# Patient Record
Sex: FEMALE | Race: WHITE | NOT HISPANIC OR LATINO | Employment: UNEMPLOYED | URBAN - METROPOLITAN AREA
[De-identification: names, ages, dates, MRNs, and addresses within clinical notes are randomized per-mention and may not be internally consistent; named-entity substitution may affect disease eponyms.]

---

## 2017-01-04 ENCOUNTER — OFFICE VISIT (OUTPATIENT)
Dept: URGENT CARE | Facility: URGENT CARE | Age: 37
End: 2017-01-04
Payer: COMMERCIAL

## 2017-01-04 VITALS
BODY MASS INDEX: 33.39 KG/M2 | HEART RATE: 72 BPM | TEMPERATURE: 98.3 F | WEIGHT: 193.2 LBS | SYSTOLIC BLOOD PRESSURE: 117 MMHG | DIASTOLIC BLOOD PRESSURE: 77 MMHG | OXYGEN SATURATION: 99 %

## 2017-01-04 DIAGNOSIS — H66.002 ACUTE SUPPURATIVE OTITIS MEDIA OF LEFT EAR WITHOUT SPONTANEOUS RUPTURE OF TYMPANIC MEMBRANE, RECURRENCE NOT SPECIFIED: Primary | ICD-10-CM

## 2017-01-04 PROCEDURE — 99213 OFFICE O/P EST LOW 20 MIN: CPT | Performed by: NURSE PRACTITIONER

## 2017-01-04 NOTE — MR AVS SNAPSHOT
After Visit Summary   1/4/2017    Brit Romano    MRN: [id number]           Patient Information     Date Of Birth          1980        Visit Information        Provider Department      1/4/2017 3:35 PM Em Sherman NP [facility]        Today's Diagnoses     Acute suppurative otitis media of left ear without spontaneous rupture of tympanic membrane, recurrence not specified    -  1       Care Instructions      Otitis Media (Middle-Ear Infection) in Adults  Otitis media is another name for a middle-ear infection. It means an infection behind your eardrum. This kind of ear infection can happen after any condition that keeps fluid from draining from the middle ear. These conditions include allergies, a cold, a sore throat, or a respiratory infection.  Middle-ear infections are common in children, but they can also happen in adults. An ear infection in an adult may mean a more serious problem than in a child. So you may need additional tests. If you have an ear infection, you should see your health care provider for treatment.  What are the types of middle-ear infections?  Infections can affect the middle ear in several ways. They are:    Acute otitis media. This middle-ear infection occurs suddenly. It causes swelling and redness. Fluid and mucus become trapped inside the ear. You can have a fever and ear pain.    Otitis media with effusion. Fluid (effusion) and mucus build up in the middle ear after the infection goes away. You may feel like your middle ear is full. This can continue for months and may affect your hearing.    Chronic otitis media with effusion. Fluid (effusion) remains in the middle ear for a long time. Or it builds up again and again, even though there is no infection. This type of middle-ear infection may be hard to treat. It may also affect your hearing.  Who is more likely to get a middle-ear infection?  You are more likely to get an ear infection if  you:    Smoke or are around someone who smokes    Have seasonal or year-round allergy symptoms    Have a cold or other upper respiratory infection  What causes a middle-ear infection?  The middle ear connects to the throat by a canal called the eustachian tube. This tube helps even out the pressure between the outer ear and the inner ear. A cold or allergy can irritate the tube or cause the area around it to swell. This can keep fluid from draining from the middle ear. The fluid builds up behind the eardrum. Bacteria and viruses can grow in this fluid. The bacteria and viruses cause the middle-ear infection.  What are the symptoms of a middle-ear infection?  Common symptoms of a middle-ear infection in adults are:    Pain in 1 or both ears    Drainage from the ear    Muffled hearing    Sore throat   You may also have a fever. Rarely, your balance can be affected.  These symptoms may be the same as for other conditions. It s important to talk with your health care provider if you think you have a middle-ear infection. If you have a high fever, severe pain behind your ear, or paralysis in your face, see your provider as soon as you can.  How is a middle-ear infection diagnosed?  Your health care provider will take a medical history and do a physical exam. He or she will look at the outer ear and eardrum with an otoscope. The otoscope is a lighted tool that lets your provider see inside the ear. A pneumatic otoscope blows a puff of air into the ear to check how well your eardrum moves. If you eardrum doesn t move well, it may mean you have fluid behind it.  Your provider may also do a test called tympanometry. This test tells how well the middle ear is working. It can find any changes in pressure in the middle ear. Your provider may test your hearing with a tuning fork.  How is a middle-ear infection treated?  A middle-ear infection may be treated with:    Antibiotics, taken by mouth or as ear drops    Medication for  pain    Decongestants, antihistamines, or nasal steroids  Your health care provider may also have you try autoinsufflation. This helps adjust the air pressure in your ear. For this, you pinch your nose and gently exhale. This forces air back through the eustachian tube.  The exact treatment for your ear infection will depend on the type of infection you have. In general, if your symptoms don t get better in 48 to 72 hours, contact your health care provider.  Middle-ear infections can cause long-term problems if not treated. They can lead to:    Infection in other parts of the head    Permanent hearing loss    Paralysis of a nerve in your face  If you have a middle-ear infection that doesn t get better, you may need to see an ear, nose, and throat specialist (otolaryngologist). You may need a CT scan or MRI to check for head and neck cancer.  Ear tubes  Sometimes fluid stays in the middle ear even after you take antibiotics and the infection goes away. In this case, your health care provider may suggest that a small tube be placed in your ear. The tube is put at the opening of the eardrum. The tube keeps fluid from building up and relieves pressure in the middle ear. It can also help you hear better. This surgery is called myringotomy. It is not often done in adults.  The tubes usually fall out on their own after 6 months to a year.    2495-5397 [employer]. [address] [place], [place] [address]. All rights reserved. This information is not intended as a substitute for professional medical care. Always follow your healthcare professional's instructions.              Follow-ups after your visit        Who to contact     If you have questions or need follow up information about today's clinic visit or your schedule please contact [facility] directly at [phone].  Normal or non-critical lab and imaging results will be communicated to you by MyChart, letter or phone within 4  "business days after the clinic has received the results. If you do not hear from us within 7 days, please contact the clinic through [employer] or phone. If you have a critical or abnormal lab result, we will notify you by phone as soon as possible.  Submit refill requests through [employer] or call your pharmacy and they will forward the refill request to us. Please allow 3 business days for your refill to be completed.          Additional Information About Your Visit        [employer] Information     [employer] lets you send messages to your doctor, view your test results, renew your prescriptions, schedule appointments and more. To sign up, go to [email or web address] . Click on \"Log in\" on the left side of the screen, which will take you to the Welcome page. Then click on \"Sign up Now\" on the right side of the page.     You will be asked to enter the access code listed below, as well as some personal information. Please follow the directions to create your username and password.     Your access code is: [email or web address]  Expires: 2017  3:50 PM     Your access code will  in 90 days. If you need help or a new code, please call your [facility] clinic or [phone].        Care EveryWhere ID     This is your Care EveryWhere ID. This could be used by other organizations to access your [facility] medical records  [id number]        Your Vitals Were     Pulse Temperature Pulse Oximetry             72 98.3  F (36.8  C) (Oral) 99%          Blood Pressure from Last 3 Encounters:   17 117/77   16 136/85   10/31/16 128/88    Weight from Last 3 Encounters:   17 193 lb 3.2 oz (87.635 kg)   16 191 lb 9.6 oz (86.909 kg)   10/31/16 189 lb 6.4 oz (85.911 kg)              Today, you had the following     No orders found for display         Today's Medication Changes          These changes are accurate as of: 17  3:50 PM.  If you have any questions, ask your nurse or doctor.               Start taking these " medicines.        Dose/Directions    amoxicillin-clavulanate 875-125 MG per tablet   Commonly known as:  AUGMENTIN   Used for:  Acute suppurative otitis media of left ear without spontaneous rupture of tympanic membrane, recurrence not specified   Started by:  Em Sherman NP        Dose:  1 tablet   Take 1 tablet by mouth 2 times daily for 7 days   Quantity:  14 tablet   Refills:  0            Where to get your medicines      These medications were sent to [facility]/pharmacy #[id number] - [facility] [address] [facility] [address]     Phone:  [phone]    - amoxicillin-clavulanate 875-125 MG per tablet             Primary Care Provider Office Phone # Fax #    [facility] [phone]       [facility] [address] [place] [id number]        Thank you!     Thank you for choosing [facility]  for your care. Our goal is always to provide you with excellent care. Hearing back from our patients is one way we can continue to improve our services. Please take a few minutes to complete the written survey that you may receive in the mail after your visit with us. Thank you!             Your Updated Medication List - Protect others around you: Learn how to safely use, store and throw away your medicines at www.disposemymeds.org.          This list is accurate as of: 1/4/17  3:50 PM.  Always use your most recent med list.                   Brand Name Dispense Instructions for use    amoxicillin-clavulanate 875-125 MG per tablet    AUGMENTIN    14 tablet    Take 1 tablet by mouth 2 times daily for 7 days       * cetirizine 10 MG tablet    zyrTEC     Take 1 tablet by mouth daily       * cetirizine 10 MG tablet    zyrTEC    60 tablet    Take 1 tablet (10 mg) by mouth 2 times daily       fluticasone 50 MCG/ACT spray    FLONASE    16 mL    SPRAY 2 SPRAYS INTO BOTH NOSTRILS DAILY       montelukast 10 MG tablet     SINGULAIR    30 tablet    Take 1 tablet (10 mg) by mouth At Bedtime       omeprazole 40 MG capsule    priLOSEC    30 capsule    Take 1 capsule (40 mg) by mouth daily       * Notice:  This list has 2 medication(s) that are the same as other medications prescribed for you. Read the directions carefully, and ask your doctor or other care provider to review them with you.

## 2017-01-04 NOTE — PROGRESS NOTES
SUBJECTIVE:                                                    Brit Romano is a 37 year old female who presents to clinic today for the following health issues:      Swollen glands on left side  6 days ago  Left side of throat  Pain on inside and outside of throat  Ibuprofen    No Known Allergies    Past Medical History   Diagnosis Date     NO ACTIVE PROBLEMS      Seasonal allergies          Current Outpatient Prescriptions on File Prior to Visit:  montelukast (SINGULAIR) 10 MG tablet Take 1 tablet (10 mg) by mouth At Bedtime   cetirizine (ZYRTEC) 10 MG tablet Take 1 tablet (10 mg) by mouth 2 times daily   omeprazole (PRILOSEC) 40 MG capsule Take 1 capsule (40 mg) by mouth daily   fluticasone (FLONASE) 50 MCG/ACT nasal spray SPRAY 2 SPRAYS INTO BOTH NOSTRILS DAILY   cetirizine (ZYRTEC) 10 MG tablet Take 1 tablet by mouth daily     No current facility-administered medications on file prior to visit.    Social History   Substance Use Topics     Smoking status: Never Smoker      Smokeless tobacco: Never Used     Alcohol Use: No       ROS:   Constitutional: no fevers  ENT: as above    OBJECTIVE:  /77 mmHg  Pulse 72  Temp(Src) 98.3  F (36.8  C) (Oral)  Wt 193 lb 3.2 oz (87.635 kg)  SpO2 99%   General:   awake, alert, and cooperative.  NAD.   Head: Normocephalic, atraumatic.  Eyes: Conjunctiva clear,   ENT: . Left TM is erythematous and bulging, unable to see landmarks. Right TM is grey and translucent. Ear canals are intact  Neuro: Alert and oriented - normal speech.    ASSESSMENT:    ICD-10-CM    1. Acute suppurative otitis media of left ear without spontaneous rupture of tympanic membrane, recurrence not specified H66.002 amoxicillin-clavulanate (AUGMENTIN) 875-125 MG per tablet       PLAN:     I discussed clinical findings with the patient.  Patient educational/instructional material provided including reasons for follow-up    The patient indicates understanding of these issues and agrees with the  plan.   Advised about symptoms which might herald more serious problems.    Em Sherman  [facility]  Family Nurse Practitoner

## 2017-01-04 NOTE — PATIENT INSTRUCTIONS
Otitis Media (Middle-Ear Infection) in Adults  Otitis media is another name for a middle-ear infection. It means an infection behind your eardrum. This kind of ear infection can happen after any condition that keeps fluid from draining from the middle ear. These conditions include allergies, a cold, a sore throat, or a respiratory infection.  Middle-ear infections are common in children, but they can also happen in adults. An ear infection in an adult may mean a more serious problem than in a child. So you may need additional tests. If you have an ear infection, you should see your health care provider for treatment.  What are the types of middle-ear infections?  Infections can affect the middle ear in several ways. They are:    Acute otitis media. This middle-ear infection occurs suddenly. It causes swelling and redness. Fluid and mucus become trapped inside the ear. You can have a fever and ear pain.    Otitis media with effusion. Fluid (effusion) and mucus build up in the middle ear after the infection goes away. You may feel like your middle ear is full. This can continue for months and may affect your hearing.    Chronic otitis media with effusion. Fluid (effusion) remains in the middle ear for a long time. Or it builds up again and again, even though there is no infection. This type of middle-ear infection may be hard to treat. It may also affect your hearing.  Who is more likely to get a middle-ear infection?  You are more likely to get an ear infection if you:    Smoke or are around someone who smokes    Have seasonal or year-round allergy symptoms    Have a cold or other upper respiratory infection  What causes a middle-ear infection?  The middle ear connects to the throat by a canal called the eustachian tube. This tube helps even out the pressure between the outer ear and the inner ear. A cold or allergy can irritate the tube or cause the area around it to swell. This can keep fluid from draining from  the middle ear. The fluid builds up behind the eardrum. Bacteria and viruses can grow in this fluid. The bacteria and viruses cause the middle-ear infection.  What are the symptoms of a middle-ear infection?  Common symptoms of a middle-ear infection in adults are:    Pain in 1 or both ears    Drainage from the ear    Muffled hearing    Sore throat   You may also have a fever. Rarely, your balance can be affected.  These symptoms may be the same as for other conditions. It s important to talk with your health care provider if you think you have a middle-ear infection. If you have a high fever, severe pain behind your ear, or paralysis in your face, see your provider as soon as you can.  How is a middle-ear infection diagnosed?  Your health care provider will take a medical history and do a physical exam. He or she will look at the outer ear and eardrum with an otoscope. The otoscope is a lighted tool that lets your provider see inside the ear. A pneumatic otoscope blows a puff of air into the ear to check how well your eardrum moves. If you eardrum doesn t move well, it may mean you have fluid behind it.  Your provider may also do a test called tympanometry. This test tells how well the middle ear is working. It can find any changes in pressure in the middle ear. Your provider may test your hearing with a tuning fork.  How is a middle-ear infection treated?  A middle-ear infection may be treated with:    Antibiotics, taken by mouth or as ear drops    Medication for pain    Decongestants, antihistamines, or nasal steroids  Your health care provider may also have you try autoinsufflation. This helps adjust the air pressure in your ear. For this, you pinch your nose and gently exhale. This forces air back through the eustachian tube.  The exact treatment for your ear infection will depend on the type of infection you have. In general, if your symptoms don t get better in 48 to 72 hours, contact your health care  provider.  Middle-ear infections can cause long-term problems if not treated. They can lead to:    Infection in other parts of the head    Permanent hearing loss    Paralysis of a nerve in your face  If you have a middle-ear infection that doesn t get better, you may need to see an ear, nose, and throat specialist (otolaryngologist). You may need a CT scan or MRI to check for head and neck cancer.  Ear tubes  Sometimes fluid stays in the middle ear even after you take antibiotics and the infection goes away. In this case, your health care provider may suggest that a small tube be placed in your ear. The tube is put at the opening of the eardrum. The tube keeps fluid from building up and relieves pressure in the middle ear. It can also help you hear better. This surgery is called myringotomy. It is not often done in adults.  The tubes usually fall out on their own after 6 months to a year.    2413-6028 [employer]. [address] [place] [address], [place] [address]. All rights reserved. This information is not intended as a substitute for professional medical care. Always follow your healthcare professional's instructions.

## 2017-01-04 NOTE — NURSING NOTE
"Chief Complaint   Patient presents with     Mass     left side swelling       Initial /77 mmHg  Pulse 72  Temp(Src) 98.3  F (36.8  C) (Oral)  Wt 193 lb 3.2 oz (87.635 kg)  SpO2 99% Estimated body mass index is 33.39 kg/(m^2) as calculated from the following:    Height as of 10/31/16: 5' 3.78\" (1.62 m).    Weight as of this encounter: 193 lb 3.2 oz (87.635 kg).  BP completed using cuff size: jeromy Wu MA    "

## 2017-01-19 ENCOUNTER — OFFICE VISIT (OUTPATIENT)
Dept: OTOLARYNGOLOGY | Facility: CLINIC | Age: 37
End: 2017-01-19
Payer: COMMERCIAL

## 2017-01-19 VITALS — HEIGHT: 64 IN | WEIGHT: 197.6 LBS | RESPIRATION RATE: 16 BRPM | BODY MASS INDEX: 33.73 KG/M2

## 2017-01-19 DIAGNOSIS — R07.0 THROAT PAIN: ICD-10-CM

## 2017-01-19 DIAGNOSIS — H92.02 OTALGIA OF LEFT EAR: ICD-10-CM

## 2017-01-19 DIAGNOSIS — J32.0 CHRONIC MAXILLARY SINUSITIS: Primary | ICD-10-CM

## 2017-01-19 PROCEDURE — 31575 DIAGNOSTIC LARYNGOSCOPY: CPT | Performed by: OTOLARYNGOLOGY

## 2017-01-19 PROCEDURE — 99244 OFF/OP CNSLTJ NEW/EST MOD 40: CPT | Mod: 25 | Performed by: OTOLARYNGOLOGY

## 2017-01-19 RX ORDER — FLUTICASONE PROPIONATE 50 MCG
1-2 SPRAY, SUSPENSION (ML) NASAL DAILY
Qty: 1 BOTTLE | Refills: 11 | Status: CANCELLED | OUTPATIENT
Start: 2017-01-19

## 2017-01-19 ASSESSMENT — PAIN SCALES - GENERAL: PAINLEVEL: MILD PAIN (2)

## 2017-01-19 NOTE — PROGRESS NOTES
History of Present Illness - Brit Romano is a 37 year old female who was seen in consultation at the request of Dr. Natarajan for possible chronic sinusitis. However, today she presents with the chief complaint of left ear pain as well as left upper neck pain. She was recently diagnosed with a left ear infection. She denies any hearing changes. She is able to swallow without difficulty. Sometimes she hears a popping sound in the left ear. She does have itchy and runny nose, and is being managed by [facility] for allergic rhinitis. She denies prior sinus surgery. She is able to breath through her nose easily.    Past Medical History -   Patient Active Problem List   Diagnosis     Mixed incontinence urge and stress (male)(female)     Lactose intolerance     Hypovitaminosis D     Vitamin D deficiency     Anemia      (normal spontaneous vaginal delivery)     Fatty liver disease, nonalcoholic     History of gestational diabetes     Fibromyalgia       Current Medications -   Current outpatient prescriptions:      montelukast (SINGULAIR) 10 MG tablet, Take 1 tablet (10 mg) by mouth At Bedtime, Disp: 30 tablet, Rfl: 3     cetirizine (ZYRTEC) 10 MG tablet, Take 1 tablet (10 mg) by mouth 2 times daily, Disp: 60 tablet, Rfl: 11     omeprazole (PRILOSEC) 40 MG capsule, Take 1 capsule (40 mg) by mouth daily, Disp: 30 capsule, Rfl: 5     fluticasone (FLONASE) 50 MCG/ACT nasal spray, SPRAY 2 SPRAYS INTO BOTH NOSTRILS DAILY, Disp: 16 mL, Rfl: 3     cetirizine (ZYRTEC) 10 MG tablet, Take 1 tablet by mouth daily, Disp: , Rfl: 8    Allergies - No Known Allergies    Social History -   Social History     Social History     Marital Status: Legally      Spouse Name: N/A     Number of Children: N/A     Years of Education: N/A     Occupational History     Housewife      Social History Main Topics     Smoking status: Never Smoker      Smokeless tobacco: Never Used     Alcohol Use: No     Drug Use: No     Sexual Activity:      "Partners: Male     Other Topics Concern     Not on file     Social History Narrative    How much exercise per week? none    How much calcium per day? diet       How much caffeine per day? none    How much vitamin D per day? Diet/supplement    Do you/your family wear seatbelts?  Yes    Do you/your family use safety helmets? No    Do you/your family use sunscreen? No    Do you/your family keep firearms in the home? No    Do you/your family have a smoke detector(s)? Yes        Do you feel safe in your home? Yes    Has anyone ever touched you in an unwanted manner? No     Explain     Cande Persaud, ANGELIKA    08/21/2015        Review SMA Fe    10/07/2016       Family History -   Family History   Problem Relation Age of Onset     Hypertension Mother      DIABETES Mother      KIDNEY DISEASE Mother      dialysis       Review of Systems - As per HPI and PMHx, otherwise 7 system review of the head and neck negative. 10+ system review negative.    Physical Exam  Resp 16  Ht 1.62 m (5' 3.78\")  Wt 89.631 kg (197 lb 9.6 oz)  BMI 34.15 kg/m2  General - The patient is well nourished and well developed, and appears to have good nutritional status.  Alert and oriented to person and place, answers questions and cooperates with examination appropriately.   Head and Face - Normocephalic and atraumatic, with no gross asymmetry noted of the contour of the facial features.  The facial nerve is intact, with strong symmetric movements.  Voice and Breathing - The patient was breathing comfortably without the use of accessory muscles. There was no wheezing, stridor, or stertor.  The patients voice was clear and strong, and had appropriate pitch and quality.  Ears - Bilateral pinna and EACs with normal appearing overlying skin. Tympanic membrane intact with good mobility on pneumatic otoscopy bilaterally. Bony landmarks of the ossicular chain are normal. The tympanic membranes are normal in appearance. No retraction, perforation, or " masses.  No fluid or purulence was seen in the external canal or the middle ear.   Eyes - Extraocular movements intact.  Sclera were not icteric or injected, conjunctiva were pink and moist.  Mouth - Examination of the oral cavity showed pink, healthy oral mucosa. No lesions or ulcerations noted.  The tongue was mobile and midline, and the dentition were in good condition.  Palpation of the left TMJ demonstrates consistent crepitus with jaw movement.  Throat - The walls of the oropharynx were smooth, pink, moist, symmetric, and had no lesions or ulcerations.  The tonsillar pillars and soft palate were symmetric.  The uvula was midline on elevation.  Neck - Normal midline excursion of the laryngotracheal complex during swallowing.  Full range of motion on passive movement.  Palpation of the occipital, submental, submandibular, internal jugular chain, and supraclavicular nodes did not demonstrate any abnormal lymph nodes or masses.  The carotid pulse was palpable bilaterally.  Palpation of the thyroid was soft and smooth, with no nodules or goiter appreciated.  The trachea was mobile and midline.  Nose - External contour is symmetric, no gross deflection or scars.  Nasal mucosa is pink and moist with no abnormal mucus.  The septum was midline and non-obstructive, turbinates of normal size and position.  No polyps, masses, or purulence noted on examination.  Heart:  Regular rate and rhythm, no murmurs.  Lungs:  Chest clear to auscultation bilaterally    Procedure: Flexible Endoscopy  Indication: otalgia, throat pain    Attempts at mirror laryngoscopy were not possible due to gag reflex.  Therefore I proceeded with a fiberoptic examination.  First I sprayed both sides of the nose with a mixture of lidocaine and neosynephrine.  I then passed the scope through the nasal cavity.  The nasal cavity was unremarkable. There was no thick mucus emanating from the nasal cavity or middle meatus.  The nasopharynx was mucosally  covered and symmetric.  The Eustachian tube openings were unobstructed.  Going further down I had a clear view of the base of tongue which had normal appearing lingual tonsillar tissue.  The base of tongue was free of lesions, and the vallecula was open.  The epiglottis was smooth and mucosally covered.  The supraglottic larynx was then clearly visualized.  The vocal cords moved smoothly and symmetrically, they were pearly white and no lesions were seen.  The pyriform sinuses were open, and the limited view of the postcricoid region did not show any lesions.        Assessment - Brit Romano is a 37 year old female with left otalgia and left upper throat pain as well as findings of left TMJ crepitus. Based on today's history and physical exam, I can find no evidence of middle ear pathology or eustachian tube dysfunction.  At this point my primary diagnosis is of temporomandibular syndrome.  I have discussed the etiology of TMJ, and the reasons why referred pain can mimic symptoms of ear disease, headaches, and even sinusitis.  Finally, I counseled the patient that should the therapy not help, or should the symptoms change, that they should return to me.    For the next 2 weeks:  1. Soft diet  2. No chewing gum  3. Use ibuprofen 600mg every 6 hours for 2 weeks  4. Use a bite guard at night, consider Lavinia's Grind-No-More which is available OTC.       Examination today did not find any suspisous findings for chronic sinusitis. However, if she develops nasal obstruction and thick rhinorrhea, she was advised to return for a CT Sinus to evaluate for possible chronic sinusitis.    Dr. Sudarshan Walters MD  Otolaryngology  [facility]

## 2017-01-19 NOTE — MR AVS SNAPSHOT
After Visit Summary   1/19/2017    Brit Romano    MRN: [id number]           Patient Information     Date Of Birth          1980        Visit Information        Provider Department      1/19/2017 1:45 PM Sudarshan Walters MD [facility]        Today's Diagnoses     Chronic maxillary sinusitis    -  1     Throat pain         Otalgia of left ear           Care Instructions    Scheduling Information  To schedule your CT/MRI scan, please contact [facility] at [phone] OR [facility] at [phone]    To schedule your Surgery, please contact our Specialty Schedulers at [phone]    ** If a CT scan or biopsy were ordered/done, Dr. Walters will need to see you back in clinic to go over your biopsy results or CT/MRI scan. He will go over the images from your scan with you and discuss treatment based on your results.     ENT Clinic Locations Clinic Hours Telephone Number     [facility] [place]  [address]  [place] [address]   E/O Thursday:      7:30am -- 4:00pm   To schedule/reschedule an appointment with   Dr. Walters,   please contact our   Specialty Scheduling Department at:     [phone]       [facility]  [address] [place] [address]     Monday:              12:00pm -- 4:00pm    Tuesday:               8:30am -- 4:30pm    Wednesday:        12:00pm -- 4:00pm    E/O Thursday:        8:00am - 4:30pm           Urgent Care Locations Clinic Hours Telephone Numbers     [facility]  [address]  [place] [address]     Monday-Friday:     11:00am - 9:00pm    Saturday-Sunday:  9:00am - 5:00pm   [phone]     [facility]  [address] [place] [address]     Monday-Friday:      5:00pm - 9:00pm     Saturday-Sunday:  9:00am - 5:00pm   [phone]     Based on today's history and physical exam, I can find no evidence of middle ear pathology or eustachian tube dysfunction.  At this point my primary diagnosis is of  temporomandibular syndrome.  I have discussed the etiology of TMJ, and the reasons why referred pain can mimic symptoms of ear disease, headaches, and even sinusitis.  Finally, I counseled the patient that should the therapy not help, or should the symptoms change, that they should return to me.    For the next 2 weeks:  1. Soft diet  2. No chewing gum  3. Use ibuprofen 600mg every 6 hours for 2 weeks  4. Use a bite guard at night, consider Lavinia's Grind-No-More which is available OTC.    TMJ exercises:  1. Close your mouth to touch your upper and lower teeth without clenching them. Rest the tip of your tongue on your palate behind your upper teeth.  2. Slide the tip of your tongue backwards toward the throat as far as you can, keeping your teeth together.  3. Try to touch the soft palate with the tip of your tongue, and keep it there. Then slowly open your mouth until you feel your tongue is being pulled away from the soft palate. Keep your mouth open in this position for five seconds before closing your mouth to relax.  4. Repeat the above steps slowly for 5 minutes. You may want to check in a mirror to be sure your teeth are closed aligned straight up and down, without your jaw being off to one side.    Do the exercises twice daily for the first 2 weeks, but then you can do it more often if it seems to help. You shouldn't hear any clicks or noise from your joints, and if you do then you should restart the exercise rather than opening the mouth further. With practice, you will be able to open further without having clicking.          Follow-ups after your visit        Who to contact     If you have questions or need follow up information about today's clinic visit or your schedule please contact [facility] directly at [phone].  Normal or non-critical lab and imaging results will be communicated to you by MyChart, letter or phone within 4 business days after the clinic has received the  "results. If you do not hear from us within 7 days, please contact the clinic through [employer] or phone. If you have a critical or abnormal lab result, we will notify you by phone as soon as possible.  Submit refill requests through [employer] or call your pharmacy and they will forward the refill request to us. Please allow 3 business days for your refill to be completed.          Additional Information About Your Visit        [employer] Information     [employer] lets you send messages to your doctor, view your test results, renew your prescriptions, schedule appointments and more. To sign up, go to [email or web address] . Click on \"Log in\" on the left side of the screen, which will take you to the Welcome page. Then click on \"Sign up Now\" on the right side of the page.     You will be asked to enter the access code listed below, as well as some personal information. Please follow the directions to create your username and password.     Your access code is: [email or web address]  Expires: 2017  3:50 PM     Your access code will  in 90 days. If you need help or a new code, please call your [facility] clinic or [phone].        Care EveryWhere ID     This is your Care EveryWhere ID. This could be used by other organizations to access your [facility] medical records  [id number]        Your Vitals Were     Respirations Height BMI (Body Mass Index)             16 1.62 m (5' 3.78\") 34.15 kg/m2          Blood Pressure from Last 3 Encounters:   17 117/77   16 136/85   10/31/16 128/88    Weight from Last 3 Encounters:   17 89.631 kg (197 lb 9.6 oz)   17 87.635 kg (193 lb 3.2 oz)   16 86.909 kg (191 lb 9.6 oz)              We Performed the Following     Laryngoscopy, Fiber        Primary Care Provider Office Phone # Fax #    Princess MALACHI Mcnamara [phone]       [address] [address]        Thank you!     Thank you for choosing " [facility]  for your care. Our goal is always to provide you with excellent care. Hearing back from our patients is one way we can continue to improve our services. Please take a few minutes to complete the written survey that you may receive in the mail after your visit with us. Thank you!             Your Updated Medication List - Protect others around you: Learn how to safely use, store and throw away your medicines at www.disposemymeds.org.          This list is accurate as of: 1/19/17  3:35 PM.  Always use your most recent med list.                   Brand Name Dispense Instructions for use    * cetirizine 10 MG tablet    zyrTEC     Take 1 tablet by mouth daily       * cetirizine 10 MG tablet    zyrTEC    60 tablet    Take 1 tablet (10 mg) by mouth 2 times daily       fluticasone 50 MCG/ACT spray    FLONASE    16 mL    SPRAY 2 SPRAYS INTO BOTH NOSTRILS DAILY       montelukast 10 MG tablet    SINGULAIR    30 tablet    Take 1 tablet (10 mg) by mouth At Bedtime       omeprazole 40 MG capsule    priLOSEC    30 capsule    Take 1 capsule (40 mg) by mouth daily       * Notice:  This list has 2 medication(s) that are the same as other medications prescribed for you. Read the directions carefully, and ask your doctor or other care provider to review them with you.

## 2017-01-19 NOTE — NURSING NOTE
"Chief Complaint   Patient presents with     Ear Problem     Recent infection in left ear     Throat Problem     Pain, left side       Initial Resp 16  Ht 1.62 m (5' 3.78\")  Wt 89.631 kg (197 lb 9.6 oz)  BMI 34.15 kg/m2 Estimated body mass index is 34.15 kg/(m^2) as calculated from the following:    Height as of this encounter: 1.62 m (5' 3.78\").    Weight as of this encounter: 89.631 kg (197 lb 9.6 oz).  BP completed using cuff size: NA (Not Taken)    Lizzie Dodson MA    "

## 2017-01-19 NOTE — PATIENT INSTRUCTIONS
Scheduling Information  To schedule your CT/MRI scan, please contact [facility] at [phone] OR [facility] at [phone]    To schedule your Surgery, please contact our Specialty Schedulers at [phone]    ** If a CT scan or biopsy were ordered/done, Dr. Walters will need to see you back in clinic to go over your biopsy results or CT/MRI scan. He will go over the images from your scan with you and discuss treatment based on your results.     ENT Clinic Locations Clinic Hours Telephone Number     [facility]  [address]  [place] [address]   E/O Thursday:      7:30am -- 4:00pm   To schedule/reschedule an appointment with   Dr. Walters,   please contact our   Specialty Scheduling Department at:     [phone]       [facility]  [address] [place] [address]     Monday:              12:00pm -- 4:00pm    Tuesday:               8:30am -- 4:30pm    Wednesday:        12:00pm -- 4:00pm    E/O Thursday:        8:00am - 4:30pm           Urgent Care Locations Clinic Hours Telephone Numbers     [facility]  [address] [place] [address]     Monday-Friday:     11:00am - 9:00pm    Saturday-Sunday:  9:00am - 5:00pm   [phone]     [facility]  [address] [place] [address]     Monday-Friday:      5:00pm - 9:00pm     Saturday-Sunday:  9:00am - 5:00pm   [phone]     Based on today's history and physical exam, I can find no evidence of middle ear pathology or eustachian tube dysfunction.  At this point my primary diagnosis is of temporomandibular syndrome.  I have discussed the etiology of TMJ, and the reasons why referred pain can mimic symptoms of ear disease, headaches, and even sinusitis.  Finally, I counseled the patient that should the therapy not help, or should the symptoms change, that they should return to me.    For the next 2 weeks:  1. Soft diet  2. No chewing gum  3. Use ibuprofen 600mg every 6 hours for 2 weeks  4. Use  a bite guard at night, consider Lavinia's Grind-No-More which is available OTC.    TMJ exercises:  1. Close your mouth to touch your upper and lower teeth without clenching them. Rest the tip of your tongue on your palate behind your upper teeth.  2. Slide the tip of your tongue backwards toward the throat as far as you can, keeping your teeth together.  3. Try to touch the soft palate with the tip of your tongue, and keep it there. Then slowly open your mouth until you feel your tongue is being pulled away from the soft palate. Keep your mouth open in this position for five seconds before closing your mouth to relax.  4. Repeat the above steps slowly for 5 minutes. You may want to check in a mirror to be sure your teeth are closed aligned straight up and down, without your jaw being off to one side.    Do the exercises twice daily for the first 2 weeks, but then you can do it more often if it seems to help. You shouldn't hear any clicks or noise from your joints, and if you do then you should restart the exercise rather than opening the mouth further. With practice, you will be able to open further without having clicking.

## 2017-03-15 ENCOUNTER — RADIANT APPOINTMENT (OUTPATIENT)
Dept: GENERAL RADIOLOGY | Facility: CLINIC | Age: 37
End: 2017-03-15
Attending: FAMILY MEDICINE
Payer: COMMERCIAL

## 2017-03-15 ENCOUNTER — OFFICE VISIT (OUTPATIENT)
Dept: URGENT CARE | Facility: URGENT CARE | Age: 37
End: 2017-03-15
Payer: COMMERCIAL

## 2017-03-15 VITALS
WEIGHT: 195 LBS | SYSTOLIC BLOOD PRESSURE: 121 MMHG | BODY MASS INDEX: 33.7 KG/M2 | DIASTOLIC BLOOD PRESSURE: 77 MMHG | HEART RATE: 83 BPM | TEMPERATURE: 97.5 F | OXYGEN SATURATION: 99 %

## 2017-03-15 DIAGNOSIS — M79.641 PAIN OF RIGHT HAND: Primary | ICD-10-CM
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
## 2017-03-15 DIAGNOSIS — M79.641 PAIN OF RIGHT HAND: ICD-10-CM

## 2017-03-15 PROCEDURE — 73130 X-RAY EXAM OF HAND: CPT | Mod: RT

## 2017-03-15 PROCEDURE — 99213 OFFICE O/P EST LOW 20 MIN: CPT | Performed by: FAMILY MEDICINE

## 2017-03-15 RX ORDER — CEPHALEXIN 500 MG/1
500 CAPSULE ORAL 3 TIMES DAILY
Refills: 0 | Status: CANCELLED | OUTPATIENT
Start: 2017-03-15 | End: 2017-03-22

## 2017-03-15 ASSESSMENT — PAIN SCALES - GENERAL: PAINLEVEL: EXTREME PAIN (8)

## 2017-03-15 NOTE — MR AVS SNAPSHOT
After Visit Summary   3/15/2017    Brit Romano    MRN: [id number]           Patient Information     Date Of Birth          1980        Visit Information        Provider Department      3/15/2017 6:35 PM Theron Oseguera MD [facility]        Today's Diagnoses     Pain of right hand    -  1    Fall, initial encounter          Care Instructions      Finger Sprain  A sprain is a stretching or tearing of the ligaments that hold a joint together. There are no broken bones. Sprains take 3 to 6 weeks to heal.    A sprained finger may be treated with a splint or buddy tape. This is when you tape the injured finger to the one next to it for support. Minor sprains may require no additional support.  Home care    Keep your hand elevated to reduce pain and swelling. This is very important during the first 48 hours.    Apply an ice pack over the injured area for 15 to 20 minutes every 3 to 6 hours. You should do this for the first 24 to 48 hours. You can make an ice pack by filling a plastic bag that seals at the top with ice cubes and then wrapping it with a thin towel. Continue the use of ice packs for relief of pain and swelling as needed. As the ice melts, be careful to avoid getting any wrap or splint wet. After 48 hours, apply heat (warm shower or warm bath) for 15 to 20 minutes several times a day, or alternate ice and heat.    If buddy tape was applied and it becomes wet or dirty, change it. You may replace it with paper, plastic or cloth tape. Cloth tape and paper tapes must be kept dry. Apply gauze or cotton padding between the fingers, especially at the webbed space. This will help prevent the skin from getting moist and breaking down. Keep the buddy tape in place for at least 4 weeks, or as instructed by your healthcare provider.    If a splint was applied, wear it for the time advised.    You may use over-the-counter pain medicine to control pain, unless another pain  medicine was prescribed. If you have chronic liver or kidney disease or ever had a stomach ulcer or GI bleeding, talk with your healthcare provider before using these medicines.  Follow-up care  Follow up with your healthcare provider as directed. Finger joints will become stiff if immobile for too long. If a splint was applied, ask your healthcare provider when it is safe to begin range-of-motion exercises.  Sometimes fractures don t show up on the first X-ray. Bruises and sprains can sometimes hurt as much as a fracture. These injuries can take time to heal completely. If your symptoms don t improve or they get worse, talk with your healthcare provider. You may need a repeat X-ray. If X-rays were taken, you will be told of any new findings that may affect your care.  When to seek medical advice  Call your healthcare provider right away if any of these occur:    Pain or swelling increases    Fingers or hand becomes cold, blue, numb, or tingly    1864-3309 [employer]. [address] [place] [address]. All rights reserved. This information is not intended as a substitute for professional medical care. Always follow your healthcare professional's instructions.              Follow-ups after your visit        Who to contact     If you have questions or need follow up information about today's clinic visit or your schedule please contact [facility] directly at [phone].  Normal or non-critical lab and imaging results will be communicated to you by MyChart, letter or phone within 4 business days after the clinic has received the results. If you do not hear from us within 7 days, please contact the clinic through MyChart or phone. If you have a critical or abnormal lab result, we will notify you by phone as soon as possible.  Submit refill requests through [employer] or call your pharmacy and they will forward the refill request to us. Please allow 3 business days for your  "refill to be completed.          Additional Information About Your Visit        "[employer] Information     [employer] lets you send messages to your doctor, view your test results, renew your prescriptions, schedule appointments and more. To sign up, go to [email or web address] . Click on \"Log in\" on the left side of the screen, which will take you to the Welcome page. Then click on \"Sign up Now\" on the right side of the page.     You will be asked to enter the access code listed below, as well as some personal information. Please follow the directions to create your username and password.     Your access code is: [email or web address]  Expires: 2017  4:50 PM     Your access code will  in 90 days. If you need help or a new code, please call your [place] clinic or [phone].        Care EveryWhere ID     This is your Care EveryWhere ID. This could be used by other organizations to access your [place] medical records  [id number]        Your Vitals Were     Pulse Temperature Pulse Oximetry BMI (Body Mass Index)          83 97.5  F (36.4  C) (Oral) 99% 33.7 kg/m2         Blood Pressure from Last 3 Encounters:   03/15/17 121/77   17 117/77   16 136/85    Weight from Last 3 Encounters:   03/15/17 195 lb (88.5 kg)   17 197 lb 9.6 oz (89.6 kg)   17 193 lb 3.2 oz (87.6 kg)               Primary Care Provider Office Phone # Fax #    Princess MALACHI Mcnamara [employer] [phone]       [facility] [address] [facility] [address]        Thank you!     Thank you for choosing [facility]  for your care. Our goal is always to provide you with excellent care. Hearing back from our patients is one way we can continue to improve our services. Please take a few minutes to complete the written survey that you may receive in the mail after your visit with us. Thank you!             Your Updated Medication List - Protect others around you: Learn how to " safely use, store and throw away your medicines at www.disposemymeds.org.          This list is accurate as of: 3/15/17  8:43 PM.  Always use your most recent med list.                   Brand Name Dispense Instructions for use    cetirizine 10 MG tablet    zyrTEC    60 tablet    Take 1 tablet (10 mg) by mouth 2 times daily       fluticasone 50 MCG/ACT spray    FLONASE    16 mL    SPRAY 2 SPRAYS INTO BOTH NOSTRILS DAILY       montelukast 10 MG tablet    SINGULAIR    30 tablet    Take 1 tablet (10 mg) by mouth At Bedtime       omeprazole 40 MG capsule    priLOSEC    30 capsule    Take 1 capsule (40 mg) by mouth daily

## 2017-03-16 NOTE — PROGRESS NOTES
"No chief complaint on file.      Initial There were no vitals taken for this visit. Estimated body mass index is 34.15 kg/(m^2) as calculated from the following:    Height as of 17: 5' 3.78\" (1.62 m).    Weight as of 17: 197 lb 9.6 oz (89.6 kg).  Medication Reconciliation: jin Andersen CMA      Some of this note was populated by a medical assistant.      SUBJECTIVE:                                                    Brit Romano is a 37 year old female who presents to clinic today for the following health issues:      Musculoskeletal problem/pain      Duration: 1 day    Description  Location: right hand. Fall onto right hand.     Intensity:  8/10    Accompanying signs and symptoms: warmth and swelling    History  Previous similar problem: no   Previous evaluation:  none    Precipitating or alleviating factors:  Trauma or overuse: YES  Aggravating factors include: overuse    Therapies tried and outcome: rest/inactivity, heat, ice and acetaminophen       Problem list and histories reviewed & adjusted, as indicated.  Additional history: as documented    Patient Active Problem List   Diagnosis     Mixed incontinence urge and stress (male)(female)     Lactose intolerance     Hypovitaminosis D     Vitamin D deficiency     Anemia      (normal spontaneous vaginal delivery)     Fatty liver disease, nonalcoholic     History of gestational diabetes     Fibromyalgia     Past Surgical History   Procedure Laterality Date     No history of surgery         Social History   Substance Use Topics     Smoking status: Never Smoker     Smokeless tobacco: Never Used     Alcohol use No     Family History   Problem Relation Age of Onset     Hypertension Mother      DIABETES Mother      KIDNEY DISEASE Mother      dialysis         Current Outpatient Prescriptions   Medication Sig Dispense Refill     montelukast (SINGULAIR) 10 MG tablet Take 1 tablet (10 mg) by mouth At Bedtime 30 tablet 3     cetirizine (ZYRTEC) 10 " MG tablet Take 1 tablet (10 mg) by mouth 2 times daily 60 tablet 11     omeprazole (PRILOSEC) 40 MG capsule Take 1 capsule (40 mg) by mouth daily 30 capsule 5     fluticasone (FLONASE) 50 MCG/ACT nasal spray SPRAY 2 SPRAYS INTO BOTH NOSTRILS DAILY 16 mL 3     No Known Allergies    Reviewed and updated as needed this visit by clinical staff       Reviewed and updated as needed this visit by Provider         ROS:  Constitutional, HEENT, cardiovascular, pulmonary, gi and gu systems are negative, except as otherwise noted.    OBJECTIVE:                                                    /77 (BP Location: Left arm, Patient Position: Chair, Cuff Size: Adult Large)  Pulse 83  Temp 97.5  F (36.4  C) (Oral)  Wt 195 lb (88.5 kg)  SpO2 99%  BMI 33.7 kg/m2  Body mass index is 33.7 kg/(m^2).  GENERAL: healthy, alert and no distress  NECK: no adenopathy, no asymmetry, masses, or scars and thyroid normal to palpation  RESP: lungs clear to auscultation - no rales, rhonchi or wheezes  CV: regular rate and rhythm, normal S1 S2, no S3 or S4, no murmur, click or rub, no peripheral edema and peripheral pulses strong  ABDOMEN: soft, nontender, no hepatosplenomegaly, no masses and bowel sounds normal  MS: no gross musculoskeletal defects noted, without edema. Noted proximal ulnar side wrist pain without scaphoid area tenderness. Noted increased pain with  and movement. Noted slight reduced ROM in all planes. Normal circ 2+ and sensation    XR HAND RT G/E 3 VW 3/15/2017 8:33 PM     HISTORY: Right hand pain. Fall.     COMPARISON: None.     FINDINGS: No fracture or malalignment. Osseous structures are within  normal limits.      ASSESSMENT/PLAN:                                                        ICD-10-CM    1. Pain of right hand M79.641 XR Hand Right G/E 3 Views   2. Fall, initial encounter W19.XXXA XR Hand Right G/E 3 Views        PLAN  Patient educational/instructional material provided including reasons for follow-up    The  patient indicates understanding of these issues and agrees with the plan.  Theron Oseguera MD        [facility]

## 2017-03-16 NOTE — PATIENT INSTRUCTIONS
Finger Sprain  A sprain is a stretching or tearing of the ligaments that hold a joint together. There are no broken bones. Sprains take 3 to 6 weeks to heal.    A sprained finger may be treated with a splint or buddy tape. This is when you tape the injured finger to the one next to it for support. Minor sprains may require no additional support.  Home care    Keep your hand elevated to reduce pain and swelling. This is very important during the first 48 hours.    Apply an ice pack over the injured area for 15 to 20 minutes every 3 to 6 hours. You should do this for the first 24 to 48 hours. You can make an ice pack by filling a plastic bag that seals at the top with ice cubes and then wrapping it with a thin towel. Continue the use of ice packs for relief of pain and swelling as needed. As the ice melts, be careful to avoid getting any wrap or splint wet. After 48 hours, apply heat (warm shower or warm bath) for 15 to 20 minutes several times a day, or alternate ice and heat.    If buddy tape was applied and it becomes wet or dirty, change it. You may replace it with paper, plastic or cloth tape. Cloth tape and paper tapes must be kept dry. Apply gauze or cotton padding between the fingers, especially at the webbed space. This will help prevent the skin from getting moist and breaking down. Keep the buddy tape in place for at least 4 weeks, or as instructed by your healthcare provider.    If a splint was applied, wear it for the time advised.    You may use over-the-counter pain medicine to control pain, unless another pain medicine was prescribed. If you have chronic liver or kidney disease or ever had a stomach ulcer or GI bleeding, talk with your healthcare provider before using these medicines.  Follow-up care  Follow up with your healthcare provider as directed. Finger joints will become stiff if immobile for too long. If a splint was applied, ask your healthcare provider when it is safe to begin  range-of-motion exercises.  Sometimes fractures don t show up on the first X-ray. Bruises and sprains can sometimes hurt as much as a fracture. These injuries can take time to heal completely. If your symptoms don t improve or they get worse, talk with your healthcare provider. You may need a repeat X-ray. If X-rays were taken, you will be told of any new findings that may affect your care.  When to seek medical advice  Call your healthcare provider right away if any of these occur:    Pain or swelling increases    Fingers or hand becomes cold, blue, numb, or tingly    7452-7116 [employer]. [address] [place] [address], [place] [address]. All rights reserved. This information is not intended as a substitute for professional medical care. Always follow your healthcare professional's instructions.

## 2017-05-02 ENCOUNTER — TELEPHONE (OUTPATIENT)
Dept: ALLERGY | Facility: CLINIC | Age: 37
End: 2017-05-02

## 2017-05-02 DIAGNOSIS — J31.0 CHRONIC RHINITIS: ICD-10-CM

## 2017-05-02 RX ORDER — CETIRIZINE HYDROCHLORIDE 10 MG/1
10 TABLET ORAL 2 TIMES DAILY
Qty: 60 TABLET | Refills: 4 | Status: SHIPPED | OUTPATIENT
Start: 2017-05-02 | End: 2018-05-07

## 2017-05-02 NOTE — TELEPHONE ENCOUNTER
Reason for Call:  Medication or medication refill:    Do you use a [facility]?  Name of the pharmacy and phone number for the current request:  [facility] listed above    Name of the medication requested: Cetirizine    Other request: NA      Can we leave a detailed message on this number? YES    Phone number patient can be reached at: Home number on file [phone] (home)    Best Time: any time    Call taken on 5/2/2017 at 1:08 PM by Christiana Lucero

## 2017-05-02 NOTE — TELEPHONE ENCOUNTER
Left message for patient to call clinic back.  Patient still has refills left on prescription, will let her know when she calls clinic back.     Diandra Vicente RN............. 5/2/2017....1:35 PM

## 2017-05-02 NOTE — TELEPHONE ENCOUNTER
Prescription sent to preferred pharmacy.  No further questions. Closing encounter.     Diandra Vicente RN............. 5/2/2017....1:52 PM

## 2017-08-29 NOTE — PROGRESS NOTES
SUBJECTIVE:   Brit Romano is a 37 year old female who presents to clinic today for the following health issues:      ABDOMINAL   PAIN     Onset: x1 months     Description:   Character: Burning and Cramping  Location: all over stomach area  Radiation: None    Intensity: moderate    Progression of Symptoms:  same    Accompanying Signs & Symptoms:  Fever/Chills?: YES  Gas/Bloating: YES  Nausea: YES  Vomitting: no   Diarrhea?: YES  Constipation:no   Dysuria or Hematuria: YES   History:   Trauma: no   Previous similar pain: YES   Previous tests done: none    Precipitating factors:   Does the pain change with:     Food: YES     BM: no     Urination: YES    Alleviating factors:  Eating, bathroom    Therapies Tried and outcome: tylenol, home remedies    LMP:  2017       Patient is concerned that she may have H. Pylori again.  She was recently visiting in [place] and developed the symptoms while there.  She saw GI and was diagnosed with duodenitis previously on EGD.  Patient continues on omeprazole 40 mg daily.  Patient denies melena, hematochezia.  Patient complains of dysuria.      Problem list and histories reviewed & adjusted, as indicated.  Additional history: as documented    Patient Active Problem List   Diagnosis     Mixed incontinence urge and stress (male)(female)     Lactose intolerance     Hypovitaminosis D     Vitamin D deficiency     Anemia      (normal spontaneous vaginal delivery)     Fatty liver disease, nonalcoholic     History of gestational diabetes     Fibromyalgia     Past Surgical History:   Procedure Laterality Date     NO HISTORY OF SURGERY         Social History   Substance Use Topics     Smoking status: Never Smoker     Smokeless tobacco: Never Used     Alcohol use No     Family History   Problem Relation Age of Onset     Hypertension Mother      DIABETES Mother      KIDNEY DISEASE Mother      dialysis         Current Outpatient Prescriptions   Medication Sig Dispense Refill      ranitidine (ZANTAC) 150 MG tablet Take 1 tablet (150 mg) by mouth 2 times daily 60 tablet 1     metroNIDAZOLE (FLAGYL) 500 MG tablet Take 1 tablet (500 mg) by mouth 2 times daily 14 tablet 0     fluconazole (DIFLUCAN) 150 MG tablet Take 1 tablet (150 mg) by mouth once for 1 dose 1 tablet 0     cetirizine (ZYRTEC) 10 MG tablet Take 1 tablet (10 mg) by mouth 2 times daily 60 tablet 4     montelukast (SINGULAIR) 10 MG tablet Take 1 tablet (10 mg) by mouth At Bedtime 30 tablet 3     omeprazole (PRILOSEC) 40 MG capsule Take 1 capsule (40 mg) by mouth daily 30 capsule 5     fluticasone (FLONASE) 50 MCG/ACT nasal spray SPRAY 2 SPRAYS INTO BOTH NOSTRILS DAILY 16 mL 3     No Known Allergies  BP Readings from Last 3 Encounters:   08/30/17 132/84   03/15/17 121/77   01/04/17 117/77    Wt Readings from Last 3 Encounters:   08/30/17 193 lb (87.5 kg)   03/15/17 195 lb (88.5 kg)   01/19/17 197 lb 9.6 oz (89.6 kg)                  Labs reviewed in EPIC        Reviewed and updated as needed this visit by clinical staff     Reviewed and updated as needed this visit by Provider         ROS:  Constitutional, HEENT, cardiovascular, pulmonary, gi and gu systems are negative, except as otherwise noted.      OBJECTIVE:   /84  Pulse 89  Temp 98.1  F (36.7  C) (Oral)  Wt 193 lb (87.5 kg)  LMP 08/11/2017  SpO2 100%  BMI 33.36 kg/m2  Body mass index is 33.36 kg/(m^2).  GENERAL: healthy, alert and no distress  RESP: lungs clear to auscultation - no rales, rhonchi or wheezes  CV: regular rate and rhythm, normal S1 S2, no S3 or S4, no murmur, click or rub, no peripheral edema and peripheral pulses strong  ABDOMEN: tenderness generalized, no organomegaly or masses, bowel sounds normal and no palpable or pulsatile masses  MS: no gross musculoskeletal defects noted, no edema    Diagnostic Test Results:  Results for orders placed or performed in visit on 08/30/17 (from the past 24 hour(s))   *UA reflex to Microscopic and Culture ([facility]  and [facility] (except [place] and [place])   Result Value Ref [facility]    Color Urine Yellow     Appearance Urine Clear     Glucose Urine Negative NEG^Negative mg/dL    Bilirubin Urine Negative NEG^Negative    Ketones Urine Negative NEG^Negative mg/dL    Specific Gravity Urine 1.025 1.003 - 1.035    Blood Urine Negative NEG^Negative    pH Urine 6.0 5.0 - 7.0 pH    Protein Albumin Urine Negative NEG^Negative mg/dL    Urobilinogen Urine 0.2 0.2 - 1.0 EU/dL    Nitrite Urine Negative NEG^Negative    Leukocyte Esterase Urine Trace (A) NEG^Negative    Source Midstream Urine    Wet prep   Result Value Ref [facility]    Specimen Description Vagina     Wet Prep No Trichomonas seen     Wet Prep Clue cells seen (A)     Wet Prep Yeast seen (A)    Urine Microscopic   Result Value Ref [facility]    WBC Urine O - 2 OTO2^O - 2 /HPF    RBC Urine O - 2 OTO2^O - 2 /HPF    Squamous Epithelial /LPF Urine Few FEW^Few /LPF    Bacteria Urine Few (A) NEG^Negative /HPF       ASSESSMENT/PLAN:     1. Abdominal pain, generalized  Normal urine.  Patient to switch from omeprazole to ranitidine for 2 weeks prior to testing for H. Pylori.  - *UA reflex to Microscopic and Culture ([place] and [facility] (except [place] and [place])  - Wet prep  - Urine Microscopic  - H Pylori antigen, stool; Future  - CBC with platelets differential  - Comprehensive metabolic panel  - Lipase  - ranitidine (ZANTAC) 150 MG tablet; Take 1 tablet (150 mg) by mouth 2 times daily  Dispense: 60 tablet; Refill: 1    2. Diarrhea, unspecified type    - Clostridium difficile Toxin B PCR; Future  - Enteric Bacteria and Virus Panel by [facility] Stool; Future    3. Special screening for malignant neoplasms, colon    - Fecal colorectal cancer screen FIT; Future    4. Bacterial vaginal infection    - metroNIDAZOLE (FLAGYL) 500 MG tablet; Take 1 tablet (500 mg) by mouth 2 times daily  Dispense: 14 tablet; Refill: 0    5. Yeast infection of the skin    - fluconazole (DIFLUCAN) 150 MG  tablet; Take 1 tablet (150 mg) by mouth once for 1 dose  Dispense: 1 tablet; Refill: 0    FUTURE APPOINTMENTS:       - Follow-up visit in 2 weeks.    MALACHI Reynolds CNP  [facility]  met

## 2017-08-30 ENCOUNTER — OFFICE VISIT (OUTPATIENT)
Dept: INTERNAL MEDICINE | Facility: CLINIC | Age: 37
End: 2017-08-30
Payer: COMMERCIAL

## 2017-08-30 VITALS
BODY MASS INDEX: 33.36 KG/M2 | HEART RATE: 89 BPM | DIASTOLIC BLOOD PRESSURE: 84 MMHG | TEMPERATURE: 98.1 F | SYSTOLIC BLOOD PRESSURE: 132 MMHG | OXYGEN SATURATION: 100 % | WEIGHT: 193 LBS

## 2017-08-30 DIAGNOSIS — E87.6 HYPOKALEMIA: ICD-10-CM

## 2017-08-30 DIAGNOSIS — R19.7 DIARRHEA, UNSPECIFIED TYPE: ICD-10-CM

## 2017-08-30 DIAGNOSIS — D50.9 MICROCYTIC ANEMIA: ICD-10-CM

## 2017-08-30 DIAGNOSIS — B37.2 YEAST INFECTION OF THE SKIN: ICD-10-CM

## 2017-08-30 DIAGNOSIS — B96.89 BACTERIAL VAGINAL INFECTION: ICD-10-CM

## 2017-08-30 DIAGNOSIS — Z12.11 SPECIAL SCREENING FOR MALIGNANT NEOPLASMS, COLON: ICD-10-CM

## 2017-08-30 DIAGNOSIS — R10.84 ABDOMINAL PAIN, GENERALIZED: Primary | ICD-10-CM

## 2017-08-30 DIAGNOSIS — N76.0 BACTERIAL VAGINAL INFECTION: ICD-10-CM

## 2017-08-30 LAB
ALBUMIN SERPL-MCNC: 3.2 G/DL (ref 3.4–5)
ALBUMIN UR-MCNC: NEGATIVE MG/DL
ALP SERPL-CCNC: 62 U/L (ref 40–150)
ALT SERPL W P-5'-P-CCNC: 17 U/L (ref 0–50)
ANION GAP SERPL CALCULATED.3IONS-SCNC: 6 MMOL/L (ref 3–14)
APPEARANCE UR: CLEAR
AST SERPL W P-5'-P-CCNC: 11 U/L (ref 0–45)
BACTERIA #/AREA URNS HPF: ABNORMAL /HPF
BASOPHILS # BLD AUTO: 0 10E9/L (ref 0–0.2)
BASOPHILS NFR BLD AUTO: 0.5 %
BILIRUB SERPL-MCNC: 0.3 MG/DL (ref 0.2–1.3)
BILIRUB UR QL STRIP: NEGATIVE
BUN SERPL-MCNC: 4 MG/DL (ref 7–30)
CALCIUM SERPL-MCNC: 8.5 MG/DL (ref 8.5–10.1)
CHLORIDE SERPL-SCNC: 109 MMOL/L (ref 94–109)
CO2 SERPL-SCNC: 26 MMOL/L (ref 20–32)
COLOR UR AUTO: YELLOW
CREAT SERPL-MCNC: 0.55 MG/DL (ref 0.52–1.04)
DIFFERENTIAL METHOD BLD: ABNORMAL
EOSINOPHIL # BLD AUTO: 0.2 10E9/L (ref 0–0.7)
EOSINOPHIL NFR BLD AUTO: 3.2 %
ERYTHROCYTE [DISTWIDTH] IN BLOOD BY AUTOMATED COUNT: 18.8 % (ref 10–15)
GFR SERPL CREATININE-BSD FRML MDRD: >90 ML/MIN/1.7M2
GLUCOSE SERPL-MCNC: 111 MG/DL (ref 70–99)
GLUCOSE UR STRIP-MCNC: NEGATIVE MG/DL
HCT VFR BLD AUTO: 27 % (ref 35–47)
HGB BLD-MCNC: 8.3 G/DL (ref 11.7–15.7)
HGB UR QL STRIP: NEGATIVE
KETONES UR STRIP-MCNC: NEGATIVE MG/DL
LEUKOCYTE ESTERASE UR QL STRIP: ABNORMAL
LIPASE SERPL-CCNC: 108 U/L (ref 73–393)
LYMPHOCYTES # BLD AUTO: 1.9 10E9/L (ref 0.8–5.3)
LYMPHOCYTES NFR BLD AUTO: 32.8 %
MCH RBC QN AUTO: 21.4 PG (ref 26.5–33)
MCHC RBC AUTO-ENTMCNC: 30.7 G/DL (ref 31.5–36.5)
MCV RBC AUTO: 70 FL (ref 78–100)
MONOCYTES # BLD AUTO: 0.4 10E9/L (ref 0–1.3)
MONOCYTES NFR BLD AUTO: 6.9 %
NEUTROPHILS # BLD AUTO: 3.3 10E9/L (ref 1.6–8.3)
NEUTROPHILS NFR BLD AUTO: 56.6 %
NITRATE UR QL: NEGATIVE
NON-SQ EPI CELLS #/AREA URNS LPF: ABNORMAL /LPF
PH UR STRIP: 6 PH (ref 5–7)
PLATELET # BLD AUTO: 286 10E9/L (ref 150–450)
POTASSIUM SERPL-SCNC: 3.1 MMOL/L (ref 3.4–5.3)
PROT SERPL-MCNC: 7.1 G/DL (ref 6.8–8.8)
RBC # BLD AUTO: 3.88 10E12/L (ref 3.8–5.2)
RBC #/AREA URNS AUTO: ABNORMAL /HPF
SODIUM SERPL-SCNC: 141 MMOL/L (ref 133–144)
SOURCE: ABNORMAL
SP GR UR STRIP: 1.02 (ref 1–1.03)
SPECIMEN SOURCE: ABNORMAL
UROBILINOGEN UR STRIP-ACNC: 0.2 EU/DL (ref 0.2–1)
WBC # BLD AUTO: 5.9 10E9/L (ref 4–11)
WBC #/AREA URNS AUTO: ABNORMAL /HPF
WET PREP SPEC: ABNORMAL

## 2017-08-30 PROCEDURE — 36415 COLL VENOUS BLD VENIPUNCTURE: CPT | Performed by: NURSE PRACTITIONER

## 2017-08-30 PROCEDURE — 85025 COMPLETE CBC W/AUTO DIFF WBC: CPT | Performed by: NURSE PRACTITIONER

## 2017-08-30 PROCEDURE — 83690 ASSAY OF LIPASE: CPT | Performed by: NURSE PRACTITIONER

## 2017-08-30 PROCEDURE — 80053 COMPREHEN METABOLIC PANEL: CPT | Performed by: NURSE PRACTITIONER

## 2017-08-30 PROCEDURE — 81001 URINALYSIS AUTO W/SCOPE: CPT | Performed by: NURSE PRACTITIONER

## 2017-08-30 PROCEDURE — 87210 SMEAR WET MOUNT SALINE/INK: CPT | Performed by: NURSE PRACTITIONER

## 2017-08-30 PROCEDURE — 99214 OFFICE O/P EST MOD 30 MIN: CPT | Performed by: NURSE PRACTITIONER

## 2017-08-30 RX ORDER — FLUCONAZOLE 150 MG/1
150 TABLET ORAL ONCE
Qty: 1 TABLET | Refills: 0 | Status: SHIPPED | OUTPATIENT
Start: 2017-08-30 | End: 2017-08-30

## 2017-08-30 RX ORDER — METRONIDAZOLE 500 MG/1
500 TABLET ORAL 2 TIMES DAILY
Qty: 14 TABLET | Refills: 0 | Status: SHIPPED | OUTPATIENT
Start: 2017-08-30 | End: 2018-02-09

## 2017-08-30 NOTE — PATIENT INSTRUCTIONS
Hold omeprazole for 2 weeks.  Take ranitidine instead.  If this is not possible, please call clinic.      [facility]    If you have any questions regarding to your visit please contact your care team:     Team Pink:   Clinic Hours Telephone Number   Internal Medicine:  Dr. Chen Hicks NP       7am-7pm  Monday - Thursday   7am-5pm  Fridays  [phone]  (Appointment scheduling available 24/7)    Questions about your visit?  Team Line  [phone]   Urgent Care - [place] - 11am-9pm Monday-Friday Saturday-Sunday- 9am-5pm   [place] - 5pm-9pm Monday-Friday Saturday-Sunday- 9am-5pm  [phone] - [facility]  [phone] - [place]       What options do I have for visits at the clinic other than the traditional office visit?  To expand how we care for you, many of our providers are utilizing electronic visits (e-visits) and telephone visits, when medically appropriate, for interactions with their patients rather than a visit in the clinic.   We also offer nurse visits for many medical concerns. Just like any other service, we will bill your insurance company for this type of visit based on time spent on the phone with your provider. Not all insurance companies cover these visits. Please check with your medical insurance if this type of visit is covered. You will be responsible for any charges that are not paid by your insurance.      E-visits via [employer]:  generally incur a $35.00 fee.  Telephone visits:  Time spent on the phone: *charged based on time that is spent on the phone in increments of 10 minutes. Estimated cost:   5-10 mins $30.00   11-20 mins. $59.00   21-30 mins. $85.00   Use [employer] (secure email communication and access to your chart) to send your primary care provider a message or make an appointment. Ask someone on your Team how to sign up for [employer].    For a Price Quote for your services, please call our  Consumer [place] Line at [phone].    As always, Thank you for trusting us with your health care needs!    Discharged by Sammi BUNCH CMA ([facility])

## 2017-08-30 NOTE — MR AVS SNAPSHOT
After Visit Summary   8/30/2017    Brit Romano    MRN: [id number]           Patient Information     Date Of Birth          1980        Visit Information        Provider Department      8/30/2017 2:20 PM Princess Hicks APRN [facility]        Today's Diagnoses     Abdominal pain, generalized    -  1    Diarrhea, unspecified type        Special screening for malignant neoplasms, colon        Bacterial vaginal infection        Yeast infection of the skin          Care Instructions    Hold omeprazole for 2 weeks.  Take ranitidine instead.  If this is not possible, please call clinic.      [facility]    If you have any questions regarding to your visit please contact your care team:     Team Pink:   Clinic Hours Telephone Number   Internal Medicine:  Dr. Chen Hicks, NP       7am-7pm  Monday - Thursday   7am-5pm  Fridays  [phone]  (Appointment scheduling available 24/7)    Questions about your visit?  Team Line  [phone]   Urgent Care - [place] - 11am-9pm Monday-Friday Saturday-Sunday- 9am-5pm   [place] - 5pm-9pm Monday-Friday Saturday-Sunday- 9am-5pm  [phone] - [place]   [phone] - [place]       What options do I have for visits at the clinic other than the traditional office visit?  To expand how we care for you, many of our providers are utilizing electronic visits (e-visits) and telephone visits, when medically appropriate, for interactions with their patients rather than a visit in the clinic.   We also offer nurse visits for many medical concerns. Just like any other service, we will bill your insurance company for this type of visit based on time spent on the phone with your provider. Not all insurance companies cover these visits. Please check with your medical insurance if this type of visit is covered. You will be responsible for any charges that are not  paid by your insurance.      E-visits via Neema:  generally incur a $35.00 fee.  Telephone visits:  Time spent on the phone: *charged based on time that is spent on the phone in increments of 10 minutes. Estimated cost:   5-10 mins $30.00   11-20 mins. $59.00   21-30 mins. $85.00   Use [employer] (secure email communication and access to your chart) to send your primary care provider a message or make an appointment. Ask someone on your Team how to sign up for Neema.    For a Price Quote for your services, please call our ADOR Line at [phone].    As always, Thank you for trusting us with your health care needs!    Discharged by Sammi BUNCH CMA ([facility])            Follow-ups after your visit        Future tests that were ordered for you today     Open Future Orders        Priority Expected Expires Ordered    Clostridium difficile Toxin B PCR Routine  9/29/2017 8/30/2017    Enteric Bacteria and Virus Panel by ROSEANNA Stool Routine  8/30/2018 8/30/2017    Fecal colorectal cancer screen FIT Routine 9/20/2017 11/22/2017 8/30/2017    H Pylori antigen, stool Routine  9/29/2017 8/30/2017            Who to contact     If you have questions or need follow up information about today's clinic visit or your schedule please contact [facility] directly at [phone].  Normal or non-critical lab and imaging results will be communicated to you by [employer], letter or phone within 4 business days after the clinic has received the results. If you do not hear from us within 7 days, please contact the clinic through [employer] or phone. If you have a critical or abnormal lab result, we will notify you by phone as soon as possible.  Submit refill requests through Neema or call your pharmacy and they will forward the refill request to us. Please allow 3 business days for your refill to be completed.          Additional Information About Your Visit        [employer] Information     Neema lets you send messages to your  "doctor, view your test results, renew your prescriptions, schedule appointments and more. To sign up, go to [email or web address] . Click on \"Log in\" on the left side of the screen, which will take you to the Welcome page. Then click on \"Sign up Now\" on the right side of the page.     You will be asked to enter the access code listed below, as well as some personal information. Please follow the directions to create your username and password.     Your access code is: [email or web address]  Expires: 2017  3:40 PM     Your access code will  in 90 days. If you need help or a new code, please call your [place] clinic or [phone].        Care EveryWhere ID     This is your Care EveryWhere ID. This could be used by other organizations to access your [place] medical records  [id number]        Your Vitals Were     Pulse Temperature Last Period Pulse Oximetry BMI (Body Mass Index)       89 98.1  F (36.7  C) (Oral) 2017 100% 33.36 kg/m2        Blood Pressure from Last 3 Encounters:   17 132/84   03/15/17 121/77   17 117/77    Weight from Last 3 Encounters:   17 193 lb (87.5 kg)   03/15/17 195 lb (88.5 kg)   17 197 lb 9.6 oz (89.6 kg)              We Performed the Following     *UA reflex to Microscopic and Culture ([place] [place] [facility] (except [place] and [place])     CBC with platelets differential     Comprehensive metabolic panel     Lipase     Urine Microscopic     Wet prep          Today's Medication Changes          These changes are accurate as of: 17  3:17 PM.  If you have any questions, ask your nurse or doctor.               Start taking these medicines.        Dose/Directions    fluconazole 150 MG tablet   Commonly known as:  DIFLUCAN   Used for:  Yeast infection of the skin   Started by:  Princess Hicks APRN CNP        Dose:  150 mg   Take 1 tablet (150 mg) by mouth once for 1 dose   Quantity:  1 tablet   Refills:  0       metroNIDAZOLE 500 MG " tablet   Commonly known as:  FLAGYL   Used for:  Bacterial vaginal infection   Started by:  Princess Hicks APRN CNP        Dose:  500 mg   Take 1 tablet (500 mg) by mouth 2 times daily   Quantity:  14 tablet   Refills:  0       ranitidine 150 MG tablet   Commonly known as:  ZANTAC   Used for:  Abdominal pain, generalized   Started by:  Princess Hicks APRN CNP        Dose:  150 mg   Take 1 tablet (150 mg) by mouth 2 times daily   Quantity:  60 tablet   Refills:  1            Where to get your medicines      These medications were sent to [facility] - [place] [address] AT [facility] [address]  [address], [facility] [address]     Phone:  [phone]     fluconazole 150 MG tablet    metroNIDAZOLE 500 MG tablet    ranitidine 150 MG tablet                Primary Care Provider Office Phone # Fax #    MALACHI Mazariegos [phone] [address] [facility] [address]        Equal Access to Services     [facility]: Hadii aad ku hadasho Soomaali, waaxda luqadaha, qaybta kaalmada adeegyada, waxay idiin hayestefanin jax khdena snyder . So [facility] [phone].    ATENCIÓN: Si habla español, tiene a smith disposición servicios gratuitos de asistencia lingüística. [facility] [phone].    We comply with applicable federal civil rights laws and [place] laws. We do not discriminate on the basis of race, color, national origin, age, disability sex, sexual orientation or gender identity.            Thank you!     Thank you for choosing [facility]  for your care. Our goal is always to provide you with excellent care. Hearing back from our patients is one way we can continue to improve our services. Please take a few minutes to complete the written survey that you may receive in the mail after your visit with us. Thank you!             Your Updated Medication List - Protect others around you: Learn how to safely use,  store and throw away your medicines at www.disposemymeds.org.          This list is accurate as of: 8/30/17  3:17 PM.  Always use your most recent med list.                   Brand Name Dispense Instructions for use Diagnosis    cetirizine 10 MG tablet    zyrTEC    60 tablet    Take 1 tablet (10 mg) by mouth 2 times daily    Chronic rhinitis       fluconazole 150 MG tablet    DIFLUCAN    1 tablet    Take 1 tablet (150 mg) by mouth once for 1 dose    Yeast infection of the skin       fluticasone 50 MCG/ACT spray    FLONASE    16 mL    SPRAY 2 SPRAYS INTO BOTH NOSTRILS DAILY    Seasonal allergic rhinitis, unspecified allergic rhinitis trigger       metroNIDAZOLE 500 MG tablet    FLAGYL    14 tablet    Take 1 tablet (500 mg) by mouth 2 times daily    Bacterial vaginal infection       montelukast 10 MG tablet    SINGULAIR    30 tablet    Take 1 tablet (10 mg) by mouth At Bedtime    Chronic rhinitis       omeprazole 40 MG capsule    priLOSEC    30 capsule    Take 1 capsule (40 mg) by mouth daily    Abdominal pain, generalized       ranitidine 150 MG tablet    ZANTAC    60 tablet    Take 1 tablet (150 mg) by mouth 2 times daily    Abdominal pain, generalized

## 2017-08-30 NOTE — NURSING NOTE
"Chief Complaint   Patient presents with     Abdominal Pain     x1 month, cramping when eating and pain when bladder is full     Nausea     Dizziness       Initial /84  Pulse 89  Temp 98.1  F (36.7  C) (Oral)  Wt 193 lb (87.5 kg)  LMP 08/11/2017  SpO2 100%  BMI 33.36 kg/m2 Estimated body mass index is 33.36 kg/(m^2) as calculated from the following:    Height as of 1/19/17: 5' 3.78\" (1.62 m).    Weight as of this encounter: 193 lb (87.5 kg).  Medication Reconciliation: complete   Sammi BUNCH CMA ([facility])      "

## 2017-08-31 RX ORDER — POTASSIUM CHLORIDE 750 MG/1
10 TABLET, EXTENDED RELEASE ORAL DAILY
Qty: 4 TABLET | Refills: 0 | Status: SHIPPED | OUTPATIENT
Start: 2017-08-31 | End: 2017-08-31

## 2017-08-31 RX ORDER — POTASSIUM CHLORIDE 1500 MG/1
20 TABLET, EXTENDED RELEASE ORAL DAILY
Qty: 4 TABLET | Refills: 0 | Status: SHIPPED | OUTPATIENT
Start: 2017-08-31 | End: 2017-09-04

## 2017-08-31 RX ORDER — FERROUS SULFATE 325(65) MG
325 TABLET ORAL 2 TIMES DAILY
Qty: 60 TABLET | Refills: 2 | Status: SHIPPED | OUTPATIENT
Start: 2017-08-31 | End: 2018-02-09

## 2017-08-31 NOTE — PROGRESS NOTES
Please call patient-    Her labs show she is anemic and has low potassium.  I would like her to continue with plan to return stool samples.  I would like her start taking iron 325 mg po BID and potassium chloride 20 mEQ daily x 4 days.  Please have her obtain and abdominal CT scan w contrast for her abdominal pain and return for repeat bmp again on Tuesday (indicaiton hypokalemia).  I would also like additional lab work to be started on Tuesday- iron and iron binding capacity, ferritin, folate, b12, peripheral smear, CBC with diff (indication microcytic anemia).    Thanks,  Princess Hicks, CNP

## 2017-09-06 DIAGNOSIS — D64.9 ANEMIA: ICD-10-CM

## 2017-09-06 DIAGNOSIS — R10.9 ABDOMINAL PAIN: ICD-10-CM

## 2017-09-06 DIAGNOSIS — D50.9 MICROCYTIC ANEMIA: ICD-10-CM

## 2017-09-06 DIAGNOSIS — E87.6 HYPOKALEMIA: ICD-10-CM

## 2017-09-06 DIAGNOSIS — E53.8 VITAMIN B12 DEFICIENCY: Primary | ICD-10-CM

## 2017-09-06 LAB
ANION GAP SERPL CALCULATED.3IONS-SCNC: 11 MMOL/L (ref 3–14)
BUN SERPL-MCNC: 3 MG/DL (ref 7–30)
CALCIUM SERPL-MCNC: 9.1 MG/DL (ref 8.5–10.1)
CHLORIDE SERPL-SCNC: 106 MMOL/L (ref 94–109)
CO2 SERPL-SCNC: 21 MMOL/L (ref 20–32)
CREAT SERPL-MCNC: 0.52 MG/DL (ref 0.52–1.04)
FERRITIN SERPL-MCNC: 33 NG/ML (ref 12–150)
FOLATE SERPL-MCNC: 14.3 NG/ML
GFR SERPL CREATININE-BSD FRML MDRD: >90 ML/MIN/1.7M2
GLUCOSE SERPL-MCNC: 86 MG/DL (ref 70–99)
IRON SATN MFR SERPL: 35 % (ref 15–46)
IRON SERPL-MCNC: 156 UG/DL (ref 35–180)
POTASSIUM SERPL-SCNC: 3.7 MMOL/L (ref 3.4–5.3)
RETICS # AUTO: 119.5 10E9/L (ref 25–95)
RETICS/RBC NFR AUTO: 2.8 % (ref 0.5–2)
SODIUM SERPL-SCNC: 138 MMOL/L (ref 133–144)
TIBC SERPL-MCNC: 449 UG/DL (ref 240–430)
VIT B12 SERPL-MCNC: 120 PG/ML (ref 193–986)

## 2017-09-06 PROCEDURE — 85045 AUTOMATED RETICULOCYTE COUNT: CPT | Performed by: NURSE PRACTITIONER

## 2017-09-06 PROCEDURE — 83550 IRON BINDING TEST: CPT | Performed by: NURSE PRACTITIONER

## 2017-09-06 PROCEDURE — 85060 BLOOD SMEAR INTERPRETATION: CPT | Performed by: NURSE PRACTITIONER

## 2017-09-06 PROCEDURE — 40000847 ZZHCL STATISTIC MORPHOLOGY W/INTERP HISTOLOGY TC 85060: Performed by: NURSE PRACTITIONER

## 2017-09-06 PROCEDURE — 80048 BASIC METABOLIC PNL TOTAL CA: CPT | Performed by: NURSE PRACTITIONER

## 2017-09-06 PROCEDURE — 82728 ASSAY OF FERRITIN: CPT | Performed by: NURSE PRACTITIONER

## 2017-09-06 PROCEDURE — 83540 ASSAY OF IRON: CPT | Performed by: NURSE PRACTITIONER

## 2017-09-06 PROCEDURE — 85025 COMPLETE CBC W/AUTO DIFF WBC: CPT | Performed by: NURSE PRACTITIONER

## 2017-09-06 PROCEDURE — 82746 ASSAY OF FOLIC ACID SERUM: CPT | Performed by: NURSE PRACTITIONER

## 2017-09-06 PROCEDURE — 82607 VITAMIN B-12: CPT | Performed by: NURSE PRACTITIONER

## 2017-09-06 PROCEDURE — 36415 COLL VENOUS BLD VENIPUNCTURE: CPT | Performed by: NURSE PRACTITIONER

## 2017-09-07 LAB — COPATH REPORT: NORMAL

## 2017-09-08 DIAGNOSIS — D64.9 ANEMIA: ICD-10-CM

## 2017-09-08 DIAGNOSIS — R10.84 ABDOMINAL PAIN, GENERALIZED: ICD-10-CM

## 2017-09-08 DIAGNOSIS — E53.8 VITAMIN B12 DEFICIENCY (NON ANEMIC): Primary | ICD-10-CM

## 2017-09-08 RX ORDER — LANOLIN ALCOHOL/MO/W.PET/CERES
1000 CREAM (GRAM) TOPICAL DAILY
Qty: 90 TABLET | Refills: 1 | Status: SHIPPED | OUTPATIENT
Start: 2017-09-08 | End: 2018-02-09

## 2017-09-08 NOTE — PROGRESS NOTES
Please call patient-    Her labs show a B12 deficiency and likely mild iron deficiency.  Her anemia has improved slightly.  Please have her continue her iron supplement and start B12 1000 mcg po daily (indication b12 deficiency).  I would also like her to get a colonoscopy in addition to her abdominal CT scan (indication anemia, abdominal pain).  I would like to plan a recheck of her hgb again in 1 week (indication anemia).    Thanks,  Princess Hicks, CNP

## 2017-09-11 LAB
ANISOCYTOSIS BLD QL SMEAR: ABNORMAL
BASOPHILS # BLD AUTO: 0.1 10E9/L (ref 0–0.2)
BASOPHILS NFR BLD AUTO: 1 %
DIFFERENTIAL METHOD BLD: ABNORMAL
EOSINOPHIL # BLD AUTO: 0.5 10E9/L (ref 0–0.7)
EOSINOPHIL NFR BLD AUTO: 7 %
ERYTHROCYTE [DISTWIDTH] IN BLOOD BY AUTOMATED COUNT: 20.3 % (ref 10–15)
HCT VFR BLD AUTO: 29 % (ref 35–47)
HGB BLD-MCNC: 9 G/DL (ref 11.7–15.7)
HYPOCHROMIA BLD QL: PRESENT
LYMPHOCYTES # BLD AUTO: 2.2 10E9/L (ref 0.8–5.3)
LYMPHOCYTES NFR BLD AUTO: 32 %
MCH RBC QN AUTO: 21.6 PG (ref 26.5–33)
MCHC RBC AUTO-ENTMCNC: 31 G/DL (ref 31.5–36.5)
MCV RBC AUTO: 70 FL (ref 78–100)
MICROCYTES BLD QL SMEAR: PRESENT
MONOCYTES # BLD AUTO: 0.3 10E9/L (ref 0–1.3)
MONOCYTES NFR BLD AUTO: 4 %
NEUTROPHILS # BLD AUTO: 3.7 10E9/L (ref 1.6–8.3)
NEUTROPHILS NFR BLD AUTO: 56 %
PLATELET # BLD AUTO: 327 10E9/L (ref 150–450)
PLATELET # BLD EST: NORMAL 10*3/UL
POIKILOCYTOSIS BLD QL SMEAR: ABNORMAL
RBC # BLD AUTO: 4.16 10E12/L (ref 3.8–5.2)
WBC # BLD AUTO: 6.8 10E9/L (ref 4–11)

## 2017-09-14 DIAGNOSIS — R19.7 DIARRHEA, UNSPECIFIED TYPE: ICD-10-CM

## 2017-09-14 DIAGNOSIS — R10.84 ABDOMINAL PAIN, GENERALIZED: ICD-10-CM

## 2017-09-14 LAB
C DIFF TOX B STL QL: NEGATIVE
SPECIMEN SOURCE: NORMAL

## 2017-09-14 PROCEDURE — 87338 HPYLORI STOOL AG IA: CPT | Performed by: NURSE PRACTITIONER

## 2017-09-14 PROCEDURE — 87493 C DIFF AMPLIFIED PROBE: CPT | Performed by: NURSE PRACTITIONER

## 2017-09-14 PROCEDURE — 82274 ASSAY TEST FOR BLOOD FECAL: CPT | Performed by: NURSE PRACTITIONER

## 2017-09-14 PROCEDURE — 87506 IADNA-DNA/RNA PROBE TQ 6-11: CPT | Performed by: NURSE PRACTITIONER

## 2017-09-14 NOTE — TELEPHONE ENCOUNTER
Reason for Call:  Medication or medication refill:    Do you use a [facility]?  Name of the pharmacy and phone number for the current request:  [facility] listed above    Name of the medication requested: omeprazole     Other request: NA    Can we leave a detailed message on this number? YES    Phone number patient can be reached at: Home number on file [phone] (home)    Best Time: any time      Call taken on 9/14/2017 at 5:26 PM by Christiana Lucero

## 2017-09-14 NOTE — LETTER
[address] [place] [address]    September 18, 2017    Brit Romano  [address] [facility] [address]          Dear Brit,    Your stool tests are normal.     If you have any questions please feel free to contact [phone] or myself via [employer]    Enclosed is a copy of your results.     Results for orders placed or performed in visit on 09/14/17   H Pylori antigen, stool   Result Value Ref Range    Specimen Description Feces     H Pylori Antigen       Negative for Helicobacter pylori antigen by enzyme immunoassay. A negative result   indicates the absence of H. pylori antigen or that the level of antigen is below the level   of detection.     Enteric Bacteria and Virus Panel by ROSEANNA Stool   Result Value Ref Range    Campylobacter group by ROSEANNA Not Detected NDET^Not Detected    Salmonella species by ROSEANNA Not Detected NDET^Not Detected    Shigella species by ROSEANNA Not Detected NDET^Not Detected    Vibrio group by ROSEANNA Not Detected NDET^Not Detected    Rotavirus A by ROSEANNA Not Detected NDET^Not Detected    Shiga toxin 1 gene by ROSEANNA Not Detected NDET^Not Detected    Shiga toxin 2 gene by ROSEANNA Not Detected NDET^Not Detected    Norovirus I and II by ROSEANNA Not Detected NDET^Not Detected    Yersinia enterocolitica by ROSEANNA Not Detected NDET^Not Detected    Enteric pathogen comment       Testing performed by multiplexed, qualitative PCR using the [employer] Isabella Oliverigene Enteric   Pathogens Nucleic Acid Test. Results should not be used as the sole basis for diagnosis,   treatment, or other patient management decisions.     Clostridium difficile Toxin B PCR   Result Value Ref Range    Specimen Description Feces     C Diff Toxin B PCR Negative NEG^Negative       If you have any questions or concerns, please call myself or my nurse at [phone].      Sincerely,        Princess Hicks, CNP/MORENO

## 2017-09-14 NOTE — TELEPHONE ENCOUNTER
Omeprazole 40 mg capsule      Last Written Prescription Date: 10/26/2016  Last Fill Quantity: 30,  # refills: 5   Last Office Visit with [facility], [facility] or [facility] prescribing provider: 8/30/2017

## 2017-09-15 ENCOUNTER — RADIANT APPOINTMENT (OUTPATIENT)
Dept: CT IMAGING | Facility: CLINIC | Age: 37
End: 2017-09-15
Attending: NURSE PRACTITIONER
Payer: COMMERCIAL

## 2017-09-15 DIAGNOSIS — R10.84 ABDOMINAL PAIN, GENERALIZED: ICD-10-CM

## 2017-09-15 DIAGNOSIS — K38.8 OTHER SPECIFIED DISEASES OF APPENDIX: Primary | ICD-10-CM

## 2017-09-15 LAB
C COLI+JEJUNI+LARI FUSA STL QL NAA+PROBE: NOT DETECTED
EC STX1 GENE STL QL NAA+PROBE: NOT DETECTED
EC STX2 GENE STL QL NAA+PROBE: NOT DETECTED
ENTERIC PATHOGEN COMMENT: NORMAL
H PYLORI AG STL QL IA: NORMAL
NOROV GI+II ORF1-ORF2 JNC STL QL NAA+PR: NOT DETECTED
RVA NSP5 STL QL NAA+PROBE: NOT DETECTED
SALMONELLA SP RPOD STL QL NAA+PROBE: NOT DETECTED
SHIGELLA SP+EIEC IPAH STL QL NAA+PROBE: NOT DETECTED
SPECIMEN SOURCE: NORMAL
V CHOL+PARA RFBL+TRKH+TNAA STL QL NAA+PR: NOT DETECTED
Y ENTERO RECN STL QL NAA+PROBE: NOT DETECTED

## 2017-09-15 PROCEDURE — 74177 CT ABD & PELVIS W/CONTRAST: CPT | Mod: TC

## 2017-09-15 RX ORDER — IOPAMIDOL 755 MG/ML
95 INJECTION, SOLUTION INTRAVASCULAR ONCE
Status: COMPLETED | OUTPATIENT
Start: 2017-09-15 | End: 2017-09-15

## 2017-09-15 RX ORDER — OMEPRAZOLE 40 MG/1
40 CAPSULE, DELAYED RELEASE ORAL DAILY
Qty: 30 CAPSULE | Refills: 5 | Status: SHIPPED | OUTPATIENT
Start: 2017-09-15 | End: 2019-03-31

## 2017-09-15 RX ADMIN — IOPAMIDOL 95 ML: 755 INJECTION, SOLUTION INTRAVASCULAR at 14:19

## 2017-09-15 NOTE — TELEPHONE ENCOUNTER
Pending Prescriptions:                       Disp   Refills    omeprazole (PRILOSEC) 40 MG capsule        30 cap*5        Sig: Take 1 capsule (40 mg) by mouth daily

## 2017-09-15 NOTE — PROGRESS NOTES
I spoke with patient on the phone.  She mentioned that a provider in [facility] several months ago noted her appendix was enlarged as well.  I do not feel that acute appendicitis is likely at this time, but would appreciate surgical consult.  Patient is agreeable to follow-up and referral was placed.  Patient advised to continue with plan for colonoscopy as well due to anemia.  Patient advised to seek care for fever, worsening pain.  Patient verbalized understanding.     Princess Hicks, CNP

## 2017-09-15 NOTE — PROGRESS NOTES
Dear Brit,    Your recent test results are attached.      Your stool tests are normal.    If you have any questions please feel free to contact [phone] or myself via Medicine in [employer].    Sincerely,  Princess Hicks, CNP

## 2017-09-15 NOTE — TELEPHONE ENCOUNTER
Prescription approved per [facility] Refill Protocol.  Monisha Hollsi, RN - BC

## 2017-09-17 LAB — HEMOCCULT STL QL IA: NEGATIVE

## 2017-09-18 DIAGNOSIS — Z12.11 SPECIAL SCREENING FOR MALIGNANT NEOPLASMS, COLON: ICD-10-CM

## 2017-09-18 NOTE — LETTER
[facility]  [address] [place] [address]    September 18, 2017    Brit Romano  [address] [facility] [address]          Dear Brit,    No blood noted in stool on screening.       If you have any questions please feel free to contact [phone] or myself via [employer]    Enclosed is a copy of your results.     Results for orders placed or performed in visit on 09/18/17   Fecal colorectal cancer screen FIT   Result Value Ref Range    Occult Blood Scn FIT Negative NEG^Negative       If you have any questions or concerns, please call myself or my nurse at [phone].      Sincerely,      Princess Hicks CNP/MORENO

## 2017-09-18 NOTE — LETTER
[facility]  [address] [place] [address]    September 18, 2017    Brit Romano  [address] [facility] [address]          Dear Brit,    No blood noted in stool on screening.       If you have any questions please feel free to contact [phone] or myself via Streem[employer]    Enclosed is a copy of your results.     Results for orders placed or performed in visit on 09/18/17   Fecal colorectal cancer screen FIT   Result Value Ref Range    Occult Blood Scn FIT Negative NEG^Negative       If you have any questions or concerns, please call myself or my nurse at [phone].      Sincerely,        Princess Hicks CNP/MORENO

## 2017-09-18 NOTE — PROGRESS NOTES
Dear Brit,    Your recent test results are attached.      No blood noted in stool on screening.      If you have any questions please feel free to contact [phone] or myself via [employer].    Sincerely,  Princess Hicks, CNP

## 2017-09-20 ENCOUNTER — TELEPHONE (OUTPATIENT)
Dept: INTERNAL MEDICINE | Facility: CLINIC | Age: 37
End: 2017-09-20

## 2017-09-20 NOTE — TELEPHONE ENCOUNTER
Received fax from pharmacy stating patient requires Prior Authorization for Omeprazole 40 mg capsules.     **Per [facility]: Over the quantity limits, 120 per year, needs PA**    Insurance information:   Name: Jerel Pedroza/[employer] [place]  Phone number: [phone]  ID number: [id number]    Initiate prior authorization or change medication?    If a prior authorization is to be initiated, please list the following:    -any medications the patient has tried and failed or any contraindications.  -is the patient currently on this medication, or has tried before?  -What is the diagnosis?  -Justification or other information that may be helpful.

## 2017-09-21 NOTE — TELEPHONE ENCOUNTER
PA for quantity limit done over the phone with [employer].  Was told expect decision within 4-5 business days.  Dixie Tate,

## 2017-09-21 NOTE — TELEPHONE ENCOUNTER
Please initiate prior auth:    Dx: GERD, epigastric pain  Patient has severe symptoms and is also on ranitidine at the same time.    Thanks,  Princess Hicks, CNP

## 2017-09-22 ENCOUNTER — OFFICE VISIT (OUTPATIENT)
Dept: SURGERY | Facility: CLINIC | Age: 37
End: 2017-09-22
Payer: COMMERCIAL

## 2017-09-22 VITALS
WEIGHT: 193 LBS | SYSTOLIC BLOOD PRESSURE: 129 MMHG | DIASTOLIC BLOOD PRESSURE: 85 MMHG | BODY MASS INDEX: 32.15 KG/M2 | HEART RATE: 66 BPM | HEIGHT: 65 IN

## 2017-09-22 DIAGNOSIS — R10.84 ABDOMINAL PAIN, GENERALIZED: Primary | ICD-10-CM

## 2017-09-22 DIAGNOSIS — N94.0 MITTELSCHMERZ: ICD-10-CM

## 2017-09-22 PROCEDURE — 99243 OFF/OP CNSLTJ NEW/EST LOW 30: CPT | Performed by: SURGERY

## 2017-09-22 NOTE — MR AVS SNAPSHOT
"              After Visit Summary   2017    Brit Romano    MRN: [id number]           Patient Information     Date Of Birth          1980        Visit Information        Provider Department      2017 8:30 AM [facility]        Today's Diagnoses     Abdominal pain, generalized    -  1    [place]           Follow-ups after your visit        Who to contact     If you have questions or need follow up information about today's clinic visit or your schedule please contact [facility] directly at [phone].  Normal or non-critical lab and imaging results will be communicated to you by [employer], letter or phone within 4 business days after the clinic has received the results. If you do not hear from us within 7 days, please contact the clinic through [employer] or phone. If you have a critical or abnormal lab result, we will notify you by phone as soon as possible.  Submit refill requests through [employer] or call your pharmacy and they will forward the refill request to us. Please allow 3 business days for your refill to be completed.          Additional Information About Your Visit        MyChart Information     [employer] lets you send messages to your doctor, view your test results, renew your prescriptions, schedule appointments and more. To sign up, go to [email or web address] . Click on \"Log in\" on the left side of the screen, which will take you to the Welcome page. Then click on \"Sign up Now\" on the right side of the page.     You will be asked to enter the access code listed below, as well as some personal information. Please follow the directions to create your username and password.     Your access code is: [email or web address]  Expires: 2017  3:40 PM     Your access code will  in 90 days. If you need help or a new code, please call your [facility] or [phone].        Care EveryWhere ID     This is your Care EveryWhere ID. This " "could be used by other organizations to access your [place] medical records  [id number]        Your Vitals Were     Pulse Height Last Period BMI (Body Mass Index)          66 1.651 m (5' 5\") 08/11/2017 32.12 kg/m2         Blood Pressure from Last 3 Encounters:   09/22/17 129/85   08/30/17 132/84   03/15/17 121/77    Weight from Last 3 Encounters:   09/22/17 87.5 kg (193 lb)   08/30/17 87.5 kg (193 lb)   03/15/17 88.5 kg (195 lb)              Today, you had the following     No orders found for display       Primary Care Provider Office Phone # Fax #    Princess Knutson MALACHI Hicks [facility] [phone] [address] [facility] [address]        Equal Access to Services     ZORAIDA MOLINA : Hadii aad ku hadasho Soomaali, waaxda luqadaha, qaybta kaalmada adeegyada, pablo zimmerman hayrosangela snyder . So [facility] [phone].    ATENCIÓN: Si habla español, tiene a smith disposición servicios gratuitos de asistencia lingüística. Jasmin al [phone].    We comply with applicable federal civil rights laws and [place] laws. We do not discriminate on the basis of race, color, national origin, age, disability sex, sexual orientation or gender identity.            Thank you!     Thank you for choosing [facility]  for your care. Our goal is always to provide you with excellent care. Hearing back from our patients is one way we can continue to improve our services. Please take a few minutes to complete the written survey that you may receive in the mail after your visit with us. Thank you!             Your Updated Medication List - Protect others around you: Learn how to safely use, store and throw away your medicines at www.disposemymeds.org.          This list is accurate as of: 9/22/17  9:20 AM.  Always use your most recent med list.                   Brand Name Dispense Instructions for use Diagnosis    cetirizine 10 MG tablet    zyrTEC    60 tablet    Take 1 tablet (10 mg) by mouth 2 " times daily    Chronic rhinitis       cyanocobalamin 1000 MCG tablet    vitamin  B-12    90 tablet    Take 1 tablet (1,000 mcg) by mouth daily    Vitamin B12 deficiency       ferrous sulfate 325 (65 FE) MG tablet    IRON    60 tablet    Take 1 tablet (325 mg) by mouth 2 times daily    Microcytic anemia       fluticasone 50 MCG/ACT spray    FLONASE    16 mL    SPRAY 2 SPRAYS INTO BOTH NOSTRILS DAILY    Seasonal allergic rhinitis, unspecified allergic rhinitis trigger       metroNIDAZOLE 500 MG tablet    FLAGYL    14 tablet    Take 1 tablet (500 mg) by mouth 2 times daily    Bacterial vaginal infection       montelukast 10 MG tablet    SINGULAIR    30 tablet    Take 1 tablet (10 mg) by mouth At Bedtime    Chronic rhinitis       omeprazole 40 MG capsule    priLOSEC    30 capsule    Take 1 capsule (40 mg) by mouth daily    Abdominal pain, generalized       ranitidine 150 MG tablet    ZANTAC    60 tablet    Take 1 tablet (150 mg) by mouth 2 times daily    Abdominal pain, generalized

## 2017-09-22 NOTE — NURSING NOTE
"Chief Complaint   Patient presents with     Consult     appendix       Initial /85  Pulse 66  Ht 5' 5\" (1.651 m)  Wt 193 lb (87.5 kg)  LMP 08/11/2017  BMI 32.12 kg/m2 Estimated body mass index is 32.12 kg/(m^2) as calculated from the following:    Height as of this encounter: 5' 5\" (1.651 m).    Weight as of this encounter: 193 lb (87.5 kg).  Medication Reconciliation: complete   Jacki Mclaughlin Cma      "

## 2017-09-22 NOTE — PROGRESS NOTES
Patient seen in consultation for appendix by Princess Hicks    HPI:  Patient is a 37 year old female  with complaints of some abdominal pain she had back in July when she was in [place]  Had US which didn't show anything but she says the images were bad  Now has had CT to further investigate since being [place] the country  Currently gets some abdominal pain in the central abdomen. Not as bad as it was in July and is different spot. Back then was more towards right side or RLQ and into right leg even. Felt like there was a lump in there and was painful to ride in car or if kids bumped the bed  Had a pain episode back in March as well, then in July.  The current more general pain she thinks has been there for a year or two  Feels chills every night. Sometimes will get nauseous  nothing makes the episode better. Pain will subside with time but does not go completely away  Pain does seem to correlate with menstrual cycles in that it is in between periods maybe 10 days after the pain will come  Patient has not family history of similar problems    Review Of Systems    Skin: negative  Ears/Nose/Throat: negative  Respiratory: No shortness of breath, dyspnea on exertion, cough, or hemoptysis  Cardiovascular: negative  Gastrointestinal: negative  Genitourinary: negative, regular menstrual cycles  Musculoskeletal: negative  Neurologic: negative  Hematologic/Lymphatic/Immunologic: negative  Endocrine: negative      Past Medical History:   Diagnosis Date     NO ACTIVE PROBLEMS      Seasonal allergies        Past Surgical History:   Procedure Laterality Date     NO HISTORY OF SURGERY         Social History     Social History     Marital status: Legally      Spouse name: N/A     Number of children: N/A     Years of education: N/A     Occupational History     Housewife      Social History Main Topics     Smoking status: Never Smoker     Smokeless tobacco: Never Used     Alcohol use No     Drug use: No     Sexual  "activity: Yes     Partners: Male     Other Topics Concern     Not on file     Social History Narrative    How much exercise per week? none    How much calcium per day? diet       How much caffeine per day? none    How much vitamin D per day? Diet/supplement    Do you/your family wear seatbelts?  Yes    Do you/your family use safety helmets? No    Do you/your family use sunscreen? No    Do you/your family keep firearms in the home? No    Do you/your family have a smoke detector(s)? Yes        Do you feel safe in your home? Yes    Has anyone ever touched you in an unwanted manner? No     Explain     Cande Persaud, [facility]    08/21/2015        Review Maya Zamorano, [facility]    10/07/2016       Current Outpatient Prescriptions   Medication Sig Dispense Refill     cyanocobalamin (VITAMIN  B-12) 1000 MCG tablet Take 1 tablet (1,000 mcg) by mouth daily 90 tablet 1     ferrous sulfate (IRON) 325 (65 FE) MG tablet Take 1 tablet (325 mg) by mouth 2 times daily 60 tablet 2     ranitidine (ZANTAC) 150 MG tablet Take 1 tablet (150 mg) by mouth 2 times daily 60 tablet 1     cetirizine (ZYRTEC) 10 MG tablet Take 1 tablet (10 mg) by mouth 2 times daily 60 tablet 4     omeprazole (PRILOSEC) 40 MG capsule Take 1 capsule (40 mg) by mouth daily (Patient not taking: Reported on 9/22/2017) 30 capsule 5     metroNIDAZOLE (FLAGYL) 500 MG tablet Take 1 tablet (500 mg) by mouth 2 times daily (Patient not taking: Reported on 9/22/2017) 14 tablet 0     montelukast (SINGULAIR) 10 MG tablet Take 1 tablet (10 mg) by mouth At Bedtime (Patient not taking: Reported on 9/22/2017) 30 tablet 3     fluticasone (FLONASE) 50 MCG/ACT nasal spray SPRAY 2 SPRAYS INTO BOTH NOSTRILS DAILY (Patient not taking: Reported on 9/22/2017) 16 mL 3       Medications and history reviewed    Physical exam:  Vitals: /85  Pulse 66  Ht 1.651 m (5' 5\")  Wt 87.5 kg (193 lb)  LMP 08/11/2017  BMI 32.12 kg/m2  BMI= Body mass index is 32.12 kg/(m^2).    Constitutional: healthy, " alert and no distress  Head: Normocephalic. No masses, lesions, tenderness or abnormalities  Cardiovascular: negative, PMI normal. No lifts, heaves, or thrills. RRR. No murmurs, clicks gallops or rub  Respiratory: negative, Percussion normal. Good diaphragmatic excursion. Lungs clear  Gastrointestinal: Abdomen soft. BS normal. No masses, organomegaly. Some mild tenderness in lower abdomen   : Deferred  Musculoskeletal: extremities normal- no gross deformities noted, gait normal and normal muscle tone  Skin: no suspicious lesions or rashes  Psychiatric: mentation appears normal and affect normal/bright  Patient able to get up on table without difficulty.    Labs show:  Results for LEAH TORIBIO (MRN [id number]) as of 9/22/2017 08:59   Ref. Range 9/6/2017 13:01   Sodium Latest Ref Range: 133 - 144 mmol/L 138   Potassium Latest Ref Range: 3.4 - 5.3 mmol/L 3.7   Chloride Latest Ref Range: 94 - 109 mmol/L 106   Carbon Dioxide Latest Ref Range: 20 - 32 mmol/L 21   Urea Nitrogen Latest Ref Range: 7 - 30 mg/dL 3 (L)   Creatinine Latest Ref Range: 0.52 - 1.04 mg/dL 0.52   GFR Estimate Latest Ref Range: >60 mL/min/1.7m2 >90   GFR Estimate If Black Latest Ref Range: >60 mL/min/1.7m2 >90   Calcium Latest Ref Range: 8.5 - 10.1 mg/dL 9.1   Anion Gap Latest Ref Range: 3 - 14 mmol/L 11   Ferritin Latest Ref Range: 12 - 150 ng/mL 33   Iron Latest Ref Range: 35 - 180 ug/dL 156   Iron Binding Cap Latest Ref Range: 240 - 430 ug/dL 449 (H)   Iron Saturation Index Latest Ref Range: 15 - 46 % 35   Vitamin B12 Latest Ref Range: 193 - 986 pg/mL 120 (L)   Glucose Latest Ref Range: 70 - 99 mg/dL 86   WBC Latest Ref Range: 4.0 - 11.0 10e9/L 6.8   Hemoglobin Latest Ref Range: 11.7 - 15.7 g/dL 9.0 (L)   Hematocrit Latest Ref Range: 35.0 - 47.0 % 29.0 (L)   Platelet Count Latest Ref Range: 150 - 450 10e9/L 327   RBC Count Latest Ref Range: 3.8 - 5.2 10e12/L 4.16   MCV Latest Ref Range: 78 - 100 fl 70 (L)   MCH Latest Ref Range: 26.5 - 33.0 pg  21.6 (L)   MCHC Latest Ref Range: 31.5 - 36.5 g/dL 31.0 (L)   RDW Latest Ref Range: 10.0 - 15.0 % 20.3 (H)   Diff Method Unknown Manual Differential   % Neutrophils Latest Units: % 56.0   % Lymphocytes Latest Units: % 32.0   % Monocytes Latest Units: % 4.0   % Eosinophils Latest Units: % 7.0   % Basophils Latest Units: % 1.0   Absolute Neutrophil Latest Ref Range: 1.6 - 8.3 10e9/L 3.7   Absolute Lymphocytes Latest Ref Range: 0.8 - 5.3 10e9/L 2.2   Absolute Monocytes Latest Ref Range: 0.0 - 1.3 10e9/L 0.3   Absolute Eosinophils Latest Ref Range: 0.0 - 0.7 10e9/L 0.5   Absolute Basophils Latest Ref Range: 0.0 - 0.2 10e9/L 0.1   Anisocytosis Unknown Moderate   Poikilocytosis Unknown Moderate   Microcytes Unknown Present   Hypochromasia Unknown Present   Platelet Estimate Unknown Normal   % Retic Latest Ref Range: 0.5 - 2.0 % 2.8 (H)   Absolute Retic Latest Ref Range: 25 - 95 10e9/L 119.5 (H)   BLOOD MORPHOLOGY PATHOLOGIST REVIEW Unknown Rpt       Imaging shows: Images personally reviewed with patient    CT ABDOMEN PELVIS WITH CONTRAST 9/15/2017 2:24 PM     TECHNIQUE: Images from diaphragm to pubic symphysis 95 mL Isovue-370  IV contrast and oral contrast.  Radiation dose for this scan was reduced using automated exposure  control, adjustment of the mA and/or kV according to patient size, or  iterative reconstruction technique.     HISTORY: Generalized abdominal pain     COMPARISON: None.     FINDINGS:   Abdomen and Pelvis: Lung bases clear. Normal-appearing liver,  gallbladder, spleen, pancreas, adrenal glands and kidneys.     No periaortic or pelvic adenopathy. No free fluid or dominant ovarian  cyst. The appendix is prominent, 0.7 cm in size in the midabdomen. No  evidence for periappendiceal stranding but very early appendicitis  cannot be excluded. There is gas identified in the proximal half of  the appendix and tiny amount of gas in the more distal appendix.         IMPRESSION:   1. Mildly prominent 0.7 cm  appendix without periappendiceal stranding  may be normal, very early appendix inflammation cannot be excluded.  Clinical correlation. Results telephoned by myself to [facility]  Flor Lyons 3:00pm 9/15/17.    Assessment:     ICD-10-CM    1. Abdominal pain, generalized R10.84    2. Mittelschmerz N94.0      Plan: I think there could be two different abdominal pains that were going on. What she described to me with this pain back in July does sound like it may have been an appendicitis. No sequelae seen on recent CT however. Appendix is fairly long and was measured just slightly above normal at 7mm.  Her current pain issue is different and currently am suspicious of this being mittleschmerz or painful ovulation given its regularity and timing with her menstrual cycle. With how CT looks and with current pains being different I do not think appendectomy is warranted. I did discuss with patient that if pain becomes more like it was in July then to get to ER so it can be evaluated and if is appendix surgery can be arranged. No other questions or concerns from patient at this time.  Follow up with me as needed.    Jeet Lacy MD

## 2017-10-02 NOTE — TELEPHONE ENCOUNTER
Please notify patient that her prior auth was denied.  She'll need to purchase it out of pocket.    Thanks,  Princess Hicks, CNP

## 2017-10-02 NOTE — TELEPHONE ENCOUNTER
Received Denial because the requested quantity larger or greater than allowed in patient's benefit set.Put papers in providers bin to review.  Lissa Coultre,

## 2017-10-06 ENCOUNTER — TRANSFERRED RECORDS (OUTPATIENT)
Dept: HEALTH INFORMATION MANAGEMENT | Facility: CLINIC | Age: 37
End: 2017-10-06

## 2017-10-26 ENCOUNTER — TELEPHONE (OUTPATIENT)
Dept: INTERNAL MEDICINE | Facility: CLINIC | Age: 37
End: 2017-10-26

## 2017-10-26 NOTE — TELEPHONE ENCOUNTER
Received fax from pharmacy stating patient requires Prior Authorization for Omeprazole 40 mg capsules.     Insurance information:   Name: Jerel Pedroza/[employer] [place]  Phone number: [phone]  ID number: [id number]    Initiate prior authorization or change medication?    If a prior authorization is to be initiated, please list the following:    -any medications the patient has tried and failed or any contraindications.  -is the patient currently on this medication, or has tried before?  -What is the diagnosis?  -Justification or other information that may be helpful.

## 2017-10-27 ENCOUNTER — TELEPHONE (OUTPATIENT)
Dept: INTERNAL MEDICINE | Facility: CLINIC | Age: 37
End: 2017-10-27

## 2017-10-27 NOTE — TELEPHONE ENCOUNTER
Patient states she spoke to the pharmacy and they said her insurance might cover a 20 mg dose but we would need to submit a prior authorization first for the 40 mg for this month and then if/when it denied see if they will cover the 20 mg. I advised the patient to call her insurance now and see if they will cover a different dose and she states she already did and they can't do anything with it until a PA is sent.    Ashley Posada, CMA

## 2017-10-27 NOTE — TELEPHONE ENCOUNTER
Pharmacy/Pateint informed  informed as written .  Lab only appointment scheduled for 11/1/2017    Ethel Mclaughlin MA

## 2017-10-30 NOTE — TELEPHONE ENCOUNTER
Received fax from pharmacy stating patient requires Prior Authorization for Omeprozole.     Insurance information:   Name: Kyle Pedroza  Phone number: [phone]   ID number: [id number]    Initiate prior authorization or change medication?    If a prior authorization is to be initiated, please list the following:    -any medications the patient has tried and failed or any contraindications.  -is the patient currently on this medication, or has tried before?  -What is the diagnosis?  -Justification or other information that may be helpful.

## 2017-10-31 NOTE — TELEPHONE ENCOUNTER
I called [employer] ([phone]) to do PA over the phone, and was told that the omeprazole 40mg., was covered with no PA needed.  I called [facility] ([phone]) to inform them and they stated that her insurance only covers 120 capsules in a year's time.      I called [employer] back and PA was done over the phone for 365 capsules for 365 days.  Was told to expect decision within 10 calendar days.  Will await their decision. Dixie Tate,

## 2017-11-07 NOTE — TELEPHONE ENCOUNTER
Spoke to patient in regards to message below. Patient states understanding.    Ashley Posada, ANGELIKA

## 2017-11-07 NOTE — TELEPHONE ENCOUNTER
Received Denial because you must take this medication according to the FDA label. This includes how often you take the drug and intended condition. It also includes the length of treatment.You are able to get 1 capsule per day for a maximum of 120 days within 365 days.  Lissa Coulter,

## 2017-11-15 ENCOUNTER — APPOINTMENT (OUTPATIENT)
Dept: OPTOMETRY | Facility: CLINIC | Age: 37
End: 2017-11-15
Payer: COMMERCIAL

## 2017-11-15 ENCOUNTER — OFFICE VISIT (OUTPATIENT)
Dept: OPTOMETRY | Facility: CLINIC | Age: 37
End: 2017-11-15
Payer: COMMERCIAL

## 2017-11-15 DIAGNOSIS — H52.13 MYOPIA OF BOTH EYES: Primary | ICD-10-CM

## 2017-11-15 DIAGNOSIS — H04.123 DRY EYES: ICD-10-CM

## 2017-11-15 PROCEDURE — V2020 VISION SVCS FRAMES PURCHASES: HCPCS | Performed by: OPTOMETRIST

## 2017-11-15 PROCEDURE — V2100 LENS SPHER SINGLE PLANO 4.00: HCPCS | Mod: LT | Performed by: OPTOMETRIST

## 2017-11-15 PROCEDURE — 92015 DETERMINE REFRACTIVE STATE: CPT | Performed by: OPTOMETRIST

## 2017-11-15 PROCEDURE — 92004 COMPRE OPH EXAM NEW PT 1/>: CPT | Performed by: OPTOMETRIST

## 2017-11-15 ASSESSMENT — VISUAL ACUITY
OS_SC: 20/25
OD_SC: 20/30
OD_SC: 20/25
METHOD: SNELLEN - LINEAR
OS_SC+: -1
OD_SC+: -2
OS_SC: 20/25-1

## 2017-11-15 ASSESSMENT — CONF VISUAL FIELD
OS_NORMAL: 1
OD_NORMAL: 1
METHOD: COUNTING FINGERS

## 2017-11-15 ASSESSMENT — CUP TO DISC RATIO
OS_RATIO: 0.3
OD_RATIO: 0.3

## 2017-11-15 ASSESSMENT — EXTERNAL EXAM - LEFT EYE: OS_EXAM: NORMAL

## 2017-11-15 ASSESSMENT — SLIT LAMP EXAM - LIDS
COMMENTS: NORMAL
COMMENTS: NORMAL

## 2017-11-15 ASSESSMENT — TONOMETRY
OD_IOP_MMHG: 15
IOP_METHOD: APPLANATION
OS_IOP_MMHG: 17

## 2017-11-15 ASSESSMENT — REFRACTION_MANIFEST
OS_SPHERE: -1.00
OS_CYLINDER: SPHERE
OD_SPHERE: -1.00
OD_CYLINDER: SPHERE

## 2017-11-15 ASSESSMENT — EXTERNAL EXAM - RIGHT EYE: OD_EXAM: NORMAL

## 2017-11-15 NOTE — PATIENT INSTRUCTIONS
Patient was advised of today's exam findings.  Fill glasses prescription  Use over the counter artificial tears 2 times a day (Blink Tears, Systane Ultra or Refresh Optive)  Use Zaditor or Alaway allergy drop twice a day as needed for itchy eyes  Return in 1 year for eye exam    Simin Grover, LUCIANO  [facility]    [address] [place] [address]. [place] [id number] [phone]

## 2017-11-15 NOTE — MR AVS SNAPSHOT
"              After Visit Summary   11/15/2017    Brit Romano    MRN: [id number]           Patient Information     Date Of Birth          1980        Visit Information        Provider Department      11/15/2017 1:20 PM Simin Grover, OD [facility]        Today's Diagnoses     Myopia of both eyes    -  1      Care Instructions    Patient was advised of today's exam findings.  Fill glasses prescription  Use over the counter artificial tears 2 times a day (Blink Tears, Systane Ultra or Refresh Optive)  Use Zaditor or Alaway allergy drop twice a day as needed for itchy eyes  Return in 1 year for eye exam    Simin Grover, OD  [facility]    [address] [place] [address]. [place] [address]   [phone]                 Follow-ups after your visit        Who to contact     If you have questions or need follow up information about today's clinic visit or your schedule please contact [facility] directly at [phone].  Normal or non-critical lab and imaging results will be communicated to you by RUSBASEhart, letter or phone within 4 business days after the clinic has received the results. If you do not hear from us within 7 days, please contact the clinic through [employer] or phone. If you have a critical or abnormal lab result, we will notify you by phone as soon as possible.  Submit refill requests through Bubok or call your pharmacy and they will forward the refill request to us. Please allow 3 business days for your refill to be completed.          Additional Information About Your Visit        RUSBASEharYashi Information     Bubok lets you send messages to your doctor, view your test results, renew your prescriptions, schedule appointments and more. To sign up, go to [email or web address] . Click on \"Log in\" on the left side of the screen, which will take you to the Welcome page. Then click on \"Sign up Now\" on the right side of the page.     You will be asked to enter the access " code listed below, as well as some personal information. Please follow the directions to create your username and password.     Your access code is: [email or web address]  Expires: 2017  2:40 PM     Your access code will  in 90 days. If you need help or a new code, please call your [facility] clinic or [phone].        Care EveryWhere ID     This is your Care EveryWhere ID. This could be used by other organizations to access your [facility] medical records  [id number]         Blood Pressure from Last 3 Encounters:   17 129/85   17 132/84   03/15/17 121/77    Weight from Last 3 Encounters:   17 87.5 kg (193 lb)   17 87.5 kg (193 lb)   03/15/17 88.5 kg (195 lb)              Today, you had the following     No orders found for display       Primary Care Provider Office Phone # Fax #    Princess Knutson Lawrence Wang, MALACHI [facility] [phone]       [address] [facility] [address]        Equal Access to Services     [facility]: Hadii aad ku hadasho Soomaali, waaxda luqadaha, qaybta kaalmada ademaxyada, pablo snyder . So [facility] [phone].    ATENCIÓN: Si habla español, tiene a smith disposición servicios gratuitos de asistencia lingüística. [facility] [phone].    We comply with applicable federal civil rights laws and [place] laws. We do not discriminate on the basis of race, color, national origin, age, disability, sex, sexual orientation, or gender identity.            Thank you!     Thank you for choosing [facility]  for your care. Our goal is always to provide you with excellent care. Hearing back from our patients is one way we can continue to improve our services. Please take a few minutes to complete the written survey that you may receive in the mail after your visit with us. Thank you!             Your Updated Medication List - Protect others around you: Learn how to safely use, store and throw away your medicines at  www.disposemymeds.org.          This list is accurate as of: 11/15/17  2:46 PM.  Always use your most recent med list.                   Brand Name Dispense Instructions for use Diagnosis    cetirizine 10 MG tablet    zyrTEC    60 tablet    Take 1 tablet (10 mg) by mouth 2 times daily    Chronic rhinitis       cyanocobalamin 1000 MCG tablet    vitamin  B-12    90 tablet    Take 1 tablet (1,000 mcg) by mouth daily    Vitamin B12 deficiency       ferrous sulfate 325 (65 FE) MG tablet    IRON    60 tablet    Take 1 tablet (325 mg) by mouth 2 times daily    Microcytic anemia       fluticasone 50 MCG/ACT spray    FLONASE    16 mL    SPRAY 2 SPRAYS INTO BOTH NOSTRILS DAILY    Seasonal allergic rhinitis, unspecified allergic rhinitis trigger       metroNIDAZOLE 500 MG tablet    FLAGYL    14 tablet    Take 1 tablet (500 mg) by mouth 2 times daily    Bacterial vaginal infection       montelukast 10 MG tablet    SINGULAIR    30 tablet    Take 1 tablet (10 mg) by mouth At Bedtime    Chronic rhinitis       omeprazole 40 MG capsule    priLOSEC    30 capsule    Take 1 capsule (40 mg) by mouth daily    Abdominal pain, generalized       ranitidine 150 MG tablet    ZANTAC    60 tablet    Take 1 tablet (150 mg) by mouth 2 times daily    Abdominal pain, generalized

## 2017-11-15 NOTE — PROGRESS NOTES
Chief Complaint   Patient presents with     COMPREHENSIVE EYE EXAM     New to Eye Dept         Last Eye Exam: 2+ years ago  Dilated Previously: Yes    What are you currently using to see?  Patient had glasses but lost them. She has been been without them for about 8 months       Distance Vision Acuity: Noticed gradual change in both eyes, and is missing her glasses for the distance tasks    Near Vision Acuity: Satisfied with vision while reading and using computer unaided    Eye Comfort: itchy and again mentioned that she has allergies  Do you use eye drops? 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       OBJECTIVE: See Ophthalmology exam    ASSESSMENT:    ICD-10-CM    1. Myopia of both eyes H52.13 EYE EXAM (SIMPLE-NONBILLABLE)     REFRACTION   2. Dry eyes H04.123 EYE EXAM (SIMPLE-NONBILLABLE)     REFRACTION     polyethylene glycol 400 (BLINK TEARS) 0.25 % SOLN ophthalmic solution      PLAN:     Patient Instructions   Patient was advised of today's exam findings.  Fill glasses prescription  Use over the counter artificial tears 2 times a day (Blink Tears, Systane Ultra or Refresh Optive)  Use Zaditor or Alaway allergy drop twice a day as needed for itchy eyes  Return in 1 year for eye exam    Simin Grover OD  [facility]    [address] [facility]. [place] [address]   [phone]

## 2017-12-24 ENCOUNTER — HEALTH MAINTENANCE LETTER (OUTPATIENT)
Age: 37
End: 2017-12-24

## 2018-01-11 NOTE — PROGRESS NOTES
SUBJECTIVE:   Brit Romano is a 38 year old female who presents to clinic today for the following health issues:      ABDOMINAL   PAIN     Onset: About 1 month ago    Description:   Character: Sharp, Dull ache, Fullness and Cramping  Location: middle lower  Radiation: Up to right side    Intensity: mild    Progression of Symptoms:  worsening    Accompanying Signs & Symptoms:  Fever/Chills?: YES- hot flashes and chills, no fever  Gas/Bloating: YES  Nausea: YES  Vomitting: no   Diarrhea?: YES- Occasional  Constipation:YES- denies melena, hematochezia.  Dysuria or Hematuria: Yes.   History:   Trauma: no   Previous similar pain: YES   Previous tests done: Seen in past for similar pain    Precipitating factors:   Does the pain change with:     Food: YES- occasionally improves.    BM: YES- feels better    Urination: no     Alleviating factors:  food    Therapies Tried and outcome: Tylenol    LMP:  11/3/2017       Patient had colonoscopy in 10/17, which was normal, and was diagnosed with IBS at that time.  Patient has noticed worsening pain over the past month.  LMP was over 2 months ago.  Patient complains of pain over her kidney's, worse on left side.  Patient is taking omeprazole daily, but has stopped her iron.  She has not had a recheck of her iron deficiency anemia lately.  Patient has been taking overnight laxative relief once weekly.  She feels better when she uses it.    Problem list and histories reviewed & adjusted, as indicated.  Additional history: as documented    Patient Active Problem List   Diagnosis     Mixed incontinence urge and stress (male)(female)     Lactose intolerance     Hypovitaminosis D     Vitamin D deficiency     Anemia      (normal spontaneous vaginal delivery)     Fatty liver disease, nonalcoholic     History of gestational diabetes     Fibromyalgia     Vitamin B12 deficiency (non anemic)     Past Surgical History:   Procedure Laterality Date     NO HISTORY OF SURGERY         Social  "History   Substance Use Topics     Smoking status: Never Smoker     Smokeless tobacco: Never Used     Alcohol use No     Family History   Problem Relation Age of Onset     Hypertension Mother      DIABETES Mother      KIDNEY DISEASE Mother      dialysis     Glaucoma Mother      Macular Degeneration No family hx of          Current Outpatient Prescriptions   Medication Sig Dispense Refill     psyllium (METAMUCIL SMOOTH TEXTURE) 28 % PACK Take 1 Package by mouth daily 30 each 11     Prenatal Vit-Fe Fumarate-FA (PRENATAL MULTIVITAMIN PLUS IRON) 27-0.8 MG TABS per tablet Take 1 tablet by mouth daily 100 tablet 3     omeprazole (PRILOSEC) 40 MG capsule Take 1 capsule (40 mg) by mouth daily 30 capsule 5     cetirizine (ZYRTEC) 10 MG tablet Take 1 tablet (10 mg) by mouth 2 times daily 60 tablet 4     polyethylene glycol 400 (BLINK TEARS) 0.25 % SOLN ophthalmic solution Place 1 drop into both eyes 2 times daily \"Blink Tears\" (Patient not taking: Reported on 1/12/2018)       cyanocobalamin (VITAMIN  B-12) 1000 MCG tablet Take 1 tablet (1,000 mcg) by mouth daily (Patient not taking: Reported on 1/12/2018) 90 tablet 1     ferrous sulfate (IRON) 325 (65 FE) MG tablet Take 1 tablet (325 mg) by mouth 2 times daily (Patient not taking: Reported on 1/12/2018) 60 tablet 2     metroNIDAZOLE (FLAGYL) 500 MG tablet Take 1 tablet (500 mg) by mouth 2 times daily (Patient not taking: Reported on 1/12/2018) 14 tablet 0     montelukast (SINGULAIR) 10 MG tablet Take 1 tablet (10 mg) by mouth At Bedtime (Patient not taking: Reported on 9/22/2017) 30 tablet 3     fluticasone (FLONASE) 50 MCG/ACT nasal spray SPRAY 2 SPRAYS INTO BOTH NOSTRILS DAILY (Patient not taking: Reported on 9/22/2017) 16 mL 3     No Known Allergies  BP Readings from Last 3 Encounters:   01/12/18 128/70   09/22/17 129/85   08/30/17 132/84    Wt Readings from Last 3 Encounters:   01/12/18 205 lb (93 kg)   09/22/17 193 lb (87.5 kg)   08/30/17 193 lb (87.5 kg)                "   Labs reviewed in EPIC        Reviewed and updated as needed this visit by clinical staffTobacco  Allergies  Meds  Problems  Med Hx  Surg Hx  Fam Hx  Soc Hx        Reviewed and updated as needed this visit by Provider  Problems         ROS:  Constitutional, HEENT, cardiovascular, pulmonary, gi and gu systems are negative, except as otherwise noted.      OBJECTIVE:   /70  Pulse 82  Temp 97.2  F (36.2  C) (Oral)  Resp 18  Wt 205 lb (93 kg)  LMP 11/03/2017  BMI 34.11 kg/m2  Body mass index is 34.11 kg/(m^2).  GENERAL: healthy, alert and no distress  RESP: lungs clear to auscultation - no rales, rhonchi or wheezes  CV: regular rate and rhythm, normal S1 S2, no S3 or S4, no murmur, click or rub, no peripheral edema and peripheral pulses strong  ABDOMEN: soft, nontender, no hepatosplenomegaly, no masses and bowel sounds normal  MS: no gross musculoskeletal defects noted, no edema    Diagnostic Test Results:  Results for orders placed or performed in visit on 01/12/18 (from the past 24 hour(s))   UA reflex to Microscopic and Culture   Result Value Ref Range    Color Urine Yellow     Appearance Urine Clear     Glucose Urine Negative NEG^Negative mg/dL    Bilirubin Urine Negative NEG^Negative    Ketones Urine Negative NEG^Negative mg/dL    Specific Gravity Urine <=1.005 1.003 - 1.035    Blood Urine Negative NEG^Negative    pH Urine 6.0 5.0 - 7.0 pH    Protein Albumin Urine Negative NEG^Negative mg/dL    Urobilinogen Urine 0.2 0.2 - 1.0 EU/dL    Nitrite Urine Negative NEG^Negative    Leukocyte Esterase Urine Negative NEG^Negative    Source Midstream Urine    Beta HCG qual IFA urine - [facility] and [place]   Result Value Ref Range    Beta HCG Qual IFA Urine Positive (A) NEG^Negative      CBC with platelets differential   Result Value Ref Range    WBC 8.0 4.0 - 11.0 10e9/L    RBC Count 4.24 3.8 - 5.2 10e12/L    Hemoglobin 10.9 (L) 11.7 - 15.7 g/dL    Hematocrit 33.4 (L) 35.0 - 47.0 %    MCV 79 78 - 100 fl     MCH 25.7 (L) 26.5 - 33.0 pg    MCHC 32.6 31.5 - 36.5 g/dL    RDW 15.7 (H) 10.0 - 15.0 %    Platelet Count 262 150 - 450 10e9/L    Diff Method Automated Method     % Neutrophils 64.2 %    % Lymphocytes 23.6 %    % Monocytes 8.9 %    % Eosinophils 3.1 %    % Basophils 0.2 %    Absolute Neutrophil 5.2 1.6 - 8.3 10e9/L    Absolute Lymphocytes 1.9 0.8 - 5.3 10e9/L    Absolute Monocytes 0.7 0.0 - 1.3 10e9/L    Absolute Eosinophils 0.3 0.0 - 0.7 10e9/L    Absolute Basophils 0.0 0.0 - 0.2 10e9/L       ASSESSMENT/PLAN:     1. Abdominal pain, generalized  Normal urine.  - UA reflex to Microscopic and Culture    2. Absence of menstruation    - Beta HCG qual IFA urine - [facility] and [place]    3. Iron deficiency anemia, unspecified iron deficiency anemia type  Patient due for recheck and is not currently taking iron.  - Ferritin  - Iron and iron binding capacity  - CBC with platelets differential    4. Vitamin B12 deficiency (non anemic)  Due for recheck.  - Vitamin B12    5. Irritable bowel syndrome with both constipation and diarrhea  Likely the cause of patient's symptoms.  Will have her start fiber supplements to help with constipation.  - psyllium (METAMUCIL SMOOTH TEXTURE) 28 % PACK; Take 1 Package by mouth daily  Dispense: 30 each; Refill: 11    6. Pregnancy examination or test, positive result  FRANCISCO JAVIER 8/10/18, 10w0d  G 9, P 7.    Patient educated on signs and symptoms of pregnancy- nausea, breast tenderness, fatigue.  Patient educated on signs and symptoms of possible ectopic pregnancy and miscarriage- bleeding, cramping, unilateral abdominal pain.  Patient educated on eating a healthy diet and the improved of light exercise.  Patient advised to avoid seafood that may contain mercury, unpasteurized dairy products, and uncooked meats.  Patient to start prenatal vitamin as below.    - Prenatal Vit-Fe Fumarate-FA (PRENATAL MULTIVITAMIN PLUS IRON) 27-0.8 MG TABS per tablet; Take 1 tablet by mouth daily  Dispense: 100 tablet;  Refill: 3  - OB/GYN REFERRAL    FUTURE APPOINTMENTS:       - Follow-up for annual visit or as needed    MALACHI Reynolds [facility]

## 2018-01-12 ENCOUNTER — OFFICE VISIT (OUTPATIENT)
Dept: FAMILY MEDICINE | Facility: CLINIC | Age: 38
End: 2018-01-12
Payer: COMMERCIAL

## 2018-01-12 VITALS
RESPIRATION RATE: 18 BRPM | BODY MASS INDEX: 34.11 KG/M2 | HEART RATE: 82 BPM | DIASTOLIC BLOOD PRESSURE: 70 MMHG | WEIGHT: 205 LBS | SYSTOLIC BLOOD PRESSURE: 128 MMHG | TEMPERATURE: 97.2 F

## 2018-01-12 DIAGNOSIS — N91.2 ABSENCE OF MENSTRUATION: ICD-10-CM

## 2018-01-12 DIAGNOSIS — Z32.01 PREGNANCY EXAMINATION OR TEST, POSITIVE RESULT: ICD-10-CM

## 2018-01-12 DIAGNOSIS — R10.84 ABDOMINAL PAIN, GENERALIZED: Primary | ICD-10-CM

## 2018-01-12 DIAGNOSIS — K58.2 IRRITABLE BOWEL SYNDROME WITH BOTH CONSTIPATION AND DIARRHEA: ICD-10-CM

## 2018-01-12 DIAGNOSIS — D50.9 IRON DEFICIENCY ANEMIA, UNSPECIFIED IRON DEFICIENCY ANEMIA TYPE: ICD-10-CM

## 2018-01-12 DIAGNOSIS — E53.8 VITAMIN B12 DEFICIENCY (NON ANEMIC): ICD-10-CM

## 2018-01-12 LAB
ALBUMIN UR-MCNC: NEGATIVE MG/DL
APPEARANCE UR: CLEAR
BASOPHILS # BLD AUTO: 0 10E9/L (ref 0–0.2)
BASOPHILS NFR BLD AUTO: 0.2 %
BETA HCG QUAL IFA URINE: POSITIVE
BILIRUB UR QL STRIP: NEGATIVE
COLOR UR AUTO: YELLOW
DIFFERENTIAL METHOD BLD: ABNORMAL
EOSINOPHIL # BLD AUTO: 0.3 10E9/L (ref 0–0.7)
EOSINOPHIL NFR BLD AUTO: 3.1 %
ERYTHROCYTE [DISTWIDTH] IN BLOOD BY AUTOMATED COUNT: 15.7 % (ref 10–15)
FERRITIN SERPL-MCNC: 6 NG/ML (ref 12–150)
GLUCOSE UR STRIP-MCNC: NEGATIVE MG/DL
HCT VFR BLD AUTO: 33.4 % (ref 35–47)
HGB BLD-MCNC: 10.9 G/DL (ref 11.7–15.7)
HGB UR QL STRIP: NEGATIVE
IRON SATN MFR SERPL: 6 % (ref 15–46)
IRON SERPL-MCNC: 28 UG/DL (ref 35–180)
KETONES UR STRIP-MCNC: NEGATIVE MG/DL
LEUKOCYTE ESTERASE UR QL STRIP: NEGATIVE
LYMPHOCYTES # BLD AUTO: 1.9 10E9/L (ref 0.8–5.3)
LYMPHOCYTES NFR BLD AUTO: 23.6 %
MCH RBC QN AUTO: 25.7 PG (ref 26.5–33)
MCHC RBC AUTO-ENTMCNC: 32.6 G/DL (ref 31.5–36.5)
MCV RBC AUTO: 79 FL (ref 78–100)
MONOCYTES # BLD AUTO: 0.7 10E9/L (ref 0–1.3)
MONOCYTES NFR BLD AUTO: 8.9 %
NEUTROPHILS # BLD AUTO: 5.2 10E9/L (ref 1.6–8.3)
NEUTROPHILS NFR BLD AUTO: 64.2 %
NITRATE UR QL: NEGATIVE
PH UR STRIP: 6 PH (ref 5–7)
PLATELET # BLD AUTO: 262 10E9/L (ref 150–450)
RBC # BLD AUTO: 4.24 10E12/L (ref 3.8–5.2)
SOURCE: NORMAL
SP GR UR STRIP: <=1.005 (ref 1–1.03)
TIBC SERPL-MCNC: 505 UG/DL (ref 240–430)
UROBILINOGEN UR STRIP-ACNC: 0.2 EU/DL (ref 0.2–1)
WBC # BLD AUTO: 8 10E9/L (ref 4–11)

## 2018-01-12 PROCEDURE — 36415 COLL VENOUS BLD VENIPUNCTURE: CPT | Performed by: NURSE PRACTITIONER

## 2018-01-12 PROCEDURE — 99214 OFFICE O/P EST MOD 30 MIN: CPT | Performed by: NURSE PRACTITIONER

## 2018-01-12 PROCEDURE — 84703 CHORIONIC GONADOTROPIN ASSAY: CPT | Performed by: NURSE PRACTITIONER

## 2018-01-12 PROCEDURE — 85025 COMPLETE CBC W/AUTO DIFF WBC: CPT | Performed by: NURSE PRACTITIONER

## 2018-01-12 PROCEDURE — 82728 ASSAY OF FERRITIN: CPT | Performed by: NURSE PRACTITIONER

## 2018-01-12 PROCEDURE — 83550 IRON BINDING TEST: CPT | Performed by: NURSE PRACTITIONER

## 2018-01-12 PROCEDURE — 83540 ASSAY OF IRON: CPT | Performed by: NURSE PRACTITIONER

## 2018-01-12 PROCEDURE — 82607 VITAMIN B-12: CPT | Performed by: NURSE PRACTITIONER

## 2018-01-12 PROCEDURE — 81003 URINALYSIS AUTO W/O SCOPE: CPT | Performed by: NURSE PRACTITIONER

## 2018-01-12 RX ORDER — PRENATAL VIT/IRON FUM/FOLIC AC 27MG-0.8MG
1 TABLET ORAL DAILY
Qty: 100 TABLET | Refills: 3 | Status: SHIPPED | OUTPATIENT
Start: 2018-01-12 | End: 2019-04-09

## 2018-01-12 ASSESSMENT — PAIN SCALES - GENERAL: PAINLEVEL: SEVERE PAIN (6)

## 2018-01-12 NOTE — MR AVS SNAPSHOT
After Visit Summary   1/12/2018    Brit Romano    MRN: [id number]           Patient Information     Date Of Birth          1980        Visit Information        Provider Department      1/12/2018 2:00 PM Princess Hicks APRN [facility]        Today's Diagnoses     Abdominal pain, generalized    -  1    Absence of menstruation        Iron deficiency anemia, unspecified iron deficiency anemia type        Vitamin B12 deficiency (non anemic)        Irritable bowel syndrome with both constipation and diarrhea        Pregnancy examination or test, positive result          Care Instructions    Schedule Ob/Gyn appointment.    [facility]    If you have any questions regarding to your visit please contact your care team:     Team Pink:   Clinic Hours Telephone Number   Internal Medicine:  Dr. Chen Hicks, NP       7am-7pm  Monday - Thursday   7am-5pm  Fridays  [phone]  (Appointment scheduling available 24/7)    Questions about your visit?  Team Line  [phone]   Urgent Care - [place] - 11am-9pm Monday-Friday Saturday-Sunday- 9am-5pm   [place] - 5pm-9pm Monday-Friday Saturday-Sunday- 9am-5pm  [phone] - [place]   [phone] - [place]       What options do I have for visits at the clinic other than the traditional office visit?  To expand how we care for you, many of our providers are utilizing electronic visits (e-visits) and telephone visits, when medically appropriate, for interactions with their patients rather than a visit in the clinic.   We also offer nurse visits for many medical concerns. Just like any other service, we will bill your insurance company for this type of visit based on time spent on the phone with your provider. Not all insurance companies cover these visits. Please check with your medical insurance if this type of visit is covered. You will be  responsible for any charges that are not paid by your insurance.      E-visits via [employer]hart:  generally incur a $35.00 fee.  Telephone visits:  Time spent on the phone: *charged based on time that is spent on the phone in increments of 10 minutes. Estimated cost:   5-10 mins $30.00   11-20 mins. $59.00   21-30 mins. $85.00   Use [employer]hart (secure email communication and access to your chart) to send your primary care provider a message or make an appointment. Ask someone on your Team how to sign up for [employer].    For a Price Quote for your services, please call our [employer] Line at [phone].    As always, Thank you for trusting us with your health care needs!    Discharged By:  EDSON ARAYA            Follow-ups after your visit        Additional Services     OB/GYN REFERRAL       Your provider has referred you to:  [facility]: [facility] [phone]   [email or web address]    Please be aware that coverage of these services is subject to the terms and limitations of your health insurance plan.  Call member services at your health plan with any benefit or coverage questions.      Please bring the following with you to your appointment:    (1) Any X-Rays, CTs or MRIs which have been performed.  Contact the facility where they were done to arrange for  prior to your scheduled appointment.   (2) List of current medications   (3) This referral request   (4) Any documents/labs given to you for this referral                  Who to contact     If you have questions or need follow up information about today's clinic visit or your schedule please contact [facility] directly at [phone].  Normal or non-critical lab and imaging results will be communicated to you by MyChart, letter or phone within 4 business days after the clinic has received the results. If you do not hear from us within 7 days, please contact the clinic through MyChart or phone. If you  "have a critical or abnormal lab result, we will notify you by phone as soon as possible.  Submit refill requests through [employer] or call your pharmacy and they will forward the refill request to us. Please allow 3 business days for your refill to be completed.          Additional Information About Your Visit        [email or web address] Information     [employer] lets you send messages to your doctor, view your test results, renew your prescriptions, schedule appointments and more. To sign up, go to [email or web address] . Click on \"Log in\" on the left side of the screen, which will take you to the Welcome page. Then click on \"Sign up Now\" on the right side of the page.     You will be asked to enter the access code listed below, as well as some personal information. Please follow the directions to create your username and password.     Your access code is: [email or web address]  Expires: 2018  3:17 PM     Your access code will  in 90 days. If you need help or a new code, please call your [place] clinic or [phone].        Care EveryWhere ID     This is your Care EveryWhere ID. This could be used by other organizations to access your [place] medical records  [id number]        Your Vitals Were     Pulse Temperature Respirations Last Period BMI (Body Mass Index)       82 97.2  F (36.2  C) (Oral) 18 2017 34.11 kg/m2        Blood Pressure from Last 3 Encounters:   18 128/70   17 129/85   17 132/84    Weight from Last 3 Encounters:   18 205 lb (93 kg)   17 193 lb (87.5 kg)   17 193 lb (87.5 kg)              We Performed the Following     Beta HCG qual IFA urine - [facility] and [place]     CBC with platelets differential     Ferritin     Iron and iron binding capacity     OB/GYN REFERRAL     UA reflex to Microscopic and Culture     Vitamin B12          Today's Medication Changes          These changes are accurate as of: 18  3:17 PM.  If you have any questions, ask your nurse or " doctor.               Start taking these medicines.        Dose/Directions    prenatal multivitamin plus iron 27-0.8 MG Tabs per tablet   Used for:  Pregnancy examination or test, positive result   Started by:  Princess Hicks APRN CNP        Dose:  1 tablet   Take 1 tablet by mouth daily   Quantity:  100 tablet   Refills:  3       psyllium 28 % Pack   Commonly known as:  METAMUCIL SMOOTH TEXTURE   Used for:  Irritable bowel syndrome with both constipation and diarrhea   Started by:  Princess Hicks APRN CNP        Dose:  1 Package   Take 1 Package by mouth daily   Quantity:  30 each   Refills:  11            Where to get your medicines      These medications were sent to [id number] - [place], [place] - [address] AT [facility] [address]  [address], [place] [address]     Phone:  [phone]     prenatal multivitamin plus iron 27-0.8 MG Tabs per tablet    psyllium 28 % Pack                Primary Care Provider Office Phone # Fax #    MALACHI Mazariegos -[phone] [address] [facility] [address]        Equal Access to Services     [facility]: Hadii aad ku hadasho Soomaali, waaxda luqadaha, qaybta kaalmada adeegyada, waxay idiin hayaan jax snyder . So [facility] [phone].    ATENCIÓN: Si habla español, tiene a smith disposición servicios gratuitos de asistencia lingüística. [facility] [phone].    We comply with applicable federal civil rights laws and [place] laws. We do not discriminate on the basis of race, color, national origin, age, disability, sex, sexual orientation, or gender identity.            Thank you!     Thank you for choosing [facility]  for your care. Our goal is always to provide you with excellent care. Hearing back from our patients is one way we can continue to improve our services. Please take a few minutes to complete the written survey that you  "may receive in the mail after your visit with us. Thank you!             Your Updated Medication List - Protect others around you: Learn how to safely use, store and throw away your medicines at www.disposemymeds.org.          This list is accurate as of: 1/12/18  3:17 PM.  Always use your most recent med list.                   Brand Name Dispense Instructions for use Diagnosis    cetirizine 10 MG tablet    zyrTEC    60 tablet    Take 1 tablet (10 mg) by mouth 2 times daily    Chronic rhinitis       cyanocobalamin 1000 MCG tablet    vitamin  B-12    90 tablet    Take 1 tablet (1,000 mcg) by mouth daily    Vitamin B12 deficiency       ferrous sulfate 325 (65 FE) MG tablet    IRON    60 tablet    Take 1 tablet (325 mg) by mouth 2 times daily    Microcytic anemia       fluticasone 50 MCG/ACT spray    FLONASE    16 mL    SPRAY 2 SPRAYS INTO BOTH NOSTRILS DAILY    Seasonal allergic rhinitis, unspecified allergic rhinitis trigger       metroNIDAZOLE 500 MG tablet    FLAGYL    14 tablet    Take 1 tablet (500 mg) by mouth 2 times daily    Bacterial vaginal infection       montelukast 10 MG tablet    SINGULAIR    30 tablet    Take 1 tablet (10 mg) by mouth At Bedtime    Chronic rhinitis       omeprazole 40 MG capsule    priLOSEC    30 capsule    Take 1 capsule (40 mg) by mouth daily    Abdominal pain, generalized       polyethylene glycol 400 0.25 % Soln ophthalmic solution    BLINK TEARS     Place 1 drop into both eyes 2 times daily \"Blink Tears\"    Dry eyes       prenatal multivitamin plus iron 27-0.8 MG Tabs per tablet     100 tablet    Take 1 tablet by mouth daily    Pregnancy examination or test, positive result       psyllium 28 % Pack    METAMUCIL SMOOTH TEXTURE    30 each    Take 1 Package by mouth daily    Irritable bowel syndrome with both constipation and diarrhea         "

## 2018-01-12 NOTE — PATIENT INSTRUCTIONS
Schedule Ob/Gyn appointment.    [facility]    If you have any questions regarding to your visit please contact your care team:     Team Pink:   Clinic Hours Telephone Number   Internal Medicine:  Dr. Chen Hicks, NP       7am-7pm  Monday - Thursday   7am-5pm  Fridays  [phone]  (Appointment scheduling available 24/7)    Questions about your visit?  Team Line  [phone]   Urgent Care - [place] and [facility] - 11am-9pm Monday-Friday Saturday-Sunday- 9am-5pm   [place] - 5pm-9pm Monday-Friday Saturday-Sunday- 9am-5pm  [phone] - [facility]  [phone] - [place]       What options do I have for visits at the clinic other than the traditional office visit?  To expand how we care for you, many of our providers are utilizing electronic visits (e-visits) and telephone visits, when medically appropriate, for interactions with their patients rather than a visit in the clinic.   We also offer nurse visits for many medical concerns. Just like any other service, we will bill your insurance company for this type of visit based on time spent on the phone with your provider. Not all insurance companies cover these visits. Please check with your medical insurance if this type of visit is covered. You will be responsible for any charges that are not paid by your insurance.      E-visits via [employer]:  generally incur a $35.00 fee.  Telephone visits:  Time spent on the phone: *charged based on time that is spent on the phone in increments of 10 minutes. Estimated cost:   5-10 mins $30.00   11-20 mins. $59.00   21-30 mins. $85.00   Use [employer] (secure email communication and access to your chart) to send your primary care provider a message or make an appointment. Ask someone on your Team how to sign up for [employer].    For a Price Quote for your services, please call our Consumer Price Line at [phone].    As always, Thank you for trusting us with  your health care needs!    Discharged By:  EDSON ARAYA

## 2018-01-13 LAB — VIT B12 SERPL-MCNC: 128 PG/ML (ref 193–986)

## 2018-02-07 ENCOUNTER — TELEPHONE (OUTPATIENT)
Dept: OBGYN | Facility: CLINIC | Age: 38
End: 2018-02-07

## 2018-02-07 DIAGNOSIS — O09.521 ELDERLY MULTIGRAVIDA IN FIRST TRIMESTER: Primary | ICD-10-CM

## 2018-02-07 NOTE — TELEPHONE ENCOUNTER
Pt is calling her LMP was 11/3/17 this is her 9th pregnancy and she is currently experiencing nausea, vomiting, and breast tenderness. Pt is currently taking prenatal vitamins and is scheduled on 2/8/18 @ 1:30pm.

## 2018-02-08 ENCOUNTER — OFFICE VISIT (OUTPATIENT)
Dept: OBGYN | Facility: CLINIC | Age: 38
End: 2018-02-08
Attending: ADVANCED PRACTICE MIDWIFE
Payer: COMMERCIAL

## 2018-02-08 VITALS
WEIGHT: 206.4 LBS | DIASTOLIC BLOOD PRESSURE: 77 MMHG | BODY MASS INDEX: 34.39 KG/M2 | HEIGHT: 65 IN | SYSTOLIC BLOOD PRESSURE: 123 MMHG | HEART RATE: 87 BPM

## 2018-02-08 DIAGNOSIS — O44.42 LOW-LYING PLACENTA IN SECOND TRIMESTER: ICD-10-CM

## 2018-02-08 DIAGNOSIS — O09.40 SUPERVISION OF HIGH-RISK PREGNANCY WITH GRAND MULTIPARITY, UNSPECIFIED TRIMESTER: Primary | ICD-10-CM

## 2018-02-08 DIAGNOSIS — O09.529 SUPERVISION OF HIGH-RISK PREGNANCY OF ELDERLY MULTIGRAVIDA: Primary | ICD-10-CM

## 2018-02-08 DIAGNOSIS — Z86.32 HISTORY OF GESTATIONAL DIABETES: ICD-10-CM

## 2018-02-08 DIAGNOSIS — D50.8 OTHER IRON DEFICIENCY ANEMIA: ICD-10-CM

## 2018-02-08 DIAGNOSIS — Z36.89 ENCOUNTER FOR FETAL ANATOMIC SURVEY: ICD-10-CM

## 2018-02-08 LAB
ABO + RH BLD: NORMAL
ABO + RH BLD: NORMAL
BASOPHILS # BLD AUTO: 0 10E9/L (ref 0–0.2)
BASOPHILS NFR BLD AUTO: 0.1 %
BLD GP AB SCN SERPL QL: NORMAL
BLOOD BANK CMNT PATIENT-IMP: NORMAL
DIFFERENTIAL METHOD BLD: ABNORMAL
EOSINOPHIL # BLD AUTO: 0.1 10E9/L (ref 0–0.7)
EOSINOPHIL NFR BLD AUTO: 2 %
ERYTHROCYTE [DISTWIDTH] IN BLOOD BY AUTOMATED COUNT: 15.9 % (ref 10–15)
HBA1C MFR BLD: 5.4 % (ref 4.3–6)
HCT VFR BLD AUTO: 33.1 % (ref 35–47)
HGB BLD-MCNC: 10.6 G/DL (ref 11.7–15.7)
IMM GRANULOCYTES # BLD: 0 10E9/L (ref 0–0.4)
IMM GRANULOCYTES NFR BLD: 0.1 %
LYMPHOCYTES # BLD AUTO: 1.6 10E9/L (ref 0.8–5.3)
LYMPHOCYTES NFR BLD AUTO: 22.7 %
MCH RBC QN AUTO: 25.4 PG (ref 26.5–33)
MCHC RBC AUTO-ENTMCNC: 32 G/DL (ref 31.5–36.5)
MCV RBC AUTO: 79 FL (ref 78–100)
MONOCYTES # BLD AUTO: 0.4 10E9/L (ref 0–1.3)
MONOCYTES NFR BLD AUTO: 5.5 %
NEUTROPHILS # BLD AUTO: 5 10E9/L (ref 1.6–8.3)
NEUTROPHILS NFR BLD AUTO: 69.6 %
NRBC # BLD AUTO: 0 10*3/UL
NRBC BLD AUTO-RTO: 0 /100
PLATELET # BLD AUTO: 244 10E9/L (ref 150–450)
RBC # BLD AUTO: 4.17 10E12/L (ref 3.8–5.2)
SPECIMEN EXP DATE BLD: NORMAL
WBC # BLD AUTO: 7.1 10E9/L (ref 4–11)

## 2018-02-08 PROCEDURE — 87086 URINE CULTURE/COLONY COUNT: CPT | Performed by: ADVANCED PRACTICE MIDWIFE

## 2018-02-08 PROCEDURE — 82306 VITAMIN D 25 HYDROXY: CPT | Performed by: ADVANCED PRACTICE MIDWIFE

## 2018-02-08 PROCEDURE — G0463 HOSPITAL OUTPT CLINIC VISIT: HCPCS | Mod: 25,ZF

## 2018-02-08 PROCEDURE — 85025 COMPLETE CBC W/AUTO DIFF WBC: CPT | Performed by: ADVANCED PRACTICE MIDWIFE

## 2018-02-08 PROCEDURE — 36415 COLL VENOUS BLD VENIPUNCTURE: CPT | Performed by: ADVANCED PRACTICE MIDWIFE

## 2018-02-08 PROCEDURE — 86850 RBC ANTIBODY SCREEN: CPT | Performed by: ADVANCED PRACTICE MIDWIFE

## 2018-02-08 PROCEDURE — 83021 HEMOGLOBIN CHROMOTOGRAPHY: CPT | Performed by: ADVANCED PRACTICE MIDWIFE

## 2018-02-08 PROCEDURE — 76801 OB US < 14 WKS SINGLE FETUS: CPT | Mod: ZF

## 2018-02-08 PROCEDURE — 87591 N.GONORRHOEAE DNA AMP PROB: CPT | Performed by: ADVANCED PRACTICE MIDWIFE

## 2018-02-08 PROCEDURE — 86762 RUBELLA ANTIBODY: CPT | Performed by: ADVANCED PRACTICE MIDWIFE

## 2018-02-08 PROCEDURE — G0499 HEPB SCREEN HIGH RISK INDIV: HCPCS | Performed by: ADVANCED PRACTICE MIDWIFE

## 2018-02-08 PROCEDURE — 87491 CHLMYD TRACH DNA AMP PROBE: CPT | Performed by: ADVANCED PRACTICE MIDWIFE

## 2018-02-08 PROCEDURE — 86780 TREPONEMA PALLIDUM: CPT | Performed by: ADVANCED PRACTICE MIDWIFE

## 2018-02-08 PROCEDURE — 83036 HEMOGLOBIN GLYCOSYLATED A1C: CPT | Performed by: ADVANCED PRACTICE MIDWIFE

## 2018-02-08 PROCEDURE — 86901 BLOOD TYPING SEROLOGIC RH(D): CPT | Performed by: ADVANCED PRACTICE MIDWIFE

## 2018-02-08 PROCEDURE — 86900 BLOOD TYPING SEROLOGIC ABO: CPT | Performed by: ADVANCED PRACTICE MIDWIFE

## 2018-02-08 PROCEDURE — 87389 HIV-1 AG W/HIV-1&-2 AB AG IA: CPT | Performed by: ADVANCED PRACTICE MIDWIFE

## 2018-02-08 ASSESSMENT — ANXIETY QUESTIONNAIRES
1. FEELING NERVOUS, ANXIOUS, OR ON EDGE: NOT AT ALL
5. BEING SO RESTLESS THAT IT IS HARD TO SIT STILL: NOT AT ALL
6. BECOMING EASILY ANNOYED OR IRRITABLE: NOT AT ALL
GAD7 TOTAL SCORE: 0
3. WORRYING TOO MUCH ABOUT DIFFERENT THINGS: NOT AT ALL
7. FEELING AFRAID AS IF SOMETHING AWFUL MIGHT HAPPEN: NOT AT ALL
2. NOT BEING ABLE TO STOP OR CONTROL WORRYING: NOT AT ALL

## 2018-02-08 ASSESSMENT — PATIENT HEALTH QUESTIONNAIRE - PHQ9: 5. POOR APPETITE OR OVEREATING: NOT AT ALL

## 2018-02-08 NOTE — PROGRESS NOTES
38 year old female, , with LMP 11/3/17, 13 6/7 weeks,  Estimated Date of Delivery: 8/10/18 presents for confirmation of dates and assessment of viability. This study was done transabdominally.    Measurements     CRL = 73.7 mm = 13 3/7 weeks  EGA.   FRANCISCO JAVIER = 18.     Fetal anatomy appears normal for gestational age.     Fetal/Fetal Cardiac Activity: Present.  FHR = 153bpm.     Implantation: Intrauterine.     Cervix = 4.3 cm      Maternal structures appear normal.    Low lying placenta- 0.74cm    Impression: Viable IUP at 13+6 weeks by LMP c/w today's study.  Low lying placenta is noted.    Recommend comprehensive scan at 18 to 20 weeks, follow up placental location then.    ANGELA Nair MD, FACOG

## 2018-02-08 NOTE — PATIENT INSTRUCTIONS
"  Thank you for choosing [facility] for your obstetrical care.  We are an integrated health clinic with obstetricians, midwives, a psychologist, an acupuncturist, a nutritionist, a pharmacist, internal medicine and family practice all in one.  If you have questions about services offered please ask.      o Please keep all appointments with your provider.  You will help catch, prevent and treat problems early.  o Review your Expectant Family booklet and folder given to you at this intake visit.  It can answer many basic questions including:    Treatment for nausea and vomiting    Medications that are safe in pregnancy    Healthy diet and weight gain    Exercise and activity  o ASK questions!!  Please use \"Questions for my New OB visit\" form to write down any questions or concerns for your next visit.  o Eat a healthy diet.  Visit www.choosemyplate.gov and click on  Pregnancy and Breastfeeding  for information and tips  o Do not smoke.  Avoid other people's smoke, too.  We are happy to help with referrals to stop smoking programs.  o Do not drink alcohol.  o Try to avoid people who have colds or other infections.  Practice good hand washing.  o Consider registering for our Healthy Pregnancy Class here at [facility].  This class is offered every 3rd Wednesday from 2:30-4:30 p.m.  [place] at [phone] or online at [email or web address]  o Consider registering for prenatal education classes through [place] at [facility].  You can view class schedules and register online at [email or web address] or call [phone] for questions     For urgent concerns, call [facility] at [phone] to speak with a triage nurse during regular clinic hours (8:00 am - 4:30 pm).  This line is answered by our service 24 hours a day, after hours a provider will return your call.    Thank you for choosing [facility] for your " "obstetrical care.  We are an integrated health clinic with obstetricians, midwives, a psychologist, an acupuncturist, a nutritionist, a pharmacist, internal medicine and family practice all in one.  If you have questions about services offered please ask.      o Please keep all appointments with your provider.  You will help catch, prevent and treat problems early.  o Review your Expectant Family booklet and folder given to you at this intake visit.  It can answer many basic questions including:    Treatment for nausea and vomiting    Medications that are safe in pregnancy    Healthy diet and weight gain    Exercise and activity  o ASK questions!!  Please use \"Questions for my New OB visit\" form to write down any questions or concerns for your next visit.  o Eat a healthy diet.  Visit www.choosemyplate.gov and click on  Pregnancy and Breastfeeding  for information and tips  o Do not smoke.  Avoid other people's smoke, too.  We are happy to help with referrals to stop smoking programs.  o Do not drink alcohol.  o Try to avoid people who have colds or other infections.  Practice good hand washing.  o Consider registering for our Healthy Pregnancy Class here at [facility].  This class is offered every 3rd Wednesday from 2:30-4:30 p.m.  [place] at [phone] or online at [email or web address] or [email or web address]  o Consider registering for prenatal education classes through [place] at [facility].  You can view class schedules and register online at [email or web address] or call [phone] for questions     For urgent concerns, call [facility] at [phone] to speak with a triage nurse during regular clinic hours (8:00 am - 4:30 pm).  This line is answered by our service 24 hours a day, after hours a provider will return your call.  "

## 2018-02-08 NOTE — PROGRESS NOTES
"  SUBJECTIVE:    38 year old, female, , 13w6d,  who presents to the clinic today for a new ob visit.    Feels well. Has started PNV.  Estimated Date of Delivery: Aug 10, 2018   Aug 10, 2018 is calculated from Patient's last menstrual period was 2017., c/w dating u/s.  She has not had bleeding since her LMP.   She has had mild nausea. Weight loss has not occurred.   Patient states this pregnancy was \"not a surprise.\" Not using contraception. Happy.   FOB is involved.    OTHER CONCERNS:    - History of IUFD-    - History of GDM x 2       ===========================================  ROS  PSYCHIATRIC:  Denies mood changes, difficulty concentrating, depression, anxiety, panic attacks or post partum depression  PHQ9: Last PHQ-9 score on record= 0  Social History   Substance Use Topics     Smoking status: Never Smoker     Smokeless tobacco: Never Used     Alcohol use No     History   Drug Use No     History   Smoking Status     Never Smoker   Smokeless Tobacco     Never Used       Alcohol use No     Family History   Problem Relation Age of Onset     Hypertension Mother      DIABETES Mother      KIDNEY DISEASE Mother      dialysis     Glaucoma Mother      Macular Degeneration No family hx of      ============================================  MEDICAL HISTORY   No Known Allergies        Current Outpatient Prescriptions:      Prenatal Vit-Fe Fumarate-FA (PRENATAL MULTIVITAMIN PLUS IRON) 27-0.8 MG TABS per tablet, Take 1 tablet by mouth daily, Disp: 100 tablet, Rfl: 3     omeprazole (PRILOSEC) 40 MG capsule, Take 1 capsule (40 mg) by mouth daily, Disp: 30 capsule, Rfl: 5     cyanocobalamin (VITAMIN  B-12) 1000 MCG tablet, Take 1 tablet (1,000 mcg) by mouth daily, Disp: 90 tablet, Rfl: 1     ferrous sulfate (IRON) 325 (65 FE) MG tablet, Take 1 tablet (325 mg) by mouth 2 times daily, Disp: 60 tablet, Rfl: 2     cetirizine (ZYRTEC) 10 MG tablet, Take 1 tablet (10 mg) by mouth 2 times daily, Disp: 60 tablet, Rfl: " "4     psyllium (METAMUCIL SMOOTH TEXTURE) 28 % PACK, Take 1 Package by mouth daily (Patient not taking: Reported on 2018), Disp: 30 each, Rfl: 11     polyethylene glycol 400 (BLINK TEARS) 0.25 % SOLN ophthalmic solution, Place 1 drop into both eyes 2 times daily \"Blink Tears\" (Patient not taking: Reported on 2018), Disp: , Rfl:      metroNIDAZOLE (FLAGYL) 500 MG tablet, Take 1 tablet (500 mg) by mouth 2 times daily (Patient not taking: Reported on 2018), Disp: 14 tablet, Rfl: 0     montelukast (SINGULAIR) 10 MG tablet, Take 1 tablet (10 mg) by mouth At Bedtime (Patient not taking: Reported on 2017), Disp: 30 tablet, Rfl: 3     fluticasone (FLONASE) 50 MCG/ACT nasal spray, SPRAY 2 SPRAYS INTO BOTH NOSTRILS DAILY (Patient not taking: Reported on 2017), Disp: 16 mL, Rfl: 3    Past Medical History:   Diagnosis Date     NO ACTIVE PROBLEMS      Seasonal allergies        Past Surgical History:   Procedure Laterality Date     NO HISTORY OF SURGERY              Obstetric History       T8      L7     SAB0   TAB0   Ectopic0   Multiple0   Live Births7       # Outcome Date GA Lbr Jamal/2nd Weight Sex Delivery Anes PTL Lv   9 Current            8 Term 16 40w2d 04:29 / 00:17 3.52 kg (7 lb 12.2 oz) F Vag-Spont EPI N ROXANNA      Apgar1:  9                Apgar5: 9   7 Term 12    F    ROXANNA   6 Term 11    F   N ROXANNA   5 Term 09    M   N ROXANNA      Complications: GDM (gestational diabetes mellitus)   4 Term 07    M I.U. FETAL D  N FD   3 Term 05    M   N ROXANNA   2 Term 03    M   N ROXANNA   1 Term 12/10/00    M   N ROXANNA      Obstetric Comments   No PTL, of HTN. GDM x 1only, diet controlled       GYN History- - Pap NIL 10/24/14, declines today, desires PP    I personally reviewed the past social/family/medical and surgical history on the date of service.   I reviewed lab work done at Intake visit with patient.    OBJECTIVE:   PHYSICAL EXAM:  BP " "123/77 (BP Location: Right arm, Patient Position: Chair)  Pulse 87  Ht 1.651 m (5' 5\")  Wt 93.6 kg (206 lb 6.4 oz)  LMP 2017  BMI 34.35 kg/m2  BMI- Body mass index is 34.35 kg/(m^2)., GENERAL:  Pleasant pregnant female, alert, cooperative  and well groomed.  SKIN:  Warm and dry, without lesions or rashes  HEAD: Symmetrical features.  MOUTH:  Buccal mucosa pink, moist without lesions.  Teeth in good repair.    NECK:  Thyroid without enlargement and nodules.  Lymph nodes not palpable.   LUNGS:  Clear to auscultation.  BREAST:  declined  HEART:  RRR without murmur.  ABDOMEN: Soft without masses , tenderness or organomegaly.  No CVA tenderness.  Uterus palpable at size equal to dates.  No scars noted.. Fetal heart tones present.  MUSCULOSKELETAL:  Full range of motion  EXTREMITIES:  No edema. No significant varicosities.   PELVIC EXAM: declined  WET PREP:Not done  GC/CHLAMYDIA CULTURE OBTAINED:YES  - Pap NIL 10/24/14, declines today, desires PP    ASSESSMENT:  Intrauterine pregnancy 13w6d size  consistent with dates  Genetic Screening: level 2 only  Encounter Diagnosis   Name Primary?     Supervision of high-risk pregnancy of elderly multigravida Yes        PLAN:  Orders Placed This Encounter   Procedures     25- OH-Vitamin D     CBC with Platelets Differential [BPG892]     Anti Treponema [KDD3436]     Rubella Antibody IgG Quantitative [OGQ9077]     Hepatitis B Surface Antigen [VOU312]     HIV Antigen Antibody Combo [OUO3330]     Hemoglobin A1c     HGB Eval Reflex to ELP or RBC Solubility     Iron and Iron Binding Capacity     Ferritin     Maternal Fetal Medicine Center Referral [9046.022]     ABO/Rh Type and Screen [OKE817]     - Reviewed use of triage nurse line and contacting the on-call provider after hours for an urgent need such as fever, vagina bleeding, bladder or vaginal infection, rupture of membranes,  or term labor.    - Reviewed best evidence for: weight gain for her weight and height for " pregnancy:    healthy diet and foods to avoid; exercise and activity during pregnancy;avoiding exposure to toxoplasmosis; and maintenance of a generally healthy lifestyle.   - Discussed the harms, benefits, side effects and alternative therapies for current prescribed and OTC medications.    - All pt's questions discussed and answered.  Pt verbalized understanding of and agreement to plan of care.     - Reviewed OBI/NOB education.  - Reviewed dating ultrasound today and finding of low lying placenta  Recommended pelvic rest. Will reevaluate placenta location at level 2 u/s.   - M referral placed for level 2 ultrasound.  - Labs ordered.  - Hbga1c added d/t histoy of gdm x 2. Declines early 1 hour glucose.   - GC/CT today  - Pap NIL 10/24/14, declines today, desires PP  - Needs urine culture at next visit.  - Desires MD care.  - Continue scheduled prenatal care, RTC in approx 4 weeks and prn if questions or concerns    MALACHI Espino, GEMA

## 2018-02-08 NOTE — LETTER
2018       RE: Brit Romano  [address] [place] [facility] [place] [facility] [address]     Dear Colleague,    Thank you for referring your patient, Brit Romano, to the [facility] at [facility]. Please see a copy of my visit note below.    38 year old female, , with LMP 11/3/17, 13 6/7 weeks,  Estimated Date of Delivery: 8/10/18 presents for confirmation of dates and assessment of viability. This study was done transabdominally.    Measurements     CRL = 73.7 mm = 13 3/7 weeks  EGA.   FRANCISCO JAVIER = 18.     Fetal anatomy appears normal for gestational age.     Fetal/Fetal Cardiac Activity: Present.  FHR = 153bpm.     Implantation: Intrauterine.     Cervix = 4.3 cm      Maternal structures appear normal.    Low lying placenta- 0.74cm    Impression: Viable IUP at 13+6 weeks by LMP c/w today's study.  Low lying placenta is noted.    Recommend comprehensive scan at 18 to 20 weeks, follow up placental location then.    ANGELA Nair MD, FACOG      Again, thank you for allowing me to participate in the care of your patient.

## 2018-02-08 NOTE — MR AVS SNAPSHOT
"              After Visit Summary   2/8/2018    Brit Romano    MRN: [id number]           Patient Information     Date Of Birth          1980        Visit Information        Provider Department      2/8/2018 2:00 PM Zully Goldman CNM [facility]        Today's Diagnoses     Supervision of high-risk pregnancy of elderly multigravida    -  1    Encounter for fetal anatomic survey        Other iron deficiency anemia        Low-lying placenta in second trimester        History of gestational diabetes          Care Instructions      Thank you for choosing [facility] for your obstetrical care.  We are an integrated health clinic with obstetricians, midwives, a psychologist, an acupuncturist, a nutritionist, a pharmacist, internal medicine and family practice all in one.  If you have questions about services offered please ask.      o Please keep all appointments with your provider.  You will help catch, prevent and treat problems early.  o Review your Expectant Family booklet and folder given to you at this intake visit.  It can answer many basic questions including:    Treatment for nausea and vomiting    Medications that are safe in pregnancy    Healthy diet and weight gain    Exercise and activity  o ASK questions!!  Please use \"Questions for my New OB visit\" form to write down any questions or concerns for your next visit.  o Eat a healthy diet.  Visit www.choosemyplate.gov and click on  Pregnancy and Breastfeeding  for information and tips  o Do not smoke.  Avoid other people's smoke, too.  We are happy to help with referrals to stop smoking programs.  o Do not drink alcohol.  o Try to avoid people who have colds or other infections.  Practice good hand washing.  o Consider registering for our Healthy Pregnancy Class here at [facility].  This class is offered every 3rd Wednesday from 2:30-4:30 p.m.  [place] at [phone] or online at " [email or web address] or [email or web address]  o Consider registering for prenatal education classes through [place] at [facility].  You can view class schedules and register online at [email or web address] or call [phone] for questions     For urgent concerns, call [facility] at [phone] to speak with a triage nurse during regular clinic hours (8:00 am - 4:30 pm).  This line is answered by our service 24 hours a day, after hours a provider will return your call.    Thank you for choosing [facility] for your obstetrical care.  We are an integrated health clinic with obstetricians, midwives, a psychologist, an acupuncturist, a nutritionist, a pharmacist, internal medicine and family practice all in one.  If you have questions about services offered please ask.      o Please keep all appointments with your provider.  You will help catch, prevent and treat problems early.  o Review your Expectant Family booklet and folder given to you at this intake visit.  It can answer many basic questions including:    Treatment for nausea and vomiting    Medications that are safe in pregnancy    Healthy diet and weight gain    Exercise and activity  o ASK questions!!  Please use \"Questions for my New OB visit\" form to write down any questions or concerns for your next visit.  o Eat a healthy diet.  Visit www.choosemyplate.gov and click on  Pregnancy and Breastfeeding  for information and tips  o Do not smoke.  Avoid other people's smoke, too.  We are happy to help with referrals to stop smoking programs.  o Do not drink alcohol.  o Try to avoid people who have colds or other infections.  Practice good hand washing.  o Consider registering for our Healthy Pregnancy Class here at [facility].  This class is offered every 3rd Wednesday from 2:30-4:30 p.m.  [place] at [phone] or online at [email or web address] or " [email or web address]  o Consider registering for prenatal education classes through [place] at [facility].  You can view class schedules and register online at [email or web address] or call [phone] for questions     For urgent concerns, call [facility] at [phone] to speak with a triage nurse during regular clinic hours (8:00 am - 4:30 pm).  This line is answered by our service 24 hours a day, after hours a provider will return your call.          Follow-ups after your visit        Additional Services     Maternal Fetal Medicine Center Referral [9046.022]       >> Patient may proceed with recommendations for further testing as directed by the Maternal Fetal Medicine Specialist >>    >> If requesting Fetal Echo: MFM will determine appropriate location for exam due to indication.    >> If requesting Lung Maturity Amnio:  If results indicate fetal lung maturity, induction or C/S is recommended within 36 hours.  Please schedule accordingly.     Dear Patient:   Please be aware that coverage of these services is subject to the terms and limitations of your health insurance plan.  Call member services at your health plan with any benefit or coverage questions.      Please bring the following to your appointment:    >>  Any x-rays, CTs or MRIs which have been performed.  Contact the facility where they were done to arrange for  prior to your scheduled appointment.  Any new CT, MRI or other procedures ordered by your specialist must be performed at a [place] facility or coordinated by your clinic's referral office.  >>  List of current medications   >>  This referral request   >>  Any documents/labs given to you for this referral                  Follow-up notes from your care team     Return in about 4 weeks (around 3/8/2018) for CABRERA ross.      Your next 10 appointments already scheduled     Mar 12, 2018 12:45 PM CDT   Genetic Counseling with FIFI GEN COUNSELOR 2   [facility]  M[facility] Fetal Medicine - [place] ([facility])    [address]  [facility] [address]   [phone]            Mar 12, 2018  1:30 PM CDT   (Arrive by 1:15 PM)   MFM US COMP with [id number]   [facility]etal [facility] Ultrasound - [place] ([facility])    [address]  [facility] [address] [phone]           Wear comfortable clothes and leave your valuables at home.            Mar 12, 2018  2:00 PM CDT   Radiology MD with UR KAELA ROUSSEAU   [facility] M[facility]etal [facility] - [facility])    [address]  [facility] [address]   [phone]           Please arrive at the time given for your first appointment. This visit is used internally to schedule the physician's time during your ultrasound.              Who to contact     Please call your clinic at [phone] to:    Ask questions about your health    Make or cancel appointments    Discuss your medicines    Learn about your test results    Speak to your doctor            Additional Information About Your Visit        Basyshart Information     GuestMetrics is an electronic gateway that provides easy, online access to your medical records. With GuestMetrics, you can request a clinic appointment, read your test results, renew a prescription or communicate with your care team.     To sign up for Guest[employer] visit the website at [email or web address]   You will be asked to enter the access code listed below, as well as some personal information. Please follow the directions to create your username and password.     Your access code is: [email or web address]  Expires: 2018  3:17 PM     Your access code will  in 90 days. If you need help or a new code, please contact your [facility] or call [phone] for assistance.        Care EveryWhere ID     This is your Care EveryWhere ID. This could be used  "by other organizations to access your [place] medical records  [id number]        Your Vitals Were     Pulse Height Last Period BMI (Body Mass Index)          87 1.651 m (5' 5\") 11/03/2017 34.35 kg/m2         Blood Pressure from Last 3 Encounters:   02/08/18 123/77   01/12/18 128/70   09/22/17 129/85    Weight from Last 3 Encounters:   02/08/18 93.6 kg (206 lb 6.4 oz)   01/12/18 93 kg (205 lb)   09/22/17 87.5 kg (193 lb)              We Performed the Following     25- OH-Vitamin D     ABO/Rh Type and Screen [CMP936]     Anti Treponema [NSE0593]     CBC with Platelets Differential [LUB005]     Chlamydia PCR (Clinic Collect)     Gonorrhea PCR     Hemoglobin A1c     Hepatitis B Surface Antigen [JFF579]     HGB Eval Reflex to ELP or RBC Solubility     HIV Antigen Antibody Combo [OBP6908]     Maternal Fetal Medicine Center Referral [9046.022]     Rubella Antibody IgG Quantitative [[id number]]        Primary Care Provider Office Phone # Fax #    Princess Zenia Hicks, APRN [phone]       [address] [facility] [address]        Equal Access to Services     KERRI MOLINA : Hadii aad ku hadasho Soomaali, waaxda luqadaha, qaybta kaalmada adeegyada, pbalo snyder . So [facility] [phone].    ATENCIÓN: Si habla español, tiene a smith disposición servicios gratuitos de asistencia lingüística. Llame al [phone].    We comply with applicable federal civil rights laws and [place] laws. We do not discriminate on the basis of race, color, national origin, age, disability, sex, sexual orientation, or gender identity.            Thank you!     Thank you for choosing [facility]  for your care. Our goal is always to provide you with excellent care. Hearing back from our patients is one way we can continue to improve our services. Please take a few minutes to complete the written survey that you may receive in the mail after your visit with us. Thank you!      "        Your Updated Medication List - Protect others around you: Learn how to safely use, store and throw away your medicines at www.disposemymeds.org.          This list is accurate as of 2/8/18 11:59 PM.  Always use your most recent med list.                   Brand Name Dispense Instructions for use Diagnosis    cetirizine 10 MG tablet    zyrTEC    60 tablet    Take 1 tablet (10 mg) by mouth 2 times daily    Chronic rhinitis       fluticasone 50 MCG/ACT spray    FLONASE    16 mL    SPRAY 2 SPRAYS INTO BOTH NOSTRILS DAILY    Seasonal allergic rhinitis, unspecified allergic rhinitis trigger       montelukast 10 MG tablet    SINGULAIR    30 tablet    Take 1 tablet (10 mg) by mouth At Bedtime    Chronic rhinitis       omeprazole 40 MG capsule    priLOSEC    30 capsule    Take 1 capsule (40 mg) by mouth daily    Abdominal pain, generalized       prenatal multivitamin plus iron 27-0.8 MG Tabs per tablet     100 tablet    Take 1 tablet by mouth daily    Pregnancy examination or test, positive result       psyllium 28 % Pack    METAMUCIL SMOOTH TEXTURE    30 each    Take 1 Package by mouth daily    Irritable bowel syndrome with both constipation and diarrhea

## 2018-02-08 NOTE — MR AVS SNAPSHOT
After Visit Summary   2018    Brit Romano    MRN: [id number]           Patient Information     Date Of Birth          1980        Visit Information        Provider Department      2018 1:30 PM [facility] ULTRASOUND II [facility]        Today's Diagnoses     Supervision of high-risk pregnancy with grand multiparity, unspecified trimester    -  1       Follow-ups after your visit        Who to contact     Please call your clinic at [phone] to:    Ask questions about your health    Make or cancel appointments    Discuss your medicines    Learn about your test results    Speak to your doctor            Additional Information About Your Visit        MyChart Information     Alsbridge is an electronic gateway that provides easy, online access to your medical records. With Alsbridge, you can request a clinic appointment, read your test results, renew a prescription or communicate with your care team.     To sign up for Alsbridge visit the website at [email or web address]   You will be asked to enter the access code listed below, as well as some personal information. Please follow the directions to create your username and password.     Your access code is: [email or web address]  Expires: 2018  3:17 PM     Your access code will  in 90 days. If you need help or a new code, please contact your [facility] or call [phone] for assistance.        Care EveryWhere ID     This is your Care EveryWhere ID. This could be used by other organizations to access your [place] medical records  [id number]        Your Vitals Were     Last Period                   2017            Blood Pressure from Last 3 Encounters:   18 128/70   17 129/85   17 132/84    Weight from Last 3 Encounters:   18 93 kg (205 lb)   17 87.5 kg (193 lb)   17 87.5 kg (193 lb)               Primary Care Provider Office Phone # Fax #     Princess Hicks, APRN [phone]       [address] [facility]  [place] [address]        Equal Access to Services     KERRI MOLINA : Hadpebbles juan diaz kofi Sodary, wasirenada luqadaha, qaybta kaalmada marian, pablo hebert lashannaleida ruiz. So [facility] [phone].    ATENCIÓN: Si habla español, tiene a smith disposición servicios gratuitos de asistencia lingüística. Llame al [phone].    We comply with applicable federal civil rights laws and [place] laws. We do not discriminate on the basis of race, color, national origin, age, disability, sex, sexual orientation, or gender identity.            Thank you!     Thank you for choosing [facility]  for your care. Our goal is always to provide you with excellent care. Hearing back from our patients is one way we can continue to improve our services. Please take a few minutes to complete the written survey that you may receive in the mail after your visit with us. Thank you!             Your Updated Medication List - Protect others around you: Learn how to safely use, store and throw away your medicines at www.disposemymeds.org.          This list is accurate as of 2/8/18  2:03 PM.  Always use your most recent med list.                   Brand Name Dispense Instructions for use Diagnosis    cetirizine 10 MG tablet    zyrTEC    60 tablet    Take 1 tablet (10 mg) by mouth 2 times daily    Chronic rhinitis       cyanocobalamin 1000 MCG tablet    vitamin  B-12    90 tablet    Take 1 tablet (1,000 mcg) by mouth daily    Vitamin B12 deficiency       ferrous sulfate 325 (65 FE) MG tablet    IRON    60 tablet    Take 1 tablet (325 mg) by mouth 2 times daily    Microcytic anemia       fluticasone 50 MCG/ACT spray    FLONASE    16 mL    SPRAY 2 SPRAYS INTO BOTH NOSTRILS DAILY    Seasonal allergic rhinitis, unspecified allergic rhinitis trigger       metroNIDAZOLE 500 MG tablet    FLAGYL    14 tablet    Take 1 tablet (500 mg)  "by mouth 2 times daily    Bacterial vaginal infection       montelukast 10 MG tablet    SINGULAIR    30 tablet    Take 1 tablet (10 mg) by mouth At Bedtime    Chronic rhinitis       omeprazole 40 MG capsule    priLOSEC    30 capsule    Take 1 capsule (40 mg) by mouth daily    Abdominal pain, generalized       polyethylene glycol 400 0.25 % Soln ophthalmic solution    BLINK TEARS     Place 1 drop into both eyes 2 times daily \"Blink Tears\"    Dry eyes       prenatal multivitamin plus iron 27-0.8 MG Tabs per tablet     100 tablet    Take 1 tablet by mouth daily    Pregnancy examination or test, positive result       psyllium 28 % Pack    METAMUCIL SMOOTH TEXTURE    30 each    Take 1 Package by mouth daily    Irritable bowel syndrome with both constipation and diarrhea         "

## 2018-02-08 NOTE — LETTER
"2018       RE: Brit Romano  [address] [facility] [address]     Dear Colleague,    Thank you for referring your patient, Brit Romano, to the [facility] at [facility]. Please see a copy of my visit note below.      SUBJECTIVE:    38 year old, female, , 13w6d,  who presents to the clinic today for a new ob visit.    Feels well. Has started PNV.  Estimated Date of Delivery: Aug 10, 2018   Aug 10, 2018 is calculated from Patient's last menstrual period was 2017., c/w dating u/s.  She has not had bleeding since her LMP.   She has had mild nausea. Weight loss has not occurred.   Patient states this pregnancy was \"not a surprise.\" Not using contraception. Happy.   FOB is involved.    OTHER CONCERNS:    - History of IUFD-    - History of GDM x 2       ===========================================  ROS  PSYCHIATRIC:  Denies mood changes, difficulty concentrating, depression, anxiety, panic attacks or post partum depression  PHQ9: Last PHQ-9 score on record= 0  Social History   Substance Use Topics     Smoking status: Never Smoker     Smokeless tobacco: Never Used     Alcohol use No     History   Drug Use No     History   Smoking Status     Never Smoker   Smokeless Tobacco     Never Used       Alcohol use No     Family History   Problem Relation Age of Onset     Hypertension Mother      DIABETES Mother      KIDNEY DISEASE Mother      dialysis     Glaucoma Mother      Macular Degeneration No family hx of      ============================================  MEDICAL HISTORY   No Known Allergies        Current Outpatient Prescriptions:      Prenatal Vit-Fe Fumarate-FA (PRENATAL MULTIVITAMIN PLUS IRON) 27-0.8 MG TABS per tablet, Take 1 tablet by mouth daily, Disp: 100 tablet, Rfl: 3     omeprazole (PRILOSEC) 40 MG capsule, Take 1 capsule (40 mg) by mouth daily, Disp: 30 capsule, Rfl: 5     cyanocobalamin (VITAMIN  B-12) 1000 MCG " "tablet, Take 1 tablet (1,000 mcg) by mouth daily, Disp: 90 tablet, Rfl: 1     ferrous sulfate (IRON) 325 (65 FE) MG tablet, Take 1 tablet (325 mg) by mouth 2 times daily, Disp: 60 tablet, Rfl: 2     cetirizine (ZYRTEC) 10 MG tablet, Take 1 tablet (10 mg) by mouth 2 times daily, Disp: 60 tablet, Rfl: 4     psyllium (METAMUCIL SMOOTH TEXTURE) 28 % PACK, Take 1 Package by mouth daily (Patient not taking: Reported on 2018), Disp: 30 each, Rfl: 11     polyethylene glycol 400 (BLINK TEARS) 0.25 % SOLN ophthalmic solution, Place 1 drop into both eyes 2 times daily \"Blink Tears\" (Patient not taking: Reported on 2018), Disp: , Rfl:      metroNIDAZOLE (FLAGYL) 500 MG tablet, Take 1 tablet (500 mg) by mouth 2 times daily (Patient not taking: Reported on 2018), Disp: 14 tablet, Rfl: 0     montelukast (SINGULAIR) 10 MG tablet, Take 1 tablet (10 mg) by mouth At Bedtime (Patient not taking: Reported on 2017), Disp: 30 tablet, Rfl: 3     fluticasone (FLONASE) 50 MCG/ACT nasal spray, SPRAY 2 SPRAYS INTO BOTH NOSTRILS DAILY (Patient not taking: Reported on 2017), Disp: 16 mL, Rfl: 3    Past Medical History:   Diagnosis Date     NO ACTIVE PROBLEMS      Seasonal allergies        Past Surgical History:   Procedure Laterality Date     NO HISTORY OF SURGERY              Obstetric History       T8      L7     SAB0   TAB0   Ectopic0   Multiple0   Live Births7       # Outcome Date GA Lbr Jamal/2nd Weight Sex Delivery Anes PTL Lv   9 Current            8 Term 16 40w2d 04:29 / 00:17 3.52 kg (7 lb 12.2 oz) F Vag-Spont EPI N ROXANNA      Apgar1:  9                Apgar5: 9   7 Term 12    F    ROXANNA   6 Term 11    F   N ROXANNA   5 Term 09    M   N ROXANNA      Complications: GDM (gestational diabetes mellitus)   4 Term 07    M I.U. FETAL D  N FD   3 Term 05    M   N ROXANNA   2 Term 03    M   N ROXANNA   1 Term 12/10/00    M   N ROXANNA      Obstetric Comments   No PTL, " "of HTN. GDM x 1only, diet controlled       GYN History- - Pap NIL 10/24/14, declines today, desires PP    I personally reviewed the past social/family/medical and surgical history on the date of service.   I reviewed lab work done at Intake visit with patient.    OBJECTIVE:   PHYSICAL EXAM:  /77 (BP Location: Right arm, Patient Position: Chair)  Pulse 87  Ht 1.651 m (5' 5\")  Wt 93.6 kg (206 lb 6.4 oz)  LMP 11/03/2017  BMI 34.35 kg/m2  BMI- Body mass index is 34.35 kg/(m^2)., GENERAL:  Pleasant pregnant female, alert, cooperative  and well groomed.  SKIN:  Warm and dry, without lesions or rashes  HEAD: Symmetrical features.  MOUTH:  Buccal mucosa pink, moist without lesions.  Teeth in good repair.    NECK:  Thyroid without enlargement and nodules.  Lymph nodes not palpable.   LUNGS:  Clear to auscultation.  BREAST:  declined  HEART:  RRR without murmur.  ABDOMEN: Soft without masses , tenderness or organomegaly.  No CVA tenderness.  Uterus palpable at size equal to dates.  No scars noted.. Fetal heart tones present.  MUSCULOSKELETAL:  Full range of motion  EXTREMITIES:  No edema. No significant varicosities.   PELVIC EXAM: declined  WET PREP:Not done  GC/CHLAMYDIA CULTURE OBTAINED:YES  - Pap NIL 10/24/14, declines today, desires PP    ASSESSMENT:  Intrauterine pregnancy 13w6d size  consistent with dates  Genetic Screening: level 2 only  Encounter Diagnosis   Name Primary?     Supervision of high-risk pregnancy of elderly multigravida Yes        PLAN:  Orders Placed This Encounter   Procedures     25- OH-Vitamin D     CBC with Platelets Differential [FQY122]     Anti Treponema [FNP4549]     Rubella Antibody IgG Quantitative [DPG1082]     Hepatitis B Surface Antigen [OOA864]     HIV Antigen Antibody Combo [VGL8050]     Hemoglobin A1c     HGB Eval Reflex to ELP or RBC Solubility     Iron and Iron Binding Capacity     Ferritin     [facility] Referral [9046.022]     ABO/Rh Type and Screen " [GCB276]     - Reviewed use of triage nurse line and contacting the on-call provider after hours for an urgent need such as fever, vagina bleeding, bladder or vaginal infection, rupture of membranes,  or term labor.    - Reviewed best evidence for: weight gain for her weight and height for pregnancy:    healthy diet and foods to avoid; exercise and activity during pregnancy;avoiding exposure to toxoplasmosis; and maintenance of a generally healthy lifestyle.   - Discussed the harms, benefits, side effects and alternative therapies for current prescribed and OTC medications.    - All pt's questions discussed and answered.  Pt verbalized understanding of and agreement to plan of care.     - Reviewed OBI/NOB education.  - Reviewed dating ultrasound today and finding of low lying placenta  Recommended pelvic rest. Will reevaluate placenta location at level 2 u/s.   - M referral placed for level 2 ultrasound.  - Labs ordered.  - Hbga1c added d/t histoy of gdm x 2. Declines early 1 hour glucose.   - GC/CT today  - Pap NIL 10/24/14, declines today, desires PP  - Needs urine culture at next visit.  - Desires MD care.  - Continue scheduled prenatal care, RTC in approx 4 weeks and prn if questions or concerns    MALACHI Espino, GEMA                Discussed lab results with Brit. She will go to get labs done soon and will  prescriptions for supplements at pharmacy.

## 2018-02-09 DIAGNOSIS — E55.9 VITAMIN D DEFICIENCY: Primary | ICD-10-CM

## 2018-02-09 DIAGNOSIS — D50.8 OTHER IRON DEFICIENCY ANEMIA: Primary | ICD-10-CM

## 2018-02-09 LAB
C TRACH DNA SPEC QL NAA+PROBE: NEGATIVE
DEPRECATED CALCIDIOL+CALCIFEROL SERPL-MC: 10 UG/L (ref 20–75)
HBV SURFACE AG SERPL QL IA: NONREACTIVE
HIV 1+2 AB+HIV1 P24 AG SERPL QL IA: NONREACTIVE
N GONORRHOEA DNA SPEC QL NAA+PROBE: NEGATIVE
RUBV IGG SERPL IA-ACNC: 21 IU/ML
SPECIMEN SOURCE: NORMAL
SPECIMEN SOURCE: NORMAL
T PALLIDUM IGG+IGM SER QL: NEGATIVE

## 2018-02-09 RX ORDER — FERROUS SULFATE 325(65) MG
325 TABLET ORAL
Qty: 30 TABLET | Refills: 3 | Status: ON HOLD | OUTPATIENT
Start: 2018-02-09 | End: 2018-08-01

## 2018-02-09 RX ORDER — LANOLIN ALCOHOL/MO/W.PET/CERES
1000 CREAM (GRAM) TOPICAL DAILY
Qty: 30 TABLET | Refills: 3 | Status: SHIPPED | OUTPATIENT
Start: 2018-02-09 | End: 2019-10-02

## 2018-02-09 RX ORDER — MULTIVIT WITH MINERALS/LUTEIN
250 TABLET ORAL DAILY
Qty: 60 TABLET | Refills: 3 | Status: SHIPPED | OUTPATIENT
Start: 2018-02-09 | End: 2018-07-25

## 2018-02-09 ASSESSMENT — PATIENT HEALTH QUESTIONNAIRE - PHQ9: SUM OF ALL RESPONSES TO PHQ QUESTIONS 1-9: 2

## 2018-02-09 ASSESSMENT — ANXIETY QUESTIONNAIRES: GAD7 TOTAL SCORE: 0

## 2018-02-10 LAB
HGB A1 MFR BLD: 96.6 % (ref 95–97.9)
HGB A2 MFR BLD: 3 % (ref 2–3.5)
HGB C MFR BLD: 0 % (ref 0–0)
HGB E MFR BLD: 0 % (ref 0–0)
HGB F MFR BLD: 0.4 % (ref 0–2.1)
HGB FRACT BLD ELPH-IMP: NORMAL
HGB OTHER MFR BLD: 0 % (ref 0–0)
HGB S BLD QL SOLY: NORMAL
HGB S MFR BLD: 0 % (ref 0–0)
PATH INTERP BLD-IMP: NORMAL

## 2018-02-14 NOTE — PROGRESS NOTES
Discussed lab results with Brit. She will go to get labs done soon and will  prescriptions for supplements at pharmacy.

## 2018-02-16 PROBLEM — O44.42 LOW-LYING PLACENTA IN SECOND TRIMESTER: Status: ACTIVE | Noted: 2018-02-16

## 2018-03-08 ENCOUNTER — PRE VISIT (OUTPATIENT)
Dept: MATERNAL FETAL MEDICINE | Facility: CLINIC | Age: 38
End: 2018-03-08

## 2018-03-12 ENCOUNTER — OFFICE VISIT (OUTPATIENT)
Dept: MATERNAL FETAL MEDICINE | Facility: CLINIC | Age: 38
End: 2018-03-12
Attending: ADVANCED PRACTICE MIDWIFE
Payer: COMMERCIAL

## 2018-03-12 ENCOUNTER — HOSPITAL ENCOUNTER (OUTPATIENT)
Dept: ULTRASOUND IMAGING | Facility: CLINIC | Age: 38
Discharge: HOME | End: 2018-03-12
Attending: ADVANCED PRACTICE MIDWIFE | Admitting: OBSTETRICS & GYNECOLOGY
Payer: COMMERCIAL

## 2018-03-12 DIAGNOSIS — O26.90 PREGNANCY RELATED CONDITION, ANTEPARTUM: ICD-10-CM

## 2018-03-12 DIAGNOSIS — O09.522 ELDERLY MULTIGRAVIDA IN SECOND TRIMESTER: ICD-10-CM

## 2018-03-12 DIAGNOSIS — O35.10X0 SUSPECTED CHROMOSOME ANOMALY OF FETUS AFFECTING MANAGEMENT OF MOTHER, ANTEPARTUM, SINGLE OR UNSPECIFIED FETUS: Primary | ICD-10-CM

## 2018-03-12 DIAGNOSIS — O09.522 AMA (ADVANCED MATERNAL AGE) MULTIGRAVIDA 35+, SECOND TRIMESTER: Primary | ICD-10-CM

## 2018-03-12 PROCEDURE — 76811 OB US DETAILED SNGL FETUS: CPT

## 2018-03-12 PROCEDURE — 96040 ZZH GENETIC COUNSELING, EACH 30 MINUTES: CPT | Mod: ZF | Performed by: GENETIC COUNSELOR, MS

## 2018-03-12 NOTE — MR AVS SNAPSHOT
After Visit Summary   3/12/2018    Brit Romano    MRN: [id number]           Patient Information     Date Of Birth          1980        Visit Information        Provider Department      3/12/2018 2:00 PM Mario Houston MD [facility] Maternal Fetal Medicine [facility]        Today's Diagnoses     Suspected chromosome anomaly of fetus affecting management of mother, antepartum, single or unspecified fetus    -  1    Elderly multigravida in second trimester           Follow-ups after your visit        Your next 10 appointments already scheduled     Apr 04, 2018 11:45 AM CDT   (Arrive by 11:30 AM)   MFM US COMPRE SINGLE F/U with [id number]   [facility] Maternal Fetal Medicine Ultrasound - [place] ([facility])    [address]  [facility] [id number] [phone]           Wear comfortable clothes and leave your valuables at home.            Apr 04, 2018 12:15 PM CDT   Radiology MD with UR KAELA ROUSSEAU   [facility] Maternal Fetal Medicine - [facility])    [address]  [facility] [address]   [phone]           Please arrive at the time given for your first appointment. This visit is used internally to schedule the physician's time during your ultrasound.              Future tests that were ordered for you today     Open Future Orders        Priority Expected Expires Ordered    MFM US Comprehensive Single F/U Routine  3/12/2019 3/12/2018            Who to contact     If you have questions or need follow up information about today's clinic visit or your schedule please contact [facility] directly at [phone].  Normal or non-critical lab and imaging results will be communicated to you by MyChart, letter or phone within 4 business days after the clinic has received the results. If you do not hear from us within 7 days, please contact the clinic through MyChart or phone.  "If you have a critical or abnormal lab result, we will notify you by phone as soon as possible.  Submit refill requests through [employer] or call your pharmacy and they will forward the refill request to us. Please allow 3 business days for your refill to be completed.          Additional Information About Your Visit        Ceph[employer] Information     [employer] lets you send messages to your doctor, view your test results, renew your prescriptions, schedule appointments and more. To sign up, go to [email or web address] . Click on \"Log in\" on the left side of the screen, which will take you to the Welcome page. Then click on \"Sign up Now\" on the right side of the page.     You will be asked to enter the access code listed below, as well as some personal information. Please follow the directions to create your username and password.     Your access code is: [email or web address]  Expires: 2018  4:17 PM     Your access code will  in 90 days. If you need help or a new code, please call your [place] clinic or [phone].        Care EveryWhere ID     This is your Care EveryWhere ID. This could be used by other organizations to access your [place] medical records  [id number]        Your Vitals Were     Last Period                   2017            Blood Pressure from Last 3 Encounters:   18 123/77   18 128/70   17 129/85    Weight from Last 3 Encounters:   18 93.6 kg (206 lb 6.4 oz)   18 93 kg (205 lb)   17 87.5 kg (193 lb)               Primary Care Provider Office Phone # Fax #    Princess MALACHI Mcnamara [phone]       [address] [facility] [place] [address]        Equal Access to Services     ZORAIDA MOLINA : Hadii juan Razo, waaxda luqadaha, qaybta kaalmada marian, pablo ruiz. So [facility] [phone].    ATENCIÓN: Si habla español, tiene a smith disposición servicios gratuitos de asistencia lingüística. " Jasmin martinez [phone].    We comply with applicable federal civil rights laws and [place] laws. We do not discriminate on the basis of race, color, national origin, age, disability, sex, sexual orientation, or gender identity.            Thank you!     Thank you for choosing [facility]  for your care. Our goal is always to provide you with excellent care. Hearing back from our patients is one way we can continue to improve our services. Please take a few minutes to complete the written survey that you may receive in the mail after your visit with us. Thank you!             Your Updated Medication List - Protect others around you: Learn how to safely use, store and throw away your medicines at www.disposemymeds.org.          This list is accurate as of 3/12/18  2:55 PM.  Always use your most recent med list.                   Brand Name Dispense Instructions for use Diagnosis    ascorbic acid 250 MG tablet    VITAMIN C    60 tablet    Take 1 tablet (250 mg) by mouth daily    Other iron deficiency anemia       cetirizine 10 MG tablet    zyrTEC    60 tablet    Take 1 tablet (10 mg) by mouth 2 times daily    Chronic rhinitis       cholecalciferol 5000 UNITS Caps capsule    vitamin D3    90 capsule    Take 1 capsule (5,000 Units) by mouth daily Take one capsule daily.    Vitamin D deficiency       cyanocobalamin 1000 MCG tablet    vitamin  B-12    30 tablet    Take 1 tablet (1,000 mcg) by mouth daily    Other iron deficiency anemia       ferrous sulfate 325 (65 FE) MG tablet    IRON    30 tablet    Take 1 tablet (325 mg) by mouth daily (with breakfast)    Other iron deficiency anemia       fluticasone 50 MCG/ACT spray    FLONASE    16 mL    SPRAY 2 SPRAYS INTO BOTH NOSTRILS DAILY    Seasonal allergic rhinitis, unspecified allergic rhinitis trigger       montelukast 10 MG tablet    SINGULAIR    30 tablet    Take 1 tablet (10 mg) by mouth At Bedtime    Chronic rhinitis       omeprazole 40  MG capsule    priLOSEC    30 capsule    Take 1 capsule (40 mg) by mouth daily    Abdominal pain, generalized       prenatal multivitamin plus iron 27-0.8 MG Tabs per tablet     100 tablet    Take 1 tablet by mouth daily    Pregnancy examination or test, positive result       psyllium 28 % Pack    METAMUCIL SMOOTH TEXTURE    30 each    Take 1 Package by mouth daily    Irritable bowel syndrome with both constipation and diarrhea

## 2018-03-12 NOTE — PROGRESS NOTES
"Please see \"Imaging\" tab under \"Chart Review\" for details of today's US at the [facility].    Mario Houston MD  Maternal-Fetal Medicine      "

## 2018-03-12 NOTE — MR AVS SNAPSHOT
After Visit Summary   3/12/2018    Brit Romano    MRN: [id number]           Patient Information     Date Of Birth          1980        Visit Information        Provider Department      3/12/2018 12:45 PM Zoe Bonilla [facility] Maternal Fetal Medicine [facility]        Today's Diagnoses     AMA (advanced maternal age) multigravida 35+, second trimester    -  1    Pregnancy related condition, antepartum           Follow-ups after your visit        Your next 10 appointments already scheduled     Apr 04, 2018 11:45 AM CDT   (Arrive by 11:30 AM)   MFM US COMPRE SINGLE F/U with [id number]   [facility] Maternal Fetal Medicine Ultrasound - [place] ([facility])    [address]  [facility] [id number] [phone]           Wear comfortable clothes and leave your valuables at home.            Apr 04, 2018 12:15 PM CDT   Radiology MD with UR JUNIOR ROUSSEAU   [facility] Maternal Fetal Medicine - [facility])    [address]  [facility] [id number] [phone]           Please arrive at the time given for your first appointment. This visit is used internally to schedule the physician's time during your ultrasound.              Future tests that were ordered for you today     Open Future Orders        Priority Expected Expires Ordered    MFM US Comprehensive Single F/U Routine  3/12/2019 3/12/2018            Who to contact     If you have questions or need follow up information about today's clinic visit or your schedule please contact [facility] MATERNAL FETAL MEDICINE [facility] directly at [phone].  Normal or non-critical lab and imaging results will be communicated to you by MyChart, letter or phone within 4 business days after the clinic has received the results. If you do not hear from us within 7 days, please contact the clinic through MyChart or phone. If you have a critical or abnormal lab  "result, we will notify you by phone as soon as possible.  Submit refill requests through BarEye or call your pharmacy and they will forward the refill request to us. Please allow 3 business days for your refill to be completed.          Additional Information About Your Visit        Vitehart Information     BarEye lets you send messages to your doctor, view your test results, renew your prescriptions, schedule appointments and more. To sign up, go to [email or web address] . Click on \"Log in\" on the left side of the screen, which will take you to the Welcome page. Then click on \"Sign up Now\" on the right side of the page.     You will be asked to enter the access code listed below, as well as some personal information. Please follow the directions to create your username and password.     Your access code is: [email or web address]  Expires: 2018  4:17 PM     Your access code will  in 90 days. If you need help or a new code, please call your [place] clinic or [phone].        Care EveryWhere ID     This is your Care EveryWhere ID. This could be used by other organizations to access your [place] medical records  [id number]        Your Vitals Were     Last Period                   2017            Blood Pressure from Last 3 Encounters:   18 123/77   18 128/70   17 129/85    Weight from Last 3 Encounters:   18 93.6 kg (206 lb 6.4 oz)   18 93 kg (205 lb)   17 87.5 kg (193 lb)              We Performed the Following     [facility] Genetic Counseling        Primary Care Provider Office Phone # Fax #    PrincessMALACHI Carbajal [phone]       [address] [facility] [place] [address]        Equal Access to Services     ZORAIDA MOLINA : Hadii juan Razo, wasirenada leesaadaha, qaybta kaalmada marian, pablo ruiz. So [facility] [phone].    ATENCIÓN: Si habla español, tiene a smith disposición servicios gratuitos de " asistencia lingüística. Jasmin al [phone].    We comply with applicable federal civil rights laws and [place] laws. We do not discriminate on the basis of race, color, national origin, age, disability, sex, sexual orientation, or gender identity.            Thank you!     Thank you for choosing [facility]  for your care. Our goal is always to provide you with excellent care. Hearing back from our patients is one way we can continue to improve our services. Please take a few minutes to complete the written survey that you may receive in the mail after your visit with us. Thank you!             Your Updated Medication List - Protect others around you: Learn how to safely use, store and throw away your medicines at www.disposemymeds.org.          This list is accurate as of 3/12/18  3:39 PM.  Always use your most recent med list.                   Brand Name Dispense Instructions for use Diagnosis    ascorbic acid 250 MG tablet    VITAMIN C    60 tablet    Take 1 tablet (250 mg) by mouth daily    Other iron deficiency anemia       cetirizine 10 MG tablet    zyrTEC    60 tablet    Take 1 tablet (10 mg) by mouth 2 times daily    Chronic rhinitis       cholecalciferol 5000 UNITS Caps capsule    vitamin D3    90 capsule    Take 1 capsule (5,000 Units) by mouth daily Take one capsule daily.    Vitamin D deficiency       cyanocobalamin 1000 MCG tablet    vitamin  B-12    30 tablet    Take 1 tablet (1,000 mcg) by mouth daily    Other iron deficiency anemia       ferrous sulfate 325 (65 FE) MG tablet    IRON    30 tablet    Take 1 tablet (325 mg) by mouth daily (with breakfast)    Other iron deficiency anemia       fluticasone 50 MCG/ACT spray    FLONASE    16 mL    SPRAY 2 SPRAYS INTO BOTH NOSTRILS DAILY    Seasonal allergic rhinitis, unspecified allergic rhinitis trigger       montelukast 10 MG tablet    SINGULAIR    30 tablet    Take 1 tablet (10 mg) by mouth At Bedtime    Chronic  rhinitis       omeprazole 40 MG capsule    priLOSEC    30 capsule    Take 1 capsule (40 mg) by mouth daily    Abdominal pain, generalized       prenatal multivitamin plus iron 27-0.8 MG Tabs per tablet     100 tablet    Take 1 tablet by mouth daily    Pregnancy examination or test, positive result       psyllium 28 % Pack    METAMUCIL SMOOTH TEXTURE    30 each    Take 1 Package by mouth daily    Irritable bowel syndrome with both constipation and diarrhea

## 2018-03-12 NOTE — PROGRESS NOTES
"[facility]  Genetic Counseling Consult    Patient:  Brit Romano YOB: 1980   Date of Service:  3/12/18      Brit \"[id number]\" Juan Antonio was seen at the [facility] for genetic consultation as part of her appointment for comprehensive ultrasound.  The indication for genetic counseling is advanced maternal age.       Impression/Plan:   1. Brit has declined serum screening in this pregnancy.    2. Brit had a comprehensive (level II) ultrasound today.  Please see the ultrasound report for further details.    3. The patient declines genetic amniocentesis and maternal serum screening today.    Pregnancy History:   /Parity:    Age at Delivery: 38 year old    2000: Son,     2003: Son,     2005: Son,     2007: Son (Bravo) - stillborn at 42 weeks gestation.  Prenatal care and delivery were in [facility].  Brit does not believe that any testing was done.  She reports that her son had a normal appearance and looked like her other children.      2009: Son, , GDM    2011: Daughter,     2012: Daughter,     2016:  Daughter,   FRANCISCO JAVIER: 8/10/2018, by Last Menstrual Period  Gestational Age: 18w3d    No significant complications or exposures were reported in the current pregnancy.    Medical History:   Brit s reported medical history is not expected to impact pregnancy management or risks to fetal development.    Brit was born in [facility].  Her biological parents requested that a distant family member take her during the [place] War because they did not have the resources to provide for her; however, Brit was originally told that her parents passed away in the War.  Her adoptive family took her to a refugee camp in [facility] and then eventually to [place] .  She has lived in [place] since .  After living here for some time she was contacted by her biological mother " who was living in [place].  She visited her mother and siblings and then ultimately brought her mother here to pursue treatment for kidney failure.     Brit and the father of her children (Batsheva) were  when she was pregnant with her youngest daughter; however, they got back together after she was born and they are currently together.  He is a  and is often gone for 2-3 weeks out of the month.  Brit arranged for her sons to live in [place] with family for 2 years so that they could experience that culture.  She missed them and is happy that they are all living back in the [place] with her.  She is hoping that one of her brothers will be moving to the [place] soon so that he can help to care for her mother, who ultimately would rather be in [place] where the family cannot afford the cost of dialysis.          Family History:   A three-generation pedigree was obtained, and is scanned under the  Media  tab.   The following significant findings were reported by Brit:      Brit has 3 full siblings and 2 maternal half brothers.  One of her half-brother's wife had 4 healthy children and 4 intrauterine demises in [place].  Maternal blood pressure was reportedly very high and both the mother and the baby did pass away with the last pregnancy.      Brit's full brother has a daughter who suffered some degree of birth trauma when born in a refugee [place].  She is currently 2 years old and is not able to sit unassissted, is not able to swallow food, and is not able to speak.  She does reportedly understand things that are being asked of her.      One of Brit's sisters had a fetal demise at term in [place], possibly due to elevated blood sugars that were not managed.  Brit reported that this sister was in a comma for two days.      We discussed that it is difficult to interpret and provide a risk assessment for the aforementioned concerns reported in the family.  Given that Brit and Batsheva have 6 healthy children  together, it is unlikely that they are at increased risk for the noted family history concerns.      Brit and the father of this pregnancy are first cousins.  We discussed the increased possibility of two individuals both being carriers of the same autosomal recessive genetic condition when they share a common ancestor and the empiric data that suggests an increased chance of birth defects/cognitive disabilities. The option of expanded carrier screening was reviewed and declined.      Otherwise, the reported family history is negative for multiple miscarriages, stillbirths, birth defects, mental retardation, known genetic conditions, and consanguinity.       Carrier Screening:   The patient reports that she and the father of the pregnancy have  ancestry:      The hemoglobinopathies are a group of genetic blood diseases that occur with increased frequency in individuals of  ancestry and carrier screening for these conditions is available.  Carrier screening for the hemoglobinopathies includes a CBC with red blood cell indices, a ferritin level, and a quantitative hemoglobin electrophoresis or HPLC.  In addition,  screening in the [facility] includes many of the hemoglobinopathies.      Expanded carrier screening for mutations in a large panel of genes associated with autosomal recessive conditions including cystic fibrosis, spinal muscular atrophy, and others, is now available.      The patient has had previous carrier screening for hemoglobinopathies, the results of which were normal.  A copy of the report was available for our review today.  The option of expanded carrier screening was discussed and declined.         Risk Assessment for Chromosome Conditions:   We explained that the risk for fetal chromosome abnormalities increases with maternal age. We discussed specific features of common chromosome abnormalities, including Down syndrome, trisomy 13, trisomy 18, and sex chromosome  trisomies.      - At age 37 at midtrimester, the risk to have a baby with Down syndrome is 1 in 129.     - At age 37 at midtrimester, the risk to have a baby with any chromosome abnormality is 1 in 65.     Note, Brit reports that her actual date of birth occurred in January of 1981.      Brit did not have maternal serum screening earlier in pregnancy.       Testing Options:   We discussed the following options:   Non-invasive Prenatal Testing (NIPT)    Maternal plasma cell-free DNA testing; first trimester ultrasound with nuchal translucency and nasal bone assessment is recommended, when appropriate    Screens for fetal trisomy 21, trisomy 13, trisomy 18, and sex chromosome aneuploidy    Cannot screen for open neural tube defects; maternal serum AFP after 15 weeks is recommended       Genetic Amniocentesis    Invasive procedure typically performed in the second trimester by which amniotic fluid is obtained for the purpose of chromosome analysis and/or other prenatal genetic analysis    Diagnostic results; >99% sensitivity for fetal chromosome abnormalities    AFAFP measurement tests for open neural tube defects       Comprehensive (Level II) ultrasound: Detailed ultrasound performed between 18-22 weeks gestation to screen for major birth defects and markers for aneuploidy.        We reviewed the benefits and limitations of this testing.  Screening tests provide a risk assessment specific to the pregnancy for certain fetal chromosome abnormalities, but cannot definitively diagnose or exclude a fetal chromosome abnormality.  Follow-up genetic counseling and consideration of diagnostic testing is recommended with any abnormal screening result.     Diagnostic tests carry inherent risks- including risk of miscarriage- that require careful consideration.  These tests can detect fetal chromosome abnormalities with greater than 99% certainty.  Results can be compromised by maternal cell contamination or mosaicism, and are  limited by the resolution of cytogenetic G-banding technology.  There is no screening nor diagnostic test that can detect all forms of birth defects or mental disability.    It was a pleasure to be involved with Brit s care. Face-to-face time of the meeting was 30 minutes.      Zoe Bonilla, [facility]  Certified Genetic Counselor  Pager: [phone]

## 2018-04-04 ENCOUNTER — HOSPITAL ENCOUNTER (OUTPATIENT)
Dept: ULTRASOUND IMAGING | Facility: CLINIC | Age: 38
Discharge: HOME | End: 2018-04-04
Attending: OBSTETRICS & GYNECOLOGY | Admitting: OBSTETRICS & GYNECOLOGY
Payer: COMMERCIAL

## 2018-04-04 ENCOUNTER — OFFICE VISIT (OUTPATIENT)
Dept: MATERNAL FETAL MEDICINE | Facility: CLINIC | Age: 38
End: 2018-04-04
Attending: OBSTETRICS & GYNECOLOGY
Payer: COMMERCIAL

## 2018-04-04 DIAGNOSIS — Z03.73 SUSPECTED FETAL ANOMALY NOT FOUND: Primary | ICD-10-CM

## 2018-04-04 DIAGNOSIS — O35.10X0 SUSPECTED CHROMOSOME ANOMALY OF FETUS AFFECTING MANAGEMENT OF MOTHER, ANTEPARTUM, SINGLE OR UNSPECIFIED FETUS: ICD-10-CM

## 2018-04-04 PROCEDURE — 76816 OB US FOLLOW-UP PER FETUS: CPT

## 2018-04-04 NOTE — MR AVS SNAPSHOT
"              After Visit Summary   2018    Brit Romano    MRN: [id number]           Patient Information     Date Of Birth          1980        Visit Information        Provider Department      2018 12:15 PM Carolina Aranda, DO [facility] Maternal Fetal [facility]        Today's Diagnoses     Suspected fetal anomaly not found    -  1       Follow-ups after your visit        Who to contact     If you have questions or need follow up information about today's clinic visit or your schedule please contact [facility] MATERNAL FETAL MEDICINE [facility] directly at [phone].  Normal or non-critical lab and imaging results will be communicated to you by TareasPlushart, letter or phone within 4 business days after the clinic has received the results. If you do not hear from us within 7 days, please contact the clinic through [employer] or phone. If you have a critical or abnormal lab result, we will notify you by phone as soon as possible.  Submit refill requests through Clickyreserva or call your pharmacy and they will forward the refill request to us. Please allow 3 business days for your refill to be completed.          Additional Information About Your Visit        MyChart Information     Clickyreserva lets you send messages to your doctor, view your test results, renew your prescriptions, schedule appointments and more. To sign up, go to [email or web address] . Click on \"Log in\" on the left side of the screen, which will take you to the Welcome page. Then click on \"Sign up Now\" on the right side of the page.     You will be asked to enter the access code listed below, as well as some personal information. Please follow the directions to create your username and password.     Your access code is: [email or web address]  Expires: 2018  4:17 PM     Your access code will  in 90 days. If you need help or a new code, please call your [place] clinic or [phone].        Care EveryWhere ID     This is your Care " EveryWhere ID. This could be used by other organizations to access your [place] medical records  [id number]        Your Vitals Were     Last Period                   11/03/2017            Blood Pressure from Last 3 Encounters:   02/08/18 123/77   01/12/18 128/70   09/22/17 129/85    Weight from Last 3 Encounters:   02/08/18 93.6 kg (206 lb 6.4 oz)   01/12/18 93 kg (205 lb)   09/22/17 87.5 kg (193 lb)              Today, you had the following     No orders found for display       Primary Care Provider Office Phone # Fax #    Princess Hicks, MAL[facility] [phone]       [address] [facility] [address]        Equal Access to Services     KERRI MOLINA : Hadii aad ku hadasho Soomaali, waaxda luqadaha, qaybta kaalmada adeegyada, pablo snyder . So [facility] [phone].    ATENCIÓN: Si habla español, tiene a smith disposición servicios gratuitos de asistencia lingüística. Llame al [phone].    We comply with applicable federal civil rights laws and [place] laws. We do not discriminate on the basis of race, color, national origin, age, disability, sex, sexual orientation, or gender identity.            Thank you!     Thank you for choosing [facility]  for your care. Our goal is always to provide you with excellent care. Hearing back from our patients is one way we can continue to improve our services. Please take a few minutes to complete the written survey that you may receive in the mail after your visit with us. Thank you!             Your Updated Medication List - Protect others around you: Learn how to safely use, store and throw away your medicines at www.disposemymeds.org.          This list is accurate as of 4/4/18 12:48 PM.  Always use your most recent med list.                   Brand Name Dispense Instructions for use Diagnosis    ascorbic acid 250 MG tablet    VITAMIN C    60 tablet    Take 1 tablet (250 mg) by mouth  daily    Other iron deficiency anemia       cetirizine 10 MG tablet    zyrTEC    60 tablet    Take 1 tablet (10 mg) by mouth 2 times daily    Chronic rhinitis       cholecalciferol 5000 UNITS Caps capsule    vitamin D3    90 capsule    Take 1 capsule (5,000 Units) by mouth daily Take one capsule daily.    Vitamin D deficiency       cyanocobalamin 1000 MCG tablet    vitamin  B-12    30 tablet    Take 1 tablet (1,000 mcg) by mouth daily    Other iron deficiency anemia       ferrous sulfate 325 (65 FE) MG tablet    IRON    30 tablet    Take 1 tablet (325 mg) by mouth daily (with breakfast)    Other iron deficiency anemia       fluticasone 50 MCG/ACT spray    FLONASE    16 mL    SPRAY 2 SPRAYS INTO BOTH NOSTRILS DAILY    Seasonal allergic rhinitis, unspecified allergic rhinitis trigger       montelukast 10 MG tablet    SINGULAIR    30 tablet    Take 1 tablet (10 mg) by mouth At Bedtime    Chronic rhinitis       omeprazole 40 MG capsule    priLOSEC    30 capsule    Take 1 capsule (40 mg) by mouth daily    Abdominal pain, generalized       prenatal multivitamin plus iron 27-0.8 MG Tabs per tablet     100 tablet    Take 1 tablet by mouth daily    Pregnancy examination or test, positive result       psyllium 28 % Pack    METAMUCIL SMOOTH TEXTURE    30 each    Take 1 Package by mouth daily    Irritable bowel syndrome with both constipation and diarrhea

## 2018-04-04 NOTE — PROGRESS NOTES
"Please see \"Imaging\" tab under \"Chart Review\" for details of today's US.    Carolina Aranda, DO    "

## 2018-05-03 ENCOUNTER — TELEPHONE (OUTPATIENT)
Dept: ALLERGY | Facility: CLINIC | Age: 38
End: 2018-05-03

## 2018-05-03 NOTE — TELEPHONE ENCOUNTER
Spoke with patient and scheduled appointment with Dr. Natarajan for refills and to discuss allergy symptoms.    Jewell Crockett RN

## 2018-05-03 NOTE — TELEPHONE ENCOUNTER
Reason for Call:  Other call back    Detailed comments: Patient requesting refills of her cetirizine (ZYRTEC) 10 MG tablet and her fluticasone (FLONASE) 50 MCG/ACT nasal spray. Patient states the cetirizine is not working as well. Is there a way to increase the strength? Please advise    Phone Number Patient can be reached at: Home number on file [phone] (home)    Best Time: ASAP    Can we leave a detailed message on this number? YES    Call taken on 5/3/2018 at 12:11 PM by Madiha Merrill

## 2018-05-07 ENCOUNTER — OFFICE VISIT (OUTPATIENT)
Dept: ALLERGY | Facility: CLINIC | Age: 38
End: 2018-05-07
Payer: COMMERCIAL

## 2018-05-07 VITALS
WEIGHT: 216.2 LBS | HEART RATE: 109 BPM | DIASTOLIC BLOOD PRESSURE: 75 MMHG | OXYGEN SATURATION: 100 % | BODY MASS INDEX: 36.91 KG/M2 | SYSTOLIC BLOOD PRESSURE: 136 MMHG | HEIGHT: 64 IN | TEMPERATURE: 97.1 F

## 2018-05-07 DIAGNOSIS — J30.1 CHRONIC SEASONAL ALLERGIC RHINITIS DUE TO POLLEN: ICD-10-CM

## 2018-05-07 DIAGNOSIS — H10.13 ALLERGIC CONJUNCTIVITIS OF BOTH EYES: ICD-10-CM

## 2018-05-07 DIAGNOSIS — J30.89 ALLERGIC RHINITIS DUE TO DUST MITE: Primary | ICD-10-CM

## 2018-05-07 PROCEDURE — 99213 OFFICE O/P EST LOW 20 MIN: CPT | Performed by: ALLERGY & IMMUNOLOGY

## 2018-05-07 RX ORDER — CETIRIZINE HYDROCHLORIDE 10 MG/1
10 TABLET ORAL AT BEDTIME
Qty: 30 TABLET | Refills: 11 | Status: ON HOLD | OUTPATIENT
Start: 2018-05-07 | End: 2018-08-01

## 2018-05-07 RX ORDER — LORATADINE 10 MG/1
10 TABLET ORAL DAILY
Qty: 30 TABLET | Refills: 11 | Status: ON HOLD | OUTPATIENT
Start: 2018-05-07 | End: 2018-08-01

## 2018-05-07 RX ORDER — MONTELUKAST SODIUM 10 MG/1
10 TABLET ORAL AT BEDTIME
Qty: 30 TABLET | Refills: 11 | Status: SHIPPED | OUTPATIENT
Start: 2018-05-07 | End: 2019-04-09

## 2018-05-07 RX ORDER — FLUTICASONE PROPIONATE 50 MCG
2 SPRAY, SUSPENSION (ML) NASAL DAILY
Qty: 1 BOTTLE | Refills: 11 | Status: SHIPPED | OUTPATIENT
Start: 2018-05-07 | End: 2019-08-22

## 2018-05-07 NOTE — PROGRESS NOTES
Brit Romano was seen in the Allergy Clinic at [facility]. The following are my recommendations regarding her Allergic Rhinitis Due to Pollen, Allergic Rhinitis Due to Dust Mites and Allergic Conjunctivitis    1. Continue cetirizine 10mg at bedtime  2. Begin loratadine 10mg every morning  3. Begin montelukast 10mg daily  4. Begin fluticasone nasal spray, 2 sprays in each nostril daily - appropriate nasal spray technique reviewed  5. Begin ketotifen eye drops, 1 drop in each eye twice daily as needed  6. Consider allergen immunotherapy treatment post-pregnancy if symptoms persist  7. Consider evaluation with ENT post-pregnancy and after trial of fluticasone nasal spray  8. Follow-up in 3-6 months, sooner if needed      Brit Romano is a 38 year old [place]ian female who is seen today for follow-up of allergies. She states that in the last 2 months her symptoms have significantly worsened. Brit moved into a new home that has central heat and air conditioning and wonders if this could be causing her symptoms to flare-up. The family also got a pet cat 1 month ago and she is currently 7 months pregnant. Brit reports having itchy and watery eyes, sneezing, and constant nasal congestion. She has also lost her voice on occasion. She is currently taking cetirizine at bedtime but does not feel that it is helpful. She is not using any nasal sprays or eye drops. Brit also expressed concerns that she may have enlarged adenoids as she snores and this has been getting worse.      Component      Latest Ref Rng & Units 10/31/2016   Allergen Cat Dander      <0.10 KU(A)/L <0.10 . . .   Allergen Dog Dander      <0.10 KU(A)/L <0.10 . . .   Allergen [place] Grass      <0.10 KU(A)/L <0.10 . . .   Allergen Cocksfoot      <0.10 KU(A)/L <0.10 . . .   Allergen Gerardo      <0.10 KU(A)/L <0.10 . . .   Allergen, D Pteronyssinus      <0.10 KU(A)/L 2.06 (H)   Allergen D farinae      <0.10 KU(A)/L 0.51 (H)   Allergen A alternata       <0.10 KU(A)/L <0.10 . . .   Allergen A fumigatus      <0.10 KU(A)/L <0.10 . . .   Allergen C herbarum      <0.10 KU(A)/L <0.10 . . .   Allergen Epicoccum purpurascens IgE      <0.10 KU(A)/L <0.10 . . .   Allergen P notatum      <0.10 KU(A)/L <0.10 . . .   Allergen Oak(white)      <0.10 KU(A)/L <0.10 . . .   Allergen Red [place] IgE      <0.10 KU(A)/L <0.10 . . .   Allergen, Silver Birch      <0.10 KU(A)/L <0.10 . . .   Allergen [place] Tree       0.11   Allergen White Pine      <0.10 KU(A)/L <0.10 . . .   Allergen [place]      <0.10 KU(A)/L <0.10 . . .   Allergen Cedar IgE      <0.10 KU(A)/L <0.10 . . .   Allergen [place]      <0.10 KU(A)/L <0.10 . . .   Allergen Elm      <0.10 KU(A)/L <0.10 . . .   Allergen Maple      <0.10 KU(A)/L 0.92 (H)   Allergen [place] Tree       <0.10 . . .   Allergen, Plantain English      <0.10 KU(A)/L <0.10 . . .   Allergen, Giant Ragweed      <0.10 KU(A)/L <0.10 . . .   Allergen Lambs Qtrs      <0.10 KU(A)/L <0.10 . . .   Allergen Mugwort IgE      <0.10 KU(A)/L <0.10 . . .   Allergen, Ragweed Short      <0.10 KU(A)/L <0.10 . . .   Allergen Sagebrush [place]ormwood IgE      <0.10 KU(A)/L <0.10 . . .   Allergen Russian Thistle      <0.10 KU(A)/L <0.10 . . .   Allergen Sheep Sorrel IgE      <0.10 KU(A)/L 0.10 (H)   Allergen Weed Nettle IgE      KU(A)/L <0.10 . . .   Allergen, Kochia/Firebush      <0.10 KU(A)/L <0.10 . . .       REVIEW OF SYSTEMS:  General: negative for weight gain. negative for weight loss. positive  for changes in sleep.   Eyes: positive  for itching. positive  for redness. positive  for tearing/watering.  Ears: positive  for fullness. negative for hearing loss. negative for dizziness.   Nose: positive  for snoring.positive  for changes in smell. negative for drainage.   Throat: positive  for hoarseness. negative for sore throat. negative for trouble swallowing.   Lungs: positive  for shortness of breath.negative for wheezing. negative for sputum production.    Cardiovascular: negative for chest pain. positive for swelling of ankles. negative for fast or irregular heartbeat.   Gastrointestinal: negative for nausea. positive  for heartburn. negative for acid reflux.   Musculoskeletal: positive  for joint pain. positive  for joint stiffness. positive  for joint swelling.   Neurologic: negative for seizures. positive  for fainting. negative for weakness.   Psychiatric: negative for changes in mood. negative for anxiety.   Endocrine: negative for cold intolerance. positive  for heat intolerance. negative for tremors.   Hematologic: negative for easy bruising. negative for easy bleeding.  Integumentary: negative for rash. negative for scaling. negative for nail changes.       Current Outpatient Prescriptions:      ascorbic acid (VITAMIN C) 250 MG tablet, Take 1 tablet (250 mg) by mouth daily, Disp: 60 tablet, Rfl: 3     cetirizine (ZYRTEC) 10 MG tablet, Take 1 tablet (10 mg) by mouth 2 times daily, Disp: 60 tablet, Rfl: 4     cholecalciferol (VITAMIN D3) 5000 UNITS CAPS capsule, Take 1 capsule (5,000 Units) by mouth daily Take one capsule daily., Disp: 90 capsule, Rfl: 3     cyanocobalamin (VITAMIN  B-12) 1000 MCG tablet, Take 1 tablet (1,000 mcg) by mouth daily, Disp: 30 tablet, Rfl: 3     ferrous sulfate (IRON) 325 (65 FE) MG tablet, Take 1 tablet (325 mg) by mouth daily (with breakfast), Disp: 30 tablet, Rfl: 3     fluticasone (FLONASE) 50 MCG/ACT nasal spray, SPRAY 2 SPRAYS INTO BOTH NOSTRILS DAILY, Disp: 16 mL, Rfl: 3     omeprazole (PRILOSEC) 40 MG capsule, Take 1 capsule (40 mg) by mouth daily, Disp: 30 capsule, Rfl: 5     Prenatal Vit-Fe Fumarate-FA (PRENATAL MULTIVITAMIN PLUS IRON) 27-0.8 MG TABS per tablet, Take 1 tablet by mouth daily, Disp: 100 tablet, Rfl: 3     montelukast (SINGULAIR) 10 MG tablet, Take 1 tablet (10 mg) by mouth At Bedtime (Patient not taking: Reported on 9/22/2017), Disp: 30 tablet, Rfl: 3     psyllium (METAMUCIL SMOOTH TEXTURE) 28 % PACK, Take  "1 Package by mouth daily (Patient not taking: Reported on 2/8/2018), Disp: 30 each, Rfl: 11    EXAM:   /75  Pulse 109  Temp 97.1  F (36.2  C) (Oral)  Ht 1.626 m (5' 4\")  Wt 98.1 kg (216 lb 3.2 oz)  LMP 11/03/2017  SpO2 100%  BMI 37.11 kg/m2  GENERAL APPEARANCE: alert, cooperative and not in distress  SKIN: no rashes, no lesions  HEAD: atraumatic, normocephalic  ENT: no scars or lesions, nasal exam showed mucosal edema, tongue midline and normal, soft palate, uvula, and tonsils normal  NECK: no asymmetry, masses, or scars, supple without significant adenopathy  LUNGS: unlabored respirations, no intercostal retractions or accessory muscle use, clear to auscultation without rales or wheezes  HEART: regular rate and rhythm without murmurs and normal S1 and S2  MUSCULOSKELETAL: no musculoskeletal defects are noted  NEURO: no focal deficits noted  PSYCH: does not appear depressed or anxious      WORKUP:  None    ASSESSMENT/PLAN:  Brit Romano is a 38 year old female here for follow-up of allergic rhinoconjunctivitis. She does not feel her current medications have been effective in managing her symptoms and is not sure why her symptoms have flared in the last 2 months. Brit does not recall her symptoms being this severe last spring however she has since moved to a new home, gotten a pet cat, and is currently pregnant. We discussed that any of these could be contributing to her current symptoms. She is also concerned about possible structural abnormalities contributing to her symptoms.    1. Continue cetirizine 10mg at bedtime  2. Begin loratadine 10mg every morning  3. Begin montelukast 10mg daily  4. Begin fluticasone nasal spray, 2 sprays in each nostril daily - appropriate nasal spray technique reviewed  5. Begin ketotifen eye drops, 1 drop in each eye twice daily as needed  6. Consider allergen immunotherapy treatment post-pregnancy if symptoms persist  7. Consider evaluation with ENT post-pregnancy and " after trial of fluticasone nasal spray  8. Follow-up in 3-6 months, sooner if needed      Mo Natarajan MD  Allergy/Immunology  [facility] and [place], [place]      Chart documentation done in part with Dragon Voice Recognition Software. Although reviewed after completion, some word and grammatical errors may remain.

## 2018-05-07 NOTE — PATIENT INSTRUCTIONS
If you have any questions regarding your allergies, asthma, or what we discussed during your visit today please call the allergy clinic or contact us via [employer].    [facility]: [phone]      Your allergy symptoms may be worse this year because of your pregnancy. If the medicines do not help your allergy symptoms we can talk about allergy shots as a treatment after you have delivered the baby.    Take these allergy medications - all are safe while you are pregnant:    1. Cetirizine (zyrtec) 10mg - 1 tablet at bedtime  2. Loratadine (claritin) 10mg - 1 tablet in the morning  3. Montelukast (singulair) 10mg - 1 tablet at bedtime  4. Fluticasone (flonase) nasal spray - 2 sprays in each nostril daily  5. Ketotifen (zaditor) eye drops - 1 drop in each eye twice daily

## 2018-05-07 NOTE — LETTER
5/7/2018         RE: Brit Romano  [address] [id number]        Dear Colleague,    Thank you for referring your patient, Brit Romano, to the [facility]. Please see a copy of my visit note below.    Brit Romano was seen in the Allergy Clinic at [facility]. The following are my recommendations regarding her Allergic Rhinitis Due to Pollen, Allergic Rhinitis Due to Dust Mites and Allergic Conjunctivitis    1. Continue cetirizine 10mg at bedtime  2. Begin loratadine 10mg every morning  3. Begin montelukast 10mg daily  4. Begin fluticasone nasal spray, 2 sprays in each nostril daily - appropriate nasal spray technique reviewed  5. Begin ketotifen eye drops, 1 drop in each eye twice daily as needed  6. Consider allergen immunotherapy treatment post-pregnancy if symptoms persist  7. Consider evaluation with ENT post-pregnancy and after trial of fluticasone nasal spray  8. Follow-up in 3-6 months, sooner if needed      Brit Romano is a 38 year old [place] female who is seen today for follow-up of allergies. She states that in the last 2 months her symptoms have significantly worsened. Brit moved into a new home that has central heat and air conditioning and wonders if this could be causing her symptoms to flare-up. The family also got a pet cat 1 month ago and she is currently 7 months pregnant. Brit reports having itchy and watery eyes, sneezing, and constant nasal congestion. She has also lost her voice on occasion. She is currently taking cetirizine at bedtime but does not feel that it is helpful. She is not using any nasal sprays or eye drops. Brit also expressed concerns that she may have enlarged adenoids as she snores and this has been getting worse.      Component      Latest Ref Rng & Units 10/31/2016   Allergen Cat Dander      <0.10 KU(A)/L <0.10 . . .   Allergen Dog Dander      <0.10 KU(A)/L <0.10 . . .   Allergen Arnoldo Grass      <0.10  KU(A)/L <0.10 . . .   Allergen Cocksfoot      <0.10 KU(A)/L <0.10 . . .   Allergen Gerardo      <0.10 KU(A)/L <0.10 . . .   Allergen, D Pteronyssinus      <0.10 KU(A)/L 2.06 (H)   Allergen D farinae      <0.10 KU(A)/L 0.51 (H)   Allergen A alternata      <0.10 KU(A)/L <0.10 . . .   Allergen A fumigatus      <0.10 KU(A)/L <0.10 . . .   Allergen C herbarum      <0.10 KU(A)/L <0.10 . . .   Allergen Epicoccum purpurascens IgE      <0.10 KU(A)/L <0.10 . . .   Allergen P notatum      <0.10 KU(A)/L <0.10 . . .   Allergen Oak(white)      <0.10 KU(A)/L <0.10 . . .   Allergen Red [place] IgE      <0.10 KU(A)/L <0.10 . . .   Allergen, Silver Birch      <0.10 KU(A)/L <0.10 . . .   Allergen [place] Tree       0.11   Allergen White Pine      <0.10 KU(A)/L <0.10 . . .   Allergen White David      <0.10 KU(A)/L <0.10 . . .   Allergen Cedar IgE      <0.10 KU(A)/L <0.10 . . .   Allergen [place]      <0.10 KU(A)/L <0.10 . . .   Allergen Elm      <0.10 KU(A)/L <0.10 . . .   Allergen Maple      <0.10 KU(A)/L 0.92 (H)   Allergen [place] Tree       <0.10 . . .   Allergen, Plantain English      <0.10 KU(A)/L <0.10 . . .   Allergen, Giant Ragweed      <0.10 KU(A)/L <0.10 . . .   Allergen Lambs Qtrs      <0.10 KU(A)/L <0.10 . . .   Allergen Mugwort IgE      <0.10 KU(A)/L <0.10 . . .   Allergen, Ragweed Short      <0.10 KU(A)/L <0.10 . . .   Allergen Sagebrush [place] IgE      <0.10 KU(A)/L <0.10 . . .   Allergen Russian Thistle      <0.10 KU(A)/L <0.10 . . .   Allergen Sheep Sorrel IgE      <0.10 KU(A)/L 0.10 (H)   Allergen Weed Nettle IgE      KU(A)/L <0.10 . . .   Allergen, Kochia/Firebush      <0.10 KU(A)/L <0.10 . . .       REVIEW OF SYSTEMS:  General: negative for weight gain. negative for weight loss. positive  for changes in sleep.   Eyes: positive  for itching. positive  for redness. positive  for tearing/watering.  Ears: positive  for fullness. negative for hearing loss. negative for dizziness.   Nose: positive  for  snoring.positive  for changes in smell. negative for drainage.   Throat: positive  for hoarseness. negative for sore throat. negative for trouble swallowing.   Lungs: positive  for shortness of breath.negative for wheezing. negative for sputum production.   Cardiovascular: negative for chest pain. positive for swelling of ankles. negative for fast or irregular heartbeat.   Gastrointestinal: negative for nausea. positive  for heartburn. negative for acid reflux.   Musculoskeletal: positive  for joint pain. positive  for joint stiffness. positive  for joint swelling.   Neurologic: negative for seizures. positive  for fainting. negative for weakness.   Psychiatric: negative for changes in mood. negative for anxiety.   Endocrine: negative for cold intolerance. positive  for heat intolerance. negative for tremors.   Hematologic: negative for easy bruising. negative for easy bleeding.  Integumentary: negative for rash. negative for scaling. negative for nail changes.       Current Outpatient Prescriptions:      ascorbic acid (VITAMIN C) 250 MG tablet, Take 1 tablet (250 mg) by mouth daily, Disp: 60 tablet, Rfl: 3     cetirizine (ZYRTEC) 10 MG tablet, Take 1 tablet (10 mg) by mouth 2 times daily, Disp: 60 tablet, Rfl: 4     cholecalciferol (VITAMIN D3) 5000 UNITS CAPS capsule, Take 1 capsule (5,000 Units) by mouth daily Take one capsule daily., Disp: 90 capsule, Rfl: 3     cyanocobalamin (VITAMIN  B-12) 1000 MCG tablet, Take 1 tablet (1,000 mcg) by mouth daily, Disp: 30 tablet, Rfl: 3     ferrous sulfate (IRON) 325 (65 FE) MG tablet, Take 1 tablet (325 mg) by mouth daily (with breakfast), Disp: 30 tablet, Rfl: 3     fluticasone (FLONASE) 50 MCG/ACT nasal spray, SPRAY 2 SPRAYS INTO BOTH NOSTRILS DAILY, Disp: 16 mL, Rfl: 3     omeprazole (PRILOSEC) 40 MG capsule, Take 1 capsule (40 mg) by mouth daily, Disp: 30 capsule, Rfl: 5     Prenatal Vit-Fe Fumarate-FA (PRENATAL MULTIVITAMIN PLUS IRON) 27-0.8 MG TABS per tablet, Take 1  "tablet by mouth daily, Disp: 100 tablet, Rfl: 3     montelukast (SINGULAIR) 10 MG tablet, Take 1 tablet (10 mg) by mouth At Bedtime (Patient not taking: Reported on 9/22/2017), Disp: 30 tablet, Rfl: 3     psyllium (METAMUCIL SMOOTH TEXTURE) 28 % PACK, Take 1 Package by mouth daily (Patient not taking: Reported on 2/8/2018), Disp: 30 each, Rfl: 11    EXAM:   /75  Pulse 109  Temp 97.1  F (36.2  C) (Oral)  Ht 1.626 m (5' 4\")  Wt 98.1 kg (216 lb 3.2 oz)  LMP 11/03/2017  SpO2 100%  BMI 37.11 kg/m2  GENERAL APPEARANCE: alert, cooperative and not in distress  SKIN: no rashes, no lesions  HEAD: atraumatic, normocephalic  ENT: no scars or lesions, nasal exam showed mucosal edema, tongue midline and normal, soft palate, uvula, and tonsils normal  NECK: no asymmetry, masses, or scars, supple without significant adenopathy  LUNGS: unlabored respirations, no intercostal retractions or accessory muscle use, clear to auscultation without rales or wheezes  HEART: regular rate and rhythm without murmurs and normal S1 and S2  MUSCULOSKELETAL: no musculoskeletal defects are noted  NEURO: no focal deficits noted  PSYCH: does not appear depressed or anxious      WORKUP:  None    ASSESSMENT/PLAN:  Brit Romano is a 38 year old female here for follow-up of allergic rhinoconjunctivitis. She does not feel her current medications have been effective in managing her symptoms and is not sure why her symptoms have flared in the last 2 months. Brit does not recall her symptoms being this severe last spring however she has since moved to a new home, gotten a pet cat, and is currently pregnant. We discussed that any of these could be contributing to her current symptoms. She is also concerned about possible structural abnormalities contributing to her symptoms.    1. Continue cetirizine 10mg at bedtime  2. Begin loratadine 10mg every morning  3. Begin montelukast 10mg daily  4. Begin fluticasone nasal spray, 2 sprays in each nostril " daily - appropriate nasal spray technique reviewed  5. Begin ketotifen eye drops, 1 drop in each eye twice daily as needed  6. Consider allergen immunotherapy treatment post-pregnancy if symptoms persist  7. Consider evaluation with ENT post-pregnancy and after trial of fluticasone nasal spray  8. Follow-up in 3-6 months, sooner if needed      Mo Natarajan MD  Allergy/Immunology  [place], [place]      Chart documentation done in part with Dragon Voice Recognition Software. Although reviewed after completion, some word and grammatical errors may remain.      Again, thank you for allowing me to participate in the care of your patient.        Sincerely,        Mo Natarajan MD

## 2018-05-07 NOTE — MR AVS SNAPSHOT
After Visit Summary   5/7/2018    Brit Romano    MRN: [id number]           Patient Information     Date Of Birth          1980        Visit Information        Provider Department      5/7/2018 2:40 PM Mo Natarajan MD [facility]        Today's Diagnoses     Allergic rhinitis due to dust mite    -  1    Chronic seasonal allergic rhinitis due to pollen        Allergic conjunctivitis of both eyes          Care Instructions    If you have any questions regarding your allergies, asthma, or what we discussed during your visit today please call the allergy clinic or contact us via Nor1.    [facility]: [phone]      Your allergy symptoms may be worse this year because of your pregnancy. If the medicines do not help your allergy symptoms we can talk about allergy shots as a treatment after you have delivered the baby.    Take these allergy medications - all are safe while you are pregnant:    1. Cetirizine (zyrtec) 10mg - 1 tablet at bedtime  2. Loratadine (claritin) 10mg - 1 tablet in the morning  3. Montelukast (singulair) 10mg - 1 tablet at bedtime  4. Fluticasone (flonase) nasal spray - 2 sprays in each nostril daily  5. Ketotifen (zaditor) eye drops - 1 drop in each eye twice daily          Follow-ups after your visit        Who to contact     If you have questions or need follow up information about today's clinic visit or your schedule please contact [facility] directly at [phone].  Normal or non-critical lab and imaging results will be communicated to you by [employer] - Smartphone RFIDhart, letter or phone within 4 business days after the clinic has received the results. If you do not hear from us within 7 days, please contact the clinic through Element IDt or phone. If you have a critical or abnormal lab result, we will notify you by phone as soon as possible.  Submit refill requests through Nor1 or call your pharmacy and they will forward the refill  "request to us. Please allow 3 business days for your refill to be completed.          Additional Information About Your Visit        MyChart Information     [employer] lets you send messages to your doctor, view your test results, renew your prescriptions, schedule appointments and more. To sign up, go to [email or web address] . Click on \"Log in\" on the left side of the screen, which will take you to the Welcome page. Then click on \"Sign up Now\" on the right side of the page.     You will be asked to enter the access code listed below, as well as some personal information. Please follow the directions to create your username and password.     Your access code is: [email or web address]  Expires: 2018  3:02 PM     Your access code will  in 90 days. If you need help or a new code, please call your [facility] clinic or [phone].        Care EveryWhere ID     This is your Care EveryWhere ID. This could be used by other organizations to access your [facility] medical records  [id number]        Your Vitals Were     Pulse Temperature Height Last Period Pulse Oximetry BMI (Body Mass Index)    109 97.1  F (36.2  C) (Oral) 1.626 m (5' 4\") 2017 100% 37.11 kg/m2       Blood Pressure from Last 3 Encounters:   18 136/75   18 123/77   18 128/70    Weight from Last 3 Encounters:   18 98.1 kg (216 lb 3.2 oz)   18 93.6 kg (206 lb 6.4 oz)   18 93 kg (205 lb)              Today, you had the following     No orders found for display         Today's Medication Changes          These changes are accurate as of 18  3:02 PM.  If you have any questions, ask your nurse or doctor.               Start taking these medicines.        Dose/Directions    ketotifen 0.025 % Soln ophthalmic solution   Commonly known as:  ZADITOR/REFRESH ANTI-ITCH   Used for:  Allergic conjunctivitis of both eyes   Started by:  Mo Natarajan MD        Dose:  1 drop   Place 1 drop into both eyes 2 times daily   Quantity:  1 " Bottle   Refills:  11       loratadine 10 MG tablet   Commonly known as:  CLARITIN   Used for:  Allergic rhinitis due to dust mite, Chronic seasonal allergic rhinitis due to pollen, Allergic conjunctivitis of both eyes   Started by:  Mo Natarajan MD        Dose:  10 mg   Take 1 tablet (10 mg) by mouth daily   Quantity:  30 tablet   Refills:  11         These medicines have changed or have updated prescriptions.        Dose/Directions    cetirizine 10 MG tablet   Commonly known as:  zyrTEC   This may have changed:  when to take this   Used for:  Allergic rhinitis due to dust mite, Chronic seasonal allergic rhinitis due to pollen, Allergic conjunctivitis of both eyes   Changed by:  Mo Natarajan MD        Dose:  10 mg   Take 1 tablet (10 mg) by mouth At Bedtime   Quantity:  30 tablet   Refills:  11       fluticasone 50 MCG/ACT spray   Commonly known as:  FLONASE   This may have changed:  See the new instructions.   Used for:  Allergic rhinitis due to dust mite, Chronic seasonal allergic rhinitis due to pollen, Allergic conjunctivitis of both eyes   Changed by:  Mo Natarajan MD        Dose:  2 spray   Spray 2 sprays into both nostrils daily   Quantity:  1 Bottle   Refills:  11            Where to get your medicines      [id number] - [place], [place] - [address] AT [facility] [address]  [address], [place] [address]     Phone:  [phone]     cetirizine 10 MG tablet    fluticasone 50 MCG/ACT spray    ketotifen 0.025 % Soln ophthalmic solution    loratadine 10 MG tablet    montelukast 10 MG tablet                Primary Care Provider Office Phone # Fax #    Princess MALACHI Mcnamara [phone] [address] [facility] [address]        Equal Access to Services     KERRI MOLINA AH: Nasrin kirby Sodary, waaxda luqadaha, qaybta kaalmada ademaxyaluz marina, pablo ruiz.  So [facility] [phone].    ATENCIÓN: Si joan walker, tiene a smith disposición servicios gratuitos de asistencia lingüística. Jasmin martinez [phone].    We comply with applicable federal civil rights laws and [place] laws. We do not discriminate on the basis of race, color, national origin, age, disability, sex, sexual orientation, or gender identity.            Thank you!     Thank you for choosing [facility]  for your care. Our goal is always to provide you with excellent care. Hearing back from our patients is one way we can continue to improve our services. Please take a few minutes to complete the written survey that you may receive in the mail after your visit with us. Thank you!             Your Updated Medication List - Protect others around you: Learn how to safely use, store and throw away your medicines at www.disposemymeds.org.          This list is accurate as of 5/7/18  3:02 PM.  Always use your most recent med list.                   Brand Name Dispense Instructions for use Diagnosis    ascorbic acid 250 MG tablet    VITAMIN C    60 tablet    Take 1 tablet (250 mg) by mouth daily    Other iron deficiency anemia       cetirizine 10 MG tablet    zyrTEC    30 tablet    Take 1 tablet (10 mg) by mouth At Bedtime    Allergic rhinitis due to dust mite, Chronic seasonal allergic rhinitis due to pollen, Allergic conjunctivitis of both eyes       cholecalciferol 5000 units Caps capsule    vitamin D3    90 capsule    Take 1 capsule (5,000 Units) by mouth daily Take one capsule daily.    Vitamin D deficiency       cyanocobalamin 1000 MCG tablet    vitamin  B-12    30 tablet    Take 1 tablet (1,000 mcg) by mouth daily    Other iron deficiency anemia       ferrous sulfate 325 (65 Fe) MG tablet    IRON    30 tablet    Take 1 tablet (325 mg) by mouth daily (with breakfast)    Other iron deficiency anemia       fluticasone 50 MCG/ACT spray    FLONASE    1 Bottle    Spray 2 sprays into both nostrils daily     Allergic rhinitis due to dust mite, Chronic seasonal allergic rhinitis due to pollen, Allergic conjunctivitis of both eyes       ketotifen 0.025 % Soln ophthalmic solution    ZADITOR/REFRESH ANTI-ITCH    1 Bottle    Place 1 drop into both eyes 2 times daily    Allergic conjunctivitis of both eyes       loratadine 10 MG tablet    CLARITIN    30 tablet    Take 1 tablet (10 mg) by mouth daily    Allergic rhinitis due to dust mite, Chronic seasonal allergic rhinitis due to pollen, Allergic conjunctivitis of both eyes       montelukast 10 MG tablet    SINGULAIR    30 tablet    Take 1 tablet (10 mg) by mouth At Bedtime    Allergic rhinitis due to dust mite, Chronic seasonal allergic rhinitis due to pollen       omeprazole 40 MG capsule    priLOSEC    30 capsule    Take 1 capsule (40 mg) by mouth daily    Abdominal pain, generalized       prenatal multivitamin plus iron 27-0.8 MG Tabs per tablet     100 tablet    Take 1 tablet by mouth daily    Pregnancy examination or test, positive result       psyllium 28 % Pack    METAMUCIL SMOOTH TEXTURE    30 each    Take 1 Package by mouth daily    Irritable bowel syndrome with both constipation and diarrhea

## 2018-05-17 NOTE — PROGRESS NOTES
"OB Follow up    Patient name: Brit Romano  MRN: [id number]  : 1980    S:Patient is overall feeling well. We discussed need for likely iron supplementation. She denies shortness of breath, chest pain, nausea and vomiting. She denies cramping, LOF or VB and notes good fetal movement.    O: /81 (BP Location: Left arm, Patient Position: Chair)  Pulse 106  Ht 1.626 m (5' 4\")  Wt 98.7 kg (217 lb 9.6 oz)  LMP 2017  Breastfeeding? No  BMI 37.35 kg/m2  Gen: comfortable, no distress  Abdomen: gravid, nontender    FH: 38  FHR: 150    Ferritin: 6  (H)      A/P: 38 year old  at 27w6d by LMP c/w 13w3d US here for Routine OB visit.    1. Limited PNC:   - NOB: Neg Gonorrhea, chlamydia, Rh Pos, Ab neg, HIV NR, Hep B s Agn NR,  Rubella NR, Anti treponema Neg,    - Genetics: declined screening  2. Hx of GDM x2   - No Early GCT, Hgb A1c 18 - 5.4%   -  today, patient will need phone call and plan for GTT on Monday  3. Vit D deficiency   - on Supplementation intermittently, encouraged regular use  4. Anemia:   - Starting Hgb 10.6   - Ferritin and IBC consistent with [place]   - Hgb ELP nl on 18  5. Low laying placenta on Dating US, now resolved   - 3/12/18: 18w3d, posterior plac, no previa, normal fluid, anatomy , but cardiac, spine not seen   - 18: 21w5d, posterior plac, normal anatomy  6. Needs repeat Pap PP.  7. Allergies: Continue cetirizine 10mg at bedtime, Loratadine 10mg every morning, montelukast 10mg daily,  fluticasone nasal spray, 2 sprays in each nostril daily, ketotifen eye drops, 1 drop in each eye twice daily as needed   -  Consider allergen immunotherapy treatment post-pregnancy if symptoms persist   -  Consider evaluation with ENT post-pregnancy and after trial of fluticasone nasal spray   - F/u with allergist   RTC in 4 weeks  Rita Garcia MD 2018 7:51 PM     The Patient was seen in [facility] by RITA GARCIA " SAMY.  I reviewed the history & exam. Assessment and plan were jointly made.    Desiree Love MD

## 2018-05-18 ENCOUNTER — TELEPHONE (OUTPATIENT)
Dept: OBGYN | Facility: CLINIC | Age: 38
End: 2018-05-18

## 2018-05-18 ENCOUNTER — OFFICE VISIT (OUTPATIENT)
Dept: OBGYN | Facility: CLINIC | Age: 38
End: 2018-05-18
Payer: COMMERCIAL

## 2018-05-18 VITALS
WEIGHT: 217.6 LBS | HEIGHT: 64 IN | SYSTOLIC BLOOD PRESSURE: 117 MMHG | BODY MASS INDEX: 37.15 KG/M2 | HEART RATE: 106 BPM | DIASTOLIC BLOOD PRESSURE: 81 MMHG

## 2018-05-18 DIAGNOSIS — D64.9 ANEMIA, UNSPECIFIED TYPE: ICD-10-CM

## 2018-05-18 DIAGNOSIS — O09.529 SUPERVISION OF HIGH-RISK PREGNANCY OF ELDERLY MULTIGRAVIDA: Primary | ICD-10-CM

## 2018-05-18 DIAGNOSIS — Z36.89 ENCOUNTER FOR FETAL ANATOMIC SURVEY: ICD-10-CM

## 2018-05-18 DIAGNOSIS — D50.8 IRON DEFICIENCY ANEMIA SECONDARY TO INADEQUATE DIETARY IRON INTAKE: ICD-10-CM

## 2018-05-18 DIAGNOSIS — N64.4 SORE NIPPLE: ICD-10-CM

## 2018-05-18 DIAGNOSIS — D50.8 OTHER IRON DEFICIENCY ANEMIA: ICD-10-CM

## 2018-05-18 LAB
FERRITIN SERPL-MCNC: 6 NG/ML (ref 12–150)
GLUCOSE 1H P 50 G GLC PO SERPL-MCNC: 165 MG/DL (ref 60–129)
HGB BLD-MCNC: 10.5 G/DL (ref 11.7–15.7)
IRON SATN MFR SERPL: 8 % (ref 15–46)
IRON SERPL-MCNC: 43 UG/DL (ref 35–180)
TIBC SERPL-MCNC: 534 UG/DL (ref 240–430)

## 2018-05-18 PROCEDURE — 82728 ASSAY OF FERRITIN: CPT | Performed by: OBSTETRICS & GYNECOLOGY

## 2018-05-18 PROCEDURE — G0463 HOSPITAL OUTPT CLINIC VISIT: HCPCS | Mod: ZF

## 2018-05-18 PROCEDURE — 83540 ASSAY OF IRON: CPT | Performed by: OBSTETRICS & GYNECOLOGY

## 2018-05-18 PROCEDURE — 82950 GLUCOSE TEST: CPT | Performed by: OBSTETRICS & GYNECOLOGY

## 2018-05-18 PROCEDURE — 36415 COLL VENOUS BLD VENIPUNCTURE: CPT | Performed by: OBSTETRICS & GYNECOLOGY

## 2018-05-18 PROCEDURE — 83550 IRON BINDING TEST: CPT | Performed by: OBSTETRICS & GYNECOLOGY

## 2018-05-18 PROCEDURE — 85018 HEMOGLOBIN: CPT | Performed by: OBSTETRICS & GYNECOLOGY

## 2018-05-18 RX ORDER — LANOLIN 100 %
OINTMENT (GRAM) TOPICAL DAILY PRN
Qty: 28 G | Refills: 0 | Status: SHIPPED | OUTPATIENT
Start: 2018-05-18 | End: 2019-10-02

## 2018-05-18 NOTE — MR AVS SNAPSHOT
After Visit Summary   2018    Brit Romano    MRN: [id number]           Patient Information     Date Of Birth          1980        Visit Information        Provider Department      2018 3:00 PM Rita Ruth MD [facility]        Today's Diagnoses     Anemia, unspecified type    -  1    Sore nipple          Care Instructions    Call with questions or concerns          Follow-ups after your visit        Follow-up notes from your care team     Return in about 4 weeks (around 6/15/2018).      Future tests that were ordered for you today     Open Future Orders        Priority Expected Expires Ordered    Ferritin Routine  2018            Who to contact     Please call your clinic at [phone] to:    Ask questions about your health    Make or cancel appointments    Discuss your medicines    Learn about your test results    Speak to your doctor            Additional Information About Your Visit        MyChart Information     [employer] is an electronic gateway that provides easy, online access to your medical records. With [email or web address], you can request a clinic appointment, read your test results, renew a prescription or communicate with your care team.     To sign up for [employer] visit the website at [email or web address]   You will be asked to enter the access code listed below, as well as some personal information. Please follow the directions to create your username and password.     Your access code is: [email or web address]  Expires: 2018  3:02 PM     Your access code will  in 90 days. If you need help or a new code, please contact your [facility] or call [phone] for assistance.        Care EveryWhere ID     This is your Care EveryWhere ID. This could be used by other organizations to access your [place] medical records  [id number]        Your Vitals Were     Pulse Height Last Period Breastfeeding? BMI  "(Body Mass Index)       106 1.626 m (5' 4\") 11/03/2017 No 37.35 kg/m2        Blood Pressure from Last 3 Encounters:   05/18/18 117/81   05/07/18 136/75   02/08/18 123/77    Weight from Last 3 Encounters:   05/18/18 98.7 kg (217 lb 9.6 oz)   05/07/18 98.1 kg (216 lb 3.2 oz)   02/08/18 93.6 kg (206 lb 6.4 oz)              We Performed the Following     Iron and Iron Binding Capacity          Today's Medication Changes          These changes are accurate as of 5/18/18  3:48 PM.  If you have any questions, ask your nurse or doctor.               Start taking these medicines.        Dose/Directions    lanolin ointment   Used for:  Sore nipple   Started by:  Rita Ruth MD        Apply topically daily as needed for dry skin   Quantity:  28 g   Refills:  0            Where to get your medicines      These medications were sent to [id number] - [place], [place] - [address] AT [facility] [address]  [address], [place] [address]     Phone:  [phone]     lanolin ointment                Primary Care Provider Office Phone # Fax #    Princess MALACHI Mcnamara [facility] [phone] [address] [facility] [address]        Equal Access to Services     [place] AH: Hadii juan ku hadasho Soraynaali, waaxda luqadaha, qaybta kaalmada adeegyada, pablo ruiz. So [facility] [phone].    ATENCIÓN: Si habla español, tiene a smith disposición servicios gratuitos de asistencia lingüística. Llame al [phone].    We comply with applicable federal civil rights laws and [place] laws. We do not discriminate on the basis of race, color, national origin, age, disability, sex, sexual orientation, or gender identity.            Thank you!     Thank you for choosing [facility]  for your care. Our goal is always to provide you with excellent care. Hearing back from our patients is one way we can continue to " improve our services. Please take a few minutes to complete the written survey that you may receive in the mail after your visit with us. Thank you!             Your Updated Medication List - Protect others around you: Learn how to safely use, store and throw away your medicines at www.disposemymeds.org.          This list is accurate as of 5/18/18  3:48 PM.  Always use your most recent med list.                   Brand Name Dispense Instructions for use Diagnosis    ascorbic acid 250 MG tablet    VITAMIN C    60 tablet    Take 1 tablet (250 mg) by mouth daily    Other iron deficiency anemia       cetirizine 10 MG tablet    zyrTEC    30 tablet    Take 1 tablet (10 mg) by mouth At Bedtime    Allergic rhinitis due to dust mite, Chronic seasonal allergic rhinitis due to pollen, Allergic conjunctivitis of both eyes       cholecalciferol 5000 units Caps capsule    vitamin D3    90 capsule    Take 1 capsule (5,000 Units) by mouth daily Take one capsule daily.    Vitamin D deficiency       cyanocobalamin 1000 MCG tablet    vitamin  B-12    30 tablet    Take 1 tablet (1,000 mcg) by mouth daily    Other iron deficiency anemia       ferrous sulfate 325 (65 Fe) MG tablet    IRON    30 tablet    Take 1 tablet (325 mg) by mouth daily (with breakfast)    Other iron deficiency anemia       fluticasone 50 MCG/ACT spray    FLONASE    1 Bottle    Spray 2 sprays into both nostrils daily    Allergic rhinitis due to dust mite, Chronic seasonal allergic rhinitis due to pollen, Allergic conjunctivitis of both eyes       ketotifen 0.025 % Soln ophthalmic solution    ZADITOR/REFRESH ANTI-ITCH    1 Bottle    Place 1 drop into both eyes 2 times daily    Allergic conjunctivitis of both eyes       lanolin ointment     28 g    Apply topically daily as needed for dry skin    Sore nipple       loratadine 10 MG tablet    CLARITIN    30 tablet    Take 1 tablet (10 mg) by mouth daily    Allergic rhinitis due to dust mite, Chronic seasonal allergic  rhinitis due to pollen, Allergic conjunctivitis of both eyes       montelukast 10 MG tablet    SINGULAIR    30 tablet    Take 1 tablet (10 mg) by mouth At Bedtime    Allergic rhinitis due to dust mite, Chronic seasonal allergic rhinitis due to pollen       omeprazole 40 MG capsule    priLOSEC    30 capsule    Take 1 capsule (40 mg) by mouth daily    Abdominal pain, generalized       prenatal multivitamin plus iron 27-0.8 MG Tabs per tablet     100 tablet    Take 1 tablet by mouth daily    Pregnancy examination or test, positive result       psyllium 28 % Pack    METAMUCIL SMOOTH TEXTURE    30 each    Take 1 Package by mouth daily    Irritable bowel syndrome with both constipation and diarrhea

## 2018-05-18 NOTE — NURSING NOTE
Chief Complaint   Patient presents with     Prenatal Care     28w0d       See Roxann, CMA 5/18/2018

## 2018-05-21 ENCOUNTER — TELEPHONE (OUTPATIENT)
Dept: INTERNAL MEDICINE | Facility: CLINIC | Age: 38
End: 2018-05-21

## 2018-05-21 NOTE — TELEPHONE ENCOUNTER
Notes Recorded by Monisha Hollis RN on 5/21/2018 at 6:43 PM  Message left for patient to call the RN hotline # at [phone]    Telephone encounter started    Monisha Hollis RN - BC    ------    Notes Recorded by Princess Hicks, APRN CNP on 5/21/2018 at 4:44 PM  Please call patient-    I received results from her anemia, thought it also looks like Ob/Gyn is monitoring as well.  Her anemia is stable.  Is she taking iron?     Thanks,  Princess Hicks, CNP

## 2018-05-21 NOTE — TELEPHONE ENCOUNTER
Patient notified of providers message as written.   Patient does take Iron but not every day. She states she takes it 2-3 times per week.    Patient verbalized understanding and has no further questions or concerns.    Monisha Hollis RN - BC

## 2018-05-22 ENCOUNTER — TELEPHONE (OUTPATIENT)
Dept: OBGYN | Facility: CLINIC | Age: 38
End: 2018-05-22

## 2018-05-22 DIAGNOSIS — O09.529 SUPERVISION OF HIGH-RISK PREGNANCY OF ELDERLY MULTIGRAVIDA: Primary | ICD-10-CM

## 2018-05-22 NOTE — TELEPHONE ENCOUNTER
Spoke with patient and providers message read as below. Patient verbalized understanding and will start taking iron daily.     Giulia Beltran, RN, BSN, PHN

## 2018-05-22 NOTE — TELEPHONE ENCOUNTER
Discussed glucose results with patient. She will go to lab this Friday 3pm for 3 hr glucose test.     Reminded her to continue taking iron supplements, She understood plan and had no further questions.

## 2018-05-29 RX ORDER — CETIRIZINE HYDROCHLORIDE 10 MG/1
10 TABLET ORAL 2 TIMES DAILY
Qty: 30 TABLET | Refills: 1 | OUTPATIENT
Start: 2018-05-29

## 2018-05-29 NOTE — TELEPHONE ENCOUNTER
New prescriptions written and sent to the pharmacy on 5/7/18. Patient is to take 10mg of loratadine every morning and 10mg of cetirizine every evening. Please confirm pharmacy has received these prescriptions.

## 2018-05-29 NOTE — TELEPHONE ENCOUNTER
Spoke with pharmacy.  They do have the rx sent over for cetirizine on 5/7/18.  Rx not needed.    Refused Prescriptions:                       Disp   Refills    cetirizine (ZYRTEC) 10 MG tablet           30 tab*1        Sig: Take 1 tablet (10 mg) by mouth 2 times daily  Refused By: SOLIS MONTELONGO  Reason for Refusal: Duplicate  Reason for Refusal Comment: see rx sent 5/7/18    Solis Montelongo RN

## 2018-05-29 NOTE — TELEPHONE ENCOUNTER
Pending Prescriptions:                       Disp   Refills    cetirizine (ZYRTEC) 10 MG tablet          30 tab*1            Sig: Take 1 tablet (10 mg) by mouth 2 times daily      Routing refill request to provider for review/approval because:  Medication dose requested by pharmacy is more than what is ordered in patient's medication list. Patient is also currently pregnant.    Last office visit: 05/07/18    Jewell Crockett RN

## 2018-05-30 NOTE — PROGRESS NOTES
Spoke with patient who states she has trouble swallowing the tablets so has only been taking occasionally. Informed we can send liquid iron or discuss infusion therapy but patient states she will begin to take the oral tablet daily. Pt had no further questions or concerns at this time.

## 2018-06-14 ENCOUNTER — TELEPHONE (OUTPATIENT)
Dept: OBGYN | Facility: CLINIC | Age: 38
End: 2018-06-14

## 2018-06-14 ENCOUNTER — OFFICE VISIT (OUTPATIENT)
Dept: OBGYN | Facility: CLINIC | Age: 38
End: 2018-06-14
Attending: OBSTETRICS & GYNECOLOGY
Payer: COMMERCIAL

## 2018-06-14 VITALS
HEART RATE: 105 BPM | HEIGHT: 64 IN | WEIGHT: 216.9 LBS | BODY MASS INDEX: 37.03 KG/M2 | DIASTOLIC BLOOD PRESSURE: 83 MMHG | SYSTOLIC BLOOD PRESSURE: 121 MMHG

## 2018-06-14 DIAGNOSIS — O24.410 DIET CONTROLLED GESTATIONAL DIABETES MELLITUS (GDM) IN THIRD TRIMESTER: Primary | ICD-10-CM

## 2018-06-14 DIAGNOSIS — O09.529 SUPERVISION OF HIGH-RISK PREGNANCY OF ELDERLY MULTIGRAVIDA: Primary | ICD-10-CM

## 2018-06-14 DIAGNOSIS — D50.8 IRON DEFICIENCY ANEMIA SECONDARY TO INADEQUATE DIETARY IRON INTAKE: ICD-10-CM

## 2018-06-14 DIAGNOSIS — Z86.32 HISTORY OF GESTATIONAL DIABETES: ICD-10-CM

## 2018-06-14 LAB
DEPRECATED CALCIDIOL+CALCIFEROL SERPL-MC: 11 UG/L (ref 20–75)
ERYTHROCYTE [DISTWIDTH] IN BLOOD BY AUTOMATED COUNT: 14.1 % (ref 10–15)
GLUCOSE 1H P 100 G GLC PO SERPL-MCNC: 223 MG/DL (ref 60–179)
GLUCOSE 2H P 100 G GLC PO SERPL-MCNC: 144 MG/DL (ref 60–154)
GLUCOSE 3H P 100 G GLC PO SERPL-MCNC: 112 MG/DL (ref 60–139)
GLUCOSE BLDC GLUCOMTR-MCNC: 110 MG/DL (ref 70–99)
GLUCOSE P FAST SERPL-MCNC: 101 MG/DL (ref 60–94)
HCT VFR BLD AUTO: 33 % (ref 35–47)
HGB BLD-MCNC: 10.7 G/DL (ref 11.7–15.7)
MCH RBC QN AUTO: 27.2 PG (ref 26.5–33)
MCHC RBC AUTO-ENTMCNC: 32.4 G/DL (ref 31.5–36.5)
MCV RBC AUTO: 84 FL (ref 78–100)
PLATELET # BLD AUTO: 174 10E9/L (ref 150–450)
RBC # BLD AUTO: 3.94 10E12/L (ref 3.8–5.2)
WBC # BLD AUTO: 8.9 10E9/L (ref 4–11)

## 2018-06-14 PROCEDURE — 82952 GTT-ADDED SAMPLES: CPT | Performed by: OBSTETRICS & GYNECOLOGY

## 2018-06-14 PROCEDURE — 86780 TREPONEMA PALLIDUM: CPT | Performed by: OBSTETRICS & GYNECOLOGY

## 2018-06-14 PROCEDURE — 85027 COMPLETE CBC AUTOMATED: CPT | Performed by: OBSTETRICS & GYNECOLOGY

## 2018-06-14 PROCEDURE — 82951 GLUCOSE TOLERANCE TEST (GTT): CPT | Performed by: OBSTETRICS & GYNECOLOGY

## 2018-06-14 PROCEDURE — 36415 COLL VENOUS BLD VENIPUNCTURE: CPT | Performed by: OBSTETRICS & GYNECOLOGY

## 2018-06-14 PROCEDURE — 82962 GLUCOSE BLOOD TEST: CPT

## 2018-06-14 PROCEDURE — 82306 VITAMIN D 25 HYDROXY: CPT | Performed by: OBSTETRICS & GYNECOLOGY

## 2018-06-14 NOTE — MR AVS SNAPSHOT
After Visit Summary   2018    Brit Romano    MRN: [id number]           Patient Information     Date Of Birth          1980        Visit Information        Provider Department      2018 10:30 AM Diann Lyon MD [facility]        Today's Diagnoses     Supervision of high-risk pregnancy of elderly multigravida, [facility] MD    -  1    Iron deficiency anemia         History of gestational diabetes           Follow-ups after your visit        Follow-up notes from your care team     Return in about 2 weeks (around 2018) for CABRERA Visit.      Your next 10 appointments already scheduled     2018  4:00 PM CDT   RETURN OB with Jacqueline Hobson MD   [facility] ([facility])    [facility]  [address]  [address]  [facility] [address] [phone]              Who to contact     Please call your clinic at [phone] to:    Ask questions about your health    Make or cancel appointments    Discuss your medicines    Learn about your test results    Speak to your doctor            Additional Information About Your Visit        [employer] Information     Playtox is an electronic gateway that provides easy, online access to your medical records. With Playtox, you can request a clinic appointment, read your test results, renew a prescription or communicate with your care team.     To sign up for Playtox visit the website at [email or web address]   You will be asked to enter the access code listed below, as well as some personal information. Please follow the directions to create your username and password.     Your access code is: [email or web address]  Expires: 2018  3:02 PM     Your access code will  in 90 days. If you need help or a new code, please contact your [facility] or call [phone] for assistance.        Care EveryWhere ID     This is your Care  "EveryWhere ID. This could be used by other organizations to access your [place] medical records  [id number]        Your Vitals Were     Pulse Height Last Period BMI (Body Mass Index)          105 1.626 m (5' 4\") 11/03/2017 37.23 kg/m2         Blood Pressure from Last 3 Encounters:   06/14/18 121/83   05/18/18 117/81   05/07/18 136/75    Weight from Last 3 Encounters:   06/14/18 98.4 kg (216 lb 14.4 oz)   05/18/18 98.7 kg (217 lb 9.6 oz)   05/07/18 98.1 kg (216 lb 3.2 oz)              We Performed the Following     25- OH-Vitamin D     CBC with Platelets     Glucose tolerance gest std 100 gm 3 hr     Treponema Abs w Reflex to RPR and Titer        Primary Care Provider Office Phone # Fax #    Princess Zenia Hicks, MALACHI [phone] [address] [facility] [address]        Equal Access to Services     [facility] AH: Hadii aad ku hadasho Soomaali, waaxda luqadaha, qaybta kaalmada adeegyada, waxay eliezerin hayrosangela snyder . So [facility] [phone].    ATENCIÓN: Si habla español, tiene a smith disposición servicios gratuitos de asistencia lingüística. [facility] [phone].    We comply with applicable federal civil rights laws and [place] laws. We do not discriminate on the basis of race, color, national origin, age, disability, sex, sexual orientation, or gender identity.            Thank you!     Thank you for choosing [facility]  for your care. Our goal is always to provide you with excellent care. Hearing back from our patients is one way we can continue to improve our services. Please take a few minutes to complete the written survey that you may receive in the mail after your visit with us. Thank you!             Your Updated Medication List - Protect others around you: Learn how to safely use, store and throw away your medicines at www.disposemymeds.org.          This list is accurate as of 6/14/18 10:51 AM.  Always use your most recent med list.    "                Brand Name Dispense Instructions for use Diagnosis    ascorbic acid 250 MG tablet    VITAMIN C    60 tablet    Take 1 tablet (250 mg) by mouth daily    Other iron deficiency anemia       cetirizine 10 MG tablet    zyrTEC    30 tablet    Take 1 tablet (10 mg) by mouth At Bedtime    Allergic rhinitis due to dust mite, Chronic seasonal allergic rhinitis due to pollen, Allergic conjunctivitis of both eyes       cholecalciferol 5000 units Caps capsule    vitamin D3    90 capsule    Take 1 capsule (5,000 Units) by mouth daily Take one capsule daily.    Vitamin D deficiency       cyanocobalamin 1000 MCG tablet    vitamin  B-12    30 tablet    Take 1 tablet (1,000 mcg) by mouth daily    Other iron deficiency anemia       ferrous sulfate 325 (65 Fe) MG tablet    IRON    30 tablet    Take 1 tablet (325 mg) by mouth daily (with breakfast)    Other iron deficiency anemia       fluticasone 50 MCG/ACT spray    FLONASE    1 Bottle    Spray 2 sprays into both nostrils daily    Allergic rhinitis due to dust mite, Chronic seasonal allergic rhinitis due to pollen, Allergic conjunctivitis of both eyes       ketotifen 0.025 % Soln ophthalmic solution    ZADITOR/REFRESH ANTI-ITCH    1 Bottle    Place 1 drop into both eyes 2 times daily    Allergic conjunctivitis of both eyes       lanolin ointment     28 g    Apply topically daily as needed for dry skin    Sore nipple       loratadine 10 MG tablet    CLARITIN    30 tablet    Take 1 tablet (10 mg) by mouth daily    Allergic rhinitis due to dust mite, Chronic seasonal allergic rhinitis due to pollen, Allergic conjunctivitis of both eyes       montelukast 10 MG tablet    SINGULAIR    30 tablet    Take 1 tablet (10 mg) by mouth At Bedtime    Allergic rhinitis due to dust mite, Chronic seasonal allergic rhinitis due to pollen       omeprazole 40 MG capsule    priLOSEC    30 capsule    Take 1 capsule (40 mg) by mouth daily    Abdominal pain, generalized       prenatal  multivitamin plus iron 27-0.8 MG Tabs per tablet     100 tablet    Take 1 tablet by mouth daily    Pregnancy examination or test, positive result       psyllium 28 % Pack    METAMUCIL SMOOTH TEXTURE    30 each    Take 1 Package by mouth daily    Irritable bowel syndrome with both constipation and diarrhea

## 2018-06-14 NOTE — LETTER
2018       RE: Brit Romano  [address] [address]     Dear Colleague,    Thank you for referring your patient, Brit Romano, to the [facility] at [facility]. Please see a copy of my visit note below.    CABRERA Visit 18    S: Doing ok.  Occasional BH btx.  No VB or LOF.  + FM.  Denies HA, scotoma, SOB, RUQ pain or increased swelling in her extremities.  Plans to do her GTT this afternoon, is fasting currently.    O:  See OB Flowsheet    A/P: 38 year old  at 31w6d presents for CABRERA visit  1. Prenatal care:  Has had minimal care.  Rh positive, Lianne negative, Rubella NONIMMUNE, Remainder NOB labs wnl.  FAILED GCT.  2. Genetic screening: Declined  3. Hx of GDM x2: No Early GCT, Hgb A1c 18 - 5.4%.   18 and was notified with plan for patient to complete but patient has not done yet, explained importance of test today or if patient was to be treated as diabetic will get set up with diabetes education, patient plans for GTT this afternoon so will await results and plan accordingly.  4. Vit D deficiency: on Supplementation, level was not checked at EOB  5. Anemia: Starting Hgb 10.6.  Ferritin and IBC consistent with CAMDEN.  Hgb ELP nl on 18.  Patient last Hgb 10.5 at EOB visit, was given Rx or ferrous sulfate.  Patient states she is taking every day and tolerating well.  6. Low laying placenta on Dating US, now resolved  7. Needs repeat Pap PP.  8. Allergies: Continue cetirizine 10mg at bedtime, Loratadine 10mg every morning, montelukast 10mg daily,  fluticasone nasal spray, 2 sprays in each nostril daily, ketotifen eye drops, 1 drop in each eye twice daily as needed.  Consider allergen immunotherapy treatment post-pregnancy if symptoms persist.  Consider evaluation with ENT post-pregnancy and after trial of fluticasone nasal spray.  F/u with allergist 2018  9. Tdap offered today and declined, check  again at next visit  10. RTC in 2 week for CABRERA visit    Diann Lyon MD

## 2018-06-14 NOTE — TELEPHONE ENCOUNTER
Spoke with patient about elevated glucose. She will make appt. With diabetic Ed. Patient added to high risk GDM list.

## 2018-06-14 NOTE — TELEPHONE ENCOUNTER
----- Message from Diann Lyon MD sent at 6/14/2018  3:25 PM CDT -----  Regarding: Failed 3 hour GTT  This patient failed her 3 hour GTT, had 2 abnormal values fasting and 1 hour.  Please call her to let her know and get her set up for diabetes education, she has had gestational diabetes in 2 prior pregnancies.

## 2018-06-14 NOTE — PROGRESS NOTES
CABRERA Visit 18    S: Doing ok.  Occasional BH btx.  No VB or LOF.  + FM.  Denies HA, scotoma, SOB, RUQ pain or increased swelling in her extremities.  Plans to do her GTT this afternoon, is fasting currently.    O:  See OB Flowsheet    A/P: 38 year old  at 31w6d presents for CABRERA visit  1. Prenatal care:  Has had minimal care.  Rh positive, Lianne negative, Rubella NONIMMUNE, Remainder NOB labs wnl.  FAILED GCT.  2. Genetic screening: Declined  3. Hx of GDM x2: No Early GCT, Hgb A1c 18 - 5.4%.   18 and was notified with plan for patient to complete but patient has not done yet, explained importance of test today or if patient was to be treated as diabetic will get set up with diabetes education, patient plans for GTT this afternoon so will await results and plan accordingly.  4. Vit D deficiency: on Supplementation, level was not checked at EOB  5. Anemia: Starting Hgb 10.6.  Ferritin and IBC consistent with CAMDEN.  Hgb ELP nl on 18.  Patient last Hgb 10.5 at EOB visit, was given Rx or ferrous sulfate.  Patient states she is taking every day and tolerating well.  6. Low laying placenta on Dating US, now resolved  7. Needs repeat Pap PP.  8. Allergies: Continue cetirizine 10mg at bedtime, Loratadine 10mg every morning, montelukast 10mg daily,  fluticasone nasal spray, 2 sprays in each nostril daily, ketotifen eye drops, 1 drop in each eye twice daily as needed.  Consider allergen immunotherapy treatment post-pregnancy if symptoms persist.  Consider evaluation with ENT post-pregnancy and after trial of fluticasone nasal spray.  F/u with allergist 2018  9. Tdap offered today and declined, check again at next visit  10. RTC in 2 week for CABRERA visit    Diann Lyon MD

## 2018-06-15 ENCOUNTER — TELEPHONE (OUTPATIENT)
Dept: INTERNAL MEDICINE | Facility: CLINIC | Age: 38
End: 2018-06-15

## 2018-06-15 DIAGNOSIS — O24.410 DIET CONTROLLED GESTATIONAL DIABETES MELLITUS (GDM) IN THIRD TRIMESTER: Primary | ICD-10-CM

## 2018-06-15 LAB — T PALLIDUM AB SER QL: NONREACTIVE

## 2018-06-15 NOTE — TELEPHONE ENCOUNTER
Prior Authorization Retail Medication Request    Medication/Dose: Omeprazole 40mg  ICD code (if different than what is on RX):  Abdominal pain, generalized [R10.84]   Previously Tried and Failed:  Zantac 150mg  Rationale:      Insurance Name: Blue Plus  Insurance ID:  [id number]       Pharmacy Information (if different than what is on RX)  Name:  [facility] [address] [facility]  Phone:  [phone]  Fax: [phone]

## 2018-06-15 NOTE — TELEPHONE ENCOUNTER
Central Prior Authorization Team   Phone: [phone]      PA Initiation    Medication: Omeprazole 40mg  Insurance Company: [employer] [place] - Phone [phone] Fax [phone]  Pharmacy Filling the Rx: [facility] [address] [place] [address] [address] AT [facility] [address] [facility]  Filling Pharmacy Phone: [phone]  Filling Pharmacy Fax: [phone]  Start Date: 6/15/2018

## 2018-06-15 NOTE — TELEPHONE ENCOUNTER
Reason for Call:  Medication or medication refill:    Do you use a [facility]?  Name of the pharmacy and phone number for the current request:        [id number] - [place], [place] - [address] AT [place] [address]  [facility] [address]  Phone: [phone] Fax: [phone]        Name of the medication requested: BCBS stating Omeprazole needs PA for quantity limit exemption. Insurance only covers 120 days out of the 365 days. [facility] will also be faxing PA over. Please watch out for.    Other request: NA    Can we leave a detailed message on this number? Not Applicable    Phone number patient can be reached at: Other phone number:  [phone]    Best Time: any time    Call taken on 6/15/2018 at 3:21 PM by Christiana Lucero

## 2018-06-18 ENCOUNTER — OFFICE VISIT (OUTPATIENT)
Dept: ENDOCRINOLOGY | Facility: CLINIC | Age: 38
End: 2018-06-18
Payer: COMMERCIAL

## 2018-06-18 ENCOUNTER — OFFICE VISIT (OUTPATIENT)
Dept: EDUCATION SERVICES | Facility: CLINIC | Age: 38
End: 2018-06-18
Payer: COMMERCIAL

## 2018-06-18 VITALS
SYSTOLIC BLOOD PRESSURE: 121 MMHG | BODY MASS INDEX: 37.37 KG/M2 | HEIGHT: 64 IN | WEIGHT: 218.9 LBS | DIASTOLIC BLOOD PRESSURE: 81 MMHG | HEART RATE: 94 BPM

## 2018-06-18 DIAGNOSIS — O99.810 ABNORMAL MATERNAL GLUCOSE TOLERANCE, ANTEPARTUM: Primary | ICD-10-CM

## 2018-06-18 DIAGNOSIS — O24.410 DIET CONTROLLED GESTATIONAL DIABETES MELLITUS (GDM) IN THIRD TRIMESTER: Primary | ICD-10-CM

## 2018-06-18 LAB — HBA1C MFR BLD: 5.9 % (ref 4.3–6)

## 2018-06-18 ASSESSMENT — PAIN SCALES - GENERAL: PAINLEVEL: NO PAIN (0)

## 2018-06-18 NOTE — TELEPHONE ENCOUNTER
PRIOR AUTHORIZATION DENIED    Medication: Omeprazole 40mg - Denied    Denial Date: 6/16/2018    Denial Rational:      Appeal Information:    If you would like to appeal, please supply P/A team with a letter of medical necessity with clinical reason.

## 2018-06-18 NOTE — PATIENT INSTRUCTIONS
1. Check your blood sugar every morning and 2 hrs after your meals. Please record the numbers in your log book.    2. Return to clinic in 1-2 weeks to meet with a physician assistant to go over the data.

## 2018-06-18 NOTE — MR AVS SNAPSHOT
After Visit Summary   6/18/2018    Brit Romano    MRN: [id number]           Patient Information     Date Of Birth          1980        Visit Information        Provider Department      6/18/2018 1:00 PM Junie Feng MD [facility]        Today's Diagnoses     Diet controlled gestational diabetes mellitus (GDM) in third trimester    -  1      Care Instructions    1. Check your blood sugar every morning and 2 hrs after your meals. Please record the numbers in your log book.    2. Return to clinic in 1-2 weeks to meet with a physician assistant to go over the data.           Follow-ups after your visit        Follow-up notes from your care team     Return in about 1 week (around 6/25/2018).      Your next 10 appointments already scheduled     Jun 27, 2018  3:30 PM CDT   (Arrive by 3:15 PM)   Office Visit with Chen Lundberg RN   [facility] ([facility] [employer] [facility])    [address] [place] [address] [phone]           Bring a current list of meds and any records pertaining to this visit. For Physicals, please bring immunization records and any forms needing to be filled out. Please arrive 10 minutes early to complete paperwork.            Jul 02, 2018  4:00 PM CDT   RETURN OB with Jacqueline Hobson MD   [facility] ([facility])    [facility] [address]  [address]  [address]  [facility] [address]   [phone]            Jul 13, 2018  2:50 PM CDT   (Arrive by 2:35 PM)   RETURN DIABETES with Junie Feng MD   [facility] ([facility] [facility])    [address] [place] [id number] [phone]              Who to contact     Please call your clinic at [phone] to:    Ask questions about your health    Make or cancel appointments    Discuss your medicines    Learn about your test results    Speak to your  "doctor            Additional Information About Your Visit        MyChart Information     nPario is an electronic gateway that provides easy, online access to your medical records. With nPario, you can request a clinic appointment, read your test results, renew a prescription or communicate with your care team.     To sign up for nPario visit the website at [email or web address]   You will be asked to enter the access code listed below, as well as some personal information. Please follow the directions to create your username and password.     Your access code is: [email or web address]  Expires: 2018  3:02 PM     Your access code will  in 90 days. If you need help or a new code, please contact your [facility] or call [phone] for assistance.        Care EveryWhere ID     This is your Care EveryWhere ID. This could be used by other organizations to access your [place] medical records  [id number]        Your Vitals Were     Pulse Height Last Period BMI (Body Mass Index)          94 1.626 m (5' 4.02\") 2017 37.56 kg/m2         Blood Pressure from Last 3 Encounters:   18 121/81   18 121/83   18 117/81    Weight from Last 3 Encounters:   18 99.3 kg (218 lb 14.4 oz)   18 98.4 kg (216 lb 14.4 oz)   18 98.7 kg (217 lb 9.6 oz)              We Performed the Following     Hemoglobin A1c POCT          Today's Medication Changes          These changes are accurate as of 18 11:59 PM.  If you have any questions, ask your nurse or doctor.               Start taking these medicines.        Dose/Directions    blood glucose monitoring lancets   Used for:  Abnormal maternal glucose tolerance, antepartum   Started by:  Chen Lundberg, GENOVEVA        Use to test blood sugar 4 times daily or as directed.  Ok to substitute alternative if insurance prefers.   Quantity:  200 each   Refills:  11       blood glucose monitoring test strip   Commonly known " as:  CONTOUR NEXT TEST   Used for:  Abnormal maternal glucose tolerance, antepartum   Started by:  Chen Lundberg, GENOVEVA        Use to test blood sugar 4 times daily or as directed.  Ok to substitute alternative if insurance prefers.   Quantity:  150 strip   Refills:  11            Where to get your medicines      These medications were sent to [id number] - [place], [place] - [address] AT [facility] OF [address]  [address], [place] [address]     Phone:  [phone]     blood glucose monitoring lancets    blood glucose monitoring test strip                Primary Care Provider Office Phone # Fax #    Princess Hicks, APRN [facility] [phone] [address] [facility] [address]        Equal Access to Services     [facility] AH: Hadii juan diaz hadasho Soomaali, waaxda luqadaha, qaybta kaalmada adeegyada, waxzamzam zimmerman hayrosangela snyder . So [facility] [phone].    ATENCIÓN: Si habla español, tiene a smith disposición servicios gratuitos de asistencia lingüística. Llame al [phone].    We comply with applicable federal civil rights laws and [place] laws. We do not discriminate on the basis of race, color, national origin, age, disability, sex, sexual orientation, or gender identity.            Thank you!     Thank you for choosing [facility]  for your care. Our goal is always to provide you with excellent care. Hearing back from our patients is one way we can continue to improve our services. Please take a few minutes to complete the written survey that you may receive in the mail after your visit with us. Thank you!             Your Updated Medication List - Protect others around you: Learn how to safely use, store and throw away your medicines at www.disposemymeds.org.          This list is accurate as of 6/18/18 11:59 PM.  Always use your most recent med list.                   Brand Name Dispense Instructions for  use Diagnosis    ascorbic acid 250 MG tablet    VITAMIN C    60 tablet    Take 1 tablet (250 mg) by mouth daily    Other iron deficiency anemia       blood glucose monitoring lancets     200 each    Use to test blood sugar 4 times daily or as directed.  Ok to substitute alternative if insurance prefers.    Abnormal maternal glucose tolerance, antepartum       blood glucose monitoring test strip    CONTOUR NEXT TEST    150 strip    Use to test blood sugar 4 times daily or as directed.  Ok to substitute alternative if insurance prefers.    Abnormal maternal glucose tolerance, antepartum       cetirizine 10 MG tablet    zyrTEC    30 tablet    Take 1 tablet (10 mg) by mouth At Bedtime    Allergic rhinitis due to dust mite, Chronic seasonal allergic rhinitis due to pollen, Allergic conjunctivitis of both eyes       cholecalciferol 5000 units Caps capsule    vitamin D3    90 capsule    Take 1 capsule (5,000 Units) by mouth daily Take one capsule daily.    Vitamin D deficiency       cyanocobalamin 1000 MCG tablet    vitamin  B-12    30 tablet    Take 1 tablet (1,000 mcg) by mouth daily    Other iron deficiency anemia       ferrous sulfate 325 (65 Fe) MG tablet    IRON    30 tablet    Take 1 tablet (325 mg) by mouth daily (with breakfast)    Other iron deficiency anemia       fluticasone 50 MCG/ACT spray    FLONASE    1 Bottle    Spray 2 sprays into both nostrils daily    Allergic rhinitis due to dust mite, Chronic seasonal allergic rhinitis due to pollen, Allergic conjunctivitis of both eyes       ketotifen 0.025 % Soln ophthalmic solution    ZADITOR/REFRESH ANTI-ITCH    1 Bottle    Place 1 drop into both eyes 2 times daily    Allergic conjunctivitis of both eyes       lanolin ointment     28 g    Apply topically daily as needed for dry skin    Sore nipple       loratadine 10 MG tablet    CLARITIN    30 tablet    Take 1 tablet (10 mg) by mouth daily    Allergic rhinitis due to dust mite, Chronic seasonal allergic rhinitis  due to pollen, Allergic conjunctivitis of both eyes       montelukast 10 MG tablet    SINGULAIR    30 tablet    Take 1 tablet (10 mg) by mouth At Bedtime    Allergic rhinitis due to dust mite, Chronic seasonal allergic rhinitis due to pollen       omeprazole 40 MG capsule    priLOSEC    30 capsule    Take 1 capsule (40 mg) by mouth daily    Abdominal pain, generalized       prenatal multivitamin plus iron 27-0.8 MG Tabs per tablet     100 tablet    Take 1 tablet by mouth daily    Pregnancy examination or test, positive result       psyllium 28 % Pack    METAMUCIL SMOOTH TEXTURE    30 each    Take 1 Package by mouth daily    Irritable bowel syndrome with both constipation and diarrhea

## 2018-06-18 NOTE — PROGRESS NOTES
"Diabetes Self-Management Training - Gestational Diabetes    SUBJECTIVE/OBJECTIVE:  Brit Romano presents today for education related to gestational diabetes.  She is accompanied by self    Patient's gestational diabetes management related comments/concerns: a healthy baby    Patient's emotional response to diabetes: expresses readiness to learn and concern for health and well-being    LMP 2017    Pre pregnancy weight: 205#    Weight gain 216 lbs at 32 weeks gestation.    Estimated Date of Delivery: Aug 10, 2018    Lab Results   Component Value Date    GLC 86 2017       1 hour OGTT: 165 on 2018  3 hour OGTT: Fastin; 1 hr:223; 2 hr: 144, 3 hr: 112 on 2018    History   Smoking Status     Never Smoker   Smokeless Tobacco     Never Used       Lifestyle and Health Behaviors:  Physical Activity: sporadic or irregular exercise through housework and     Nutrition:  Patient eats 2 meals and 1 snacks per day.    Breakfast:  skips  Snack:  (10a) injera with an egg  Lunch:  (2:30p) spaghetti with red sauce or rice and vegetables (alternates every other day)  Snack:  skips  Dinner:  (9-10p) toast with peanut butter or red beans and corn and rice  Bedtime Snack: skips    Other time(s) food is eaten? no    Beverages: Water 8/day    Cultural/Congregation diet restrictions: No, does not like to eat much meat     Biggest challenge to healthy eating is:  knowing what to eat    Pre-Omaira Vitamin: Yes    Supplements: No   Experiencing nausea?  No     Socio/Economic considerations:  Support System: family    Health Literacy/Numeracy:  \"With diabetes, it's helpful to use forms and log books to write down blood sugars and what you're eating at times to help understand how foods affect your blood sugars. With this in mind:    How confident are you at filling out medical forms, such as these by yourself?   Somewhat    Health Beliefs and Attitudes:   Stage of Change: ACTION (Actively working towards " change)    ASSESSMENT:  Brit seems to understand that it is carbohydrate that impacts blood sugar.  We talked about limiting the serving sizes on some of her carb.    INTERVENTION:  Patient was instructed on Contour Next One meter and was able to provide an accurate return demonstration. Patient's blood glucose reading today was 132 mg/dL.    Educational topics covered today:  GDM diagnosis, pathophysiology, Risks and Complications of GDM, Means of controlling GDM, Using a Blood Glucose Monitor, Blood Glucose Goals, Logging and Interpreting Glucose Results, Healthy Eating During Pregnancy,Meal Planning for GDM, and Physical Activity    Educational materials provided today:   Brant Understanding Gestational Diabetes  GDM Log Book  Care After Delivery  Contour Next One meter kit    Pt verbalized understanding of concepts discussed and recommendations provided today.     PLAN:  Check glucose 4 times daily, before breakfast and 1 hour after each meal.     Meal plan: 2 carbs at breakfast, 3-4 carbs at lunch, 3-4 carbs at supper, 1-2 carbs at 3 snacks a day.  Follow consistent CHO meal plan, eat CHO and protein/fat at all meals/snacks.    Call/e-mail/MyChart message diabetes educator if 3 or more blood sugars are above the goal in 1 week or if ketones are positive.     Call/e-mail/MyChart message with questions/concerns.    FOLLOW-UP:  Follow up with diabetes educator in 1 week.    Time Spent: 60 minutes  Encounter type: Individual

## 2018-06-18 NOTE — PROGRESS NOTES
Diabetes Clinic Initial Visit  Date of Service: June 18, 2018    Consulting provider: Princess Hicks, MALACHI CNP  [address] [place] [address]    Reason for consultation: Gestational Diabetes    HPI:   Patient is a 38 year old [place]ag[place] woman with a history of fibromyalgia, NAFLD, iron deficiency anemia, and B12 deficiency who presents for evaluation of gestational diabetes. She has 7 children. She has had GDM x2 (5th and 7th child, 4th child passed prior to delivery for unclear reasons, ?diabetes). She did not have to take any medications. She used a glucometer to check her sugars but doesn't have one now. Hgb A1c was 5.4% in 2/8/2018. Today it is 5.9%. She is currently 32 weeks pregnant.     She eats a diet consisting mostly of breads, pasta, and rice. Fair amount of vegetables. Very little meat - eats some beef and goat. Does not like chicken or fish. She does not exercise because she is tired from caring for her children and her mother who is on dialysis.      5/18/18 --> 100 g GCT  101 fasting, 223 1 hr post, 144 2 hr post, 112 3 hrs post.    Pregnancy has been going fine. She is urinating frequently but drinks a lot of water. She is gaining an appropriate amount of weight with the pregnancy. She had one child that was >8 lbs, but the rest were 7 lbs or so.     She lives in [place] with her family, her mother, and her children.     Review of Systems:  A 10-point ROS is otherwise negative except as noted in HPI or below.     Current Problem List:   Patient Active Problem List   Diagnosis     Mixed incontinence urge and stress (male)(female)     Lactose intolerance     Iron deficiency anemia      Fatty liver disease, nonalcoholic     History of gestational diabetes     Fibromyalgia     Vitamin B12 deficiency (non anemic)     Supervision of high-risk pregnancy of elderly multigravida, [facility]     Low-lying placenta in second trimester     Allergic rhinitis due to dust mite      Chronic seasonal allergic rhinitis due to pollen     Allergic conjunctivitis of both eyes       Past Medical and Past Surgical History:  Past Medical History:   Diagnosis Date     Fatty liver      Fibromyalgia      History of gestational diabetes      Seasonal allergies        Past Surgical History:   Procedure Laterality Date     NO HISTORY OF SURGERY         Medications:   Current Outpatient Prescriptions   Medication Sig Dispense Refill     ascorbic acid (VITAMIN C) 250 MG tablet Take 1 tablet (250 mg) by mouth daily (Patient not taking: Reported on 5/18/2018) 60 tablet 3     cetirizine (ZYRTEC) 10 MG tablet Take 1 tablet (10 mg) by mouth At Bedtime 30 tablet 11     cholecalciferol (VITAMIN D3) 5000 UNITS CAPS capsule Take 1 capsule (5,000 Units) by mouth daily Take one capsule daily. 90 capsule 3     cyanocobalamin (VITAMIN  B-12) 1000 MCG tablet Take 1 tablet (1,000 mcg) by mouth daily 30 tablet 3     ferrous sulfate (IRON) 325 (65 FE) MG tablet Take 1 tablet (325 mg) by mouth daily (with breakfast) 30 tablet 3     fluticasone (FLONASE) 50 MCG/ACT spray Spray 2 sprays into both nostrils daily 1 Bottle 11     ketotifen (ZADITOR/REFRESH ANTI-ITCH) 0.025 % SOLN ophthalmic solution Place 1 drop into both eyes 2 times daily 1 Bottle 11     lanolin ointment Apply topically daily as needed for dry skin 28 g 0     loratadine (CLARITIN) 10 MG tablet Take 1 tablet (10 mg) by mouth daily 30 tablet 11     montelukast (SINGULAIR) 10 MG tablet Take 1 tablet (10 mg) by mouth At Bedtime 30 tablet 11     omeprazole (PRILOSEC) 40 MG capsule Take 1 capsule (40 mg) by mouth daily 30 capsule 5     Prenatal Vit-Fe Fumarate-FA (PRENATAL MULTIVITAMIN PLUS IRON) 27-0.8 MG TABS per tablet Take 1 tablet by mouth daily 100 tablet 3     psyllium (METAMUCIL SMOOTH TEXTURE) 28 % PACK Take 1 Package by mouth daily (Patient not taking: Reported on 2/8/2018) 30 each 11       Allergies:   No Known Allergies    Social History:  Social History  "  Substance Use Topics     Smoking status: Never Smoker     Smokeless tobacco: Never Used     Alcohol use No       Family History:  Family History   Problem Relation Age of Onset     Hypertension Mother      DIABETES Mother      KIDNEY DISEASE Mother      dialysis     Glaucoma Mother      Macular Degeneration No family hx of        Physical Examination:  Blood pressure 121/81, pulse 94, height 1.626 m (5' 4.02\"), weight 99.3 kg (218 lb 14.4 oz), last menstrual period 11/03/2017, not currently breastfeeding.  Body mass index is 37.56 kg/(m^2).  Wt Readings from Last 4 Encounters:   06/14/18 98.4 kg (216 lb 14.4 oz)   05/18/18 98.7 kg (217 lb 9.6 oz)   05/07/18 98.1 kg (216 lb 3.2 oz)   02/08/18 93.6 kg (206 lb 6.4 oz)   GEN: generally, well appearing.  HEENT: EOMI.  NECK: Thyroid non-palpable, non-tender. No cervical or clavicular LAD.   CV: RRR with no murmur.   RESP: CTA bilaterally.   ABD: Pregnant  EXT: No peripheral edema.   SKIN: Normal skin temperature and turgor with no lesions.   NEURO: Non-focal.    Labs and Studies:   A1c 5.9% (Hb 10.7)     Assessment/Plan:  Brit is a 38 year old Indian woman with a history of fibromyalgia, NAFLD, iron deficiency anemia, and B12 deficiency who presents for evaluation of gestational diabetes.     Gestational Diabetes - this is her 3rd pregnancy with gestational diabetes but has not required treatment. She will need some work on diet and exercise. Meals are mostly carbs and she does not walk/exercise following meals. She is meeting with diabetes education today and will be getting a meter. She knows how to use a meter. She is going to return to clinic in 1 week to go over her glucoses and determine if she needs insulin. She was given a log-book to keep track of glucoses. She is going to check fasting and 2 hrs after meals.  Discussed goal of BG control during pregnancy.    Alexey Maza MD   Endocrinology Fellow  Pager: [phone]    ATTENDING NOTE   I have seen and " examined the patient, reviewed and edited the fellow's note, and agree with the plan of care.    Junie Feng MD    Division of Diabetes and Endocrinology  Department of Medicine  [phone]

## 2018-06-18 NOTE — LETTER
6/18/2018       RE: Brit Romano  [address] [id number]     Dear Colleague,    Thank you for referring your patient, Brit Romano, to the [facility] ENDOCRINOLOGY at [facility]. Please see a copy of my visit note below.    Diabetes Clinic Initial Visit  Date of Service: June 18, 2018    Consulting provider: Princess Hicks, APRN CNP  [address] [place] [address]    Reason for consultation: Gestational Diabetes    HPI:   Patient is a 38 year old [place] citizen woman with a history of fibromyalgia, NAFLD, iron deficiency anemia, and B12 deficiency who presents for evaluation of gestational diabetes. She has 7 children. She has had GDM x2 (5th and 7th child, 4th child passed prior to delivery for unclear reasons, ?diabetes). She did not have to take any medications. She used a glucometer to check her sugars but doesn't have one now. Hgb A1c was 5.4% in 2/8/2018. Today it is 5.9%. She is currently 32 weeks pregnant.     She eats a diet consisting mostly of breads, pasta, and rice. Fair amount of vegetables. Very little meat - eats some beef and goat. Does not like chicken or fish. She does not exercise because she is tired from caring for her children and her mother who is on dialysis.      5/18/18 --> 100 g GCT  101 fasting, 223 1 hr post, 144 2 hr post, 112 3 hrs post.    Pregnancy has been going fine. She is urinating frequently but drinks a lot of water. She is gaining an appropriate amount of weight with the pregnancy. She had one child that was >8 lbs, but the rest were 7 lbs or so.     She lives in [place] with her family, her mother, and her children.     Review of Systems:  A 10-point ROS is otherwise negative except as noted in HPI or below.     Current Problem List:   Patient Active Problem List   Diagnosis     Mixed incontinence urge and stress (male)(female)     Lactose intolerance     Iron deficiency anemia       Fatty liver disease, nonalcoholic     History of gestational diabetes     Fibromyalgia     Vitamin B12 deficiency (non anemic)     Supervision of high-risk pregnancy of elderly multigravida, [facility]     Low-lying placenta in second trimester     Allergic rhinitis due to dust mite     Chronic seasonal allergic rhinitis due to pollen     Allergic conjunctivitis of both eyes       Past Medical and Past Surgical History:  Past Medical History:   Diagnosis Date     Fatty liver      Fibromyalgia      History of gestational diabetes      Seasonal allergies        Past Surgical History:   Procedure Laterality Date     NO HISTORY OF SURGERY         Medications:   Current Outpatient Prescriptions   Medication Sig Dispense Refill     ascorbic acid (VITAMIN C) 250 MG tablet Take 1 tablet (250 mg) by mouth daily (Patient not taking: Reported on 5/18/2018) 60 tablet 3     cetirizine (ZYRTEC) 10 MG tablet Take 1 tablet (10 mg) by mouth At Bedtime 30 tablet 11     cholecalciferol (VITAMIN D3) 5000 UNITS CAPS capsule Take 1 capsule (5,000 Units) by mouth daily Take one capsule daily. 90 capsule 3     cyanocobalamin (VITAMIN  B-12) 1000 MCG tablet Take 1 tablet (1,000 mcg) by mouth daily 30 tablet 3     ferrous sulfate (IRON) 325 (65 FE) MG tablet Take 1 tablet (325 mg) by mouth daily (with breakfast) 30 tablet 3     fluticasone (FLONASE) 50 MCG/ACT spray Spray 2 sprays into both nostrils daily 1 Bottle 11     ketotifen (ZADITOR/REFRESH ANTI-ITCH) 0.025 % SOLN ophthalmic solution Place 1 drop into both eyes 2 times daily 1 Bottle 11     lanolin ointment Apply topically daily as needed for dry skin 28 g 0     loratadine (CLARITIN) 10 MG tablet Take 1 tablet (10 mg) by mouth daily 30 tablet 11     montelukast (SINGULAIR) 10 MG tablet Take 1 tablet (10 mg) by mouth At Bedtime 30 tablet 11     omeprazole (PRILOSEC) 40 MG capsule Take 1 capsule (40 mg) by mouth daily 30 capsule 5     Prenatal Vit-Fe Fumarate-FA (PRENATAL MULTIVITAMIN PLUS  "IRON) 27-0.8 MG TABS per tablet Take 1 tablet by mouth daily 100 tablet 3     psyllium (METAMUCIL SMOOTH TEXTURE) 28 % PACK Take 1 Package by mouth daily (Patient not taking: Reported on 2/8/2018) 30 each 11       Allergies:   No Known Allergies    Social History:  Social History   Substance Use Topics     Smoking status: Never Smoker     Smokeless tobacco: Never Used     Alcohol use No       Family History:  Family History   Problem Relation Age of Onset     Hypertension Mother      DIABETES Mother      KIDNEY DISEASE Mother      dialysis     Glaucoma Mother      Macular Degeneration No family hx of        Physical Examination:  Blood pressure 121/81, pulse 94, height 1.626 m (5' 4.02\"), weight 99.3 kg (218 lb 14.4 oz), last menstrual period 11/03/2017, not currently breastfeeding.  Body mass index is 37.56 kg/(m^2).  Wt Readings from Last 4 Encounters:   06/14/18 98.4 kg (216 lb 14.4 oz)   05/18/18 98.7 kg (217 lb 9.6 oz)   05/07/18 98.1 kg (216 lb 3.2 oz)   02/08/18 93.6 kg (206 lb 6.4 oz)   GEN: generally, well appearing.  HEENT: EOMI.  NECK: Thyroid non-palpable, non-tender. No cervical or clavicular LAD.   CV: RRR with no murmur.   RESP: CTA bilaterally.   ABD: Pregnant  EXT: No peripheral edema.   SKIN: Normal skin temperature and turgor with no lesions.   NEURO: Non-focal.    Labs and Studies:   A1c 5.9% (Hb 10.7)     Assessment/Plan:  Brit is a 38 year old American woman with a history of fibromyalgia, NAFLD, iron deficiency anemia, and B12 deficiency who presents for evaluation of gestational diabetes.     Gestational Diabetes - this is her 3rd pregnancy with gestational diabetes but has not required treatment. She will need some work on diet and exercise. Meals are mostly carbs and she does not walk/exercise following meals. She is meeting with diabetes education today and will be getting a meter. She knows how to use a meter. She is going to return to clinic in 1 week to go over her glucoses and determine " if she needs insulin. She was given a log-book to keep track of glucoses. She is going to check fasting and 2 hrs after meals.  Discussed goal of BG control during pregnancy.    Alexey Maza MD   Endocrinology Fellow  Pager: [phone]    ATTENDING NOTE   I have seen and examined the patient, reviewed and edited the fellow's note, and agree with the plan of care.    Junie Feng MD    Division of Diabetes and Endocrinology  Department of Medicine  [phone]

## 2018-06-18 NOTE — MR AVS SNAPSHOT
After Visit Summary   6/18/2018    Brit Romano    MRN: [id number]           Patient Information     Date Of Birth          1980        Visit Information        Provider Department      6/18/2018 2:00 PM Chen Lundberg RN [facility] Diabetes        Today's Diagnoses     Abnormal maternal glucose tolerance, antepartum    -  1      Care Instructions    1. Check blood sugar 4 times a day, before breakfast and 2 hour after the start of each meal.     2. Follow the recommended meal plan: eat something every 2-3 hours, include protein/fat and carbohydrate at every meal and snack, have 2 carbs at breakfast, 3-4 carbs at lunch, 3-4 carbs at supper, 1-2 carbs at 3 snacks per day.     3. Add activity to every day, try walking or being active after each meal to help control blood sugar levels.    4.Call or e-mail educator if 3 or more blood sugars are above goal in 1 week. Call or e-mail with questions or concerns.    Chen Lundberg RN,CDE  [address] [address]  Phone: [phone]  [email or web address]                    Follow-ups after your visit        Your next 10 appointments already scheduled     Jul 02, 2018  4:00 PM CDT   RETURN OB with Jacqueline Hobson MD   [facility] ([facility])    [facility]  [address] [address]  [address] [facility] [address] [phone]            Jul 13, 2018  2:50 PM CDT   (Arrive by 2:35 PM)   RETURN DIABETES with Junie Feng MD   [facility] Endocrinology ([facility])    [address] [place] [address] [phone]              Who to contact     Please call your clinic at [phone] to:    Ask questions about your health    Make or cancel appointments    Discuss your medicines    Learn about your test results    Speak to your doctor            Additional Information About Your Visit        Rosemary  Information     [email or web address] is an electronic gateway that provides easy, online access to your medical records. With [email or web address], you can request a clinic appointment, read your test results, renew a prescription or communicate with your care team.     To sign up for [email or web address] visit the website at [email or web address]   You will be asked to enter the access code listed below, as well as some personal information. Please follow the directions to create your username and password.     Your access code is: [email or web address]  Expires: 2018  3:02 PM     Your access code will  in 90 days. If you need help or a new code, please contact your [facility] or call [phone] for assistance.        Care EveryWhere ID     This is your Care EveryWhere ID. This could be used by other organizations to access your [place] medical records  [id number]        Your Vitals Were     Last Period                   2017            Blood Pressure from Last 3 Encounters:   18 121/81   18 121/83   18 117/81    Weight from Last 3 Encounters:   18 99.3 kg (218 lb 14.4 oz)   18 98.4 kg (216 lb 14.4 oz)   18 98.7 kg (217 lb 9.6 oz)              We Performed the Following     DIABETES EDUCATION - Individual  []          Today's Medication Changes          These changes are accurate as of 18  6:18 PM.  If you have any questions, ask your nurse or doctor.               Start taking these medicines.        Dose/Directions    blood glucose monitoring lancets   Used for:  Abnormal maternal glucose tolerance, antepartum   Started by:  Chen Lundberg, RN        Use to test blood sugar 4 times daily or as directed.  Ok to substitute alternative if insurance prefers.   Quantity:  200 each   Refills:  11       blood glucose monitoring test strip   Commonly known as:  CONTOUR NEXT TEST   Used for:  Abnormal maternal glucose tolerance, antepartum   Started by:  Toño  hCen DIOP RN        Use to test blood sugar 4 times daily or as directed.  Ok to substitute alternative if insurance prefers.   Quantity:  150 strip   Refills:  11            Where to get your medicines      These medications were sent to [id number] - [place], [place] - [address]  [address], [place] [address]     Phone:  [phone]     blood glucose monitoring lancets    blood glucose monitoring test strip                Primary Care Provider Office Phone # Fax #    Princess Hicks, APRN [facility] [phone]       [address] [facility] [address]        Equal Access to Services     KERRI MOLINA : Hadii juan diaz hadasho Soomaali, waaxda luqadaha, qaybta kaalmada adeegyada, pablo snyder . So [facility] [phone].    ATENCIÓN: Si habla español, tiene a smith disposición servicios gratuitos de asistencia lingüística. Llame al [phone].    We comply with applicable federal civil rights laws and [place] laws. We do not discriminate on the basis of race, color, national origin, age, disability, sex, sexual orientation, or gender identity.            Thank you!     Thank you for choosing [facility] DIABETES  for your care. Our goal is always to provide you with excellent care. Hearing back from our patients is one way we can continue to improve our services. Please take a few minutes to complete the written survey that you may receive in the mail after your visit with us. Thank you!             Your Updated Medication List - Protect others around you: Learn how to safely use, store and throw away your medicines at www.disposemymeds.org.          This list is accurate as of 6/18/18  6:18 PM.  Always use your most recent med list.                   Brand Name Dispense Instructions for use Diagnosis    ascorbic acid 250 MG tablet    VITAMIN C    60 tablet    Take 1 tablet (250 mg) by mouth daily     Other iron deficiency anemia       blood glucose monitoring lancets     200 each    Use to test blood sugar 4 times daily or as directed.  Ok to substitute alternative if insurance prefers.    Abnormal maternal glucose tolerance, antepartum       blood glucose monitoring test strip    CONTOUR NEXT TEST    150 strip    Use to test blood sugar 4 times daily or as directed.  Ok to substitute alternative if insurance prefers.    Abnormal maternal glucose tolerance, antepartum       cetirizine 10 MG tablet    zyrTEC    30 tablet    Take 1 tablet (10 mg) by mouth At Bedtime    Allergic rhinitis due to dust mite, Chronic seasonal allergic rhinitis due to pollen, Allergic conjunctivitis of both eyes       cholecalciferol 5000 units Caps capsule    vitamin D3    90 capsule    Take 1 capsule (5,000 Units) by mouth daily Take one capsule daily.    Vitamin D deficiency       cyanocobalamin 1000 MCG tablet    vitamin  B-12    30 tablet    Take 1 tablet (1,000 mcg) by mouth daily    Other iron deficiency anemia       ferrous sulfate 325 (65 Fe) MG tablet    IRON    30 tablet    Take 1 tablet (325 mg) by mouth daily (with breakfast)    Other iron deficiency anemia       fluticasone 50 MCG/ACT spray    FLONASE    1 Bottle    Spray 2 sprays into both nostrils daily    Allergic rhinitis due to dust mite, Chronic seasonal allergic rhinitis due to pollen, Allergic conjunctivitis of both eyes       ketotifen 0.025 % Soln ophthalmic solution    ZADITOR/REFRESH ANTI-ITCH    1 Bottle    Place 1 drop into both eyes 2 times daily    Allergic conjunctivitis of both eyes       lanolin ointment     28 g    Apply topically daily as needed for dry skin    Sore nipple       loratadine 10 MG tablet    CLARITIN    30 tablet    Take 1 tablet (10 mg) by mouth daily    Allergic rhinitis due to dust mite, Chronic seasonal allergic rhinitis due to pollen, Allergic conjunctivitis of both eyes       montelukast 10 MG tablet    SINGULAIR    30 tablet     Take 1 tablet (10 mg) by mouth At Bedtime    Allergic rhinitis due to dust mite, Chronic seasonal allergic rhinitis due to pollen       omeprazole 40 MG capsule    priLOSEC    30 capsule    Take 1 capsule (40 mg) by mouth daily    Abdominal pain, generalized       prenatal multivitamin plus iron 27-0.8 MG Tabs per tablet     100 tablet    Take 1 tablet by mouth daily    Pregnancy examination or test, positive result       psyllium 28 % Pack    METAMUCIL SMOOTH TEXTURE    30 each    Take 1 Package by mouth daily    Irritable bowel syndrome with both constipation and diarrhea

## 2018-06-18 NOTE — PATIENT INSTRUCTIONS
1. Check blood sugar 4 times a day, before breakfast and 2 hour after the start of each meal.     2. Follow the recommended meal plan: eat something every 2-3 hours, include protein/fat and carbohydrate at every meal and snack, have 2 carbs at breakfast, 3-4 carbs at lunch, 3-4 carbs at supper, 1-2 carbs at 3 snacks per day.     3. Add activity to every day, try walking or being active after each meal to help control blood sugar levels.    4.Call or e-mail educator if 3 or more blood sugars are above goal in 1 week. Call or e-mail with questions or concerns.    Chen Lundberg RN,CDE  [address] [place] [address]  Phone: [phone]  [email or web address]

## 2018-06-25 NOTE — TELEPHONE ENCOUNTER
Called patient and informed of denial and will purchase this OTC.     Sammi BUNCH CMA ([facility])

## 2018-06-27 ENCOUNTER — OFFICE VISIT (OUTPATIENT)
Dept: EDUCATION SERVICES | Facility: CLINIC | Age: 38
End: 2018-06-27
Payer: COMMERCIAL

## 2018-06-27 VITALS — WEIGHT: 219 LBS | BODY MASS INDEX: 37.57 KG/M2

## 2018-06-27 DIAGNOSIS — O99.810 ABNORMAL MATERNAL GLUCOSE TOLERANCE, ANTEPARTUM: Primary | ICD-10-CM

## 2018-06-27 DIAGNOSIS — O24.414 INSULIN CONTROLLED GESTATIONAL DIABETES MELLITUS (GDM) IN THIRD TRIMESTER: ICD-10-CM

## 2018-06-27 DIAGNOSIS — O24.414 INSULIN CONTROLLED GESTATIONAL DIABETES MELLITUS (GDM) IN THIRD TRIMESTER: Primary | ICD-10-CM

## 2018-06-27 RX ORDER — INSULIN ASPART 100 [IU]/ML
INJECTION, SOLUTION INTRAVENOUS; SUBCUTANEOUS
Qty: 6 ML | Refills: 1 | Status: SHIPPED | OUTPATIENT
Start: 2018-06-27 | End: 2018-06-27

## 2018-06-27 RX ORDER — INSULIN ASPART 100 [IU]/ML
INJECTION, SOLUTION INTRAVENOUS; SUBCUTANEOUS
Qty: 6 ML | Refills: 1 | Status: SHIPPED | OUTPATIENT
Start: 2018-06-27 | End: 2018-07-05

## 2018-06-27 NOTE — Clinical Note
Jordan Zaman, Can I get insulin orders for this woman?  Per our protocol, I would start NPH 5 units twice a day and 3 units of Humalog before each meal.  She has agreed to send numbers on Monday. Let me know.  I told her I would call her back. Thanks! Chen

## 2018-06-27 NOTE — MR AVS SNAPSHOT
After Visit Summary   6/27/2018    Brit Romano    MRN: [id number]           Patient Information     Date Of Birth          1980        Visit Information        Provider Department      6/27/2018 12:30 PM Chen Lundberg RN [facility] Diabetes        Today's Diagnoses     Abnormal maternal glucose tolerance, antepartum    -  1    Insulin controlled gestational diabetes mellitus (GDM) in third trimester          Care Instructions    1. Check blood sugar 4 times a day, before breakfast and 1 hour after the start of each meal.     2. Follow the recommended meal plan: eat something every 2-3 hours, include protein/fat and carbohydrate at every meal and snack, have 2 carbs at breakfast, 3-4 carbs at lunch, 3-4 carbs at supper, 3-4 carbs at 3 snacks per day.     3.  Add activity to every day, try walking or being active after each meal to help control blood sugar levels.    4.Call or e-mail educator if 3 or more blood sugars are above goal in 1 week. Call or e-mail with questions or concerns.  5.  Start insulin as directed      Chen Lundberg RN,[place] [address]  Phone: [phone]  [email or web address]                  Follow-ups after your visit        Your next 10 appointments already scheduled     Jul 02, 2018  4:00 PM CDT   RETURN OB with Jacqueline Hobson MD   [facility] ([facility])    [facility]  3rd Flr,[facility] [address]  [address]  [facility] [id number] [phone]            Jul 13, 2018  2:50 PM CDT   (Arrive by 2:35 PM)   RETURN DIABETES with Junie Feng MD   [facility] Endocrinology ([facility])    [address] [place] [address] [phone]              Who to contact     Please call your clinic at [phone] to:    Ask questions about your health    Make or cancel appointments    Discuss your medicines    Learn about  your test results    Speak to your doctor            Additional Information About Your Visit        Care EveryWhere ID     This is your Care EveryWhere ID. This could be used by other organizations to access your [place] medical records  [id number]        Your Vitals Were     Last Period BMI (Body Mass Index)                11/03/2017 37.57 kg/m2           Blood Pressure from Last 3 Encounters:   06/18/18 121/81   06/14/18 121/83   05/18/18 117/81    Weight from Last 3 Encounters:   06/27/18 99.3 kg (219 lb)   06/18/18 99.3 kg (218 lb 14.4 oz)   06/14/18 98.4 kg (216 lb 14.4 oz)              We Performed the Following     DIABETES EDUCATION - Individual  []     DIABETES EDUCATION - Individual  []          Today's Medication Changes          These changes are accurate as of 6/27/18  6:54 PM.  If you have any questions, ask your nurse or doctor.               Start taking these medicines.        Dose/Directions    * insulin  UNIT/ML injection   Commonly known as:  HumuLIN N/NovoLIN N   Used for:  Insulin controlled gestational diabetes mellitus (GDM) in third trimester, Abnormal maternal glucose tolerance, antepartum   Started by:  Chen Lundberg RN        Inject 5 units in AM and 5 units PM   Quantity:  10 mL   Refills:  1       * insulin isophane human 100 UNIT/ML injection   Commonly known as:  HumuLIN N PEN   Used for:  Insulin controlled gestational diabetes mellitus (GDM) in third trimester   Started by:  Chen Lundberg RN        Take 5 units in the morning and 5 units at bedtime   Quantity:  15 mL   Refills:  11       NovoLOG FLEXPEN 100 UNIT/ML injection   Used for:  Insulin controlled gestational diabetes mellitus (GDM) in third trimester, Abnormal maternal glucose tolerance, antepartum   Generic drug:  insulin aspart   Started by:  Chen Lundberg RN        3 units before each meal   Quantity:  6 mL   Refills:  1       * Notice:  This list has 2 medication(s) that are the same as  other medications prescribed for you. Read the directions carefully, and ask your doctor or other care provider to review them with you.         Where to get your medicines      These medications were sent to [id number] - [place], [place] - [address] AT [facility] [address]  [address], [place] [address]     Phone:  [phone]     insulin isophane human 100 UNIT/ML injection    insulin  UNIT/ML injection    NovoLOG FLEXPEN 100 UNIT/ML injection                Primary Care Provider Office Phone # Fax #    Princess Hicks, APRN [phone]       [address] [facility]  [facility] [address]        Equal Access to Services     KERRI MOLINA : Hadii aad ku hadasho Soomaali, waaxda luqadaha, qaybta kaalmada adeegyada, waxay idiin hayestefanin jax snyder . So [facility] [phone].    ATENCIÓN: Si habla español, tiene a smith disposición servicios gratuitos de asistencia lingüística. [facility] [phone].    We comply with applicable federal civil rights laws and [place] laws. We do not discriminate on the basis of race, color, national origin, age, disability, sex, sexual orientation, or gender identity.            Thank you!     Thank you for choosing [facility] DIABETES  for your care. Our goal is always to provide you with excellent care. Hearing back from our patients is one way we can continue to improve our services. Please take a few minutes to complete the written survey that you may receive in the mail after your visit with us. Thank you!             Your Updated Medication List - Protect others around you: Learn how to safely use, store and throw away your medicines at www.disposemymeds.org.          This list is accurate as of 6/27/18  6:54 PM.  Always use your most recent med list.                   Brand Name Dispense Instructions for use Diagnosis    ascorbic acid 250 MG tablet    VITAMIN C    60 tablet    Take 1 tablet  (250 mg) by mouth daily    Other iron deficiency anemia       blood glucose monitoring lancets     200 each    Use to test blood sugar 4 times daily or as directed.  Ok to substitute alternative if insurance prefers.    Abnormal maternal glucose tolerance, antepartum       blood glucose monitoring test strip    CONTOUR NEXT TEST    150 strip    Use to test blood sugar 4 times daily or as directed.  Ok to substitute alternative if insurance prefers.    Abnormal maternal glucose tolerance, antepartum       cetirizine 10 MG tablet    zyrTEC    30 tablet    Take 1 tablet (10 mg) by mouth At Bedtime    Allergic rhinitis due to dust mite, Chronic seasonal allergic rhinitis due to pollen, Allergic conjunctivitis of both eyes       cholecalciferol 5000 units Caps capsule    vitamin D3    90 capsule    Take 1 capsule (5,000 Units) by mouth daily Take one capsule daily.    Vitamin D deficiency       cyanocobalamin 1000 MCG tablet    vitamin  B-12    30 tablet    Take 1 tablet (1,000 mcg) by mouth daily    Other iron deficiency anemia       ferrous sulfate 325 (65 Fe) MG tablet    IRON    30 tablet    Take 1 tablet (325 mg) by mouth daily (with breakfast)    Other iron deficiency anemia       fluticasone 50 MCG/ACT spray    FLONASE    1 Bottle    Spray 2 sprays into both nostrils daily    Allergic rhinitis due to dust mite, Chronic seasonal allergic rhinitis due to pollen, Allergic conjunctivitis of both eyes       * insulin  UNIT/ML injection    HumuLIN N/NovoLIN N    10 mL    Inject 5 units in AM and 5 units PM    Insulin controlled gestational diabetes mellitus (GDM) in third trimester, Abnormal maternal glucose tolerance, antepartum       * insulin isophane human 100 UNIT/ML injection    HumuLIN N PEN    15 mL    Take 5 units in the morning and 5 units at bedtime    Insulin controlled gestational diabetes mellitus (GDM) in third trimester       ketotifen 0.025 % Soln ophthalmic solution    ZADITOR/REFRESH ANTI-ITCH     1 Bottle    Place 1 drop into both eyes 2 times daily    Allergic conjunctivitis of both eyes       lanolin ointment     28 g    Apply topically daily as needed for dry skin    Sore nipple       loratadine 10 MG tablet    CLARITIN    30 tablet    Take 1 tablet (10 mg) by mouth daily    Allergic rhinitis due to dust mite, Chronic seasonal allergic rhinitis due to pollen, Allergic conjunctivitis of both eyes       montelukast 10 MG tablet    SINGULAIR    30 tablet    Take 1 tablet (10 mg) by mouth At Bedtime    Allergic rhinitis due to dust mite, Chronic seasonal allergic rhinitis due to pollen       NovoLOG FLEXPEN 100 UNIT/ML injection   Generic drug:  insulin aspart     6 mL    3 units before each meal    Insulin controlled gestational diabetes mellitus (GDM) in third trimester, Abnormal maternal glucose tolerance, antepartum       omeprazole 40 MG capsule    priLOSEC    30 capsule    Take 1 capsule (40 mg) by mouth daily    Abdominal pain, generalized       prenatal multivitamin plus iron 27-0.8 MG Tabs per tablet     100 tablet    Take 1 tablet by mouth daily    Pregnancy examination or test, positive result       psyllium 28 % Pack    METAMUCIL SMOOTH TEXTURE    30 each    Take 1 Package by mouth daily    Irritable bowel syndrome with both constipation and diarrhea       * Notice:  This list has 2 medication(s) that are the same as other medications prescribed for you. Read the directions carefully, and ask your doctor or other care provider to review them with you.

## 2018-06-27 NOTE — Clinical Note
Jordan Baca, Could I get insulin orders for this woman from you?  Junie is out today.  Per our protocol, NPH 5 units morning and hs and Humalog/Novolog 3 units before each meal.  She has agreed to send numbers Monday. Let me know. Thanks! Chen

## 2018-06-27 NOTE — PATIENT INSTRUCTIONS
1. Check blood sugar 4 times a day, before breakfast and 1 hour after the start of each meal.     2. Follow the recommended meal plan: eat something every 2-3 hours, include protein/fat and carbohydrate at every meal and snack, have 2 carbs at breakfast, 3-4 carbs at lunch, 3-4 carbs at supper, 3-4 carbs at 3 snacks per day.     3.  Add activity to every day, try walking or being active after each meal to help control blood sugar levels.    4.Call or e-mail educator if 3 or more blood sugars are above goal in 1 week. Call or e-mail with questions or concerns.  5.  Start insulin as directed      Chen Lundberg RN,CDE  [address] [place] [address]  Phone: [phone]  [email or web address]

## 2018-06-27 NOTE — PROGRESS NOTES
"Gestational Diabetes Follow-up Visit    SUBJECTIVE/OBJECTIVE:  Brit Romano presents today for education and evaluation of glucose control related to gestational diabetes    She is accompanied by son    Patient's gestational diabetes management related comments/concerns: blood sugar too high    LMP 11/03/2017    Weight 213 .lbs    Blood Glucose/Ketone Log:        Current gestational diabetes management:    Taking medications for gestational diabetes? No    Physical Activity: no regular exercise program    Nutrition:  Patient eats 3 meals and 0-1 snacks per day.    ASSESSMENT:  Fasting blood glucoses: 0% in target.  After breakfast: 0% in target.  After lunch: 0% in target.  After dinner: 0% in target.    Health Beliefs and Attitudes:   Stage of Change: ACTION (Actively working towards change)      Educational topics covered today:  Explained some of the differences between insulins.  Explained the action and timing of NPH and Novolog/Humalog insulin.  Demonstrated applying pen needle, dialing up the 2 unit \"airshot\", dialing up dose to be given, injecting insulin and instructed to hold in the skin for 10 seconds after pushing in dose.   Discussed safe and legal disposal of needles.   Discussed the storage and disposal of insulin pens, including \"good to\" date.   Discussed the recognition and treatment of hypoglycemia, including carrying meter and fast-acting form of carbohydrate at all times.   Discussed plan for follow up.   Discussed goals for BG's and probable need for adjustment of insulin.      Discussed insulin administration using a vial and syringe in case insurance does not cover pens      Educational Materials provided today:  Hypoglycemia Prevention and treatment    PLAN:  Humulin N 5 units in the morning and 5 units at bedtime  Humalog 3 units before each meal  Check glucose 4 times daily.  See Desiree Lopez RD,CDE asap--left message with Desiree  Continue with recommended physical activity.  Send numbers " to me on Monday 7/2/18      Call/e-mail/MyChart message diabetes educator if 3 or more blood sugars are above the goal in 1 week or if ketones are positive.     FOLLOW-UP:  Call or e-mail educator if 3 or more blood sugars are above goal in 1 week.  Call or e-mail with blood sugars 7/2/18  Phone call to pharmacy to see if insulin pens are covered  They say Novolog pen is covered but Novolin N/Humulin N is not --left message for the PA team to work on.    Call/e-mail/MyChart message diabetes educator if 3 or more blood sugars are above the goal in 1 week or if ketones are positive.     Time spent was 60 minutes  Encounter type: Individual    Any diabetes medication dose changes were made via the CDE Protocol and Collaborative Practice Agreement with the patient's referring provider. A copy of this encounter was shared with the provider.

## 2018-06-28 ENCOUNTER — TELEPHONE (OUTPATIENT)
Dept: EDUCATION SERVICES | Facility: CLINIC | Age: 38
End: 2018-06-28

## 2018-06-28 ENCOUNTER — TELEPHONE (OUTPATIENT)
Dept: FAMILY MEDICINE | Facility: CLINIC | Age: 38
End: 2018-06-28

## 2018-06-28 DIAGNOSIS — O24.419 GESTATIONAL DIABETES MELLITUS, ANTEPARTUM: Primary | ICD-10-CM

## 2018-06-28 NOTE — TELEPHONE ENCOUNTER
[facility] calling to verify if  Is wanting patient to use both Humulin Kwikpens and vials? Novolin has already been dispensed. Routing to nurse.

## 2018-06-28 NOTE — TELEPHONE ENCOUNTER
Central Prior Authorization Team   Phone: [phone]      PA Initiation    Medication: insulin NPH (HUMULIN N/NOVOLIN N) 100 UNIT/ML injection-PA initiated  Insurance Company: [employer] PMA - Phone [phone] Fax [phone]  Pharmacy Filling the Rx: [id number] - [place], [place] - [address] AT [facility] [address] [facility]  Filling Pharmacy Phone: [phone]  Filling Pharmacy Fax:    Start Date: 6/28/2018

## 2018-06-29 NOTE — TELEPHONE ENCOUNTER
PRIOR AUTHORIZATION DENIED    Medication: insulin NPH (HUMULIN N/NOVOLIN N) 100 UNIT/ML injection-PA Denied    Denial Date: 6/29/2018    Denial Rational:        Appeal Information:

## 2018-07-02 ENCOUNTER — TELEPHONE (OUTPATIENT)
Dept: OBGYN | Facility: CLINIC | Age: 38
End: 2018-07-02

## 2018-07-02 ENCOUNTER — OFFICE VISIT (OUTPATIENT)
Dept: OBGYN | Facility: CLINIC | Age: 38
End: 2018-07-02
Attending: OBSTETRICS & GYNECOLOGY
Payer: COMMERCIAL

## 2018-07-02 VITALS
DIASTOLIC BLOOD PRESSURE: 78 MMHG | SYSTOLIC BLOOD PRESSURE: 117 MMHG | BODY MASS INDEX: 37.3 KG/M2 | HEART RATE: 111 BPM | WEIGHT: 217.4 LBS

## 2018-07-02 DIAGNOSIS — O09.529 SUPERVISION OF HIGH-RISK PREGNANCY OF ELDERLY MULTIGRAVIDA: Primary | ICD-10-CM

## 2018-07-02 DIAGNOSIS — O24.414 INSULIN CONTROLLED GESTATIONAL DIABETES MELLITUS (GDM) IN THIRD TRIMESTER: Primary | ICD-10-CM

## 2018-07-02 NOTE — LETTER
2018       RE: Brit Romano  [address] [facility] [address]     Dear Colleague,    Thank you for referring your patient, Brit Romano, to the [facility] at [facility]. Please see a copy of my visit note below.    Doing well. Has started checking BS and has now been on insulin x 4 days. Using 3 units of insulin w/ each meal.  Is going to send values to endo later this week.    Vitals:    18 1610   BP: 117/78   Pulse: 111   Weight: 98.6 kg (217 lb 6.4 oz)     See flow    BS values are improving on insulin.  Fewer PP values elevated. All 4 fasting levels over 100 and above goal.      A/p: 37 yo  at 34+3 wks for CABRERA visit    1. PNC: GBS at next visit.  2. GDMA2. On insulin. Adjusting per endo.  Will likely need HS long acting insulin to help with AM fastings.  u/s testing ordered for 2x/week w/ growth u/s.    3. Anemia: on iron. Recheck hgb at 38 wks  4. F/u later this week for u/s and in 2 wks for CABRERA Hobson MD, FACOG  [facility]  OB/GYN    2018  7:24 PM

## 2018-07-02 NOTE — TELEPHONE ENCOUNTER
I spoke with [facility] pharmacy  who refused to take the Irene  Voucher for NPH last week. They ran  It today for a box of 5 pens free. The voucher is good once in a lifetime no matter what insurance they have. He will inform the other pharmacist of this .

## 2018-07-02 NOTE — MR AVS SNAPSHOT
After Visit Summary   7/2/2018    Brit Romano    MRN: [id number]           Patient Information     Date Of Birth          1980        Visit Information        Provider Department      7/2/2018 4:00 PM Jacqueline Hobson MD [facility]        Today's Diagnoses     Insulin controlled gestational diabetes mellitus (GDM) in third trimester    -  1       Follow-ups after your visit        Follow-up notes from your care team     Return in about 1 week (around 7/9/2018) for ultrasound.      Your next 10 appointments already scheduled     Jul 13, 2018  2:50 PM CDT   (Arrive by 2:35 PM)   RETURN DIABETES with Junie Feng MD   [facility] Endocrinology ([facility] [place])    [address]  3rd Floor  [place] [address] [phone]            Jul 16, 2018  2:45 PM CDT   RETURN OB with Diann Lyon MD   [facility] ([facility])    [place] [facility] [address]  [address]  [address]  [place] [id number] [phone]            Jul 20, 2018  3:30 PM CDT   [facility]   [facility] ([facility])    [place] [facility]  [address]  [address]  [facility] [id number] [phone]              Future tests that were ordered for you today     Open Future Orders        Priority Expected Expires Ordered    MAT FETAL MED CTR REFERRAL-PREGNANCY Routine  12/29/2018 7/2/2018    Growth Ultrasound 10957 Routine  10/30/2018 7/2/2018    BPP (Single) w/out NST (In Clinic) Routine  10/30/2018 7/2/2018            Who to contact     Please call your clinic at [phone] to:    Ask questions about your health    Make or cancel appointments    Discuss your medicines    Learn about your test results    Speak to your doctor            Additional Information About Your Visit        Care EveryWhere ID     This is your Care EveryWhere  ID. This could be used by other organizations to access your [place] medical records  [id number]        Your Vitals Were     Pulse Last Period BMI (Body Mass Index)             111 11/03/2017 37.3 kg/m2          Blood Pressure from Last 3 Encounters:   07/02/18 117/78   06/18/18 121/81   06/14/18 121/83    Weight from Last 3 Encounters:   07/02/18 98.6 kg (217 lb 6.4 oz)   06/27/18 99.3 kg (219 lb)   06/18/18 99.3 kg (218 lb 14.4 oz)               Primary Care Provider Office Phone # Fax #    Princess Hicks, APRN [facility] [phone]       [address] [facility] [address]        Equal Access to Services     KERRI MOLINA : Nasrin moraleso Soomaali, waaxda luqadaha, qaybta kaalmada adeegyada, pablo snyder . So [facility] [phone].    ATENCIÓN: Si habla español, tiene a smith disposición servicios gratuitos de asistencia lingüística. Llame al [phone].    We comply with applicable federal civil rights laws and [place] laws. We do not discriminate on the basis of race, color, national origin, age, disability, sex, sexual orientation, or gender identity.            Thank you!     Thank you for choosing [facility]  for your care. Our goal is always to provide you with excellent care. Hearing back from our patients is one way we can continue to improve our services. Please take a few minutes to complete the written survey that you may receive in the mail after your visit with us. Thank you!             Your Updated Medication List - Protect others around you: Learn how to safely use, store and throw away your medicines at www.disposemymeds.org.          This list is accurate as of 7/2/18  7:24 PM.  Always use your most recent med list.                   Brand Name Dispense Instructions for use Diagnosis    ascorbic acid 250 MG tablet    VITAMIN C    60 tablet    Take 1 tablet (250 mg) by mouth daily    Other iron deficiency anemia        blood glucose monitoring lancets     200 each    Use to test blood sugar 4 times daily or as directed.  Ok to substitute alternative if insurance prefers.    Abnormal maternal glucose tolerance, antepartum       blood glucose monitoring test strip    CONTOUR NEXT TEST    150 strip    Use to test blood sugar 4 times daily or as directed.  Ok to substitute alternative if insurance prefers.    Abnormal maternal glucose tolerance, antepartum       cetirizine 10 MG tablet    zyrTEC    30 tablet    Take 1 tablet (10 mg) by mouth At Bedtime    Allergic rhinitis due to dust mite, Chronic seasonal allergic rhinitis due to pollen, Allergic conjunctivitis of both eyes       cholecalciferol 5000 units Caps capsule    vitamin D3    90 capsule    Take 1 capsule (5,000 Units) by mouth daily Take one capsule daily.    Vitamin D deficiency       cyanocobalamin 1000 MCG tablet    vitamin  B-12    30 tablet    Take 1 tablet (1,000 mcg) by mouth daily    Other iron deficiency anemia       ferrous sulfate 325 (65 Fe) MG tablet    IRON    30 tablet    Take 1 tablet (325 mg) by mouth daily (with breakfast)    Other iron deficiency anemia       fluticasone 50 MCG/ACT spray    FLONASE    1 Bottle    Spray 2 sprays into both nostrils daily    Allergic rhinitis due to dust mite, Chronic seasonal allergic rhinitis due to pollen, Allergic conjunctivitis of both eyes       * insulin  UNIT/ML injection    HumuLIN N/NovoLIN N    10 mL    Inject 5 units in AM and 5 units PM    Insulin controlled gestational diabetes mellitus (GDM) in third trimester, Abnormal maternal glucose tolerance, antepartum       * insulin isophane human 100 UNIT/ML injection    HumuLIN N PEN    15 mL    Take 5 units in the morning and 5 units at bedtime    Insulin controlled gestational diabetes mellitus (GDM) in third trimester       insulin pen needle 31G X 6 MM    ULTICARE MINI    200 each    Use 5 daily or as directed.    Gestational diabetes mellitus, antepartum        ketotifen 0.025 % Soln ophthalmic solution    ZADITOR/REFRESH ANTI-ITCH    1 Bottle    Place 1 drop into both eyes 2 times daily    Allergic conjunctivitis of both eyes       lanolin ointment     28 g    Apply topically daily as needed for dry skin    Sore nipple       loratadine 10 MG tablet    CLARITIN    30 tablet    Take 1 tablet (10 mg) by mouth daily    Allergic rhinitis due to dust mite, Chronic seasonal allergic rhinitis due to pollen, Allergic conjunctivitis of both eyes       montelukast 10 MG tablet    SINGULAIR    30 tablet    Take 1 tablet (10 mg) by mouth At Bedtime    Allergic rhinitis due to dust mite, Chronic seasonal allergic rhinitis due to pollen       NovoLOG FLEXPEN 100 UNIT/ML injection   Generic drug:  insulin aspart     6 mL    3 units before each meal    Insulin controlled gestational diabetes mellitus (GDM) in third trimester, Abnormal maternal glucose tolerance, antepartum       omeprazole 40 MG capsule    priLOSEC    30 capsule    Take 1 capsule (40 mg) by mouth daily    Abdominal pain, generalized       prenatal multivitamin plus iron 27-0.8 MG Tabs per tablet     100 tablet    Take 1 tablet by mouth daily    Pregnancy examination or test, positive result       psyllium 28 % Pack    METAMUCIL SMOOTH TEXTURE    30 each    Take 1 Package by mouth daily    Irritable bowel syndrome with both constipation and diarrhea       * Notice:  This list has 2 medication(s) that are the same as other medications prescribed for you. Read the directions carefully, and ask your doctor or other care provider to review them with you.

## 2018-07-03 NOTE — PROGRESS NOTES
Doing well. Has started checking BS and has now been on insulin x 4 days. Using 3 units of insulin w/ each meal.  Is going to send values to endo later this week.    Vitals:    18 1610   BP: 117/78   Pulse: 111   Weight: 98.6 kg (217 lb 6.4 oz)     See flow    BS values are improving on insulin.  Fewer PP values elevated. All 4 fasting levels over 100 and above goal.      A/p: 37 yo  at 34+3 wks for CABRERA visit    1. PNC: GBS at next visit.  2. GDMA2. On insulin. Adjusting per endo.  Will likely need HS long acting insulin to help with AM fastings.  u/s testing ordered for 2x/week w/ growth u/s.    3. Anemia: on iron. Recheck hgb at 38 wks  4. F/u later this week for u/s and in 2 wks for CABRERA Hobson MD, FACOG  [facility]  OB/GYN    2018  7:24 PM

## 2018-07-05 ENCOUNTER — TELEPHONE (OUTPATIENT)
Dept: EDUCATION SERVICES | Facility: CLINIC | Age: 38
End: 2018-07-05

## 2018-07-05 DIAGNOSIS — O99.810 ABNORMAL MATERNAL GLUCOSE TOLERANCE, ANTEPARTUM: ICD-10-CM

## 2018-07-05 DIAGNOSIS — O24.414 INSULIN CONTROLLED GESTATIONAL DIABETES MELLITUS (GDM) IN THIRD TRIMESTER: ICD-10-CM

## 2018-07-05 RX ORDER — INSULIN ASPART 100 [IU]/ML
INJECTION, SOLUTION INTRAVENOUS; SUBCUTANEOUS
Qty: 6 ML | Refills: 1 | Status: ON HOLD | COMMUNITY
Start: 2018-07-05 | End: 2018-08-04

## 2018-07-05 NOTE — TELEPHONE ENCOUNTER
Gestational Diabetes Follow-up    Subjective/Objective:    Brit Romano sent in blood glucose log for review. Last date of communication was: 6/28/17    Gestational diabetes is being managed with diet, activity and NPH 5 morning and 5 evening, Novolog 3 units before each meal    Taking diabetes medications:   yes:     Diabetes Medication(s)     Insulin Sig    insulin isophane human (HUMULIN N PEN) 100 UNIT/ML injection Take 5 units in the morning and 5 units at bedtime    insulin NPH (HUMULIN N/NOVOLIN N) 100 UNIT/ML injection Inject 5 units in AM and 5 units PM    NOVOLOG FLEXPEN 100 UNIT/ML soln 3 units before each meal          Estimated Date of Delivery: Aug 10, 2018    BG/Food Log:       Assessment:    Fasting blood glucoses: 0% in target.  After breakfast: 20% in target.  After lunch: 20% in target.  After dinner:60% in target.    Plan/Response:  Increase NPH to 7 units before breakfast and 8 units before bed.  Increase Novolog to 4 units before breakfast and lunch  Meal Plan Recommendation: 2 carbs at breakfast, 3-4 carbs at lunch, 3-4 carbs at supper, 1-2 carbs at each of 3 snacks between meals  Exercise / activity plan: walk for 5-15 minutes after meals  Continue to check BG 4 times daily (fasting and two hour(s) after each meal).  Follow-up on Monday 7/9/2018.    Michael Perea,  Thanks for sending these over!  They do need some attention.      Please increase your NPH to 7 units in the morning and 8 units before bed.  Please increase the Novolog to 4 units before each meal.  Please send numbers on Monday 7/9.      Have a good weekend!  Chen    Any diabetes medication dose changes were made via the CDE Protocol and Collaborative Practice Agreement with the patient's referring provider. A copy of this encounter was shared with the provider.

## 2018-07-10 ENCOUNTER — TELEPHONE (OUTPATIENT)
Dept: EDUCATION SERVICES | Facility: CLINIC | Age: 38
End: 2018-07-10

## 2018-07-10 ENCOUNTER — TELEPHONE (OUTPATIENT)
Dept: ENDOCRINOLOGY | Facility: CLINIC | Age: 38
End: 2018-07-10

## 2018-07-10 NOTE — TELEPHONE ENCOUNTER
[facility] Call Center    Phone Message    May a detailed message be left on voicemail: no    Reason for Call: Other: PT had to cancel her appt. with Dr. Zaman for gestational diabetes and is wondering if she can be seen sooner.  The next available I could pull up was in September.  Please follow up with the PT.     Action Taken: Message routed to:  [facility] ([facility] Juan

## 2018-07-11 NOTE — TELEPHONE ENCOUNTER
Gestational Diabetes Follow-up    Subjective/Objective:    Brit Romano sent in blood glucose log for review. Last date of communication was: 2018    Gestational diabetes is being managed with NPH 7 units morning and 8 at hs, Novolog 4 units before meals    Taking diabetes medications: yes    Estimated Date of Delivery: Aug 10, 2018    BG/Food Log: BS log  to 7/10      Fastin,103,97,121  2 hr post breakfast: 80,168, 126  2 hr post lunch:113,135,160  2 hr post dinner: 149, 130, 124    Assessment:  Fasting blood glucoses: 0% in target.  After breakfast: 33% in target.  After lunch: 33% in target.  After dinner: 0% in target.    Plan/Response:  1.  Increase NPH to 9 at bedtime, leave morning dose the same  2.  Increase Novolog to 5 units before dinner  3.  Send numbers to Desiree Jessica Monday,   4.  Follow up with endo as scheduled    Chen Lundberg RN,CDE          Any diabetes medication dose changes were made via the CDE Protocol and Collaborative Practice Agreement with the patient's referring provider. A copy of this encounter was shared with the provider.

## 2018-07-16 ENCOUNTER — OFFICE VISIT (OUTPATIENT)
Dept: MATERNAL FETAL MEDICINE | Facility: CLINIC | Age: 38
End: 2018-07-16
Attending: OBSTETRICS & GYNECOLOGY
Payer: COMMERCIAL

## 2018-07-16 ENCOUNTER — OFFICE VISIT (OUTPATIENT)
Dept: OBGYN | Facility: CLINIC | Age: 38
End: 2018-07-16
Attending: OBSTETRICS & GYNECOLOGY
Payer: COMMERCIAL

## 2018-07-16 ENCOUNTER — HOSPITAL ENCOUNTER (OUTPATIENT)
Dept: ULTRASOUND IMAGING | Facility: CLINIC | Age: 38
Discharge: HOME | End: 2018-07-16
Attending: OBSTETRICS & GYNECOLOGY | Admitting: OBSTETRICS & GYNECOLOGY
Payer: COMMERCIAL

## 2018-07-16 VITALS
SYSTOLIC BLOOD PRESSURE: 116 MMHG | BODY MASS INDEX: 37.37 KG/M2 | HEIGHT: 64 IN | DIASTOLIC BLOOD PRESSURE: 81 MMHG | HEART RATE: 89 BPM | WEIGHT: 218.9 LBS

## 2018-07-16 DIAGNOSIS — O09.529 SUPERVISION OF HIGH-RISK PREGNANCY OF ELDERLY MULTIGRAVIDA: Primary | ICD-10-CM

## 2018-07-16 DIAGNOSIS — O24.414 INSULIN CONTROLLED GESTATIONAL DIABETES MELLITUS (GDM) IN THIRD TRIMESTER: ICD-10-CM

## 2018-07-16 DIAGNOSIS — O24.414 INSULIN CONTROLLED GESTATIONAL DIABETES MELLITUS (GDM) IN THIRD TRIMESTER: Primary | ICD-10-CM

## 2018-07-16 DIAGNOSIS — O26.90 PREGNANCY RELATED CONDITION, UNSPECIFIED TRIMESTER: ICD-10-CM

## 2018-07-16 DIAGNOSIS — R30.0 DYSURIA: ICD-10-CM

## 2018-07-16 LAB
ALBUMIN UR-MCNC: NEGATIVE MG/DL
APPEARANCE UR: ABNORMAL
BACTERIA #/AREA URNS HPF: ABNORMAL /HPF
BILIRUB UR QL STRIP: NEGATIVE
COLOR UR AUTO: ABNORMAL
GLUCOSE UR STRIP-MCNC: 30 MG/DL
HGB UR QL STRIP: NEGATIVE
KETONES UR STRIP-MCNC: NEGATIVE MG/DL
LEUKOCYTE ESTERASE UR QL STRIP: NEGATIVE
NITRATE UR QL: NEGATIVE
PH UR STRIP: 6.5 PH (ref 5–7)
RBC #/AREA URNS AUTO: 1 /HPF (ref 0–2)
SOURCE: ABNORMAL
SP GR UR STRIP: 1 (ref 1–1.03)
SQUAMOUS #/AREA URNS AUTO: 18 /HPF (ref 0–1)
UROBILINOGEN UR STRIP-MCNC: NORMAL MG/DL (ref 0–2)
WBC #/AREA URNS AUTO: 5 /HPF (ref 0–5)

## 2018-07-16 PROCEDURE — 81001 URINALYSIS AUTO W/SCOPE: CPT | Performed by: OBSTETRICS & GYNECOLOGY

## 2018-07-16 PROCEDURE — 87086 URINE CULTURE/COLONY COUNT: CPT | Performed by: OBSTETRICS & GYNECOLOGY

## 2018-07-16 PROCEDURE — 76819 FETAL BIOPHYS PROFIL W/O NST: CPT

## 2018-07-16 PROCEDURE — 87653 STREP B DNA AMP PROBE: CPT | Performed by: OBSTETRICS & GYNECOLOGY

## 2018-07-16 PROCEDURE — G0463 HOSPITAL OUTPT CLINIC VISIT: HCPCS

## 2018-07-16 NOTE — MR AVS SNAPSHOT
After Visit Summary   7/16/2018    Brit Romano    MRN: [id number]           Patient Information     Date Of Birth          1980        Visit Information        Provider Department      7/16/2018 2:00 PM Carolina Aranda DO [facility] Maternal Fetal Medicine [facility]        Today's Diagnoses     Insulin controlled gestational diabetes mellitus (GDM) in third trimester    -  1       Follow-ups after your visit        Your next 10 appointments already scheduled     Jul 23, 2018  3:00 PM CDT   ULTRASOUND with [facility] ULTRASOUND   [facility] ([facility])    [place] [facility] [address]  [address]  [address]  [facility] [address] [phone]            Aug 03, 2018  3:20 PM CDT   (Arrive by 3:05 PM)   RETURN DIABETES with Junie Feng MD   [facility] Endocrinology ([facility])    [address]  3rd [address]  [facility] [id number] [phone]              Future tests that were ordered for you today     Open Future Orders        Priority Expected Expires Ordered    BPP (Single) w/out NST Routine  7/16/2019 7/16/2018            Who to contact     If you have questions or need follow up information about today's clinic visit or your schedule please contact Health system MATERNAL FETAL MEDICINE [facility] directly at [phone].  Normal or non-critical lab and imaging results will be communicated to you by MyChart, letter or phone within 4 business days after the clinic has received the results. If you do not hear from us within 7 days, please contact the clinic through MyChart or phone. If you have a critical or abnormal lab result, we will notify you by phone as soon as possible.  Submit refill requests through [employer] or call your pharmacy and they will forward the refill request to us. Please allow 3 business days for your refill to be completed.          Additional Information About Your Visit         Care EveryWhere ID     This is your Care EveryWhere ID. This could be used by other organizations to access your [place] medical records  [id number]        Your Vitals Were     Last Period                   11/03/2017            Blood Pressure from Last 3 Encounters:   07/16/18 116/81   07/02/18 117/78   06/18/18 121/81    Weight from Last 3 Encounters:   07/16/18 99.3 kg (218 lb 14.4 oz)   07/02/18 98.6 kg (217 lb 6.4 oz)   06/27/18 99.3 kg (219 lb)              Today, you had the following     No orders found for display       Primary Care Provider Office Phone # Fax #    Princess Knutson Lawrence Wang, MALACHI [facility] [phone]       [address]  [facility] [place] [address]        Equal Access to Services     [facility]: Hadii juan diaz hadasho Soomaali, waaxda luqadaha, qaybta kaalmada adeegyada, pablo snyder . So [facility] [phone].    ATENCIÓN: Si habla español, tiene a smith disposición servicios gratuitos de asistencia lingüística. Jasmin al [phone].    We comply with applicable federal civil rights laws and [place] laws. We do not discriminate on the basis of race, color, national origin, age, disability, sex, sexual orientation, or gender identity.            Thank you!     Thank you for choosing [facility]  for your care. Our goal is always to provide you with excellent care. Hearing back from our patients is one way we can continue to improve our services. Please take a few minutes to complete the written survey that you may receive in the mail after your visit with us. Thank you!             Your Updated Medication List - Protect others around you: Learn how to safely use, store and throw away your medicines at www.disposemymeds.org.          This list is accurate as of 7/16/18  2:50 PM.  Always use your most recent med list.                   Brand Name Dispense Instructions for use Diagnosis    ascorbic acid 250 MG tablet    VITAMIN  C    60 tablet    Take 1 tablet (250 mg) by mouth daily    Other iron deficiency anemia       blood glucose monitoring lancets     200 each    Use to test blood sugar 4 times daily or as directed.  Ok to substitute alternative if insurance prefers.    Abnormal maternal glucose tolerance, antepartum       blood glucose monitoring test strip    CONTOUR NEXT TEST    150 strip    Use to test blood sugar 4 times daily or as directed.  Ok to substitute alternative if insurance prefers.    Abnormal maternal glucose tolerance, antepartum       cetirizine 10 MG tablet    zyrTEC    30 tablet    Take 1 tablet (10 mg) by mouth At Bedtime    Allergic rhinitis due to dust mite, Chronic seasonal allergic rhinitis due to pollen, Allergic conjunctivitis of both eyes       cholecalciferol 5000 units Caps capsule    vitamin D3    90 capsule    Take 1 capsule (5,000 Units) by mouth daily Take one capsule daily.    Vitamin D deficiency       cyanocobalamin 1000 MCG tablet    vitamin  B-12    30 tablet    Take 1 tablet (1,000 mcg) by mouth daily    Other iron deficiency anemia       ferrous sulfate 325 (65 Fe) MG tablet    IRON    30 tablet    Take 1 tablet (325 mg) by mouth daily (with breakfast)    Other iron deficiency anemia       fluticasone 50 MCG/ACT spray    FLONASE    1 Bottle    Spray 2 sprays into both nostrils daily    Allergic rhinitis due to dust mite, Chronic seasonal allergic rhinitis due to pollen, Allergic conjunctivitis of both eyes       * insulin  UNIT/ML injection    HumuLIN N/NovoLIN N    10 mL    Inject 5 units in AM and 5 units PM    Insulin controlled gestational diabetes mellitus (GDM) in third trimester, Abnormal maternal glucose tolerance, antepartum       * insulin isophane human 100 UNIT/ML injection    HumuLIN N PEN    15 mL    Take 7 units in the morning and 8 units at bedtime    Insulin controlled gestational diabetes mellitus (GDM) in third trimester       insulin pen needle 31G X 6 MM     ULTICARE MINI    200 each    Use 5 daily or as directed.    Gestational diabetes mellitus, antepartum       ketotifen 0.025 % Soln ophthalmic solution    ZADITOR/REFRESH ANTI-ITCH    1 Bottle    Place 1 drop into both eyes 2 times daily    Allergic conjunctivitis of both eyes       lanolin ointment     28 g    Apply topically daily as needed for dry skin    Sore nipple       loratadine 10 MG tablet    CLARITIN    30 tablet    Take 1 tablet (10 mg) by mouth daily    Allergic rhinitis due to dust mite, Chronic seasonal allergic rhinitis due to pollen, Allergic conjunctivitis of both eyes       montelukast 10 MG tablet    SINGULAIR    30 tablet    Take 1 tablet (10 mg) by mouth At Bedtime    Allergic rhinitis due to dust mite, Chronic seasonal allergic rhinitis due to pollen       NovoLOG FLEXPEN 100 UNIT/ML injection   Generic drug:  insulin aspart     6 mL    4 units before each meal    Insulin controlled gestational diabetes mellitus (GDM) in third trimester, Abnormal maternal glucose tolerance, antepartum       omeprazole 40 MG capsule    priLOSEC    30 capsule    Take 1 capsule (40 mg) by mouth daily    Abdominal pain, generalized       prenatal multivitamin plus iron 27-0.8 MG Tabs per tablet     100 tablet    Take 1 tablet by mouth daily    Pregnancy examination or test, positive result       psyllium 28 % Pack    METAMUCIL SMOOTH TEXTURE    30 each    Take 1 Package by mouth daily    Irritable bowel syndrome with both constipation and diarrhea       * Notice:  This list has 2 medication(s) that are the same as other medications prescribed for you. Read the directions carefully, and ask your doctor or other care provider to review them with you.

## 2018-07-16 NOTE — LETTER
2018       RE: Brit Romano  [address] [facility] [address]     Dear Colleague,    Thank you for referring your patient, Brit Romano, to the [facility] at [facility]. Please see a copy of my visit note below.    CABRERA Visit 18    S: Brit is a 38 year old female  with gestational diabetes presenting for a CABRERA visit with a new complaint of increased bladder pressure. She is peeing frequently and has increased bladder pressure. She does not have any burning when she urinates. She would like to have a UA to make sure she does not have a UTI.  Additionally her sugars are not well controlled. Fasting sugars are ranging between  and post prandial sugars are 127-176.  She is having contractions occasionally approximately every 10-20min during the night and day. She is also experiencing a decent amount of swelling in her legs especially after sitting. She is staying hydrated and is very thirsty, but has a poor appetite. She is still gaining weight. She is not experiencing headaches or shortness of breath. Her baby is breech.    O:  See OB Flowsheet    A/P: 38 year old  at 36w3d presents for CABRERA visit  1. Prenatal care:  Has had minimal care.  Rh positive, Lianne negative, Rubella NONIMMUNE, Remainder NOB labs wnl.  FAILED GCT.  2. Genetic screening: Declined  3. GDMA2, Hx of GDM x2: No Early GCT, Hgb A1c 18 - 5.4%.   18, /223/144/112.  Has seen diabetes education.  On insulin.  Current dosing NPH 7 units qam, 9 units qhs, NPH  5 units pre meals.  Sugars reviewed today 18 and suboptimal control.  Twice weekly BPP, had in MFM today and .  Needs growth scans q4 weeks. Reminded Juan Antonio to send sugars to endocrine so they can help manage her insulin and sugars.  4. Vit D deficiency: on Supplementation, level was not checked at EOB  5. Anemia: Starting Hgb 10.6.  Ferritin and IBC consistent with  CAMDEN.  Hgb ELP nl on 2/2/18.  Patient last Hgb 10.5 at EOB visit, was given Rx or ferrous sulfate.  Patient states she is taking every day and tolerating well.  6. Low laying placenta on Dating US, now resolved  7. Needs repeat Pap PP.  8. Allergies: Continue cetirizine 10mg at bedtime, Loratadine 10mg every morning, montelukast 10mg daily,  fluticasone nasal spray, 2 sprays in each nostril daily, ketotifen eye drops, 1 drop in each eye twice daily as needed.  Consider allergen immunotherapy treatment post-pregnancy if symptoms persist.  Consider evaluation with ENT post-pregnancy and after trial of fluticasone nasal spray.  F/u with allergist 8/11/2018  9. Tdap offered today and declined  10. GBS collected  11. Increased bladder pressure and increased urinary frequency. This could be related to pregnancy or could be a UTI.  UA obtained. If positive will call and treat appropriately.  12. Breech positioning of fetus. Offered ECV if continued breech on Friday 7/20/18. Patient refuses now and would prefer to wait until 39-40 weeks, she states they have done this for her before. She would like to wait until a later date as she feels this is too early.  Aware we can attempt this at any time after Friday for her.  She will continue to think about this.  13. RTC in 1 week for CABRERA visit    Scribe Disclosure:   I, Reema Tucker, am serving as a scribe; to document services personally performed by Dr. Lyon- -based on data collection and the provider's statements to me.     Provider Disclosure:  I agree with above History, Review of Systems, Physical exam and Plan.  I have reviewed the content of the documentation and have edited it as needed. I have personally performed the services documented here and the documentation accurately represents those services and the decisions I have made.      Electronically signed by:  Diann Lyon MD

## 2018-07-16 NOTE — PROGRESS NOTES
CABRERA Visit 18    S: Brit is a 38 year old female  with gestational diabetes presenting for a CABRERA visit with a new complaint of increased bladder pressure. She is peeing frequently and has increased bladder pressure. She does not have any burning when she urinates. She would like to have a UA to make sure she does not have a UTI.  Additionally her sugars are not well controlled. Fasting sugars are ranging between  and post prandial sugars are 127-176.  She is having contractions occasionally approximately every 10-20min during the night and day. She is also experiencing a decent amount of swelling in her legs especially after sitting. She is staying hydrated and is very thirsty, but has a poor appetite. She is still gaining weight. She is not experiencing headaches or shortness of breath. Her baby is breech.    O:  See OB Flowsheet    A/P: 38 year old  at 36w3d presents for CABRERA visit  1. Prenatal care:  Has had minimal care.  Rh positive, Lianne negative, Rubella NONIMMUNE, Remainder NOB labs wnl.  FAILED GCT.  2. Genetic screening: Declined  3. GDMA2, Hx of GDM x2: No Early GCT, Hgb A1c 18 - 5.4%.   18, /223/144/112.  Has seen diabetes education.  On insulin.  Current dosing NPH 7 units qam, 9 units qhs, NPH  5 units pre meals.  Sugars reviewed today 18 and suboptimal control.  Twice weekly BPP, had in MFM today and .  Needs growth scans q4 weeks. Reminded Juan Antonio to send sugars to endocrine so they can help manage her insulin and sugars.  4. Vit D deficiency: on Supplementation, level was not checked at EOB  5. Anemia: Starting Hgb 10.6.  Ferritin and IBC consistent with CAMDEN.  Hgb ELP nl on 18.  Patient last Hgb 10.5 at EOB visit, was given Rx or ferrous sulfate.  Patient states she is taking every day and tolerating well.  6. Low laying placenta on Dating US, now resolved  7. Needs repeat Pap PP.  8. Allergies: Continue cetirizine 10mg at bedtime, Loratadine  10mg every morning, montelukast 10mg daily,  fluticasone nasal spray, 2 sprays in each nostril daily, ketotifen eye drops, 1 drop in each eye twice daily as needed.  Consider allergen immunotherapy treatment post-pregnancy if symptoms persist.  Consider evaluation with ENT post-pregnancy and after trial of fluticasone nasal spray.  F/u with allergist 8/11/2018  9. Tdap offered today and declined  10. GBS collected  11. Increased bladder pressure and increased urinary frequency. This could be related to pregnancy or could be a UTI.  UA obtained. If positive will call and treat appropriately.  12. Breech positioning of fetus. Offered ECV if continued breech on Friday 7/20/18. Patient refuses now and would prefer to wait until 39-40 weeks, she states they have done this for her before. She would like to wait until a later date as she feels this is too early.  Aware we can attempt this at any time after Friday for her.  She will continue to think about this.  13. RTC in 1 week for CABRERA visit    Scribe Disclosure:   I, Reema Tucker, am serving as a scribe; to document services personally performed by Dr. Lyon- -based on data collection and the provider's statements to me.     Provider Disclosure:  I agree with above History, Review of Systems, Physical exam and Plan.  I have reviewed the content of the documentation and have edited it as needed. I have personally performed the services documented here and the documentation accurately represents those services and the decisions I have made.      Electronically signed by:  Diann Lyon MD

## 2018-07-16 NOTE — MR AVS SNAPSHOT
After Visit Summary   7/16/2018    Brit Romano    MRN: [id number]           Patient Information     Date Of Birth          1980        Visit Information        Provider Department      7/16/2018 2:45 PM Diann Lyon MD [facility]        Today's Diagnoses     Supervision of high-risk pregnancy of elderly multigravida, [facility] MD    -  1    Insulin controlled gestational diabetes mellitus (GDM) in third trimester        Dysuria        Breech presentation, single or unspecified fetus           Follow-ups after your visit        Follow-up notes from your care team     Return in about 3 days (around 7/19/2018) for BPP.      Your next 10 appointments already scheduled     Jul 19, 2018  2:30 PM CDT   (Arrive by 2:15 PM)   [facility]   [facility] ([facility])    [facility]  3rd Floor, [address]  [address] [place] [address] [phone]           Please bring a list of your medicines (including vitamins, minerals and over-the-counter drugs). Also, tell your doctor about any allergies you may have. Wear comfortable clothes and leave your valuables at home.  Drink four 8-ounce glasses of fluid an hour before your exam. If you need to empty your bladder before your exam, try to release only a little urine. Then, drink another glass of fluid.  You may have up to two family members in the exam room. If you bring a small child, an adult must be there to care for him or her. No video or camera photography during the procedure.  Please call the Imaging Department at your exam site with any questions.            Jul 23, 2018  3:00 PM CDT   ULTRASOUND with [facility] ULTRASOUND   [facility] ([facility])    [facility] [address]  [address]  [address] [place] [address] [phone]            Jul 25, 2018  3:45 PM  "CDT   RETURN OB with Alicia Myhre, DO   [facility] ([facility])    [facility] [address]  [address]  [facility] [address] [phone]            Aug 03, 2018  3:20 PM CDT   (Arrive by 3:05 PM)   RETURN DIABETES with Junie Feng MD   [facility] ([email or web address])    [address]  3rd Floor  [facility] [address] [phone]              Future tests that were ordered for you today     Open Future Orders        Priority Expected Expires Ordered    BPP (Single) w/out NST Routine  7/16/2019 7/16/2018            Who to contact     Please call your clinic at [phone] to:    Ask questions about your health    Make or cancel appointments    Discuss your medicines    Learn about your test results    Speak to your doctor            Additional Information About Your Visit        Care EveryWhere ID     This is your Care EveryWhere ID. This could be used by other organizations to access your [facility] medical records  [id number]        Your Vitals Were     Pulse Height Last Period BMI (Body Mass Index)          89 1.626 m (5' 4.02\") 11/03/2017 37.55 kg/m2         Blood Pressure from Last 3 Encounters:   07/16/18 116/81   07/02/18 117/78   06/18/18 121/81    Weight from Last 3 Encounters:   07/16/18 99.3 kg (218 lb 14.4 oz)   07/02/18 98.6 kg (217 lb 6.4 oz)   06/27/18 99.3 kg (219 lb)              We Performed the Following     Group B strep PCR     Urinalysis - No culture (Collected in Clinic)     Urine Culture Aerobic Bacterial        Primary Care Provider Office Phone # Fax #    Princess MALACHI Mcnamara [phone] [address] [facility] [address]        Equal Access to Services     KERRI MOLINA : Nasrin Razo, ne dickey, norma kaalblair fnoseca, pablo ruiz. So [facility] [phone].    ATENCIÓN: Si habla " español, tiene a smith disposición servicios gratuitos de asistencia lingüística. Jasmin martinez [phone].    We comply with applicable federal civil rights laws and [place] laws. We do not discriminate on the basis of race, color, national origin, age, disability, sex, sexual orientation, or gender identity.            Thank you!     Thank you for choosing [facility]  for your care. Our goal is always to provide you with excellent care. Hearing back from our patients is one way we can continue to improve our services. Please take a few minutes to complete the written survey that you may receive in the mail after your visit with us. Thank you!             Your Updated Medication List - Protect others around you: Learn how to safely use, store and throw away your medicines at www.disposemymeds.org.          This list is accurate as of 7/16/18 11:59 PM.  Always use your most recent med list.                   Brand Name Dispense Instructions for use Diagnosis    ascorbic acid 250 MG tablet    VITAMIN C    60 tablet    Take 1 tablet (250 mg) by mouth daily    Other iron deficiency anemia       blood glucose monitoring lancets     200 each    Use to test blood sugar 4 times daily or as directed.  Ok to substitute alternative if insurance prefers.    Abnormal maternal glucose tolerance, antepartum       blood glucose monitoring test strip    CONTOUR NEXT TEST    150 strip    Use to test blood sugar 4 times daily or as directed.  Ok to substitute alternative if insurance prefers.    Abnormal maternal glucose tolerance, antepartum       cetirizine 10 MG tablet    zyrTEC    30 tablet    Take 1 tablet (10 mg) by mouth At Bedtime    Allergic rhinitis due to dust mite, Chronic seasonal allergic rhinitis due to pollen, Allergic conjunctivitis of both eyes       cholecalciferol 5000 units Caps capsule    vitamin D3    90 capsule    Take 1 capsule (5,000 Units) by mouth daily Take one capsule daily.    Vitamin  D deficiency       cyanocobalamin 1000 MCG tablet    vitamin  B-12    30 tablet    Take 1 tablet (1,000 mcg) by mouth daily    Other iron deficiency anemia       ferrous sulfate 325 (65 Fe) MG tablet    IRON    30 tablet    Take 1 tablet (325 mg) by mouth daily (with breakfast)    Other iron deficiency anemia       fluticasone 50 MCG/ACT spray    FLONASE    1 Bottle    Spray 2 sprays into both nostrils daily    Allergic rhinitis due to dust mite, Chronic seasonal allergic rhinitis due to pollen, Allergic conjunctivitis of both eyes       * insulin  UNIT/ML injection    HumuLIN N/NovoLIN N    10 mL    Inject 5 units in AM and 5 units PM    Insulin controlled gestational diabetes mellitus (GDM) in third trimester, Abnormal maternal glucose tolerance, antepartum       * insulin isophane human 100 UNIT/ML injection    HumuLIN N PEN    15 mL    Take 7 units in the morning and 8 units at bedtime    Insulin controlled gestational diabetes mellitus (GDM) in third trimester       insulin pen needle 31G X 6 MM    ULTICARE MINI    200 each    Use 5 daily or as directed.    Gestational diabetes mellitus, antepartum       ketotifen 0.025 % Soln ophthalmic solution    ZADITOR/REFRESH ANTI-ITCH    1 Bottle    Place 1 drop into both eyes 2 times daily    Allergic conjunctivitis of both eyes       lanolin ointment     28 g    Apply topically daily as needed for dry skin    Sore nipple       loratadine 10 MG tablet    CLARITIN    30 tablet    Take 1 tablet (10 mg) by mouth daily    Allergic rhinitis due to dust mite, Chronic seasonal allergic rhinitis due to pollen, Allergic conjunctivitis of both eyes       montelukast 10 MG tablet    SINGULAIR    30 tablet    Take 1 tablet (10 mg) by mouth At Bedtime    Allergic rhinitis due to dust mite, Chronic seasonal allergic rhinitis due to pollen       NovoLOG FLEXPEN 100 UNIT/ML injection   Generic drug:  insulin aspart     6 mL    4 units before each meal    Insulin controlled  gestational diabetes mellitus (GDM) in third trimester, Abnormal maternal glucose tolerance, antepartum       omeprazole 40 MG capsule    priLOSEC    30 capsule    Take 1 capsule (40 mg) by mouth daily    Abdominal pain, generalized       prenatal multivitamin plus iron 27-0.8 MG Tabs per tablet     100 tablet    Take 1 tablet by mouth daily    Pregnancy examination or test, positive result       psyllium 28 % Pack    METAMUCIL SMOOTH TEXTURE    30 each    Take 1 Package by mouth daily    Irritable bowel syndrome with both constipation and diarrhea       * Notice:  This list has 2 medication(s) that are the same as other medications prescribed for you. Read the directions carefully, and ask your doctor or other care provider to review them with you.

## 2018-07-17 LAB
BACTERIA SPEC CULT: NO GROWTH
GP B STREP DNA SPEC QL NAA+PROBE: NEGATIVE
Lab: NORMAL
SPECIMEN SOURCE: NORMAL
SPECIMEN SOURCE: NORMAL

## 2018-07-19 ENCOUNTER — RADIANT APPOINTMENT (OUTPATIENT)
Dept: ULTRASOUND IMAGING | Facility: CLINIC | Age: 38
End: 2018-07-19
Attending: OBSTETRICS & GYNECOLOGY
Payer: COMMERCIAL

## 2018-07-19 DIAGNOSIS — O24.414 INSULIN CONTROLLED GESTATIONAL DIABETES MELLITUS (GDM) IN THIRD TRIMESTER: ICD-10-CM

## 2018-07-19 PROCEDURE — 76816 OB US FOLLOW-UP PER FETUS: CPT

## 2018-07-23 ENCOUNTER — RADIANT APPOINTMENT (OUTPATIENT)
Dept: ULTRASOUND IMAGING | Facility: CLINIC | Age: 38
End: 2018-07-23
Payer: COMMERCIAL

## 2018-07-23 DIAGNOSIS — O24.414 INSULIN CONTROLLED GESTATIONAL DIABETES MELLITUS (GDM) IN THIRD TRIMESTER: ICD-10-CM

## 2018-07-23 PROCEDURE — 76816 OB US FOLLOW-UP PER FETUS: CPT

## 2018-07-25 ENCOUNTER — OFFICE VISIT (OUTPATIENT)
Dept: OBGYN | Facility: CLINIC | Age: 38
End: 2018-07-25
Payer: COMMERCIAL

## 2018-07-25 VITALS
BODY MASS INDEX: 37.22 KG/M2 | WEIGHT: 218 LBS | HEIGHT: 64 IN | DIASTOLIC BLOOD PRESSURE: 82 MMHG | HEART RATE: 96 BPM | SYSTOLIC BLOOD PRESSURE: 121 MMHG

## 2018-07-25 DIAGNOSIS — O32.0XX0 UNSTABLE FETAL LIE, SINGLE OR UNSPECIFIED FETUS: ICD-10-CM

## 2018-07-25 DIAGNOSIS — O24.414 INSULIN CONTROLLED GESTATIONAL DIABETES MELLITUS (GDM) IN THIRD TRIMESTER: ICD-10-CM

## 2018-07-25 DIAGNOSIS — O09.529 SUPERVISION OF HIGH-RISK PREGNANCY OF ELDERLY MULTIGRAVIDA: Primary | ICD-10-CM

## 2018-07-25 DIAGNOSIS — D50.8 IRON DEFICIENCY ANEMIA SECONDARY TO INADEQUATE DIETARY IRON INTAKE: ICD-10-CM

## 2018-07-25 DIAGNOSIS — Z86.32 HISTORY OF GESTATIONAL DIABETES: ICD-10-CM

## 2018-07-25 PROCEDURE — G0463 HOSPITAL OUTPT CLINIC VISIT: HCPCS | Mod: ZF

## 2018-07-25 ASSESSMENT — PAIN SCALES - GENERAL: PAINLEVEL: NO PAIN (0)

## 2018-07-25 NOTE — MR AVS SNAPSHOT
After Visit Summary   7/25/2018    Brit Romano    MRN: [id number]           Patient Information     Date Of Birth          1980        Visit Information        Provider Department      7/25/2018 3:45 PM Myhre, Alicia, DO [facility]        Today's Diagnoses     Unstable fetal lie, single or unspecified fetus    -  1      Care Instructions    Your external cephalic version (ECV) is scheduled at 1pm on Friday. Please come to labor and delivery at 1pm. Do not eat or drink after midnight the night before your procedure.           Follow-ups after your visit        Follow-up notes from your care team     Return in about 1 week (around 8/1/2018) for Routine Visit.      Your next 10 appointments already scheduled     Jul 26, 2018  2:30 PM CDT   (Arrive by 2:15 PM)   US FETAL BIOPHYS PROFILE W/O NON STRESS TEST with [place] [address]   [facility] ([facility])    [facility]  3rd Floor, [address]  [address] [place] [id number] [phone]           Please bring a list of your medicines (including vitamins, minerals and over-the-counter drugs). Also, tell your doctor about any allergies you may have. Wear comfortable clothes and leave your valuables at home.  Drink four 8-ounce glasses of fluid an hour before your exam. If you need to empty your bladder before your exam, try to release only a little urine. Then, drink another glass of fluid.  You may have up to two family members in the exam room. If you bring a small child, an adult must be there to care for him or her. No video or camera photography during the procedure.  Please call the Imaging Department at your exam site with any questions.            Jul 30, 2018  1:30 PM CDT   (Arrive by 1:15 PM)   US FETAL BIOPHYS PROFILE W/O NON STRESS TEST with [place] [id number]   [facility] ([facility])    [facility]  3rd Floor, [address] [place] [address] [phone]           Please bring a list of your medicines (including vitamins, minerals and over-the-counter drugs). Also, tell your doctor about any allergies you may have. Wear comfortable clothes and leave your valuables at home.  Drink four 8-ounce glasses of fluid an hour before your exam. If you need to empty your bladder before your exam, try to release only a little urine. Then, drink another glass of fluid.  You may have up to two family members in the exam room. If you bring a small child, an adult must be there to care for him or her. No video or camera photography during the procedure.  Please call the Imaging Department at your exam site with any questions.            Aug 02, 2018  2:00 PM CDT   (Arrive by 1:45 PM)   [facility]   [facility] ([facility])    [facility]  3rd [facility], [address]  [address] [place] [address] [phone]           Please bring a list of your medicines (including vitamins, minerals and over-the-counter drugs). Also, tell your doctor about any allergies you may have. Wear comfortable clothes and leave your valuables at home.  Drink four 8-ounce glasses of fluid an hour before your exam. If you need to empty your bladder before your exam, try to release only a little urine. Then, drink another glass of fluid.  You may have up to two family members in the exam room. If you bring a small child, an adult must be there to care for him or her. No video or camera photography during the procedure.  Please call the Imaging Department at your exam site with any questions.            Aug 03, 2018  3:20 PM CDT   (Arrive by 3:05 PM)   RETURN DIABETES with Junie Feng MD   [facility] ([email or web address])    [address] [facility]  [address]rd [facility] [address] [phone]          "     Who to contact     Please call your clinic at [phone] to:    Ask questions about your health    Make or cancel appointments    Discuss your medicines    Learn about your test results    Speak to your doctor            Additional Information About Your Visit        Care EveryWhere ID     This is your Care EveryWhere ID. This could be used by other organizations to access your [place] medical records  [id number]        Your Vitals Were     Pulse Height Last Period Breastfeeding? BMI (Body Mass Index)       96 1.626 m (5' 4\") 11/03/2017 No 37.42 kg/m2        Blood Pressure from Last 3 Encounters:   07/25/18 121/82   07/16/18 116/81   07/02/18 117/78    Weight from Last 3 Encounters:   07/25/18 98.9 kg (218 lb)   07/16/18 99.3 kg (218 lb 14.4 oz)   07/02/18 98.6 kg (217 lb 6.4 oz)              Today, you had the following     No orders found for display         Today's Medication Changes          These changes are accurate as of 7/25/18  4:39 PM.  If you have any questions, ask your nurse or doctor.               Stop taking these medicines if you haven't already. Please contact your care team if you have questions.     ascorbic acid 250 MG tablet   Commonly known as:  VITAMIN C   Stopped by:  Myhre, Alicia, DO                    Primary Care Provider Office Phone # Fax #    Princess Zenia Hicks, MALACHI [facility] [phone] [address] [facility] [address]        Equal Access to Services     [facility]: Hadii juan Razo, waaxda luqadaha, qaybta kaalmapablo bryant. So [facility] [phone].    ATENCIÓN: Si habla español, tiene a smith disposición servicios gratuitos de asistencia lingüística. Llame al [phone].    We comply with applicable federal civil rights laws and [place] laws. We do not discriminate on the basis of race, color, national origin, age, disability, sex, sexual orientation, or gender identity.            Thank " you!     Thank you for choosing [facility]  for your care. Our goal is always to provide you with excellent care. Hearing back from our patients is one way we can continue to improve our services. Please take a few minutes to complete the written survey that you may receive in the mail after your visit with us. Thank you!             Your Updated Medication List - Protect others around you: Learn how to safely use, store and throw away your medicines at www.disposemymeds.org.          This list is accurate as of 7/25/18  4:39 PM.  Always use your most recent med list.                   Brand Name Dispense Instructions for use Diagnosis    blood glucose monitoring lancets     200 each    Use to test blood sugar 4 times daily or as directed.  Ok to substitute alternative if insurance prefers.    Abnormal maternal glucose tolerance, antepartum       blood glucose monitoring test strip    CONTOUR NEXT TEST    150 strip    Use to test blood sugar 4 times daily or as directed.  Ok to substitute alternative if insurance prefers.    Abnormal maternal glucose tolerance, antepartum       cetirizine 10 MG tablet    zyrTEC    30 tablet    Take 1 tablet (10 mg) by mouth At Bedtime    Allergic rhinitis due to dust mite, Chronic seasonal allergic rhinitis due to pollen, Allergic conjunctivitis of both eyes       cholecalciferol 5000 units Caps capsule    vitamin D3    90 capsule    Take 1 capsule (5,000 Units) by mouth daily Take one capsule daily.    Vitamin D deficiency       cyanocobalamin 1000 MCG tablet    vitamin  B-12    30 tablet    Take 1 tablet (1,000 mcg) by mouth daily    Other iron deficiency anemia       ferrous sulfate 325 (65 Fe) MG tablet    IRON    30 tablet    Take 1 tablet (325 mg) by mouth daily (with breakfast)    Other iron deficiency anemia       fluticasone 50 MCG/ACT spray    FLONASE    1 Bottle    Spray 2 sprays into both nostrils daily    Allergic rhinitis due to dust mite, Chronic  seasonal allergic rhinitis due to pollen, Allergic conjunctivitis of both eyes       * insulin  UNIT/ML injection    HumuLIN N/NovoLIN N    10 mL    Inject 5 units in AM and 5 units PM    Insulin controlled gestational diabetes mellitus (GDM) in third trimester, Abnormal maternal glucose tolerance, antepartum       * insulin isophane human 100 UNIT/ML injection    HumuLIN N PEN    15 mL    Take 7 units in the morning and 8 units at bedtime    Insulin controlled gestational diabetes mellitus (GDM) in third trimester       insulin pen needle 31G X 6 MM    ULTICARE MINI    200 each    Use 5 daily or as directed.    Gestational diabetes mellitus, antepartum       ketotifen 0.025 % Soln ophthalmic solution    ZADITOR/REFRESH ANTI-ITCH    1 Bottle    Place 1 drop into both eyes 2 times daily    Allergic conjunctivitis of both eyes       lanolin ointment     28 g    Apply topically daily as needed for dry skin    Sore nipple       loratadine 10 MG tablet    CLARITIN    30 tablet    Take 1 tablet (10 mg) by mouth daily    Allergic rhinitis due to dust mite, Chronic seasonal allergic rhinitis due to pollen, Allergic conjunctivitis of both eyes       montelukast 10 MG tablet    SINGULAIR    30 tablet    Take 1 tablet (10 mg) by mouth At Bedtime    Allergic rhinitis due to dust mite, Chronic seasonal allergic rhinitis due to pollen       NovoLOG FLEXPEN 100 UNIT/ML injection   Generic drug:  insulin aspart     6 mL    4 units before each meal    Insulin controlled gestational diabetes mellitus (GDM) in third trimester, Abnormal maternal glucose tolerance, antepartum       omeprazole 40 MG capsule    priLOSEC    30 capsule    Take 1 capsule (40 mg) by mouth daily    Abdominal pain, generalized       prenatal multivitamin plus iron 27-0.8 MG Tabs per tablet     100 tablet    Take 1 tablet by mouth daily    Pregnancy examination or test, positive result       psyllium 28 % Pack    METAMUCIL SMOOTH TEXTURE    30 each     Take 1 Package by mouth daily    Irritable bowel syndrome with both constipation and diarrhea       * Notice:  This list has 2 medication(s) that are the same as other medications prescribed for you. Read the directions carefully, and ask your doctor or other care provider to review them with you.

## 2018-07-25 NOTE — NURSING NOTE
Chief Complaint   Patient presents with     Prenatal Care     CABRERA 37 weeks and 5 days   Lisa Zapata LPN

## 2018-07-25 NOTE — PROGRESS NOTES
"Subjective:      38 year old  at 37w5d presents for a routine prenatal appointment.         Denies vaginal bleeding,  leakage of fluid, or change in vaginal discharge. She feels intermittent contractions.  Active fetal movement.  No HA, visual changes, RUQ or epigastric pain.   Patient concerns: Concerned that baby is breech despite very active movement and feeling like baby has changed position. With fetal activity she notes intermittent increased pain on the right side of her abdomen similar to contraction pain.  Patient recently did not check her blood glucose for three days. She is taking 5 units of insulin at each meal. She stopped taking her night time insulin due to concerns of itching. She denies itching or rash today. Fasting blood glucose this morning was 114. She feels well overall and denies signs or symptoms of preeclampsia.     Objective:  Vitals:    18 1612   BP: 121/82   Pulse: 96   Weight: 98.9 kg (218 lb)   Height: 1.626 m (5' 4\")    See OB flowsheet  Gen: NAD  Skin: no rash noted on palms or trunk.   Cervix: 1/L/H  Transverse fetal lie by leopold's    Assessment/Plan     Encounter Diagnoses   Name Primary?     Unstable fetal lie, single or unspecified fetus Yes     Supervision of high-risk pregnancy of elderly multigravida, VICKY ROUSSEAU      Iron deficiency anemia       History of gestational diabetes      Insulin controlled gestational diabetes mellitus (GDM) in third trimester      No orders of the defined types were placed in this encounter.    No orders of the defined types were placed in this encounter.      PHQ-9 SCORE 2016 3/18/2016 2018   Total Score - - -   Total Score 3 2 2     GDMA2:  No Early GCT, Hgb A1c 18 - 5.4%.   18, /223/144/112.  Has seen diabetes education. Encouraged patient to resume nighttime insulin, continue meal time insulin and continue to monitor FBG and post prandial BG. Patient expressed understanding and agreed to resume bedtime " insulin.   Twice weekly BPP - next BPP at 2pm tomorrow.   GBS screening: Obtained. Negative  Discussed transverse presentation and recommendation for ECV prior to presenting in labor. Patient amenable to ECV with the possibility of IOL due to unstable lie. Patient scheduled for ECV on Friday, July 27th at 1pm. Patient provided with instructions for nothing to eat or drink after midnight prior to procedure.   Labor signs discussed. Reinforced daily fetal movement counts.  Reviewed why/how to contact provider if headache/visual changes/RUQ or epigastric pain, decreased fetal movement, vaginal bleeding, leakage of fluid.  Return to clinic in 1 week and prn if questions or concerns.     Alicia Myhre, DO    The Patient was seen in [facility] by Myhre.   I reviewed the history & exam. Assessment and plan were jointly made.   Diann Lyon MD, MPH      OB/GYN, PGY2

## 2018-07-27 ENCOUNTER — HOSPITAL ENCOUNTER (OUTPATIENT)
Facility: CLINIC | Age: 38
Discharge: HOME | End: 2018-07-27
Attending: OBSTETRICS & GYNECOLOGY | Admitting: OBSTETRICS & GYNECOLOGY
Payer: COMMERCIAL

## 2018-07-27 VITALS — HEIGHT: 65 IN | TEMPERATURE: 97.5 F | BODY MASS INDEX: 33.32 KG/M2 | WEIGHT: 200 LBS | RESPIRATION RATE: 18 BRPM

## 2018-07-27 PROBLEM — O32.0XX0 UNSTABLE FETAL LIE: Status: ACTIVE | Noted: 2018-07-27

## 2018-07-27 LAB
ABO + RH BLD: NORMAL
ABO + RH BLD: NORMAL
APTT PPP: 26 SEC (ref 22–37)
BLD GP AB SCN SERPL QL: NORMAL
BLOOD BANK CMNT PATIENT-IMP: NORMAL
ERYTHROCYTE [DISTWIDTH] IN BLOOD BY AUTOMATED COUNT: 14.2 % (ref 10–15)
FIBRINOGEN PPP-MCNC: 468 MG/DL (ref 200–420)
GLUCOSE BLDC GLUCOMTR-MCNC: 110 MG/DL (ref 70–99)
HCT VFR BLD AUTO: 34.1 % (ref 35–47)
HGB BLD-MCNC: 11 G/DL (ref 11.7–15.7)
INR PPP: 1.05 (ref 0.86–1.14)
MCH RBC QN AUTO: 25.9 PG (ref 26.5–33)
MCHC RBC AUTO-ENTMCNC: 32.3 G/DL (ref 31.5–36.5)
MCV RBC AUTO: 80 FL (ref 78–100)
PLATELET # BLD AUTO: 179 10E9/L (ref 150–450)
RBC # BLD AUTO: 4.24 10E12/L (ref 3.8–5.2)
RUPTURE OF FETAL MEMBRANES BY ROM PLUS: NEGATIVE
SPECIMEN EXP DATE BLD: NORMAL
WBC # BLD AUTO: 9.7 10E9/L (ref 4–11)

## 2018-07-27 PROCEDURE — 86850 RBC ANTIBODY SCREEN: CPT | Performed by: INTERNAL MEDICINE

## 2018-07-27 PROCEDURE — 36415 COLL VENOUS BLD VENIPUNCTURE: CPT | Performed by: INTERNAL MEDICINE

## 2018-07-27 PROCEDURE — 85384 FIBRINOGEN ACTIVITY: CPT | Performed by: INTERNAL MEDICINE

## 2018-07-27 PROCEDURE — 59025 FETAL NON-STRESS TEST: CPT

## 2018-07-27 PROCEDURE — 85730 THROMBOPLASTIN TIME PARTIAL: CPT | Performed by: INTERNAL MEDICINE

## 2018-07-27 PROCEDURE — 85610 PROTHROMBIN TIME: CPT | Performed by: INTERNAL MEDICINE

## 2018-07-27 PROCEDURE — 85460 HEMOGLOBIN FETAL: CPT | Performed by: INTERNAL MEDICINE

## 2018-07-27 PROCEDURE — 86900 BLOOD TYPING SEROLOGIC ABO: CPT | Performed by: INTERNAL MEDICINE

## 2018-07-27 PROCEDURE — 84112 EVAL AMNIOTIC FLUID PROTEIN: CPT | Performed by: INTERNAL MEDICINE

## 2018-07-27 PROCEDURE — 86901 BLOOD TYPING SEROLOGIC RH(D): CPT | Performed by: INTERNAL MEDICINE

## 2018-07-27 PROCEDURE — 82962 GLUCOSE BLOOD TEST: CPT

## 2018-07-27 PROCEDURE — 85027 COMPLETE CBC AUTOMATED: CPT | Performed by: INTERNAL MEDICINE

## 2018-07-27 PROCEDURE — 86780 TREPONEMA PALLIDUM: CPT | Performed by: INTERNAL MEDICINE

## 2018-07-27 PROCEDURE — G0463 HOSPITAL OUTPT CLINIC VISIT: HCPCS | Mod: 25

## 2018-07-27 NOTE — PLAN OF CARE
Data: [facility].   Reason for maternal/fetal assessment per patient is External Version  . Patient is a . Prenatal record reviewed.      Obstetric History       T8      L7     SAB0   TAB0   Ectopic0   Multiple0   Live Births7       # Outcome Date GA Lbr Jamal/2nd Weight Sex Delivery Anes PTL Lv   9 Current            8 Term 16 40w2d 04:29 / 00:17 3.52 kg (7 lb 12.2 oz) F Vag-Spont EPI N ROXANNA      Apgar1:  9                Apgar5: 9   7 Term 12    F    ROXANNA   6 Term 11    F   N ROXANNA   5 Term 09    M   N ROXANNA      Complications: GDM (gestational diabetes mellitus)   4 Term 07    M I.U. FETAL D  N FD   3 Term 05    M   N ROXANNA   2 Term 03    M   N ROXANNA   1 Term 12/10/00    M   N ROXANNA      Obstetric Comments   No PTL, of HTN. GDM x 2 diet controlled      Medical History:   Past Medical History:   Diagnosis Date     Fatty liver      Fibromyalgia      History of gestational diabetes      Seasonal allergies    . Gestational Age 38w0d. VSS. Cervix: dilated to 1 and long.  Fetal movement present. Upon further assessment fetal position cephalic but patient reporting a fall in the shower this morning.  Patient denies backache, pelvic pressure, UTI symptoms, GI problems, bloody show, vaginal bleeding, edema, headache, visual disturbances, epigastric or URQ pain, abdominal pain. Support persons present.  Action: Verbal consent for EFM. Triage assessment completed. EFM and toco applied for fetal and uterine assessment. Fetal assessment: Presumed adequate fetal oxygenation documented (see flow record). Contractions q 4-6 minutes but reported as not uncomfortable to patient. SVE performed and unchanged from prior clinic check. Abruption panel labs and ROM+ collected and sent. Patient instructed to report change in fetal movement, vaginal leaking of fluid or bleeding, abdominal pain, or any concerns related to the pregnancy  to her nurse/physician. All labs WNL with a negative ROM+ result.  Response: Dr. Heredia informed of patient status. Plan per provider is discharge to home. Patient education pamphlets given on fetal movement counts. Patient verbalized understanding of education and verbalized agreement with plan. Discharged ambulatory at 1810.

## 2018-07-27 NOTE — PROGRESS NOTES
"[facility]  OB Triage Note    CC: Transverse presentation in clinic, here for external cephalic version    HPI: Ms. Brit Romano is a 38 year old  at 38w0d by LMP c/w 13w3d US here for an external cephalic version after transverse lie identified at her clinic visit on 18. She reports contractions since yesterday and leakage of fluid since she fell getting out of the shower around 1130 this morning. She slipped getting out of the shower and twisted her left knee, hit her right ankle on the toilet, and landed in a sitting position. She denies direct abdominal trauma. She otherwise feels well. She denies vaginal bleeding and is feeling fetal movement but reports that it is less than normal.     Obstetric Complications  1. Grand multiparity  2. AMA  3. Limited prenatal care  4. GDMA2 - on insulin, Novolog 5U before each meal, humulin 7U qAM, 9U qHS  5. Vitamin D deficiency  6. Anemia  7. Low lying placenta, resolved  8. Needs postpartum pap smear    O:  Patient Vitals for the past 24 hrs:   Temp Temp src Resp Height Weight   18 1341 - - - 1.651 m (5' 5\") 90.7 kg (200 lb)   18 1334 97.5  F (36.4  C) Oral 18 - -     Gen: Well-appearing, NAD  Pulm: Breathing comfortably on room air  Abd: Soft, gravid, nontender  Cvx: 1/L/H    BSUS: Cephalic by BSUS.    FHT: , moderate lyudmila, present accels, no decels  Caroleen: Ctx 1-2/10     A/P:  Ms. Brit Romano is a 38 year old  at 38w0d by LMP c/w 13w3d US here for a scheduled ECV for unstable lie. Fetus is now vertex by BSUS. While in triage, she reported watery discharge and also a fall earlier this morning.    #Rule out rupture of membranes  - ROM plus negative    #Rule out abruption  - Continuous monitoring reassuring  - Abruption labs negative    #Rule out labor  - Cervical exam unchanged       # Pain Assessment:  Current Pain Score 2018   Patient currently in pain? denies   Pain location -   Pain descriptors - "   Brit s pain level was assessed and she currently denies pain.      Patient stable for discharge to home. Labor precautions given.     Alicia Myhre, DO  OB/GYN PGY2         S Staff Note:  Patient was seen by the resident and history/physical were reviewed with me and assessment/plan was made jointly. Patient now s/p several hours of monitoring with CAT1 tracing. Reported fall but no direct abdominal trauma and cervix unchanged, abruption labs negative and reassuring fetal status on monitor. Agree with stable for discharge to home.   Toma Ambriz MD  7/27/2018  6:24 PM

## 2018-07-27 NOTE — IP AVS SNAPSHOT
MRN:[id number]                      After Visit Summary   7/27/2018    Brit Romano    MRN: [id number]           Thank you!     Thank you for choosing [facility] for your care. Our goal is always to provide you with excellent care. Hearing back from our patients is one way we can continue to improve our services. Please take a few minutes to complete the written survey that you may receive in the mail after you visit with us. Thank you!        Patient Information     Date Of Birth          1980        Designated Caregiver       Most Recent Value    Caregiver    Will someone help with your care after discharge? yes      About your hospital stay     You were admitted on:  July 27, 2018 You last received care in the:  [facility]    You were discharged on:  July 27, 2018       Who to Call     For medical emergencies, please call 911.  For non-urgent questions about your medical care, please call your primary care provider or clinic, [phone]          Attending Provider     Provider Specialty    Toma Ambriz MD OB/Gyn       Primary Care Provider Office Phone # Fax #    Princess MALACHI Mcnamara [facility] [phone]      Your next 10 appointments already scheduled     Jul 30, 2018  1:30 PM CDT   (Arrive by 1:15 PM)   US FETAL BIOPHYS PROFILE W/O NON STRESS TEST with [id number]   [facility] ([facility])    [facility]  3rd Floor, [address]  [address] [place] [address] [phone]           Please bring a list of your medicines (including vitamins, minerals and over-the-counter drugs). Also, tell your doctor about any allergies you may have. Wear comfortable clothes and leave your valuables at home.  Drink four 8-ounce glasses of fluid an hour before your exam. If you need to empty your bladder before your exam, try to release only a little urine. Then, drink another glass of fluid.  You may have up to two  family members in the exam room. If you bring a small child, an adult must be there to care for him or her. No video or camera photography during the procedure.  Please call the Imaging Department at your exam site with any questions.            Aug 01, 2018  3:30 PM CDT   RETURN OB with Elvia Bee MD   [facility] ([facility])    [facility]  [address]rd Flr,[address]  [address] [facility] [address] [phone]            Aug 02, 2018  2:00 PM CDT   (Arrive by 1:45 PM)   US OB SINGLE FOLLOW UP REPEAT with [id number]   [facility] ([facility])    [facility]  3rd Floor, [address]  [address] [place] [address] [phone]           Please bring a list of your medicines (including vitamins, minerals and over-the-counter drugs). Also, tell your doctor about any allergies you may have. Wear comfortable clothes and leave your valuables at home.  Drink four 8-ounce glasses of fluid an hour before your exam. If you need to empty your bladder before your exam, try to release only a little urine. Then, drink another glass of fluid.  You may have up to two family members in the exam room. If you bring a small child, an adult must be there to care for him or her. No video or camera photography during the procedure.  Please call the Imaging Department at your exam site with any questions.            Aug 03, 2018  3:20 PM CDT   (Arrive by 3:05 PM)   RETURN DIABETES with Junie Feng MD   [facility]ology ([facility])    [address] [facility]  3rd [facility] [id number] [phone]              Further instructions from your care team       Discharge Instruction for Undelivered Patients      You were seen for: Version then fall and membrane assessment  We Consulted: [facility]  You had (Test or Medicine): Blood draw  "for abruption panel. ROM+, fetal and uterine monitoring, cervical exam     Diet:   Drink 8 to 12 glasses of liquids (milk, juice, water) every day.  You may eat meals and snacks.     Activity:  Count fetal kicks everyday (see handout)  Call your doctor or nurse midwife if your baby is moving less than usual.     Call your provider if you notice:  Swelling in your face or increased swelling in your hands or legs.  Headaches that are not relieved by Tylenol (acetaminophen).  Changes in your vision (blurring: seeing spots or stars.)  Nausea (sick to your stomach) and vomiting (throwing up).   Weight gain of 5 pounds or more per week.  Heartburn that doesn't go away.  Signs of bladder infection: pain when you urinate (use the toilet), need to go more often and more urgently.  The bag of davidson (rupture of membranes) breaks, or you notice leaking in your underwear.  Bright red blood in your underwear.  Abdominal (lower belly) or stomach pain.  For first baby: Contractions (tightening) less than 5 minutes apart for one hour or more.  Second (plus) baby: Contractions (tightening) less than 10 minutes apart and getting stronger.  *If less than 34 weeks: Contractions (tightenings) more than 6 times in one hour.  Increase or change in vaginal discharge (note the color and amount)    Follow-up:  As scheduled in the clinic            Pending Results     Date and Time Order Name Status Description    7/27/2018 1406 Treponema Abs w Reflex to RPR and Titer In process             Admission Information     Date & Time Provider Department Dept. Phone    7/27/2018 Toma Ambriz MD [facility] [phone]      Your Vitals Were     Temperature Respirations Height Weight Last Period BMI (Body Mass Index)    97.5  F (36.4  C) (Oral) 18 1.651 m (5' 5\") 90.7 kg (200 lb) 11/03/2017 33.28 kg/m2      Care EveryWhere ID     This is your Care EveryWhere ID. This could be used by other organizations to access your [facility] " records  [id number]        Equal Access to Services     [facility] : Hadii aad ku hadsupriyakarthik Sodary, waaxda luqadaha, qaybta kabrealuz marina fonseca, pablo ruiz. So [facility] [phone].    ATENCIÓN: Si habla español, tiene a smith disposición servicios gratuitos de asistencia lingüística. [facility] [phone].    We comply with applicable federal civil rights laws and [place] laws. We do not discriminate on the basis of race, color, national origin, age, disability, sex, sexual orientation, or gender identity.               Review of your medicines      UNREVIEWED medicines. Ask your doctor about these medicines        Dose / Directions    cetirizine 10 MG tablet   Commonly known as:  zyrTEC   Used for:  Allergic rhinitis due to dust mite, Chronic seasonal allergic rhinitis due to pollen, Allergic conjunctivitis of both eyes        Dose:  10 mg   Take 1 tablet (10 mg) by mouth At Bedtime   Quantity:  30 tablet   Refills:  11       cholecalciferol 5000 units Caps capsule   Commonly known as:  vitamin D3   Used for:  Vitamin D deficiency        Dose:  5000 Units   Take 1 capsule (5,000 Units) by mouth daily Take one capsule daily.   Quantity:  90 capsule   Refills:  3       cyanocobalamin 1000 MCG tablet   Commonly known as:  vitamin  B-12   Used for:  Other iron deficiency anemia        Dose:  1000 mcg   Take 1 tablet (1,000 mcg) by mouth daily   Quantity:  30 tablet   Refills:  3       ferrous sulfate 325 (65 Fe) MG tablet   Commonly known as:  IRON   Used for:  Other iron deficiency anemia        Dose:  325 mg   Take 1 tablet (325 mg) by mouth daily (with breakfast)   Quantity:  30 tablet   Refills:  3       fluticasone 50 MCG/ACT spray   Commonly known as:  FLONASE   Used for:  Allergic rhinitis due to dust mite, Chronic seasonal allergic rhinitis due to pollen, Allergic conjunctivitis of both eyes        Dose:  2 spray   Spray 2 sprays into both nostrils daily   Quantity:  1 Bottle    Refills:  11       * insulin  UNIT/ML injection   Commonly known as:  HumuLIN N/NovoLIN N   Used for:  Insulin controlled gestational diabetes mellitus (GDM) in third trimester, Abnormal maternal glucose tolerance, antepartum        Inject 5 units in AM and 5 units PM   Quantity:  10 mL   Refills:  1       * insulin isophane human 100 UNIT/ML injection   Commonly known as:  HumuLIN N PEN   Used for:  Insulin controlled gestational diabetes mellitus (GDM) in third trimester        Take 7 units in the morning and 8 units at bedtime   Quantity:  15 mL   Refills:  11       ketotifen 0.025 % Soln ophthalmic solution   Commonly known as:  ZADITOR/REFRESH ANTI-ITCH   Used for:  Allergic conjunctivitis of both eyes        Dose:  1 drop   Place 1 drop into both eyes 2 times daily   Quantity:  1 Bottle   Refills:  11       lanolin ointment   Used for:  Sore nipple        Apply topically daily as needed for dry skin   Quantity:  28 g   Refills:  0       loratadine 10 MG tablet   Commonly known as:  CLARITIN   Used for:  Allergic rhinitis due to dust mite, Chronic seasonal allergic rhinitis due to pollen, Allergic conjunctivitis of both eyes        Dose:  10 mg   Take 1 tablet (10 mg) by mouth daily   Quantity:  30 tablet   Refills:  11       montelukast 10 MG tablet   Commonly known as:  SINGULAIR   Used for:  Allergic rhinitis due to dust mite, Chronic seasonal allergic rhinitis due to pollen        Dose:  10 mg   Take 1 tablet (10 mg) by mouth At Bedtime   Quantity:  30 tablet   Refills:  11       NovoLOG FLEXPEN 100 UNIT/ML injection   Used for:  Insulin controlled gestational diabetes mellitus (GDM) in third trimester, Abnormal maternal glucose tolerance, antepartum   Generic drug:  insulin aspart        4 units before each meal   Quantity:  6 mL   Refills:  1       omeprazole 40 MG capsule   Commonly known as:  priLOSEC   Used for:  Abdominal pain, generalized        Dose:  40 mg   Take 1 capsule (40 mg) by mouth  daily   Quantity:  30 capsule   Refills:  5       prenatal multivitamin plus iron 27-0.8 MG Tabs per tablet   Used for:  Pregnancy examination or test, positive result        Dose:  1 tablet   Take 1 tablet by mouth daily   Quantity:  100 tablet   Refills:  3       psyllium 28 % Pack   Commonly known as:  METAMUCIL SMOOTH TEXTURE   Used for:  Irritable bowel syndrome with both constipation and diarrhea        Dose:  1 Package   Take 1 Package by mouth daily   Quantity:  30 each   Refills:  11       * Notice:  This list has 2 medication(s) that are the same as other medications prescribed for you. Read the directions carefully, and ask your doctor or other care provider to review them with you.      CONTINUE these medicines which have NOT CHANGED        Dose / Directions    blood glucose monitoring lancets   Used for:  Abnormal maternal glucose tolerance, antepartum        Use to test blood sugar 4 times daily or as directed.  Ok to substitute alternative if insurance prefers.   Quantity:  200 each   Refills:  11       blood glucose monitoring test strip   Commonly known as:  CONTOUR NEXT TEST   Used for:  Abnormal maternal glucose tolerance, antepartum        Use to test blood sugar 4 times daily or as directed.  Ok to substitute alternative if insurance prefers.   Quantity:  150 strip   Refills:  11       insulin pen needle 31G X 6 MM   Commonly known as:  ULTICARE MINI   Used for:  Gestational diabetes mellitus, antepartum        Use 5 daily or as directed.   Quantity:  200 each   Refills:  3                Protect others around you: Learn how to safely use, store and throw away your medicines at www.disposemymeds.org.             Medication List: This is a list of all your medications and when to take them. Check marks below indicate your daily home schedule. Keep this list as a reference.      Medications           Morning Afternoon Evening Bedtime As Needed    blood glucose monitoring lancets   Use to test  blood sugar 4 times daily or as directed.  Ok to substitute alternative if insurance prefers.                                blood glucose monitoring test strip   Commonly known as:  CONTOUR NEXT TEST   Use to test blood sugar 4 times daily or as directed.  Ok to substitute alternative if insurance prefers.                                cetirizine 10 MG tablet   Commonly known as:  zyrTEC   Take 1 tablet (10 mg) by mouth At Bedtime                                cholecalciferol 5000 units Caps capsule   Commonly known as:  vitamin D3   Take 1 capsule (5,000 Units) by mouth daily Take one capsule daily.                                cyanocobalamin 1000 MCG tablet   Commonly known as:  vitamin  B-12   Take 1 tablet (1,000 mcg) by mouth daily                                ferrous sulfate 325 (65 Fe) MG tablet   Commonly known as:  IRON   Take 1 tablet (325 mg) by mouth daily (with breakfast)                                fluticasone 50 MCG/ACT spray   Commonly known as:  FLONASE   Spray 2 sprays into both nostrils daily                                * insulin  UNIT/ML injection   Commonly known as:  HumuLIN N/NovoLIN N   Inject 5 units in AM and 5 units PM                                * insulin isophane human 100 UNIT/ML injection   Commonly known as:  HumuLIN N PEN   Take 7 units in the morning and 8 units at bedtime                                insulin pen needle 31G X 6 MM   Commonly known as:  ULTICARE MINI   Use 5 daily or as directed.                                ketotifen 0.025 % Soln ophthalmic solution   Commonly known as:  ZADITOR/REFRESH ANTI-ITCH   Place 1 drop into both eyes 2 times daily                                lanolin ointment   Apply topically daily as needed for dry skin                                loratadine 10 MG tablet   Commonly known as:  CLARITIN   Take 1 tablet (10 mg) by mouth daily                                montelukast 10 MG tablet   Commonly known as:   SINGULAIR   Take 1 tablet (10 mg) by mouth At Bedtime                                NovoLOG FLEXPEN 100 UNIT/ML injection   4 units before each meal   Generic drug:  insulin aspart                                omeprazole 40 MG capsule   Commonly known as:  priLOSEC   Take 1 capsule (40 mg) by mouth daily                                prenatal multivitamin plus iron 27-0.8 MG Tabs per tablet   Take 1 tablet by mouth daily                                psyllium 28 % Pack   Commonly known as:  METAMUCIL SMOOTH TEXTURE   Take 1 Package by mouth daily                                * Notice:  This list has 2 medication(s) that are the same as other medications prescribed for you. Read the directions carefully, and ask your doctor or other care provider to review them with you.              More Information        Kick Counts    It s normal to worry about your baby s health. One way you can know your baby s doing well is to record the baby s movements once a day. This is called a kick count. Remember to take your kick count records to all your appointments with your healthcare provider.  How to count kicks  Here are tips for counting kicks:    Choose a time when the baby is active, such as after a meal.     Sit comfortably or lie on your side.     The first time the baby moves, write down the time.     Count each movement until the baby has moved 10 times. This can take from 20 minutes to 2 hours.     Try to do it at the same time each day.  When to call your healthcare provider  Call your healthcare provider right away if you notice any of the following:    Your baby moves fewer than 10 times in 2 hours while you re doing kick counts.    Your baby moves much less often than on the days before.    You have not felt your baby move all day.  Date Last Reviewed: 12/1/2017 2000-2017 [employer]. [address] [place] [address], [place] [address]. All rights reserved. This information is not intended as a  substitute for professional medical care. Always follow your healthcare professional's instructions.

## 2018-07-27 NOTE — PROGRESS NOTES
Patient seen for fetal external version. Patient did not need the version since fetus found to be vertex. Patient attested she fell down this morning at 1030 AM. She also report the presence of some vaginal fluid. Provider notified, amnisure sent and patient on continuous fetal and uterine monitoring for 4 hours. Will continue to monitor patient and notify the provider with changes.

## 2018-07-27 NOTE — DISCHARGE INSTRUCTIONS
Discharge Instruction for Undelivered Patients      You were seen for: Version then fall and membrane assessment  We Consulted: [facility] MDs  You had (Test or Medicine): Blood draw for abruption panel. ROM+, fetal and uterine monitoring, cervical exam     Diet:   Drink 8 to 12 glasses of liquids (milk, juice, water) every day.  You may eat meals and snacks.     Activity:  Count fetal kicks everyday (see handout)  Call your doctor or nurse midwife if your baby is moving less than usual.     Call your provider if you notice:  Swelling in your face or increased swelling in your hands or legs.  Headaches that are not relieved by Tylenol (acetaminophen).  Changes in your vision (blurring: seeing spots or stars.)  Nausea (sick to your stomach) and vomiting (throwing up).   Weight gain of 5 pounds or more per week.  Heartburn that doesn't go away.  Signs of bladder infection: pain when you urinate (use the toilet), need to go more often and more urgently.  The bag of davidson (rupture of membranes) breaks, or you notice leaking in your underwear.  Bright red blood in your underwear.  Abdominal (lower belly) or stomach pain.  For first baby: Contractions (tightening) less than 5 minutes apart for one hour or more.  Second (plus) baby: Contractions (tightening) less than 10 minutes apart and getting stronger.  *If less than 34 weeks: Contractions (tightenings) more than 6 times in one hour.  Increase or change in vaginal discharge (note the color and amount)    Follow-up:  As scheduled in the clinic

## 2018-07-27 NOTE — IP AVS SNAPSHOT
[facility]    [address]    [place] [address]    Phone:  [phone]                                       After Visit Summary   7/27/2018    Brit Romano    MRN: [id number]           After Visit Summary Signature Page     I have received my discharge instructions, and my questions have been answered. I have discussed any challenges I see with this plan with the nurse or doctor.    ..........................................................................................................................................  Patient/Patient Representative Signature      ..........................................................................................................................................  Patient Representative Print Name and Relationship to Patient    ..................................................               ................................................  Date                                            Time    ..........................................................................................................................................  Reviewed by Signature/Title    ...................................................              ..............................................  Date                                                            Time

## 2018-07-28 LAB — T PALLIDUM AB SER QL: NONREACTIVE

## 2018-07-30 ENCOUNTER — RADIANT APPOINTMENT (OUTPATIENT)
Dept: ULTRASOUND IMAGING | Facility: CLINIC | Age: 38
End: 2018-07-30
Attending: OBSTETRICS & GYNECOLOGY
Payer: COMMERCIAL

## 2018-07-30 DIAGNOSIS — O24.414 INSULIN CONTROLLED GESTATIONAL DIABETES MELLITUS (GDM) IN THIRD TRIMESTER: ICD-10-CM

## 2018-07-30 PROCEDURE — 76819 FETAL BIOPHYS PROFIL W/O NST: CPT

## 2018-07-31 ENCOUNTER — TELEPHONE (OUTPATIENT)
Dept: ENDOCRINOLOGY | Facility: CLINIC | Age: 38
End: 2018-07-31

## 2018-07-31 DIAGNOSIS — O24.414 INSULIN CONTROLLED GESTATIONAL DIABETES MELLITUS (GDM) IN THIRD TRIMESTER: Primary | ICD-10-CM

## 2018-07-31 LAB — HGB F BLD QL KLEIH BETKE: NORMAL

## 2018-07-31 RX ORDER — INSULIN DETEMIR 100 [IU]/ML
8 INJECTION, SOLUTION SUBCUTANEOUS DAILY
Qty: 3 ML | Refills: 1 | Status: ON HOLD | OUTPATIENT
Start: 2018-07-31 | End: 2018-08-04

## 2018-07-31 NOTE — TELEPHONE ENCOUNTER
Attempted to call Brit.  Left message on voicemail.  Email sent as well.  It does not look like Brit has followed up with Desiree Lopez as instructed. Chen Lundberg RN,CDE    E-mail to patient:    Jordan Perea,  I understand that you had some itching that you related to your NPH insulin.  There is another insulin we can order called Levemir.  It is a different type and should not cause that.  Are you still taking the Novolog before your meals?  Please let me know as soon as you can.    Thanks!  Chen

## 2018-07-31 NOTE — TELEPHONE ENCOUNTER
Did a URGENT PA over the  Phone for Levemir flextouch  Pen . I will call in the AM to  Make sure they act quickly .

## 2018-07-31 NOTE — TELEPHONE ENCOUNTER
[facility] Call Center    Phone Message    May a detailed message be left on voicemail: yes    Reason for Call: Other: Pt is experiencing itching since starting new insulin, so stopped taking it. Please call pt to discuss.     Action Taken: Message routed to:  [facility] Diabetes Endo

## 2018-07-31 NOTE — TELEPHONE ENCOUNTER
Danette,  I could see her tomorrow at 4:30 or 5:30pm.  I have left messages but if you hear from her first would you offer those times.  Chen

## 2018-07-31 NOTE — TELEPHONE ENCOUNTER
She tells me she stopped NPH 7/23/18 due to  Itching all over her body  . The itching is gone now . Her BS :  7/31/18   7/30/18  AM  117= lunch  173= 2hr post lunch   161  Due date 8/10 do you need to send in for levemir to pharmacy ? She tells me she was taking 9 units  Once daily of the NPH.

## 2018-08-01 ENCOUNTER — HOSPITAL ENCOUNTER (INPATIENT)
Facility: CLINIC | Age: 38
LOS: 3 days | Discharge: HOME HEALTH | End: 2018-08-04
Attending: OBSTETRICS & GYNECOLOGY | Admitting: OBSTETRICS & GYNECOLOGY
Payer: COMMERCIAL

## 2018-08-01 ENCOUNTER — OFFICE VISIT (OUTPATIENT)
Dept: OBGYN | Facility: CLINIC | Age: 38
End: 2018-08-01
Attending: OBSTETRICS & GYNECOLOGY
Payer: COMMERCIAL

## 2018-08-01 VITALS
BODY MASS INDEX: 36.49 KG/M2 | SYSTOLIC BLOOD PRESSURE: 119 MMHG | DIASTOLIC BLOOD PRESSURE: 82 MMHG | HEART RATE: 116 BPM | HEIGHT: 65 IN | WEIGHT: 219 LBS

## 2018-08-01 DIAGNOSIS — O09.529 SUPERVISION OF HIGH-RISK PREGNANCY OF ELDERLY MULTIGRAVIDA: Primary | ICD-10-CM

## 2018-08-01 PROBLEM — O36.8190 DECREASED FETAL MOVEMENT: Status: ACTIVE | Noted: 2018-08-01

## 2018-08-01 PROBLEM — Z36.89 ENCOUNTER FOR TRIAGE IN PREGNANT PATIENT: Status: ACTIVE | Noted: 2018-08-01

## 2018-08-01 LAB
ABO + RH BLD: NORMAL
ABO + RH BLD: NORMAL
BLD GP AB SCN SERPL QL: NORMAL
BLOOD BANK CMNT PATIENT-IMP: NORMAL
ERYTHROCYTE [DISTWIDTH] IN BLOOD BY AUTOMATED COUNT: 14.3 % (ref 10–15)
GLUCOSE BLDC GLUCOMTR-MCNC: 114 MG/DL (ref 70–99)
GLUCOSE BLDC GLUCOMTR-MCNC: 176 MG/DL (ref 70–99)
HCT VFR BLD AUTO: 33.5 % (ref 35–47)
HGB BLD-MCNC: 10.8 G/DL (ref 11.7–15.7)
MCH RBC QN AUTO: 26.2 PG (ref 26.5–33)
MCHC RBC AUTO-ENTMCNC: 32.2 G/DL (ref 31.5–36.5)
MCV RBC AUTO: 81 FL (ref 78–100)
PLATELET # BLD AUTO: 175 10E9/L (ref 150–450)
RBC # BLD AUTO: 4.13 10E12/L (ref 3.8–5.2)
SPECIMEN EXP DATE BLD: NORMAL
WBC # BLD AUTO: 10.2 10E9/L (ref 4–11)

## 2018-08-01 PROCEDURE — 85027 COMPLETE CBC AUTOMATED: CPT | Performed by: OBSTETRICS & GYNECOLOGY

## 2018-08-01 PROCEDURE — 00000146 ZZHCL STATISTIC GLUCOSE BY METER IP

## 2018-08-01 PROCEDURE — 59025 FETAL NON-STRESS TEST: CPT

## 2018-08-01 PROCEDURE — 86780 TREPONEMA PALLIDUM: CPT | Performed by: OBSTETRICS & GYNECOLOGY

## 2018-08-01 PROCEDURE — 86850 RBC ANTIBODY SCREEN: CPT | Performed by: OBSTETRICS & GYNECOLOGY

## 2018-08-01 PROCEDURE — 25000131 ZZH RX MED GY IP 250 OP 636 PS 637: Performed by: STUDENT IN AN ORGANIZED HEALTH CARE EDUCATION/TRAINING PROGRAM

## 2018-08-01 PROCEDURE — 12000030 ZZH R&B OB INTERMEDIATE UMMC

## 2018-08-01 PROCEDURE — 36415 COLL VENOUS BLD VENIPUNCTURE: CPT | Performed by: OBSTETRICS & GYNECOLOGY

## 2018-08-01 PROCEDURE — G0463 HOSPITAL OUTPT CLINIC VISIT: HCPCS | Mod: ZF

## 2018-08-01 PROCEDURE — G0463 HOSPITAL OUTPT CLINIC VISIT: HCPCS | Mod: 25

## 2018-08-01 PROCEDURE — 25000132 ZZH RX MED GY IP 250 OP 250 PS 637: Performed by: STUDENT IN AN ORGANIZED HEALTH CARE EDUCATION/TRAINING PROGRAM

## 2018-08-01 PROCEDURE — 86900 BLOOD TYPING SEROLOGIC ABO: CPT | Performed by: OBSTETRICS & GYNECOLOGY

## 2018-08-01 PROCEDURE — 86901 BLOOD TYPING SEROLOGIC RH(D): CPT | Performed by: OBSTETRICS & GYNECOLOGY

## 2018-08-01 RX ORDER — FLUTICASONE PROPIONATE 50 MCG
2 SPRAY, SUSPENSION (ML) NASAL DAILY
Status: DISCONTINUED | OUTPATIENT
Start: 2018-08-01 | End: 2018-08-04 | Stop reason: HOSPADM

## 2018-08-01 RX ORDER — NALOXONE HYDROCHLORIDE 0.4 MG/ML
.1-.4 INJECTION, SOLUTION INTRAMUSCULAR; INTRAVENOUS; SUBCUTANEOUS
Status: DISCONTINUED | OUTPATIENT
Start: 2018-08-01 | End: 2018-08-03

## 2018-08-01 RX ORDER — ONDANSETRON 2 MG/ML
4 INJECTION INTRAMUSCULAR; INTRAVENOUS EVERY 6 HOURS PRN
Status: DISCONTINUED | OUTPATIENT
Start: 2018-08-01 | End: 2018-08-03

## 2018-08-01 RX ORDER — DEXTROSE MONOHYDRATE 25 G/50ML
25-50 INJECTION, SOLUTION INTRAVENOUS
Status: DISCONTINUED | OUTPATIENT
Start: 2018-08-01 | End: 2018-08-04 | Stop reason: HOSPADM

## 2018-08-01 RX ORDER — OXYTOCIN 10 [USP'U]/ML
10 INJECTION, SOLUTION INTRAMUSCULAR; INTRAVENOUS
Status: DISCONTINUED | OUTPATIENT
Start: 2018-08-01 | End: 2018-08-03

## 2018-08-01 RX ORDER — MONTELUKAST SODIUM 10 MG/1
10 TABLET ORAL AT BEDTIME
Status: DISCONTINUED | OUTPATIENT
Start: 2018-08-01 | End: 2018-08-04 | Stop reason: HOSPADM

## 2018-08-01 RX ORDER — CARBOPROST TROMETHAMINE 250 UG/ML
250 INJECTION, SOLUTION INTRAMUSCULAR
Status: DISCONTINUED | OUTPATIENT
Start: 2018-08-01 | End: 2018-08-03

## 2018-08-01 RX ORDER — LIDOCAINE 40 MG/G
CREAM TOPICAL
Status: DISCONTINUED | OUTPATIENT
Start: 2018-08-01 | End: 2018-08-03

## 2018-08-01 RX ORDER — METHYLERGONOVINE MALEATE 0.2 MG/ML
200 INJECTION INTRAVENOUS
Status: DISCONTINUED | OUTPATIENT
Start: 2018-08-01 | End: 2018-08-03

## 2018-08-01 RX ORDER — NICOTINE POLACRILEX 4 MG
15-30 LOZENGE BUCCAL
Status: DISCONTINUED | OUTPATIENT
Start: 2018-08-01 | End: 2018-08-04 | Stop reason: HOSPADM

## 2018-08-01 RX ORDER — ACETAMINOPHEN 325 MG/1
650 TABLET ORAL EVERY 4 HOURS PRN
Status: DISCONTINUED | OUTPATIENT
Start: 2018-08-01 | End: 2018-08-03

## 2018-08-01 RX ORDER — OXYTOCIN/0.9 % SODIUM CHLORIDE 30/500 ML
100-340 PLASTIC BAG, INJECTION (ML) INTRAVENOUS CONTINUOUS PRN
Status: COMPLETED | OUTPATIENT
Start: 2018-08-01 | End: 2018-08-03

## 2018-08-01 RX ORDER — OXYCODONE AND ACETAMINOPHEN 5; 325 MG/1; MG/1
1 TABLET ORAL
Status: DISCONTINUED | OUTPATIENT
Start: 2018-08-01 | End: 2018-08-03

## 2018-08-01 RX ORDER — IBUPROFEN 800 MG/1
800 TABLET, FILM COATED ORAL
Status: COMPLETED | OUTPATIENT
Start: 2018-08-01 | End: 2018-08-03

## 2018-08-01 RX ORDER — NALBUPHINE HYDROCHLORIDE 10 MG/ML
10-20 INJECTION, SOLUTION INTRAMUSCULAR; INTRAVENOUS; SUBCUTANEOUS
Status: DISCONTINUED | OUTPATIENT
Start: 2018-08-01 | End: 2018-08-03

## 2018-08-01 RX ORDER — SODIUM CHLORIDE, SODIUM LACTATE, POTASSIUM CHLORIDE, CALCIUM CHLORIDE 600; 310; 30; 20 MG/100ML; MG/100ML; MG/100ML; MG/100ML
INJECTION, SOLUTION INTRAVENOUS CONTINUOUS
Status: DISCONTINUED | OUTPATIENT
Start: 2018-08-01 | End: 2018-08-03

## 2018-08-01 RX ORDER — MISOPROSTOL 100 UG/1
25 TABLET ORAL
Status: DISCONTINUED | OUTPATIENT
Start: 2018-08-01 | End: 2018-08-03

## 2018-08-01 RX ORDER — FENTANYL CITRATE 50 UG/ML
50-100 INJECTION, SOLUTION INTRAMUSCULAR; INTRAVENOUS
Status: DISCONTINUED | OUTPATIENT
Start: 2018-08-01 | End: 2018-08-03

## 2018-08-01 RX ADMIN — Medication 25 MCG: at 19:50

## 2018-08-01 RX ADMIN — Medication 25 MCG: at 22:00

## 2018-08-01 RX ADMIN — INSULIN ASPART 5 UNITS: 100 INJECTION, SOLUTION INTRAVENOUS; SUBCUTANEOUS at 20:26

## 2018-08-01 ASSESSMENT — PAIN SCALES - GENERAL: PAINLEVEL: NO PAIN (0)

## 2018-08-01 NOTE — PLAN OF CARE
Problem: Patient Care Overview  Goal: Plan of Care/Patient Progress Review  Data: Patient presented to [facility] at 1636.   Reason for maternal/fetal assessment per patient is Decreased Fetal Movement  .  Patient is a . Prenatal record reviewed.      Obstetric History       T8      L7     SAB0   TAB0   Ectopic0   Multiple0   Live Births7       # Outcome Date GA Lbr Jamal/2nd Weight Sex Delivery Anes PTL Lv   9 Current            8 Term 16 40w2d 04:29  00:17 3.52 kg (7 lb 12.2 oz) F Vag-Spont EPI N ROXANNA      Apgar1:  9                Apgar5: 9   7 Term 12    F    ROXANNA   6 Term 11    F   N ROXANNA   5 Term 09    M   N ROXANNA      Complications: GDM (gestational diabetes mellitus)   4 Term 07    M I.U. FETAL D  N FD   3 Term 05    M   N ROXANNA   2 Term 03    M   N ROXANNA   1 Term 12/10/00    M   N ROXANNA      Obstetric Comments   No PTL, of HTN. GDM x 2 diet controlled   . Medical history:   Past Medical History:   Diagnosis Date     Fatty liver      Fibromyalgia      History of gestational diabetes      Seasonal allergies    . Gestational Age 38w5d. VSS. Fetal movement decreased. Patient denies cramping, backache, vaginal discharge, pelvic pressure, UTI symptoms, GI problems, bloody show, vaginal bleeding, edema, headache, visual disturbances, epigastric or URQ pain, abdominal pain, rupture of membranes. Support persons friend present.  Action: Verbal consent for EFM. Triage assessment completed. EFM applied for NST. Uterine assessment toco. Fetal assessment: Presumed adequate fetal oxygenation documented (see flow record). Reactive NST.  Response: Dr. Diamond/Dr. Decker informed of pt arrival.

## 2018-08-01 NOTE — MR AVS SNAPSHOT
"              After Visit Summary   8/1/2018    Brit Romano    MRN: [id number]           Patient Information     Date Of Birth          1980        Visit Information        Provider Department      8/1/2018 3:30 PM Yamilet Gutierrez MD [facility]        Today's Diagnoses     Supervision of high-risk pregnancy of elderly multigravida, [facility] MD    -  1       Follow-ups after your visit        Your next 10 appointments already scheduled     Oct 19, 2018  8:50 AM CDT   (Arrive by 8:35 AM)   RETURN DIABETES with Junie Feng MD   [facility] ([facility])    [address]  [address] [place] [address] [phone]              Who to contact     Please call your clinic at [phone] to:    Ask questions about your health    Make or cancel appointments    Discuss your medicines    Learn about your test results    Speak to your doctor            Additional Information About Your Visit        Care EveryWhere ID     This is your Care EveryWhere ID. This could be used by other organizations to access your [place] medical records  [id number]        Your Vitals Were     Pulse Height Last Period BMI (Body Mass Index)          116 1.651 m (5' 5\") 11/03/2017 36.44 kg/m2         Blood Pressure from Last 3 Encounters:   08/02/18 144/63   08/01/18 119/82   07/25/18 121/82    Weight from Last 3 Encounters:   08/01/18 99.3 kg (219 lb)   08/01/18 99.3 kg (219 lb)   07/27/18 90.7 kg (200 lb)              Today, you had the following     No orders found for display       Primary Care Provider Office Phone # Fax #    MALACHI Mazariegos [facility] [phone] [address] [address]        Equal Access to Services     ZORAIDA MOLINA AH: Nasrin Razo, waearl luedward, qaybta kaalmaluz marina fonseca, pablo ruiz. So [facility] [phone].    ATENCIÓN: Si habla español, tiene a smith " disposición servicios gratuitos de asistencia lingüística. Jasmin martinez [phone].    We comply with applicable federal civil rights laws and [place] laws. We do not discriminate on the basis of race, color, national origin, age, disability, sex, sexual orientation, or gender identity.            Thank you!     Thank you for choosing [facility]  for your care. Our goal is always to provide you with excellent care. Hearing back from our patients is one way we can continue to improve our services. Please take a few minutes to complete the written survey that you may receive in the mail after your visit with us. Thank you!             Your Updated Medication List - Protect others around you: Learn how to safely use, store and throw away your medicines at www.disposemymeds.org.          This list is accurate as of 8/1/18  4:36 PM.  Always use your most recent med list.                   Brand Name Dispense Instructions for use Diagnosis    blood glucose monitoring lancets     200 each    Use to test blood sugar 4 times daily or as directed.  Ok to substitute alternative if insurance prefers.    Abnormal maternal glucose tolerance, antepartum       blood glucose monitoring test strip    CONTOUR NEXT TEST    150 strip    Use to test blood sugar 4 times daily or as directed.  Ok to substitute alternative if insurance prefers.    Abnormal maternal glucose tolerance, antepartum       cyanocobalamin 1000 MCG tablet    vitamin  B-12    30 tablet    Take 1 tablet (1,000 mcg) by mouth daily    Other iron deficiency anemia       fluticasone 50 MCG/ACT spray    FLONASE    1 Bottle    Spray 2 sprays into both nostrils daily    Allergic rhinitis due to dust mite, Chronic seasonal allergic rhinitis due to pollen, Allergic conjunctivitis of both eyes       * insulin  UNIT/ML injection    HumuLIN N/NovoLIN N    10 mL    Inject 5 units in AM and 5 units PM    Insulin controlled gestational diabetes mellitus  (GDM) in third trimester, Abnormal maternal glucose tolerance, antepartum       * insulin isophane human 100 UNIT/ML injection    HumuLIN N PEN    15 mL    Take 7 units in the morning and 8 units at bedtime    Insulin controlled gestational diabetes mellitus (GDM) in third trimester       insulin pen needle 31G X 6 MM    ULTICARE MINI    200 each    Use 5 daily or as directed.    Gestational diabetes mellitus, antepartum       ketotifen 0.025 % Soln ophthalmic solution    ZADITOR/REFRESH ANTI-ITCH    1 Bottle    Place 1 drop into both eyes 2 times daily    Allergic conjunctivitis of both eyes       lanolin ointment     28 g    Apply topically daily as needed for dry skin    Sore nipple       LEVEMIR FLEXPEN/FLEXTOUCH 100 UNIT/ML injection   Generic drug:  insulin detemir     3 mL    Inject 8 Units Subcutaneous daily    Insulin controlled gestational diabetes mellitus (GDM) in third trimester       montelukast 10 MG tablet    SINGULAIR    30 tablet    Take 1 tablet (10 mg) by mouth At Bedtime    Allergic rhinitis due to dust mite, Chronic seasonal allergic rhinitis due to pollen       NovoLOG FLEXPEN 100 UNIT/ML injection   Generic drug:  insulin aspart     6 mL    4 units before each meal    Insulin controlled gestational diabetes mellitus (GDM) in third trimester, Abnormal maternal glucose tolerance, antepartum       omeprazole 40 MG capsule    priLOSEC    30 capsule    Take 1 capsule (40 mg) by mouth daily    Abdominal pain, generalized       prenatal multivitamin plus iron 27-0.8 MG Tabs per tablet     100 tablet    Take 1 tablet by mouth daily    Pregnancy examination or test, positive result       psyllium 28 % Pack    METAMUCIL SMOOTH TEXTURE    30 each    Take 1 Package by mouth daily    Irritable bowel syndrome with both constipation and diarrhea       * Notice:  This list has 2 medication(s) that are the same as other medications prescribed for you. Read the directions carefully, and ask your doctor or  other care provider to review them with you.

## 2018-08-01 NOTE — H&P
"[facility] Labor and Delivery History and Physical    Brit Romano MRN# [id number]   Age: 38 year old YOB: 1980     Date of Admission: 2018    Primary care provider: Princess Hicks         CC:      Decreased fetal movement         HPI:     Brit Romano is a 38 year old  at 38w5d by LMP consistent with 13w3d US. She was seen in clinic today and mentioned decreased fetal movement over the past 3-4 days, so she was sent to triage for evaluation. There is some movement but it is not normal for this pregnancy or any of her others.     Otherwise she denies loss of fluid, vaginal bleeding, or regular contractions.  She mild HA, but denies vision changes, chest pain, shortness of breath, nausea, vomiting, or dysuria.      Pregnancy notable for:  IUP at 38w5d   GDMA2   Has been taking Novolog 5U with meals, Humulin 7U qAM and 9U at bedtime but stopped with \"itching\"   Reports fastings mostly 100s-120s, postprandials wide range 120s-200s with most values elevated  Vitamin D deficiency  AMA  History of IUFD  Resolved low lying placenta  Needs postpartum Pap test         Pregnancy history:     OBSTETRIC HISTORY:    Obstetric History       T8      L7     SAB0   TAB0   Ectopic0   Multiple0   Live Births7       # Outcome Date GA Lbr Jamal/2nd Weight Sex Delivery Anes PTL Lv   9 Current            8 Term 16 40w2d 04:29 / 00:17 3.52 kg (7 lb 12.2 oz) F Vag-Spont EPI N ROXANNA      Apgar1:  9                Apgar5: 9   7 Term 12    F    ROXANNA   6 Term 11    F   N ROXANNA   5 Term 09    M   N ROXANNA      Complications: GDM (gestational diabetes mellitus)   4 Term 07    M I.U. FETAL D  N FD   3 Term 05    M   N ROXANNA   2 Term 03    M   N ROXANNA   1 Term 12/10/00    M   N ROXANNA      Obstetric Comments   No PTL, of HTN. GDM x 2 diet controlled       Prenatal Labs:   Lab Results   Component Value Date    ABO A 2018    RH Pos " 07/27/2018    AS Neg 07/27/2018    HEPBANG Nonreactive 02/08/2018    CHPCRT Negative 02/08/2018    GCPCRT Negative 02/08/2018    TREPAB Negative 02/08/2018    HGB 11.0 (L) 07/27/2018       GBS Status:   Lab Results   Component Value Date    GBS Negative 07/16/2018       Active Problem List  Patient Active Problem List   Diagnosis     Mixed incontinence urge and stress (male)(female)     Lactose intolerance     Iron deficiency anemia      Fatty liver disease, nonalcoholic     History of gestational diabetes     Fibromyalgia     Vitamin B12 deficiency (non anemic)     Supervision of high-risk pregnancy of elderly multigravida, [facility]     Low-lying placenta in second trimester     Allergic rhinitis due to dust mite     Chronic seasonal allergic rhinitis due to pollen     Allergic conjunctivitis of both eyes     Insulin controlled gestational diabetes mellitus (GDM) in third trimester     Breech presentation, single or unspecified fetus     Unstable fetal lie     Encounter for triage in pregnant patient       Medication Prior to Admission  Prescriptions Prior to Admission   Medication Sig Dispense Refill Last Dose     fluticasone (FLONASE) 50 MCG/ACT spray Spray 2 sprays into both nostrils daily 1 Bottle 11 7/31/2018 at 2200     insulin NPH (HUMULIN N/NOVOLIN N) 100 UNIT/ML injection Inject 5 units in AM and 5 units PM 10 mL 1 8/1/2018 at Unknown time     montelukast (SINGULAIR) 10 MG tablet Take 1 tablet (10 mg) by mouth At Bedtime 30 tablet 11 7/31/2018 at 2200     omeprazole (PRILOSEC) 40 MG capsule Take 1 capsule (40 mg) by mouth daily 30 capsule 5 7/31/2018 at 2200     Prenatal Vit-Fe Fumarate-FA (PRENATAL MULTIVITAMIN PLUS IRON) 27-0.8 MG TABS per tablet Take 1 tablet by mouth daily 100 tablet 3 7/31/2018 at Unknown time     blood glucose monitoring (Maintenance Assistant MICROLET) lancets Use to test blood sugar 4 times daily or as directed.  Ok to substitute alternative if insurance prefers. 200 each 11 7/27/2018 at 1000      blood glucose monitoring (CONTOUR NEXT TEST) test strip Use to test blood sugar 4 times daily or as directed.  Ok to substitute alternative if insurance prefers. 150 strip 11 7/27/2018 at 1000     cyanocobalamin (VITAMIN  B-12) 1000 MCG tablet Take 1 tablet (1,000 mcg) by mouth daily (Patient not taking: Reported on 8/1/2018) 30 tablet 3 Not Taking     insulin isophane human (HUMULIN N PEN) 100 UNIT/ML injection Take 7 units in the morning and 8 units at bedtime 15 mL 11 Not Taking     insulin pen needle (ULTICARE MINI) 31G X 6 MM Use 5 daily or as directed. 200 each 3 Taking     ketotifen (ZADITOR/REFRESH ANTI-ITCH) 0.025 % SOLN ophthalmic solution Place 1 drop into both eyes 2 times daily (Patient not taking: Reported on 8/1/2018) 1 Bottle 11 Not Taking     lanolin ointment Apply topically daily as needed for dry skin 28 g 0 Unknown at Unknown time     LEVEMIR FLEXPEN/FLEXTOUCH 100 UNIT/ML soln Inject 8 Units Subcutaneous daily 3 mL 1      NOVOLOG FLEXPEN 100 UNIT/ML soln 4 units before each meal 6 mL 1 Taking     psyllium (METAMUCIL SMOOTH TEXTURE) 28 % PACK Take 1 Package by mouth daily (Patient not taking: Reported on 8/1/2018) 30 each 11 Not Taking           Maternal Past Medical History:     Past Medical History:   Diagnosis Date     Fatty liver      Fibromyalgia      History of gestational diabetes      Seasonal allergies                        Family History:     Family History   Problem Relation Age of Onset     Hypertension Mother      Diabetes Mother      KIDNEY DISEASE Mother      dialysis     Glaucoma Mother      Macular Degeneration No family hx of             Social History:     Social History     Social History     Marital status:      Spouse name: N/A     Number of children: N/A     Years of education: N/A     Occupational History     Housewife      Social History Main Topics     Smoking status: Never Smoker     Smokeless tobacco: Never Used     Alcohol use No     Drug use: No     Sexual  "activity: Yes     Partners: Male     Other Topics Concern     Not on file     Social History Narrative    How much exercise per week? none    How much calcium per day? diet       How much caffeine per day? none    How much vitamin D per day? Diet/supplement    Do you/your family wear seatbelts?  Yes    Do you/your family use safety helmets? No    Do you/your family use sunscreen? No    Do you/your family keep firearms in the home? No    Do you/your family have a smoke detector(s)? Yes        Do you feel safe in your home? Yes    Has anyone ever touched you in an unwanted manner? No     Explain     Cande Persaud, [facility]    2015        Review Maya Zamorano SMA    10/07/2016            Review of Systems:   Negative except as noted above in the HPI         Physical Exam:     Vitals:    18 1649   BP: 121/75   Weight: 99.3 kg (219 lb)   Height: 1.651 m (5' 5\")     Gen: Alert and oriented in NAD  Cardio: well perfused  Resp: CTAB   Abdomen: gravid, soft, nontender. EFW 7.5 lb  Cervix: C/L/H, soft, post  Presentation:Cephalic by BSUS, difficult on Leopold's  Fetal Heart Rate Tracin BPM, mod variability, accels present, no decels  Tocometer: occasional    Imaging:    Dating Ultrasound   GA: 13w3d      Single IUP, +Fetal cardiac activity   Fetal Anatomy Survey   GA: 18w3d      Single IUP, Fetal Anatomy WNL, 3 VC, Placenta: posterior   Growth   GA: 37w3d USEGA = 37 4/7 weeks.  EFW = 3,269 grams, 64 % for 37 weeks. Transverse lie, anterior placenta           Assessment:   Brit Romano is a 38 year old  at 38w5d by LMP consistent with 13w3d US admitted for for IOL in the setting of poorly controlled GDMA2 and decreased fetal movement.        Plan:     1. IOL  - Admit to labor and delivery for IOL. Plan PO misoprostol for cervical ripening. Discussed with Brit. She agrees to this plan.   - FWB: Cat 1, reactive; CEFM for decerased fetal movement and GDMA2  - GBS: neg, antibiotics not indicated  - Labs: T&S, " CBC, RPR ordered  - Imaging: as above anterior placenta; EFW 7.5 lb    2. GDMA2  For now, continued home insulin regimen as unfavorable and will be eating meals. Will start Q4h checks when uncomfortable and entering labor, then Q2h and later insulin drip protocol in active labor.     3. History of IUFD  Consider SW consult postpartum    Christine Decker MD PGY4  OB/Gyn  8/1/2018, 6:41 PM        I have seen and examined the patient without the resident. I agree with the history, physical, assessment and plan.   My findings:  Appears well  Temp: 97.9  F (36.6  C) Temp src: Oral BP: 134/84     Resp: 20        EFM category I   TOCO Q 5-7     Elyssa Dean

## 2018-08-01 NOTE — IP AVS SNAPSHOT
[facility]    [address] [address]    Phone:  [phone]                                       After Visit Summary   8/1/2018    Brit Romano    MRN: [id number]           After Visit Summary Signature Page     I have received my discharge instructions, and my questions have been answered. I have discussed any challenges I see with this plan with the nurse or doctor.    ..........................................................................................................................................  Patient/Patient Representative Signature      ..........................................................................................................................................  Patient Representative Print Name and Relationship to Patient    ..................................................               ................................................  Date                                            Time    ..........................................................................................................................................  Reviewed by Signature/Title    ...................................................              ..............................................  Date                                                            Time

## 2018-08-01 NOTE — LETTER
2018       RE: Brit Romano  [address] [address]     Dear Colleague,    Thank you for referring your patient, Brit Romano, to the [facility] at [facility]. Please see a copy of my visit note below.    [facility]  Return OB Visit    S:   Brit is feeling ok. Had fall prior to planned version last week, and was cephalic when she arrived. She was still cephalic at [facility] on Monday. She is having contractions up to every 10 minutes. She reports decreased fetal movement this week, and says today she has only felt her baby move twice. She has not been taking her Humalog, as it has been making her itch. She reports fasting BGs in the 110s and postprandials in the 130s over the past week. She has been in touch with endocrine who has ordered Levemir.  Denies vaginal bleeding, vaginal discharge, LOF.    O: LMP 2017  Weight gain: 15#    Gen: Well-appearing, NAD    FHR: 155-162    A/P:  Brit Romano is a 38 year old  at 38w5d by LMP c/w 13w3d ultrasound here for return OB visit.  1. Decreased fetal movement  - Recommended prolong monitoring in triage. Patient left here to go to triage.   - Consider IOL now, did not discuss with patient  2. GDMA2  - Poorly controlled  - IOL tonight vs at 39 weeks  -  Levemir if not delivered   3. Advanced maternal age  4. Unstable lie  - most recently cephalic, cephalic by Leopolds today, though feels unstable  5. Anemia  - on Fe supplementation, most recent Hgb 11  6. Vit D deficiency  7. Low lying placenta, now resolved  8. PNC: - Prenatal labs, Rh +, Rubella immune, ,  /223/144/112   - Immunizations: tdap declined    Recommend IOL tonight vs 39 weeks. Return to clinic in 1 week if not delivered.    Staffed with Dr. Emanuel Gutierrez MD  OBGYN PGY-2  2018, 4:02 PM    The Patient was seen in [facility] by SCOTT GUTIERREZ.  I reviewed the  history & exam. Assessment and plan were jointly made.    Leslie Castellanos MD

## 2018-08-01 NOTE — PROGRESS NOTES
[facility]  Return OB Visit    S:   Brit is feeling ok. Had fall prior to planned version last week, and was cephalic when she arrived. She was still cephalic at [facility] on Monday. She is having contractions up to every 10 minutes. She reports decreased fetal movement this week, and says today she has only felt her baby move twice. She has not been taking her Humalog, as it has been making her itch. She reports fasting BGs in the 110s and postprandials in the 130s over the past week. She has been in touch with endocrine who has ordered Levemir.  Denies vaginal bleeding, vaginal discharge, LOF.    O: LMP 2017  Weight gain: 15#    Gen: Well-appearing, NAD    FHR: 155-162    A/P:  Brit Romano is a 38 year old  at 38w5d by LMP c/w 13w3d ultrasound here for return OB visit.  1. Decreased fetal movement  - Recommended prolong monitoring in triage. Patient left here to go to triage.   - Consider IOL now, did not discuss with patient  2. GDMA2  - Poorly controlled  - IOL tonight vs at 39 weeks  -  Levemir if not delivered   3. Advanced maternal age  4. Unstable lie  - most recently cephalic, cephalic by Leopolds today, though feels unstable  5. Anemia  - on Fe supplementation, most recent Hgb 11  6. Vit D deficiency  7. Low lying placenta, now resolved  8. PNC: - Prenatal labs, Rh +, Rubella immune, ,  /223/144/112   - Immunizations: tdap declined    Recommend IOL tonight vs 39 weeks. Return to clinic in 1 week if not delivered.    Staffed with Dr. Emanuel Gutierrez MD  OBGYN PGY-2  2018, 4:02 PM    The Patient was seen in [facility] by SCOTT GUTIERREZ.  I reviewed the history & exam. Assessment and plan were jointly made.    Leslie Castellanos MD

## 2018-08-01 NOTE — IP AVS SNAPSHOT
MRN:[id number]                      After Visit Summary   8/1/2018    Brit Romano    MRN: [id number]           Thank you!     Thank you for choosing [facility] for your care. Our goal is always to provide you with excellent care. Hearing back from our patients is one way we can continue to improve our services. Please take a few minutes to complete the written survey that you may receive in the mail after you visit with us. Thank you!        Patient Information     Date Of Birth          1980        Designated Caregiver       Most Recent Value    Caregiver    Will someone help with your care after discharge? no      About your hospital stay     You were admitted on:  August 1, 2018 You last received care in the:  [facility]    You were discharged on:  August 4, 2018        Reason for your hospital stay       Maternity care                  Who to Call     For medical emergencies, please call 911.  For non-urgent questions about your medical care, please call your primary care provider or clinic, [phone]          Attending Provider     Provider Specialty    Diann Lyon MD OB/Gyn    Elyssa Dean MD OB/Gyn    Joce, Jacqueline Fink MD OB/Gyn       Primary Care Provider Office Phone # Fax #    Princess Knutson Lawrence Wang, MALACHI [facility] [phone]      After Care Instructions     Activity       Review discharge instructions            Diet       Resume previous diet            Discharge Instructions - Gestational diabetic patients       Gestational diabetic patients to follow up for fasting blood sugar and 2 hour 75gm glucose load at 6 weeks postpartum.            Discharge Instructions - Postpartum visit       Schedule postpartum visit with your provider and return to clinic in 6 weeks.                  Your next 10 appointments already scheduled     Oct 19, 2018  8:50 AM CDT   (Arrive by 8:35 AM)   RETURN DIABETES with Junie Feng MD   [facility] (M  [facility])    [address]  3rd Floor  [facility] [address] [phone]              Further instructions from your care team       Postpartum Vaginal Delivery Instructions    Activity       Ask family and friends for help when you need it.    Do not place anything in your vagina for 6 weeks.    You are not restricted on other activities, but take it easy for a few weeks to allow your body to recover from delivery.  You are able to do any activities you feel up to that point.    No driving until you have stopped taking your pain medications (usually two weeks after delivery).     Call your health care provider if you have any of these symptoms:       Increased pain, swelling, redness, or fluid around your stiches from an episiotomy or perineal tear.    A fever above 100.4 F (38 C) with or without chills when placing a thermometer under your tongue.    You soak a sanitary pad with blood within 1 hour, or you see blood clots larger than a golf ball.    Bleeding that lasts more than 6 weeks.    Vaginal discharge that smells bad.    Severe pain, cramping or tenderness in your lower belly area.    A need to urinate more frequently (use the toilet more often), more urgently (use the toilet very quickly), or it burns when you urinate.    Nausea and vomiting.    Redness, swelling or pain around a vein in your leg.    Problems breastfeeding or a red or painful area on your breast.    Chest pain and cough or are gasping for air.    Problems coping with sadness, anxiety, or depression.  If you have any concerns about hurting yourself or the baby, call your provider immediately.     You have questions or concerns after you return home.     Keep your hands clean:  Always wash your hands before touching your perineal area and stitches.  This helps reduce your risk of infection.  If your hands aren't dirty, you may use an alcohol hand-rub to clean your hands. Keep your nails clean and short.  "       Pending Results     No orders found from 7/30/2018 to 8/2/2018.            Statement of Approval     Ordered          08/04/18 1153  I have reviewed and agree with all the recommendations and orders detailed in this document.  EFFECTIVE NOW     Approved and electronically signed by:  Desiree Love MD             Admission Information     Date & Time Provider Department Dept. Phone    8/1/2018 Jacqueline Hobson MD [facility] [phone]      Your Vitals Were     Blood Pressure Temperature Respirations Height Weight Last Period    123/75 98.1  F (36.7  C) (Oral) 16 1.651 m (5' 5\") 99.3 kg (219 lb) 11/03/2017    Pulse Oximetry BMI (Body Mass Index)                100% 36.44 kg/m2          Care EveryWhere ID     This is your Care EveryWhere ID. This could be used by other organizations to access your [facility] medical records  [id number]        Equal Access to Services     [facility]ORAIDA [facility] : Hadii juan Razo, waearl dickey, lioybjonathon kaalmaluz marina fonseca, pablo snyder . So [facility] [phone].    ATENCIÓN: Si habla español, tiene a smith disposición servicios gratuitos de asistencia lingüística. Jasmin al [phone].    We comply with applicable federal civil rights laws and [place] laws. We do not discriminate on the basis of race, color, national origin, age, disability, sex, sexual orientation, or gender identity.               Review of your medicines      START taking        Dose / Directions    acetaminophen 325 MG tablet   Commonly known as:  TYLENOL        Dose:  650 mg   Take 2 tablets (650 mg) by mouth every 4 hours as needed for mild pain or fever (greater than or equal to 38? C /100.4? F (oral) or 38.5? C/ 101.4? F (core).)   Quantity:  30 tablet   Refills:  0       ibuprofen 800 MG tablet   Commonly known as:  ADVIL/MOTRIN        Dose:  800 mg   Take 1 tablet (800 mg) by mouth every 6 hours as needed for other (cramping)   Quantity:  30 tablet   Refills:  0    "    senna-docusate 8.6-50 MG per tablet   Commonly known as:  SENOKOT-S;PERICOLACE        Dose:  1 tablet   Take 1 tablet by mouth daily as needed for constipation   Quantity:  10 tablet   Refills:  0         CONTINUE these medicines which have NOT CHANGED        Dose / Directions    blood glucose monitoring lancets   Used for:  Abnormal maternal glucose tolerance, antepartum        Use to test blood sugar 4 times daily or as directed.  Ok to substitute alternative if insurance prefers.   Quantity:  200 each   Refills:  11       blood glucose monitoring test strip   Commonly known as:  CONTOUR NEXT TEST   Used for:  Abnormal maternal glucose tolerance, antepartum        Use to test blood sugar 4 times daily or as directed.  Ok to substitute alternative if insurance prefers.   Quantity:  150 strip   Refills:  11       cyanocobalamin 1000 MCG tablet   Commonly known as:  vitamin  B-12   Used for:  Other iron deficiency anemia        Dose:  1000 mcg   Take 1 tablet (1,000 mcg) by mouth daily   Quantity:  30 tablet   Refills:  3       fluticasone 50 MCG/ACT spray   Commonly known as:  FLONASE   Used for:  Allergic rhinitis due to dust mite, Chronic seasonal allergic rhinitis due to pollen, Allergic conjunctivitis of both eyes        Dose:  2 spray   Spray 2 sprays into both nostrils daily   Quantity:  1 Bottle   Refills:  11       insulin pen needle 31G X 6 MM   Commonly known as:  ULTICARE MINI   Used for:  Gestational diabetes mellitus, antepartum        Use 5 daily or as directed.   Quantity:  200 each   Refills:  3       ketotifen 0.025 % Soln ophthalmic solution   Commonly known as:  ZADITOR/REFRESH ANTI-ITCH   Used for:  Allergic conjunctivitis of both eyes        Dose:  1 drop   Place 1 drop into both eyes 2 times daily   Quantity:  1 Bottle   Refills:  11       lanolin ointment   Used for:  Sore nipple        Apply topically daily as needed for dry skin   Quantity:  28 g   Refills:  0       montelukast 10 MG  tablet   Commonly known as:  SINGULAIR   Used for:  Allergic rhinitis due to dust mite, Chronic seasonal allergic rhinitis due to pollen        Dose:  10 mg   Take 1 tablet (10 mg) by mouth At Bedtime   Quantity:  30 tablet   Refills:  11       omeprazole 40 MG capsule   Commonly known as:  priLOSEC   Used for:  Abdominal pain, generalized        Dose:  40 mg   Take 1 capsule (40 mg) by mouth daily   Quantity:  30 capsule   Refills:  5       prenatal multivitamin plus iron 27-0.8 MG Tabs per tablet   Used for:  Pregnancy examination or test, positive result        Dose:  1 tablet   Take 1 tablet by mouth daily   Quantity:  100 tablet   Refills:  3       psyllium 28 % Pack   Commonly known as:  METAMUCIL SMOOTH TEXTURE   Used for:  Irritable bowel syndrome with both constipation and diarrhea        Dose:  1 Package   Take 1 Package by mouth daily   Quantity:  30 each   Refills:  11         STOP taking     insulin isophane human 100 UNIT/ML injection   Commonly known as:  HumuLIN N PEN           insulin  UNIT/ML injection   Commonly known as:  HumuLIN N/NovoLIN N           LEVEMIR FLEXPEN/FLEXTOUCH 100 UNIT/ML injection   Generic drug:  insulin detemir           NovoLOG FLEXPEN 100 UNIT/ML injection   Generic drug:  insulin aspart                Where to get your medicines      These medications were sent to [facility] [place], [place] - [address]  [address] [address]     Phone:  [phone]     ibuprofen 800 MG tablet    senna-docusate 8.6-50 MG per tablet         Some of these will need a paper prescription and others can be bought over the counter. Ask your nurse if you have questions.     Bring a paper prescription for each of these medications     acetaminophen 325 MG tablet                Protect others around you: Learn how to safely use, store and throw away your medicines at www.disposemymeds.org.             Medication List: This is a list of all your  medications and when to take them. Check marks below indicate your daily home schedule. Keep this list as a reference.      Medications           Morning Afternoon Evening Bedtime As Needed    acetaminophen 325 MG tablet   Commonly known as:  TYLENOL   Take 2 tablets (650 mg) by mouth every 4 hours as needed for mild pain or fever (greater than or equal to 38? C /100.4? F (oral) or 38.5? C/ 101.4? F (core).)   Last time this was given:  650 mg on 8/4/2018  2:14 AM                                blood glucose monitoring lancets   Use to test blood sugar 4 times daily or as directed.  Ok to substitute alternative if insurance prefers.                                blood glucose monitoring test strip   Commonly known as:  CONTOUR NEXT TEST   Use to test blood sugar 4 times daily or as directed.  Ok to substitute alternative if insurance prefers.                                cyanocobalamin 1000 MCG tablet   Commonly known as:  vitamin  B-12   Take 1 tablet (1,000 mcg) by mouth daily                                fluticasone 50 MCG/ACT spray   Commonly known as:  FLONASE   Spray 2 sprays into both nostrils daily   Last time this was given:  2 sprays on 8/4/2018  9:16 AM                                ibuprofen 800 MG tablet   Commonly known as:  ADVIL/MOTRIN   Take 1 tablet (800 mg) by mouth every 6 hours as needed for other (cramping)   Last time this was given:  800 mg on 8/4/2018  4:47 AM                                insulin pen needle 31G X 6 MM   Commonly known as:  ULTICARE MINI   Use 5 daily or as directed.                                ketotifen 0.025 % Soln ophthalmic solution   Commonly known as:  ZADITOR/REFRESH ANTI-ITCH   Place 1 drop into both eyes 2 times daily                                lanolin ointment   Apply topically daily as needed for dry skin                                montelukast 10 MG tablet   Commonly known as:  SINGULAIR   Take 1 tablet (10 mg) by mouth At Bedtime   Last time  this was given:  10 mg on 8/3/2018 10:25 PM                                omeprazole 40 MG capsule   Commonly known as:  priLOSEC   Take 1 capsule (40 mg) by mouth daily   Last time this was given:  40 mg on 8/3/2018  8:23 PM                                prenatal multivitamin plus iron 27-0.8 MG Tabs per tablet   Take 1 tablet by mouth daily                                psyllium 28 % Pack   Commonly known as:  METAMUCIL SMOOTH TEXTURE   Take 1 Package by mouth daily                                senna-docusate 8.6-50 MG per tablet   Commonly known as:  SENOKOT-S;PERICOLACE   Take 1 tablet by mouth daily as needed for constipation   Last time this was given:  2 tablets on 8/4/2018  8:28 AM

## 2018-08-02 ENCOUNTER — ANESTHESIA EVENT (OUTPATIENT)
Dept: OBGYN | Facility: CLINIC | Age: 38
End: 2018-08-02
Payer: COMMERCIAL

## 2018-08-02 ENCOUNTER — ANESTHESIA (OUTPATIENT)
Dept: OBGYN | Facility: CLINIC | Age: 38
End: 2018-08-02
Payer: COMMERCIAL

## 2018-08-02 LAB
ALT SERPL W P-5'-P-CCNC: 7 U/L (ref 0–50)
AST SERPL W P-5'-P-CCNC: 6 U/L (ref 0–45)
CREAT SERPL-MCNC: 0.41 MG/DL (ref 0.52–1.04)
CREAT UR-MCNC: 18 MG/DL
GFR SERPL CREATININE-BSD FRML MDRD: >90 ML/MIN/1.7M2
GLUCOSE BLDC GLUCOMTR-MCNC: 115 MG/DL (ref 70–99)
GLUCOSE BLDC GLUCOMTR-MCNC: 121 MG/DL (ref 70–99)
GLUCOSE BLDC GLUCOMTR-MCNC: 122 MG/DL (ref 70–99)
GLUCOSE BLDC GLUCOMTR-MCNC: 135 MG/DL (ref 70–99)
GLUCOSE BLDC GLUCOMTR-MCNC: 147 MG/DL (ref 70–99)
GLUCOSE BLDC GLUCOMTR-MCNC: 170 MG/DL (ref 70–99)
GLUCOSE BLDC GLUCOMTR-MCNC: 82 MG/DL (ref 70–99)
GLUCOSE BLDC GLUCOMTR-MCNC: 94 MG/DL (ref 70–99)
GLUCOSE BLDC GLUCOMTR-MCNC: 94 MG/DL (ref 70–99)
HGB BLD-MCNC: 11 G/DL (ref 11.7–15.7)
PLATELET # BLD AUTO: 188 10E9/L (ref 150–450)
PROT UR-MCNC: <0.05 G/L
PROT/CREAT 24H UR: NORMAL G/G CR (ref 0–0.2)
T PALLIDUM AB SER QL: NONREACTIVE

## 2018-08-02 PROCEDURE — 25000132 ZZH RX MED GY IP 250 OP 250 PS 637: Performed by: STUDENT IN AN ORGANIZED HEALTH CARE EDUCATION/TRAINING PROGRAM

## 2018-08-02 PROCEDURE — 00000146 ZZHCL STATISTIC GLUCOSE BY METER IP

## 2018-08-02 PROCEDURE — 84450 TRANSFERASE (AST) (SGOT): CPT | Performed by: OBSTETRICS & GYNECOLOGY

## 2018-08-02 PROCEDURE — 85018 HEMOGLOBIN: CPT | Performed by: OBSTETRICS & GYNECOLOGY

## 2018-08-02 PROCEDURE — 25000128 H RX IP 250 OP 636: Performed by: STUDENT IN AN ORGANIZED HEALTH CARE EDUCATION/TRAINING PROGRAM

## 2018-08-02 PROCEDURE — 85049 AUTOMATED PLATELET COUNT: CPT | Performed by: OBSTETRICS & GYNECOLOGY

## 2018-08-02 PROCEDURE — 25000125 ZZHC RX 250: Performed by: STUDENT IN AN ORGANIZED HEALTH CARE EDUCATION/TRAINING PROGRAM

## 2018-08-02 PROCEDURE — 84460 ALANINE AMINO (ALT) (SGPT): CPT | Performed by: OBSTETRICS & GYNECOLOGY

## 2018-08-02 PROCEDURE — 3E0R3BZ INTRODUCTION OF ANESTHETIC AGENT INTO SPINAL CANAL, PERCUTANEOUS APPROACH: ICD-10-PCS | Performed by: STUDENT IN AN ORGANIZED HEALTH CARE EDUCATION/TRAINING PROGRAM

## 2018-08-02 PROCEDURE — 00HU33Z INSERTION OF INFUSION DEVICE INTO SPINAL CANAL, PERCUTANEOUS APPROACH: ICD-10-PCS | Performed by: STUDENT IN AN ORGANIZED HEALTH CARE EDUCATION/TRAINING PROGRAM

## 2018-08-02 PROCEDURE — 10907ZC DRAINAGE OF AMNIOTIC FLUID, THERAPEUTIC FROM PRODUCTS OF CONCEPTION, VIA NATURAL OR ARTIFICIAL OPENING: ICD-10-PCS | Performed by: OBSTETRICS & GYNECOLOGY

## 2018-08-02 PROCEDURE — 25000131 ZZH RX MED GY IP 250 OP 636 PS 637: Performed by: STUDENT IN AN ORGANIZED HEALTH CARE EDUCATION/TRAINING PROGRAM

## 2018-08-02 PROCEDURE — 84156 ASSAY OF PROTEIN URINE: CPT | Performed by: OBSTETRICS & GYNECOLOGY

## 2018-08-02 PROCEDURE — 12000030 ZZH R&B OB INTERMEDIATE UMMC

## 2018-08-02 PROCEDURE — 82565 ASSAY OF CREATININE: CPT | Performed by: OBSTETRICS & GYNECOLOGY

## 2018-08-02 PROCEDURE — 36415 COLL VENOUS BLD VENIPUNCTURE: CPT | Performed by: OBSTETRICS & GYNECOLOGY

## 2018-08-02 RX ORDER — FENTANYL/BUPIVACAINE/NS/PF 2-1250MCG
PLASTIC BAG, INJECTION (ML) INJECTION
Status: COMPLETED
Start: 2018-08-02 | End: 2018-08-03

## 2018-08-02 RX ORDER — SODIUM CHLORIDE 9 MG/ML
INJECTION, SOLUTION INTRAVENOUS CONTINUOUS
Status: DISCONTINUED | OUTPATIENT
Start: 2018-08-02 | End: 2018-08-03

## 2018-08-02 RX ORDER — EPHEDRINE SULFATE 50 MG/ML
INJECTION, SOLUTION INTRAMUSCULAR; INTRAVENOUS; SUBCUTANEOUS
Status: DISCONTINUED
Start: 2018-08-02 | End: 2018-08-03 | Stop reason: HOSPADM

## 2018-08-02 RX ORDER — OXYTOCIN/0.9 % SODIUM CHLORIDE 30/500 ML
1-24 PLASTIC BAG, INJECTION (ML) INTRAVENOUS CONTINUOUS
Status: DISCONTINUED | OUTPATIENT
Start: 2018-08-02 | End: 2018-08-03

## 2018-08-02 RX ORDER — LIDOCAINE HYDROCHLORIDE 10 MG/ML
INJECTION, SOLUTION EPIDURAL; INFILTRATION; INTRACAUDAL; PERINEURAL
Status: DISCONTINUED
Start: 2018-08-02 | End: 2018-08-03 | Stop reason: HOSPADM

## 2018-08-02 RX ORDER — NALOXONE HYDROCHLORIDE 0.4 MG/ML
.1-.4 INJECTION, SOLUTION INTRAMUSCULAR; INTRAVENOUS; SUBCUTANEOUS
Status: DISCONTINUED | OUTPATIENT
Start: 2018-08-02 | End: 2018-08-04 | Stop reason: HOSPADM

## 2018-08-02 RX ORDER — LIDOCAINE 40 MG/G
CREAM TOPICAL
Status: DISCONTINUED | OUTPATIENT
Start: 2018-08-02 | End: 2018-08-03

## 2018-08-02 RX ORDER — SODIUM CHLORIDE, SODIUM LACTATE, POTASSIUM CHLORIDE, CALCIUM CHLORIDE 600; 310; 30; 20 MG/100ML; MG/100ML; MG/100ML; MG/100ML
INJECTION, SOLUTION INTRAVENOUS CONTINUOUS
Status: DISCONTINUED | OUTPATIENT
Start: 2018-08-02 | End: 2018-08-03

## 2018-08-02 RX ORDER — MISOPROSTOL 200 UG/1
TABLET ORAL
Status: DISCONTINUED
Start: 2018-08-02 | End: 2018-08-03 | Stop reason: HOSPADM

## 2018-08-02 RX ORDER — NICOTINE POLACRILEX 4 MG
15-30 LOZENGE BUCCAL
Status: DISCONTINUED | OUTPATIENT
Start: 2018-08-02 | End: 2018-08-03

## 2018-08-02 RX ORDER — EPHEDRINE SULFATE 50 MG/ML
5 INJECTION, SOLUTION INTRAMUSCULAR; INTRAVENOUS; SUBCUTANEOUS
Status: DISCONTINUED | OUTPATIENT
Start: 2018-08-02 | End: 2018-08-04 | Stop reason: HOSPADM

## 2018-08-02 RX ORDER — DEXTROSE, SODIUM CHLORIDE, SODIUM LACTATE, POTASSIUM CHLORIDE, AND CALCIUM CHLORIDE 5; .6; .31; .03; .02 G/100ML; G/100ML; G/100ML; G/100ML; G/100ML
INJECTION, SOLUTION INTRAVENOUS CONTINUOUS
Status: DISCONTINUED | OUTPATIENT
Start: 2018-08-02 | End: 2018-08-03

## 2018-08-02 RX ORDER — NALBUPHINE HYDROCHLORIDE 10 MG/ML
2.5-5 INJECTION, SOLUTION INTRAMUSCULAR; INTRAVENOUS; SUBCUTANEOUS EVERY 6 HOURS PRN
Status: DISCONTINUED | OUTPATIENT
Start: 2018-08-02 | End: 2018-08-04 | Stop reason: HOSPADM

## 2018-08-02 RX ORDER — DEXTROSE MONOHYDRATE 25 G/50ML
25-50 INJECTION, SOLUTION INTRAVENOUS
Status: DISCONTINUED | OUTPATIENT
Start: 2018-08-02 | End: 2018-08-03

## 2018-08-02 RX ORDER — OXYTOCIN 10 [USP'U]/ML
INJECTION, SOLUTION INTRAMUSCULAR; INTRAVENOUS
Status: DISCONTINUED
Start: 2018-08-02 | End: 2018-08-03 | Stop reason: HOSPADM

## 2018-08-02 RX ADMIN — OMEPRAZOLE 40 MG: 20 CAPSULE, DELAYED RELEASE ORAL at 00:06

## 2018-08-02 RX ADMIN — SODIUM CHLORIDE, POTASSIUM CHLORIDE, SODIUM LACTATE AND CALCIUM CHLORIDE 500 ML: 600; 310; 30; 20 INJECTION, SOLUTION INTRAVENOUS at 03:33

## 2018-08-02 RX ADMIN — INSULIN ASPART 5 UNITS: 100 INJECTION, SOLUTION INTRAVENOUS; SUBCUTANEOUS at 08:22

## 2018-08-02 RX ADMIN — INSULIN DETEMIR 8 UNITS: 100 INJECTION, SOLUTION SUBCUTANEOUS at 08:15

## 2018-08-02 RX ADMIN — Medication 25 MCG: at 02:21

## 2018-08-02 RX ADMIN — MONTELUKAST SODIUM 10 MG: 10 TABLET, FILM COATED ORAL at 00:06

## 2018-08-02 RX ADMIN — HUMAN INSULIN: 100 INJECTION, SOLUTION SUBCUTANEOUS at 22:58

## 2018-08-02 RX ADMIN — MONTELUKAST SODIUM 10 MG: 10 TABLET, FILM COATED ORAL at 20:37

## 2018-08-02 RX ADMIN — INSULIN ASPART 5 UNITS: 100 INJECTION, SOLUTION INTRAVENOUS; SUBCUTANEOUS at 14:53

## 2018-08-02 RX ADMIN — Medication 4 ML: at 23:59

## 2018-08-02 RX ADMIN — SODIUM CHLORIDE, POTASSIUM CHLORIDE, SODIUM LACTATE AND CALCIUM CHLORIDE 1000 ML: 600; 310; 30; 20 INJECTION, SOLUTION INTRAVENOUS at 21:57

## 2018-08-02 RX ADMIN — FLUTICASONE PROPIONATE 2 SPRAY: 50 SPRAY, METERED NASAL at 00:06

## 2018-08-02 RX ADMIN — SODIUM CHLORIDE, POTASSIUM CHLORIDE, SODIUM LACTATE AND CALCIUM CHLORIDE 1000 ML: 600; 310; 30; 20 INJECTION, SOLUTION INTRAVENOUS at 12:52

## 2018-08-02 RX ADMIN — Medication 25 MCG: at 00:06

## 2018-08-02 RX ADMIN — SODIUM CHLORIDE: 9 INJECTION, SOLUTION INTRAVENOUS at 22:57

## 2018-08-02 RX ADMIN — OMEPRAZOLE 40 MG: 20 CAPSULE, DELAYED RELEASE ORAL at 20:37

## 2018-08-02 RX ADMIN — Medication 2 MILLI-UNITS/MIN: at 16:15

## 2018-08-02 RX ADMIN — Medication 25 MCG: at 04:48

## 2018-08-02 RX ADMIN — LIDOCAINE HYDROCHLORIDE,EPINEPHRINE BITARTRATE 2 ML: 15; .005 INJECTION, SOLUTION EPIDURAL; INFILTRATION; INTRACAUDAL; PERINEURAL at 23:50

## 2018-08-02 RX ADMIN — LIDOCAINE HYDROCHLORIDE,EPINEPHRINE BITARTRATE 3 ML: 15; .005 INJECTION, SOLUTION EPIDURAL; INFILTRATION; INTRACAUDAL; PERINEURAL at 23:46

## 2018-08-02 RX ADMIN — SODIUM CHLORIDE, POTASSIUM CHLORIDE, SODIUM LACTATE AND CALCIUM CHLORIDE 1000 ML: 600; 310; 30; 20 INJECTION, SOLUTION INTRAVENOUS at 12:50

## 2018-08-02 ASSESSMENT — ACTIVITIES OF DAILY LIVING (ADL)
RETIRED_COMMUNICATION: 0-->UNDERSTANDS/COMMUNICATES WITHOUT DIFFICULTY
WHICH_OF_THE_ABOVE_FUNCTIONAL_RISKS_HAD_A_RECENT_ONSET_OR_CHANGE?: FALL HISTORY
FALL_HISTORY_WITHIN_LAST_SIX_MONTHS: YES
NUMBER_OF_TIMES_PATIENT_HAS_FALLEN_WITHIN_LAST_SIX_MONTHS: 1
SWALLOWING: 0-->SWALLOWS FOODS/LIQUIDS WITHOUT DIFFICULTY
BATHING: 0-->INDEPENDENT
AMBULATION: 0-->INDEPENDENT
TRANSFERRING: 0-->INDEPENDENT
DRESS: 0-->INDEPENDENT
RETIRED_EATING: 0-->INDEPENDENT
TOILETING: 0-->INDEPENDENT
COGNITION: 0 - NO COGNITION ISSUES REPORTED

## 2018-08-02 NOTE — PROGRESS NOTES
[facility]  Labor Progress Note    Subjective:  Patient concerned that fetal position changed. Otherwise doing well this a.m. Comfortable with minimal uterine activity.    Objective:   Patient Vitals for the past 4 hrs:   BP   18 0638 141/61   18 0544 124/58     SVE: /-3    FHT: Baseline 150, moderate variability with periods of minimal, no accelerations, no decelerations  [place]: 4-5 contractions in 10 minutes    Assessment/Plan:  Ms. Brit Romano is a 38 year old  at 38w6d, admitted for IOL for poorly controlled GDMA2.      IOL: s/p received Miso x5. Cook catheter placed without difficulty. Plan for pitocin following cook. GBS neg.     FWB: Category II given periods of minimal variability. Continue to monitor closely. Cephalic by BSUS. EFW 7.5#  -recommend fluid bolus, position change, and O2 PRN     PNC: Rh pos, rubella immune, placenta anterior     GDMA2: For now, continued home insulin regimen as unfavorable and will be eating meals. Will start Q4h checks when uncomfortable and entering labor, then Q2h and later insulin drip protocol in active labor    Alicia Myhre, DO  OB/GYN Resident, PGY-2  2018 9:12 AM

## 2018-08-02 NOTE — PLAN OF CARE
Problem: Patient Care Overview  Goal: Plan of Care/Patient Progress Review  Outcome: No Change  Afebrile, VSS. Pt denies LOF, bldg. Feeling some mild cramping with ctx but not uncomfortable. Last SVE was FT/40/-4. Receiving PO Miso, 5 doses so far. Ctx q 1.5-3, mild, soft. FHR currently 150 w/minimal variability, +acels, 1 PD 3 min with walt into 130s, resolved spontaneously. Mostly mod variability overnight with occas period of minimal. Received 500 ml LR bolus x1 overnight. Plan to place cook catheter when cervix more favorable, eventually starting Pitocin. Continue to monitor, contact MD with questions/concerns.    Problem: Labor (Cervical Ripen, Induct, Augment) (Adult,Obstetrics,Pediatric)  Goal: Signs and Symptoms of Listed Potential Problems Will be Absent, Minimized or Managed (Labor)  Signs and symptoms of listed potential problems will be absent, minimized or managed by discharge/transition of care (reference Labor (Cervical Ripen, Induct, Augment) (Adult,Obstetrics,Pediatric) CPG).   Outcome: No Change  IOL for GDM. Getting PO Miso, 5 doses so far. Plan to place cook when cervix favorable.

## 2018-08-02 NOTE — PLAN OF CARE
Problem: Patient Care Overview  Goal: Individualization & Mutuality  Outcome: Therapy, progress toward functional goals as expected  The cervical ripening is continue as expected. See flow sheet for fetal and uterine monitoring. Patient had a Cook Catheter inserted this morning at 0827, it fail at 1325. Patient is comfortable and is coping with family support. Will continue to monitor labor and notify the provider with cahnges.

## 2018-08-02 NOTE — PROGRESS NOTES
Strip Review    FHT Baseline 155 bpm, moderate variability, accels present, no decels  [place]: Irregular ctx    A/P:  Category I, reactive. Continue EFM and toco. Continue PO miso, plan to recheck cervix before 4th dose to assess for Cook placement.     Elaine Camarena MD  Ob/Gyn PGY-2  08/01/18 11:57 PM

## 2018-08-02 NOTE — PROGRESS NOTES
[facility]  Labor Progress Note    Subjective:  Patient concerned that fetal position changed. Otherwise doing well this a.m. Comfortable with minimal uterine activity.    Objective:   No data found.    SVE: 50/-3    FHT: Baseline 150, moderate variability with periods of minimal, no accelerations, no decelerations  [place]: 4-5 contractions in 10 minutes    Assessment/Plan:  Ms. Brit Romano is a 38 year old  at 38w6d, admitted for IOL for poorly controlled GDMA2.      IOL: s/p received Miso x5. Cook catheter placed without difficulty. Plan for pitocin following cook. GBS neg.     FWB: Category II given periods of minimal variability. Continue to monitor closely. Cephalic by BSUS. EFW 7.5#  -recommend fluid bolus, position change, and O2 PRN     PNC: Rh pos, rubella immune, placenta anterior     GDMA2: For now, continued home insulin regimen as unfavorable and will be eating meals. Will start Q4h checks when uncomfortable and entering labor, then Q2h and later insulin drip protocol in active labor    Alicia Myhre, DO  OB/GYN Resident, PGY-2  2018 9:12 AM

## 2018-08-02 NOTE — PLAN OF CARE
Patient escorted to room 479 for admission for IOL Gestation diabetic who presented for decreased fetal movement.

## 2018-08-02 NOTE — PLAN OF CARE
"Problem: Patient Care Overview  Goal: Plan of Care/Patient Progress Review  Outcome: Therapy, progress toward functional goals is gradual  Labor Shift Note  Data: Contraction irregular, palpate mild. Fetal assessment category one.   Vitals:    18 1649 18 1949 18 2230   BP: 121/75 134/84 115/69   Resp:    Temp: 97.9  F (36.6  C) 97.9  F (36.6  C)    TempSrc: Oral Oral    Weight: 99.3 kg (219 lb)     Height: 1.651 m (5' 5\")     .  .  Leaking no amounts of  fluid.  Signs and symptoms of infection absent .  Blood pressures stable. Signs and symptoms of pre-eclampsia: absent, none.  Support person friend present.  Interventions: Continue uterine/fetal assessment continuous. Vital Signs per order set. Comfort measures repositioning.  Medicated for induction of labor with oral cytotec.  Plan: Anticipate . Provide labor/coping assistance as needed by patient and support person.  Observe for and notify care provider of indications of progressing labor, need for pain medications,  or signs of fetal/maternal compromise.      "

## 2018-08-02 NOTE — PROGRESS NOTES
Labor Progress Note    S:  Patient reports mild cramping, but overall comfortable.     O:   Patient Vitals for the past 4 hrs:   BP Temp Temp src Resp   18 0215 135/60 - - -   18 0110 124/81 - - -   18 0010 129/66 98.1  F (36.7  C) Oral 18     SVE: FT/40/-4    FHT: Baseline 150 bpm, minimal to moderate variability, no accelerations, no decelerations  [place]: contractions every 2-3 mins    A/P:  Ms. Brit Romano is a 38 year old  at 38w6d, admitted for IOL for poorly controlled GDMA2.     IOL: Just received Miso x4. Plan for cook placement when able followed by Pitocin when cervix is favorable. GBS neg.    FWB: Category II given periods of minimal variability. Continue to monitor closely. Cephalic by BSUS. EFW 7.5#    PNC: Rh pos, rubella immune, placenta anterior    GDMA2: For now, continued home insulin regimen as unfavorable and will be eating meals. Will start Q4h checks when uncomfortable and entering labor, then Q2h and later insulin drip protocol in active labor.     Elaine Camarena MD  Obstetrics and Gyncology, PGY-2  [phone] (OBGYN PGY2 Pager)

## 2018-08-02 NOTE — PROGRESS NOTES
[facility]  Labor Progress Note    Subjective:  Comfortable, barely feels contractions.     Objective:   Patient Vitals for the past 12 hrs:   BP Temp Temp src Resp   18 1531 121/64 97.7  F (36.5  C) - -   18 1431 144/63 - - -   18 1240 - 97.9  F (36.6  C) Oral 18   18 0638 141/61 - - -   18 0544 124/58 - - -   18 0448 128/60 - - -     SVE: 60/very high    FHT: Baseline 150, moderate variability (periods of minimal have resolved), accels present, no decels.   [place]: Irregular, q2-5 min contractions    Results for BRIT ROMANO (MRN [id number]) as of 2018 15:47   2018 22:31 2018 06:29 2018 08:15 2018 11:48 2018 14:46   Glucose 176 (H) 121 (H) 115 (H) 122 (H) 94       Assessment/Plan:  Ms. Brit Romano is a 38 year old  at 38w6d, admitted for IOL for poorly controlled GDMA2.      IOL: s/p received Miso x5, s/p Cook catheter. Start Pitocin now.      FWB: Currently category I reactive. Prior minimal variability. Continue to monitor closely. Cephalic by BSUS. EFW 7.5#  - Fluid bolus, position change, and O2 PRN     Mild range BP x2  - Will order HELLP labs including UP:Cr now.     PNC: Rh pos, rubella immune, placenta anterior, GBS neg     GDMA2: Grand multip with history of fast second stage, will start pre-protocol (q2h checks) for insulin drip now. As per protocol will start infusion of any value >120 or two >110. OK to keep eating for now.     Christine Decker MD PGY4  OB/Gyn  2018, 3:46 PM

## 2018-08-03 LAB
ALT SERPL W P-5'-P-CCNC: 6 U/L (ref 0–50)
AST SERPL W P-5'-P-CCNC: 8 U/L (ref 0–45)
CREAT SERPL-MCNC: 0.39 MG/DL (ref 0.52–1.04)
GFR SERPL CREATININE-BSD FRML MDRD: >90 ML/MIN/1.7M2
GLUCOSE BLDC GLUCOMTR-MCNC: 107 MG/DL (ref 70–99)
GLUCOSE BLDC GLUCOMTR-MCNC: 111 MG/DL (ref 70–99)
GLUCOSE BLDC GLUCOMTR-MCNC: 117 MG/DL (ref 70–99)
GLUCOSE BLDC GLUCOMTR-MCNC: 122 MG/DL (ref 70–99)
GLUCOSE BLDC GLUCOMTR-MCNC: 124 MG/DL (ref 70–99)
GLUCOSE BLDC GLUCOMTR-MCNC: 138 MG/DL (ref 70–99)
GLUCOSE BLDC GLUCOMTR-MCNC: 139 MG/DL (ref 70–99)
GLUCOSE BLDC GLUCOMTR-MCNC: 149 MG/DL (ref 70–99)
GLUCOSE BLDC GLUCOMTR-MCNC: 161 MG/DL (ref 70–99)
GLUCOSE BLDC GLUCOMTR-MCNC: 194 MG/DL (ref 70–99)
GLUCOSE BLDC GLUCOMTR-MCNC: 85 MG/DL (ref 70–99)
HGB BLD-MCNC: 10.1 G/DL (ref 11.7–15.7)
KETONES BLD-SCNC: 0.1 MMOL/L (ref 0–0.6)
PLATELET # BLD AUTO: 146 10E9/L (ref 150–450)

## 2018-08-03 PROCEDURE — 84460 ALANINE AMINO (ALT) (SGPT): CPT | Performed by: STUDENT IN AN ORGANIZED HEALTH CARE EDUCATION/TRAINING PROGRAM

## 2018-08-03 PROCEDURE — 85018 HEMOGLOBIN: CPT | Performed by: STUDENT IN AN ORGANIZED HEALTH CARE EDUCATION/TRAINING PROGRAM

## 2018-08-03 PROCEDURE — 25000132 ZZH RX MED GY IP 250 OP 250 PS 637: Performed by: STUDENT IN AN ORGANIZED HEALTH CARE EDUCATION/TRAINING PROGRAM

## 2018-08-03 PROCEDURE — 25000132 ZZH RX MED GY IP 250 OP 250 PS 637

## 2018-08-03 PROCEDURE — 84450 TRANSFERASE (AST) (SGOT): CPT | Performed by: STUDENT IN AN ORGANIZED HEALTH CARE EDUCATION/TRAINING PROGRAM

## 2018-08-03 PROCEDURE — 12000030 ZZH R&B OB INTERMEDIATE UMMC

## 2018-08-03 PROCEDURE — 36415 COLL VENOUS BLD VENIPUNCTURE: CPT | Performed by: STUDENT IN AN ORGANIZED HEALTH CARE EDUCATION/TRAINING PROGRAM

## 2018-08-03 PROCEDURE — 82565 ASSAY OF CREATININE: CPT | Performed by: STUDENT IN AN ORGANIZED HEALTH CARE EDUCATION/TRAINING PROGRAM

## 2018-08-03 PROCEDURE — 85049 AUTOMATED PLATELET COUNT: CPT | Performed by: STUDENT IN AN ORGANIZED HEALTH CARE EDUCATION/TRAINING PROGRAM

## 2018-08-03 PROCEDURE — 36415 COLL VENOUS BLD VENIPUNCTURE: CPT | Performed by: OBSTETRICS & GYNECOLOGY

## 2018-08-03 PROCEDURE — 82010 KETONE BODYS QUAN: CPT | Performed by: OBSTETRICS & GYNECOLOGY

## 2018-08-03 PROCEDURE — 00000146 ZZHCL STATISTIC GLUCOSE BY METER IP

## 2018-08-03 PROCEDURE — 25000125 ZZHC RX 250: Performed by: STUDENT IN AN ORGANIZED HEALTH CARE EDUCATION/TRAINING PROGRAM

## 2018-08-03 PROCEDURE — 25000128 H RX IP 250 OP 636: Performed by: STUDENT IN AN ORGANIZED HEALTH CARE EDUCATION/TRAINING PROGRAM

## 2018-08-03 PROCEDURE — 72200001 ZZH LABOR CARE VAGINAL DELIVERY SINGLE

## 2018-08-03 RX ORDER — BISACODYL 10 MG
10 SUPPOSITORY, RECTAL RECTAL DAILY PRN
Status: DISCONTINUED | OUTPATIENT
Start: 2018-08-05 | End: 2018-08-04 | Stop reason: HOSPADM

## 2018-08-03 RX ORDER — AMOXICILLIN 250 MG
1 CAPSULE ORAL 2 TIMES DAILY
Status: DISCONTINUED | OUTPATIENT
Start: 2018-08-03 | End: 2018-08-04 | Stop reason: HOSPADM

## 2018-08-03 RX ORDER — AMOXICILLIN 250 MG
2 CAPSULE ORAL 2 TIMES DAILY
Status: DISCONTINUED | OUTPATIENT
Start: 2018-08-03 | End: 2018-08-04 | Stop reason: HOSPADM

## 2018-08-03 RX ORDER — DEXTROSE MONOHYDRATE 25 G/50ML
25-50 INJECTION, SOLUTION INTRAVENOUS
Status: DISCONTINUED | OUTPATIENT
Start: 2018-08-03 | End: 2018-08-04 | Stop reason: HOSPADM

## 2018-08-03 RX ORDER — LANOLIN 100 %
OINTMENT (GRAM) TOPICAL
Status: DISCONTINUED | OUTPATIENT
Start: 2018-08-03 | End: 2018-08-04 | Stop reason: HOSPADM

## 2018-08-03 RX ORDER — NICOTINE POLACRILEX 4 MG
15-30 LOZENGE BUCCAL
Status: DISCONTINUED | OUTPATIENT
Start: 2018-08-03 | End: 2018-08-04 | Stop reason: HOSPADM

## 2018-08-03 RX ORDER — OXYTOCIN 10 [USP'U]/ML
10 INJECTION, SOLUTION INTRAMUSCULAR; INTRAVENOUS
Status: DISCONTINUED | OUTPATIENT
Start: 2018-08-03 | End: 2018-08-04 | Stop reason: HOSPADM

## 2018-08-03 RX ORDER — MISOPROSTOL 200 UG/1
800 TABLET ORAL ONCE
Status: DISCONTINUED | OUTPATIENT
Start: 2018-08-03 | End: 2018-08-04 | Stop reason: HOSPADM

## 2018-08-03 RX ORDER — OXYTOCIN/0.9 % SODIUM CHLORIDE 30/500 ML
100 PLASTIC BAG, INJECTION (ML) INTRAVENOUS CONTINUOUS
Status: DISCONTINUED | OUTPATIENT
Start: 2018-08-03 | End: 2018-08-04 | Stop reason: HOSPADM

## 2018-08-03 RX ORDER — IBUPROFEN 800 MG/1
800 TABLET, FILM COATED ORAL EVERY 6 HOURS PRN
Status: DISCONTINUED | OUTPATIENT
Start: 2018-08-03 | End: 2018-08-04 | Stop reason: HOSPADM

## 2018-08-03 RX ORDER — MISOPROSTOL 200 UG/1
800 TABLET ORAL
Status: DISCONTINUED | OUTPATIENT
Start: 2018-08-03 | End: 2018-08-04 | Stop reason: HOSPADM

## 2018-08-03 RX ORDER — MISOPROSTOL 200 UG/1
TABLET ORAL
Status: COMPLETED
Start: 2018-08-03 | End: 2018-08-03

## 2018-08-03 RX ORDER — HYDROCORTISONE 2.5 %
CREAM (GRAM) TOPICAL 3 TIMES DAILY PRN
Status: DISCONTINUED | OUTPATIENT
Start: 2018-08-03 | End: 2018-08-04 | Stop reason: HOSPADM

## 2018-08-03 RX ORDER — NALOXONE HYDROCHLORIDE 0.4 MG/ML
.1-.4 INJECTION, SOLUTION INTRAMUSCULAR; INTRAVENOUS; SUBCUTANEOUS
Status: DISCONTINUED | OUTPATIENT
Start: 2018-08-03 | End: 2018-08-04 | Stop reason: HOSPADM

## 2018-08-03 RX ORDER — OXYTOCIN/0.9 % SODIUM CHLORIDE 30/500 ML
340 PLASTIC BAG, INJECTION (ML) INTRAVENOUS CONTINUOUS PRN
Status: DISCONTINUED | OUTPATIENT
Start: 2018-08-03 | End: 2018-08-04 | Stop reason: HOSPADM

## 2018-08-03 RX ORDER — LIDOCAINE HYDROCHLORIDE AND EPINEPHRINE 15; 5 MG/ML; UG/ML
INJECTION, SOLUTION EPIDURAL PRN
Status: DISCONTINUED | OUTPATIENT
Start: 2018-08-02 | End: 2018-08-04 | Stop reason: HOSPADM

## 2018-08-03 RX ORDER — ACETAMINOPHEN 325 MG/1
650 TABLET ORAL EVERY 4 HOURS PRN
Status: DISCONTINUED | OUTPATIENT
Start: 2018-08-03 | End: 2018-08-04 | Stop reason: HOSPADM

## 2018-08-03 RX ADMIN — SODIUM CHLORIDE, POTASSIUM CHLORIDE, SODIUM LACTATE AND CALCIUM CHLORIDE 1000 ML: 600; 310; 30; 20 INJECTION, SOLUTION INTRAVENOUS at 01:37

## 2018-08-03 RX ADMIN — Medication 100 ML/HR: at 08:57

## 2018-08-03 RX ADMIN — Medication 4 ML: at 00:03

## 2018-08-03 RX ADMIN — IBUPROFEN 800 MG: 800 TABLET, FILM COATED ORAL at 09:33

## 2018-08-03 RX ADMIN — SODIUM CHLORIDE, POTASSIUM CHLORIDE, SODIUM LACTATE AND CALCIUM CHLORIDE 1000 ML: 600; 310; 30; 20 INJECTION, SOLUTION INTRAVENOUS at 07:45

## 2018-08-03 RX ADMIN — IBUPROFEN 800 MG: 800 TABLET, FILM COATED ORAL at 15:46

## 2018-08-03 RX ADMIN — ACETAMINOPHEN 650 MG: 325 TABLET, FILM COATED ORAL at 20:23

## 2018-08-03 RX ADMIN — SODIUM CHLORIDE, POTASSIUM CHLORIDE, SODIUM LACTATE AND CALCIUM CHLORIDE 1000 ML: 600; 310; 30; 20 INJECTION, SOLUTION INTRAVENOUS at 07:30

## 2018-08-03 RX ADMIN — IBUPROFEN 800 MG: 800 TABLET, FILM COATED ORAL at 22:25

## 2018-08-03 RX ADMIN — SENNOSIDES AND DOCUSATE SODIUM 2 TABLET: 8.6; 5 TABLET ORAL at 20:23

## 2018-08-03 RX ADMIN — Medication 10 ML/HR: at 00:03

## 2018-08-03 RX ADMIN — MISOPROSTOL 800 MCG: 200 TABLET ORAL at 08:20

## 2018-08-03 RX ADMIN — MONTELUKAST SODIUM 10 MG: 10 TABLET, FILM COATED ORAL at 22:25

## 2018-08-03 RX ADMIN — OMEPRAZOLE 40 MG: 20 CAPSULE, DELAYED RELEASE ORAL at 20:23

## 2018-08-03 NOTE — PROGRESS NOTES
[facility]  Labor Progress Note    Subjective:  Comfortable after epidural placement.     Objective:   Patient Vitals for the past 12 hrs:   BP Temp Temp src Resp SpO2   18 0034 142/80 - - - 100 %   18 0026 133/70 - - - 100 %   18 0019 136/74 - - - -   18 0011 123/70 - - - -   18 0007 130/88 - - - 100 %   18 0003 128/76 - - - -   18 0001 146/70 - - - -   18 2359 145/83 - - - -   18 2357 150/89 - - - 100 %   18 2355 155/90 - - - -   18 154/85 - - - 100 %   186 142/89 - - - -   18 2039 139/78 - - - -   18 - 98.5  F (36.9  C) Oral - -   18 1900 130/64 - - - -   18 1730 112/64 - - - -   18 1655 114/57 - - 18 -   18 1531 121/64 97.7  F (36.5  C) Oral 18 -   18 1431 144/63 - - - -     SVE: 5/-3, leaking clear fluid    FHT: Baseline 150, moderate variability, accels present, no decels.   [place]: q2-3 min contractions      Recent Labs  Lab 18  0008 18  2254 18  2150 18  1942 18  1856 18  1652   * 147* 82 94 135* 170*       Assessment/Plan:  Ms. Brit Romano is a 38 year old  at 38w6d, admitted for IOL for poorly controlled GDMA2.      IOL: s/p received Miso x5, s/p Cook catheter. Pitocin currently at 18 naya-units/min. S/p AROM for clear fluid. Anticipate .     FWB: Category I, reactive. Continue to monitor closely. Cephalic by BSUS. EFW 7.5#     Gestational hypertension:  - HELLP labs wnl, UPC <0.2    PNC: Rh pos, rubella immune, placenta anterior, GBS neg     GDMA2: Insulin gtt for BG's >120. q1h BG.    Elaine Camarena MD  Ob/Gyn PGY-2  18 12:53 AM

## 2018-08-03 NOTE — PLAN OF CARE
Problem: Patient Care Overview  Goal: Plan of Care/Patient Progress Review  Outcome: Therapy, progress toward functional goals as expected  Insulin drip initiated on evening shift. BG in results review. Rechecking insulin q1hr and titrating as appropriate.   Patient given epidural for pain relief with excellent results; c/o occasional pressure but able to move well per self and sleep during IOL.  Pitocin induction with IUPC placed per dr mai, leaking clear fluid, no bloody show noted  Patient understands to notify nurse if increase in rectal/pelvic pressure and/or urge to push. Anticipate .

## 2018-08-03 NOTE — PLAN OF CARE
Problem: Patient Care Overview  Goal: Plan of Care/Patient Progress Review  Outcome: No Change  Data: Brit Romano stable.  BPs continue to be elevated.  Other VSS.  Bleeding small.  QBL 355ml.  Fundus firm, midline, U/U.  Small amount of bleeding.  Pitocin infusing.  800mcg miso given rectally after delivery.  Baby skin to skin at delivery, patient breast feeding baby.  Insulin gtt turned off after delivery (see MAR).  BG WNL.  Transferred to [facility] via wheelchair. Baby transferred via parent's arms.  Action: Plan per providers is to draw pre-eclampsia labs due to elevated BPs.  Continue with routine postpartum cares per orders.  Receiving unit notified of transfer: Yes. Patient and family notified of room change. Report given to Courtney at bedside. Belongings sent to receiving unit. Accompanied by Registered Nurse. Oriented patient to surroundings. Call light within reach. ID bands double-checked with receiving RN.  Response: Patient tolerated transfer and is stable.

## 2018-08-03 NOTE — PROGRESS NOTES
[facility]  Labor Progress Note    Subjective:  Comfortable, feels mild contractions    Objective:   Patient Vitals for the past 12 hrs:   BP Temp Temp src Resp   18 2039 139/78 - - -   18 2000 - 98.5  F (36.9  C) Oral -   18 1900 130/64 - - -   18 1730 112/64 - - -   18 1655 114/57 - - 18   18 1531 121/64 97.7  F (36.5  C) Oral 18   18 1431 144/63 - - -   18 1240 - 97.9  F (36.6  C) Oral 18     SVE: 4/60/-3, AROM performed with fundal pressure for clear fluid    FHT: Baseline 140, moderate variability, accels present, no decels.   [place]: Irregular, q2-4 min contractions      Recent Labs  Lab 18  1942 18  1856 18  1652 18  1446 18  1148 18  0815   BGM 94 135* 170* 94 122* 115*   Last BG 82    Assessment/Plan:  Ms. Brit Romano is a 38 year old  at 38w6d, admitted for IOL for poorly controlled GDMA2.      IOL: s/p received Miso x5, s/p Cook catheter. Pitocin currently at 10 naya-units/min. AROM performed for clear fluid. Anticipate .     FWB: Category I, reactive, minimal variability resolved. Continue to monitor closely. Cephalic by BSUS. EFW 7.5#  - Fluid bolus, position change, and O2 PRN     Mild range BP x2  - HELLP labs wnl, UPC <0.2    PNC: Rh pos, rubella immune, placenta anterior, GBS neg     GDMA2: Grand multip with history of fast second stage, will start pre-protocol (q2h checks) for insulin drip prn. As per protocol will start infusion of any value >120 or two >110. OK to keep eating for now.     Elaine Camarena MD  Ob/Gyn PGY-2  18 10:04 PM

## 2018-08-03 NOTE — L&D DELIVERY NOTE
Delivery Note:     Brit Romano is a 38 year old now  who presented at 39w0d for induction of labor for poorly controlled GDMA2.  Pregnancy complications included poorly controlled GDMA2 and history of IUFD at term, unstable fetal lie, and gestational HTN. Induction of labor course uncomplicated. Cervical ripening included misoprostol x 4 and cook catheter. Augmentation included pitocin and AROM. She progressed to complete and pushed for 36 minutes and had a spontaneous vaginal delivery of a viable female infant in OA position. No nuchal cord was noted.  Apgars of 9 and 9 with weight of 3270 grams.  The cord was double clamped after 60 seconds and cut.  A cord segment and cord blood were obtained.  The placenta was then delivered using gentle traction and suprapubic pressure.  The uterus was noted to be firm after 30U of IV pitocin and fundal massage. 800mcg of prophylactic misoprostol was administered per rectum.  The perineum was assessed for lacerations and a minor perineal abrasion not requiring repair was noted.     Total EBL was 200cc.  The placenta appeared intact with a 3V umbilical cord.  Dr. Hobson was present for the entire procedure.     Alicia Myhre, DO  OB/GYN Resident, PGY-2  8/3/2018 3:55 PM      Staff:  I was scrubbed and present for entire case and agree w/ above note.    Jacqueline Hobson MD           Delivery Summary - No Pete  Delivery Summary    Brit Romano MRN# [id number]   Age: 38 year old YOB: 1980     Labor Event Times:    Labor Onset Date       Labor Onset Time    Dilation Complete Date    Dilation Complete Time       Start Pushing Date        Start Pushing Time            Labor Length:    1st Stage (hrs/min) 7.00 36.00   2nd Stage (hrs/min) 0.00 36.00   3rd Stage (hrs/min) 0.00 6.00       Labor Events:     Labor No   Rupture Date     Rupture Time     Rupture Type Artificial Rupture of Membranes   Fluid Color     Labor Type     Induction    Induction  "Indication         Augmentation    Labor Complications     Additional Complications     Management of Labor        Antibiotics     IV Antibiotic Given     Additional Management     Fetal Status Prior to  Delivery     Fetal Status Comments         Cervical Ripening:    Date     Time     Type         Delivery:    Episiotomy None   Local Anesthetic        Lacerations None   Sponge Count Correct       Needle Count Correct     Final Count by:    Sutures     Blood loss (ml)    Packing Intentionally Left In     Number     Comments           Information for the patient's :  Fernanda Romano [[id number]]       Delivery  8/3/2018 8:12 AM by     Sex:  female Gestational Age: 39w0d  Delivery Clinician:     Living?:            APGARS  One minute Five minutes Ten minutes   Skin color:            Heart rate:            Grimace:            Muscle tone:            Breathing:            Totals: 9  9         Presentation/position:           Resuscitation and Interventions: Method:  None  Oxygen Type:     Intubation Time:   # of Attempts:     ETT Size:        Tracheal Suction:     Tracheal returns:       Care at Delivery:  Baby brought to mother's chest after birth where baby was dried and she cried spontaneously. Vigorous at birth. VS WNL. No further interventions needed.         Cord information:     Disposition of cord blood:      Blood gases sent?    Complications:     Placenta: Delivered:           appearance.  Comments:  .  Disposition:     Measurements:  Weight: 7 lb 3.3 oz (3270 g)  Height: 20.5\"  Head circumference: 34.3 cm  Chest circumference:     Temperature:     Other providers:       Additional  information:  Forceps:    Verbal Informed Consent Obtained:       Alternative Labor Strategies Discussed:     Emergency Resources Available:       Type:       Accrued Pulling Time:       # of Pulls:      Position:     Fetal Station:       Indications:      Other Indications:     Operative Vaginal Delivery " Brief Note Forceps:        Vacuum:    Verbal Informed Consent Obtained:     Alternative Labor Strategies Discussed:     Emergency Resources Available:     Type:      Accrued Pulling Time:       # of Pop-Offs:       # of Pulls:       Position:     Fetal Station:      Indications for Vacuum:       Other Indications:    Operative Vaginal Delivery Brief Note Vacuum:        Shoulder Dystocia Shoulder Dystocia    Fetal Tracing Prior to Delivery:  Category 1   Shoulder dystocia present?:  No                                            Breech:       : Type:     Indications for Primary:     Indications for Secondary:     Other Indications:        Observed anomalies     Output in Delivery Room: Stool

## 2018-08-03 NOTE — PROGRESS NOTES
[facility]  Labor Progress Note    Subjective:  Comfortable, not feeling contractions.     Objective:   Patient Vitals for the past 12 hrs:   BP Temp Temp src Resp SpO2   18 0430 - - - 18 100 %   18 0400 127/78 - - 18 99 %   18 0300 - 98.1  F (36.7  C) - - 99 %   18 0245 129/76 - - 18 99 %   18 0215 130/78 - - 18 98 %   18 0145 125/85 98.2  F (36.8  C) Axillary 18 99 %   18 0115 133/80 - - 18 99 %   18 0040 141/85 - - 18 100 %   18 0034 142/80 - - - 100 %   18 0026 133/70 - - - 100 %   18 0019 136/74 - - - -   18 0011 123/70 - - - -   18 0007 130/88 - - - 100 %   18 0003 128/76 - - - -   18 0001 146/70 - - - -   18 2359 145/83 - - - -   18 2357 150/89 - - - 100 %   18 2355 155/90 - - - -   18 2353 154/85 - - - 100 %   18 2335 - - - 18 100 %   18 2206 142/89 - - - -   18 2039 139/78 - - - -   18 - 98.5  F (36.9  C) Oral - -   18 1900 130/64 - - - -   18 1730 112/64 - - - -     SVE: /-1, leaking clear fluid, IUPC placed without difficulty    FHT: Baseline 140, minimal to moderate variability, no accels, no decels.   [place]: q2-3 min contractions      Recent Labs  Lab 18  0402 18  0302 18  0206 18  0117 18  0008 18  2254   * 149* 138* 194* 161* 147*       Assessment/Plan:  Ms. Brit Romano is a 38 year old  at 38w6d, admitted for IOL for poorly controlled GDMA2.      IOL: s/p received Miso x5, s/p Cook catheter. Pitocin currently at 18 naya-units/min. S/p AROM for clear fluid. IUPC placed given no cervical change x4 hours. Anticipate .     FWB: Category II given periods of minimal variability. Continue to monitor closely. Cephalic by BSUS. EFW 7.5#     Gestational hypertension:  - HELLP labs wnl, UPC <0.2    PNC: Rh pos, rubella immune, placenta anterior, GBS neg     GDMA2: Insulin  gtt for BG's >120. q1h BG.    Elaine Camarena MD  Ob/Gyn PGY-2  08/02/18 5:01 AM        Name band;

## 2018-08-03 NOTE — PROVIDER NOTIFICATION
08/03/18 0730   Provider Notification   Provider Name/Title G2   Method of Notification Electronic Page   Request Evaluate in Person   Notification Reason Patient Request;SVE  (feeling pressure)

## 2018-08-03 NOTE — ANESTHESIA PROCEDURE NOTES
Epidural Procedure Note    Staff:     Anesthesiologist:  BRENNEN MCKAY  Location: OB     Procedure start time:  8/2/2018 11:35 PM     Procedure end time:  8/3/2018 12:05 AM   Pre-procedure checklist:   patient identified, IV checked, site marked, risks and benefits discussed, informed consent, monitors and equipment checked, pre-op evaluation, at physician/surgeon's request and post-op pain management      Correct Patient: Yes      Correct Position: Yes      Correct Site: Yes      Correct Procedure: Yes      Correct Laterality:  Yes    Site Marked:  Yes  Procedure:     Procedure:  Epidural catheter    ASA:  2 and Emergent    Diagnosis:  Labor pain    Position:  Sitting    Sterile Prep: chloraprep, mask, sterile gloves and patient draped      Insertion site:  L2-3    Local skin infiltration:  1% lidocaine    amount (mL):  3    Approach:  Midline    Needle gauge (G):  17    Needle Length (in):  3.5    Block Needle Type:  Touhy    Injection Technique:  LORT saline    VANITA at (cm):  5.5    Attempts:  1    Redirects:  0    Catheter gauge (G):  19    Catheter threaded easily: Yes      Threaded to cm at skin:  10    Threaded in epidural space (cm):  4.5    Paresthesias:  No    Aspiration negative for Heme or CSF: Yes      Test dose (mL):  3     Local anesthetic:  Lidocaine 1.5% w/ 1:200,000 epinephrine    Test dose time:  23:46    Test dose negative for signs of intravascular, subdural or intrathecal injection: Yes

## 2018-08-03 NOTE — PROVIDER NOTIFICATION
Spoke with G2 Myhre to clarify BG monitoring. Patient will be placed on a sliding scale with monitoring of BG before meals.

## 2018-08-03 NOTE — ANESTHESIA PREPROCEDURE EVALUATION
Anesthesia Plan      History & Physical Review  History and physical reviewed and following examination; no interval change.    ASA Status:  2 emergent.    NPO Status:  > 8 hours    Plan for Epidural          Postoperative Care  Postoperative pain management:  Neuraxial analgesia.      Consents  Anesthetic plan, risks, benefits and alternatives discussed with:  Patient..          ANESTHESIA PREOP EVALUATION    PROCEDURE: Labor epidural catheter    HPI: Brit Romano is a 38 year old female who presents for above procedure 2/2 labor pain.    PAST MEDICAL HISTORY:    Past Medical History:   Diagnosis Date     Fatty liver      Fibromyalgia      History of gestational diabetes      Seasonal allergies        PAST SURGICAL HISTORY:    Past Surgical History:   Procedure Laterality Date     NO HISTORY OF SURGERY         PAST ANESTHESIA HISTORY:     No personal or family h/o anesthesia problems    SOCIAL HISTORY:       Social History   Substance Use Topics     Smoking status: Never Smoker     Smokeless tobacco: Never Used     Alcohol use No       ALLERGIES:     Allergies   Allergen Reactions     Seasonal Allergies        MEDICATIONS:     Prescriptions Prior to Admission   Medication Sig Dispense Refill Last Dose     fluticasone (FLONASE) 50 MCG/ACT spray Spray 2 sprays into both nostrils daily 1 Bottle 11 7/31/2018 at 2200     insulin NPH (HUMULIN N/NOVOLIN N) 100 UNIT/ML injection Inject 5 units in AM and 5 units PM 10 mL 1 8/1/2018 at Unknown time     montelukast (SINGULAIR) 10 MG tablet Take 1 tablet (10 mg) by mouth At Bedtime 30 tablet 11 7/31/2018 at 2200     omeprazole (PRILOSEC) 40 MG capsule Take 1 capsule (40 mg) by mouth daily 30 capsule 5 7/31/2018 at 2200     Prenatal Vit-Fe Fumarate-FA (PRENATAL MULTIVITAMIN PLUS IRON) 27-0.8 MG TABS per tablet Take 1 tablet by mouth daily 100 tablet 3 7/31/2018 at Unknown time     blood glucose monitoring (Yolto MICROLET) lancets Use to test blood sugar 4  times daily or as directed.  Ok to substitute alternative if insurance prefers. 200 each 11 7/27/2018 at 1000     blood glucose monitoring (CONTOUR NEXT TEST) test strip Use to test blood sugar 4 times daily or as directed.  Ok to substitute alternative if insurance prefers. 150 strip 11 7/27/2018 at 1000     cyanocobalamin (VITAMIN  B-12) 1000 MCG tablet Take 1 tablet (1,000 mcg) by mouth daily (Patient not taking: Reported on 8/1/2018) 30 tablet 3 Not Taking     insulin isophane human (HUMULIN N PEN) 100 UNIT/ML injection Take 7 units in the morning and 8 units at bedtime 15 mL 11 Not Taking     insulin pen needle (ULTICARE MINI) 31G X 6 MM Use 5 daily or as directed. 200 each 3 Taking     ketotifen (ZADITOR/REFRESH ANTI-ITCH) 0.025 % SOLN ophthalmic solution Place 1 drop into both eyes 2 times daily (Patient not taking: Reported on 8/1/2018) 1 Bottle 11 Not Taking     lanolin ointment Apply topically daily as needed for dry skin 28 g 0 Unknown at Unknown time     LEVEMIR FLEXPEN/FLEXTOUCH 100 UNIT/ML soln Inject 8 Units Subcutaneous daily 3 mL 1      NOVOLOG FLEXPEN 100 UNIT/ML soln 4 units before each meal 6 mL 1 Taking     psyllium (METAMUCIL SMOOTH TEXTURE) 28 % PACK Take 1 Package by mouth daily (Patient not taking: Reported on 8/1/2018) 30 each 11 Not Taking       No current outpatient prescriptions on file.       Current Facility-Administered Medications Ordered in Epic   Medication Dose Route Frequency Provider Last Rate Last Dose     acetaminophen (TYLENOL) tablet 650 mg  650 mg Oral Q4H PRN Christine Decker MD         carboprost (HEMABATE) injection 250 mcg  250 mcg Intramuscular Once PRN Christine Decker MD         dextrose 5% in lactated ringers infusion   Intravenous Continuous Christine Decker MD   Stopped at 08/02/18 1954     glucose gel 15-30 g  15-30 g Oral Q15 Min PRN Christine Decker MD        Or     dextrose 50 % injection 25-50 mL  25-50 mL Intravenous Q15 Min PRN Christine Decker MD        Or     glucagon  injection 1 mg  1 mg Subcutaneous Q15 Min PRN Christine Decker MD         glucose gel 15-30 g  15-30 g Oral Q15 Min PRN Elyssa Dean MD        Or     dextrose 50 % injection 25-50 mL  25-50 mL Intravenous Q15 Min PRN Elyssa Dean MD        Or     glucagon injection 1 mg  1 mg Subcutaneous Q15 Min PRN Elyssa Dean MD         ePHEDrine 5 MG/ML injection              ePHEDrine injection 5 mg  5 mg Intravenous Q3 Min PRN Omid Addison MD         fentaNYL (PF) (SUBLIMAZE) injection  mcg   mcg Intravenous Q1H PRN Christine Decker MD         fentaNYL (SUBLIMAZE) 2 mcg/mL, bupivacaine (MARCAINE) 0.125% in NS premix for PCEA   EPIDURAL PCA Omid Addison MD         Fentanyl-Bupivacaine-NaCl 0.5-0.125-0.9 MG/250ML-% injection SOLN              fluticasone (FLONASE) 50 MCG/ACT spray 2 spray  2 spray Both Nostrils Daily Christine Decker MD   2 spray at 08/02/18 0006     ibuprofen (ADVIL/MOTRIN) tablet 800 mg  800 mg Oral Once PRN Christine Decker MD         insulin 1 units/1 mL saline (NovoLIN, HumuLIN Regular) infusion ADULT/PEDS   Intravenous Continuous PRN Christine Decker MD 1.5 mL/hr at 08/02/18 2258       insulin aspart (NovoLOG) inj (RAPID ACTING)  5 Units Subcutaneous TID w/meals Christine Decker MD   Stopped at 08/02/18 1953     lactated ringers BOLUS 250 mL  250 mL Intravenous Once PRN Omid Addison MD         lactated ringers infusion   Intravenous Continuous Christine Decker  mL/hr at 08/02/18 2157 1,000 mL at 08/02/18 2157     lactated ringers infusion   Intravenous Continuous Christine Decker  mL/hr at 08/02/18 2302 1,000 mL at 08/02/18 2302     lidocaine (LMX4) cream   Topical Q1H PRN Christine Decker MD         lidocaine (LMX4) cream   Topical Q1H PRN Christine Decker MD         lidocaine (LMX4) cream   Topical Q1H PRN Christine Decker MD         lidocaine (PF) (XYLOCAINE) 1 % injection              lidocaine 1 % 0.1-20 mL  0.1-20 mL Subcutaneous Once PRN Christine Decker MD         lidocaine 1 % 1 mL  1 mL  "Other Q1H PRN Christine Decker MD         lidocaine 1 % 1 mL  1 mL Other Q1H PRN Christine Decker MD         lidocaine 1 % 1 mL  1 mL Other Q1H PRN Christine Decker MD         medication instruction   Does not apply Continuous PRN Omid Addison MD         medication instruction   Does not apply Continuous PRN Omid Addison MD         Medication Instructions: misoprostol (CYTOTEC)- Nurse to discuss ordering with provider, if needed. Ordered via \"OB misoprostol (CYTOTEC) Postpartum Hemorrhage PANEL\"   Does not apply Continuous PRN Christine Decker MD         methylergonovine (METHERGINE) injection 200 mcg  200 mcg Intramuscular Once PRN Christine Decker MD         misoprostol (cervical ripening) (CYTOTEC) quarter-tab 25 mcg  25 mcg Oral Q2H PRN Christine Decker MD   25 mcg at 08/02/18 0448     misoprostol (CYTOTEC) 200 MCG tablet              montelukast (SINGULAIR) tablet 10 mg  10 mg Oral At Bedtime Christine Decker MD   10 mg at 08/02/18 2037     nalbuphine (NUBAIN) injection 10-20 mg  10-20 mg Intravenous Q90 MIN PRN Christine Decker MD        Or     nalbuphine (NUBAIN) injection 10-20 mg  10-20 mg Intramuscular Q90 MIN PRN Christine Decker MD         nalbuphine (NUBAIN) injection 2.5-5 mg  2.5-5 mg Intravenous Q6H PRN Omid Addison MD         naloxone (NARCAN) injection 0.1-0.4 mg  0.1-0.4 mg Intravenous Q2 Min PRN Omid Addison MD         naloxone (NARCAN) injection 0.1-0.4 mg  0.1-0.4 mg Intravenous Q2 Min PRN Christine Decker MD         nitrous oxide/oxygen 50/50 blend   Inhalation Continuous PRN Christine Decker MD         omeprazole (priLOSEC) CR capsule 40 mg  40 mg Oral Daily Christine Decker MD   40 mg at 08/02/18 2037     ondansetron (ZOFRAN) injection 4 mg  4 mg Intravenous Q6H PRN Christine Decker MD         Opioid plan postpartum - medication instruction   Does not apply Continuous PRN Omid Addison MD         oxyCODONE-acetaminophen (PERCOCET) 5-325 MG per tablet 1 tablet  1 tablet Oral Once PRN Christine Decker MD         oxytocin (PITOCIN) 10 " UNIT/ML injection              oxytocin (PITOCIN) 30 units in 500 mL 0.9% NaCl infusion  1-24 naya-units/min Intravenous Continuous Christine Decker MD 16 mL/hr at 18 16 naya-units/min at 18 213     oxytocin (PITOCIN) 30 units in 500 mL 0.9% NaCl infusion  100-340 mL/hr Intravenous Continuous PRN Christine Decker MD         oxytocin (PITOCIN) injection 10 Units  10 Units Intramuscular Once PRN Christine Decker MD         sodium chloride (PF) 0.9% PF flush 3 mL  3 mL Intracatheter Q1H PRN Christine Decker MD         sodium chloride (PF) 0.9% PF flush 3 mL  3 mL Intracatheter Q8H Christine Decker MD   3 mL at 18 2302     sodium chloride (PF) 0.9% PF flush 3 mL  3 mL Intracatheter Q1H PRN Christine Decker MD   3 mL at 18 1938     sodium chloride (PF) 0.9% PF flush 3 mL  3 mL Intracatheter Q8H Christine Decker MD         sodium chloride (PF) 0.9% PF flush 3 mL  3 mL Intracatheter Q1H PRN Christine Decker MD         sodium chloride (PF) 0.9% PF flush 3 mL  3 mL Intracatheter Q8H Christine Decker MD   3 mL at 18 0430     sodium chloride 0.9% infusion   Intravenous Continuous Christine Decker MD 10 mL/hr at 18 2257       No current Epic-ordered outpatient prescriptions on file.       PHYSICAL EXAM:    Vitals: T 98.5, P Data Unavailable, /89, R 18, SpO2  , Weight   Wt Readings from Last 2 Encounters:   18 99.3 kg (219 lb)   18 99.3 kg (219 lb)       See doc flowsheet    Temp: 36.9  C (98.5  F) Temp  Min: 36.5  C (97.7  F)  Max: 36.9  C (98.5  F)  Resp: 18 Resp  Min: 18  Max: 18    No Data Recorded    No Data Recorded    No Data Recorded  BP: 142/89 Systolic (24hrs), Av , Min:112 , Max:144   Diastolic (24hrs), Av, Min:57, Max:89      NPO STATUS: see doc flowsheet    LABS:    BMP:  Recent Labs   Lab Test  18   1621  17   1301   NA   --   138   POTASSIUM   --   3.7   CHLORIDE   --   106   CO2   --   21   BUN   --   3*   CR  0.41*  0.52   GLC   --   86   KEYLA   --   9.1       LFTs:    Recent Labs   Lab Test  08/02/18   1621  08/30/17   1522   PROTTOTAL   --   7.1   ALBUMIN   --   3.2*   BILITOTAL   --   0.3   ALKPHOS   --   62   AST  6  11   ALT  7  17       CBC:   Recent Labs   Lab Test  08/02/18   1621  08/01/18   1936   WBC   --   10.2   RBC   --   4.13   HGB  11.0*  10.8*   HCT   --   33.5*   MCV   --   81   MCH   --   26.2*   MCHC   --   32.2   RDW   --   14.3   PLT  188  175       Coags:  Recent Labs   Lab Test  07/27/18   1439   INR  1.05   PTT  26   FIBR  468*       Imaging:  No orders to display       Omid Addison MD  Anesthesiology Staff  Pager [phone]    8/2/2018  11:31 PM

## 2018-08-03 NOTE — PROVIDER NOTIFICATION
08/02/18 1942   Provider Notification   Provider Name/Title Dr. Camarena   Method of Notification In Department   Notification Reason Status Update   BG 94 mg/dl prior to starting insulin gtt. Will hold insulin gtt admin at this time and recheck BG in 2 hrs.

## 2018-08-03 NOTE — PLAN OF CARE
Problem: Patient Care Overview  Goal: Plan of Care/Patient Progress Review  Outcome: Therapy, progress toward functional goals as expected  Insulin drip started at 2300 after POCT result of 147 mg/dl. Plan to recheck insulin q1hr and titrate as appropriate. Patient reports increase in contraction intensity and requesting epidural. Bolus started, room prepared, and patient voided. Dr. Camarena aware. Patient understands to notify nurse if increase in rectal/pelvic pressure and/or urge to push. Anticipate .

## 2018-08-03 NOTE — DISCHARGE SUMMARY
[facility] Discharge Summary    Brit Romano MRN# [id number]   Age: 38 year old YOB: 1980     Date of Admission:  2018  Date of Discharge:  2018   Admitting Physician:  Jacqueline Hobson MD  Discharge Physician:  Vinicius Love MD    Admit Dx:   - Intrauterine pregnancy at 39w0d   - GDMA2  - gestational hypertension    Discharge Dx:  - Same as above, s/p       Procedures:  - Spontaneous vaginal delivery  - Epidural analgesia    Admit HPI/Labor Course:  Brit Romano is a 38 year old  at 38w5d on admission. She was seen in clinic today and mentioned decreased fetal movement over the 3-4 days prior to admission, so she was sent to triage for evaluation. There is some movement but it is not normal for this pregnancy or any of her others.   Induction of labor course uncomplicated. Cervical ripening included misoprostolx4 and cook catheter. Augmentation included pitocin and AROM. She progressed to complete and pushed for 36 minutes and had a spontaneous vaginal delivery of a viable female infant in OA position. Apgars of 9 and 9 with weight of 3270 grams.    Please see her Admission H&P and Delivery Summary for further details.    Postpartum Course:  Her postpartum course was uncomplicated. On PPD#1, she was meeting all of her postpartum goals and deemed stable for discharge. She was voiding without difficulty, tolerating a regular diet without nausea and vomiting, her pain was well controlled on oral pain medicines and her lochia was appropriate. Her hemoglobin prior to delivery was 11 and after delivery was 10.1. Her Rh status was positive, and Rhogam was not indicated.     # Discharge Pain Plan:   - During her hospitalization, Brit experienced pain due to vaginal delivery.  The pain plan for discharge was discussed with Brit and the plan was created in a collaborative fashion.    - Pharmacologic adjuvants:  NSAIDs and Acetaminophen      Discharge  Medications:   Brit Romano   Home Medication Instructions ZIGGY:[id number]    Printed on:08/04/18 1492   Medication Information                      acetaminophen (TYLENOL) 325 MG tablet  Take 2 tablets (650 mg) by mouth every 4 hours as needed for mild pain or fever (greater than or equal to 38  C /100.4  F (oral) or 38.5  C/ 101.4  F (core).)             blood glucose monitoring (ROSHNI MICROLET) lancets  Use to test blood sugar 4 times daily or as directed.  Ok to substitute alternative if insurance prefers.             blood glucose monitoring (CONTOUR NEXT TEST) test strip  Use to test blood sugar 4 times daily or as directed.  Ok to substitute alternative if insurance prefers.             cyanocobalamin (VITAMIN  B-12) 1000 MCG tablet  Take 1 tablet (1,000 mcg) by mouth daily             fluticasone (FLONASE) 50 MCG/ACT spray  Spray 2 sprays into both nostrils daily             ibuprofen (ADVIL/MOTRIN) 800 MG tablet  Take 1 tablet (800 mg) by mouth every 6 hours as needed for other (cramping)             insulin pen needle (ULTICARE MINI) 31G X 6 MM  Use 5 daily or as directed.             ketotifen (ZADITOR/REFRESH ANTI-ITCH) 0.025 % SOLN ophthalmic solution  Place 1 drop into both eyes 2 times daily             lanolin ointment  Apply topically daily as needed for dry skin             montelukast (SINGULAIR) 10 MG tablet  Take 1 tablet (10 mg) by mouth At Bedtime             omeprazole (PRILOSEC) 40 MG capsule  Take 1 capsule (40 mg) by mouth daily             Prenatal Vit-Fe Fumarate-FA (PRENATAL MULTIVITAMIN PLUS IRON) 27-0.8 MG TABS per tablet  Take 1 tablet by mouth daily             psyllium (METAMUCIL SMOOTH TEXTURE) 28 % PACK  Take 1 Package by mouth daily             senna-docusate (SENOKOT-S;PERICOLACE) 8.6-50 MG per tablet  Take 1 tablet by mouth daily as needed for constipation                   Discharge/Disposition:  Brit Romano was discharged to home in stable condition with the following  instructions/medications:  1) Call for temperature > 100.4, bright red vaginal bleeding >1 pad an hour x 2 hours, foul smelling vaginal discharge, pain not controlled by usual oral pain meds, persistent nausea and vomiting not controlled on medications  2) She was undecided for contraception  3) For feeding she decided to breast.  4) She was instructed to follow-up with her primary OB in 6 weeks for a routine postpartum visit and 1 week BP check.    Lynn Quezada MD  PGY-1  Ob/Gyn    Appreciate Dr. Quezada's summary above, patient also seen and examined by me on the day of discharge. I agree with the summary above.   Desiree Love MD

## 2018-08-03 NOTE — PROVIDER NOTIFICATION
08/02/18 1910   Provider Notification   Provider Name/Title Dr. Camarena   Method of Notification In Department   Notification Reason Status Update   Orders to start insulin gtt at this time.

## 2018-08-03 NOTE — PROVIDER NOTIFICATION
08/02/18 1900   Provider Notification   Provider Name/Title Dr. Camarena   Method of Notification Electronic Page   Request Evaluate in Person   Notification Reason Lab/Diagnostic Study   FYI . Would provider prefer sliding scale, insulin gtt, or continue to monitor q 2hrs.

## 2018-08-03 NOTE — PROVIDER NOTIFICATION
08/03/18 0944   Provider Notification   Provider Name/Title Dr. Hobson   Method of Notification Electronic Page   Request Evaluate-Remote   Notification Reason Other  (BPs\)     Pt has had intermittent elevated BPs (see flow sheets). None within treatable range.  Pt denies  s/s of pre-eclampsia.  Dr. Hobson and Dr. Myhre notified.

## 2018-08-03 NOTE — PLAN OF CARE
Problem: Patient Care Overview  Goal: Plan of Care/Patient Progress Review  Outcome: Improving  Vss, postpartum assessment WDL. Taking ibuprofen for pain control. Giving infant formula per her choice and also breast feeding. Patient  at lunch, no coverage needed. Voiding and emptying. Continue to monitor BG and assist with BF.

## 2018-08-03 NOTE — PLAN OF CARE
Problem: Patient Care Overview  Goal: Plan of Care/Patient Progress Review  Outcome: No Change  Patient arrived to [facility] unit via wheelchair at 1110,with belongings, accompanied by family, with infant in arms. Received report from Monika POTTER RN and checked bands. Unit and room orientation started. Call light within arms reach; no concerns present at this time. Continue with plan of care.

## 2018-08-03 NOTE — PROGRESS NOTES
[facility]  Labor Progress Note    Subjective:  Comfortable, feels mild contractions    Objective:   Patient Vitals for the past 12 hrs:   BP Temp Temp src Resp   18 1900 130/64 - - -   18 1730 112/64 - - -   18 1655 114/57 - - 18   18 1531 121/64 97.7  F (36.5  C) Oral 18   18 1431 144/63 - - -   18 1240 - 97.9  F (36.6  C) Oral 18     SVE: 4/60/very high  BSUS:  Baby remains cephalic    FHT: Baseline 150, minimal to moderate variability, accels present, no decels.   [place]: Irregular, q2-4 min contractions      Recent Labs  Lab 18  1856 18  1652 18  1446 18  1148 18  0815 18  0629   * 170* 94 122* 115* 121*   Last BG 95    Assessment/Plan:  Ms. Brit Romano is a 38 year old  at 38w6d, admitted for IOL for poorly controlled GDMA2.      IOL: s/p received Miso x5, s/p Cook catheter. Pitocin currently at 10 naya-units/min. Will hold off on AROM until head has descended further in to pelvis.      FWB: Currently category II reactive with minimal variability. Continue to monitor closely. Cephalic by BSUS. EFW 7.5#  - Fluid bolus, position change, and O2 PRN     Mild range BP x2  - HELLP labs wnl, UPC pending.    PNC: Rh pos, rubella immune, placenta anterior, GBS neg     GDMA2: Grand multip with history of fast second stage, will start pre-protocol (q2h checks) for insulin drip prn. As per protocol will start infusion of any value >120 or two >110. OK to keep eating for now.     Elaine Camarena MD  Ob/Gyn PGY-2  18 7:53 PM

## 2018-08-04 VITALS
BODY MASS INDEX: 36.49 KG/M2 | OXYGEN SATURATION: 100 % | HEIGHT: 65 IN | RESPIRATION RATE: 16 BRPM | WEIGHT: 219 LBS | DIASTOLIC BLOOD PRESSURE: 75 MMHG | SYSTOLIC BLOOD PRESSURE: 123 MMHG | TEMPERATURE: 98.1 F

## 2018-08-04 LAB
GLUCOSE BLDC GLUCOMTR-MCNC: 120 MG/DL (ref 70–99)
GLUCOSE BLDC GLUCOMTR-MCNC: 92 MG/DL (ref 70–99)
HGB BLD-MCNC: 9 G/DL (ref 11.7–15.7)

## 2018-08-04 PROCEDURE — 25000132 ZZH RX MED GY IP 250 OP 250 PS 637: Performed by: STUDENT IN AN ORGANIZED HEALTH CARE EDUCATION/TRAINING PROGRAM

## 2018-08-04 PROCEDURE — 36415 COLL VENOUS BLD VENIPUNCTURE: CPT | Performed by: OBSTETRICS & GYNECOLOGY

## 2018-08-04 PROCEDURE — 00000146 ZZHCL STATISTIC GLUCOSE BY METER IP

## 2018-08-04 PROCEDURE — 85018 HEMOGLOBIN: CPT | Performed by: OBSTETRICS & GYNECOLOGY

## 2018-08-04 RX ORDER — ACETAMINOPHEN 325 MG/1
650 TABLET ORAL EVERY 4 HOURS PRN
Qty: 30 TABLET | Refills: 0 | Status: ON HOLD | OUTPATIENT
Start: 2018-08-04 | End: 2020-04-08

## 2018-08-04 RX ORDER — IBUPROFEN 800 MG/1
800 TABLET, FILM COATED ORAL EVERY 6 HOURS PRN
Qty: 30 TABLET | Refills: 0 | Status: SHIPPED | OUTPATIENT
Start: 2018-08-04 | End: 2019-10-02

## 2018-08-04 RX ORDER — AMOXICILLIN 250 MG
1 CAPSULE ORAL DAILY PRN
Qty: 10 TABLET | Refills: 0 | Status: SHIPPED | OUTPATIENT
Start: 2018-08-04 | End: 2019-04-09

## 2018-08-04 RX ADMIN — IBUPROFEN 800 MG: 800 TABLET, FILM COATED ORAL at 04:47

## 2018-08-04 RX ADMIN — SENNOSIDES AND DOCUSATE SODIUM 2 TABLET: 8.6; 5 TABLET ORAL at 08:28

## 2018-08-04 RX ADMIN — FLUTICASONE PROPIONATE 2 SPRAY: 50 SPRAY, METERED NASAL at 09:16

## 2018-08-04 RX ADMIN — ACETAMINOPHEN 650 MG: 325 TABLET, FILM COATED ORAL at 02:14

## 2018-08-04 RX ADMIN — IBUPROFEN 800 MG: 800 TABLET, FILM COATED ORAL at 13:09

## 2018-08-04 NOTE — PROGRESS NOTES
Post Partum Progress Note    PPD#1 from .     Subjective: reports overall doing well.  Pain: well controlled on PO meds.  PO: tolerating with no problems  Void: spontaneous  Amb: no problems  Breastfeeding: nipples are hurting   Bleeding: scant     Denies any fever, chills, SOB, chest pain, N/V,  headache, dizziness.   Objective:  Patient Vitals for the past 24 hrs:   BP Temp Temp src Heart Rate Resp SpO2   18 0758 123/75 98.1  F (36.7  C) Oral 85 16 -   18 2320 123/81 97.9  F (36.6  C) Oral 86 17 -   18 1609 121/88 98.2  F (36.8  C) Oral 89 16 -   18 1120 129/80 98.7  F (37.1  C) Oral 79 18 100 %   18 1054 133/73 - - - - -   18 1015 153/73 - - - - 99 %   18 1000 139/74 - - - - 99 %   18 0945 (!) 146/100 - - - - 98 %   18 0930 (!) 140/95 98.4  F (36.9  C) Oral - 18 99 %   18 0915 127/86 - - - - 99 %   18 0900 159/88 - - - - 99 %   18 0845 139/73 - - - - 99 %   18 0830 130/69 - - - - 99 %   18 0818 (!) 146/99 - - - - 100 %         General:  AAOx3, NAD, appears generally well  CV:  RRR, no murmurs, rubs  Resp:  CTAB, no wheezes, rales  Abd:  Soft, nontender, nondistended, fundus firm at approximately 1 cm below umbilicus  Ext:  minimal edema in bilateral LE    Hgb  11 >  > 9    Assessment and Plan:  38 year old old  PPD#1 s/p .     # PPD1 - recovering appropriately.   - continue PO pain meds   - encourage amb, PO, and IS    # Heme:   - no s/s anemia, UOP adequate  - Fe on discharge    Routine pp:    -Rh positive; no rhogam needed  -Rubella immune; no MMR needed  -breastfeeding - would maybe benefit from nipple shield  - undecided for birth control    Likely ready for discharge PPD#1-2      Vika Atkinson, PGY3  8:08 AM, 2018    Appreciate Dr. Atkinson's note above, patient also seen and examined by me. I agree with the note above.  Patient desires discharge home today.  She would like to see lactation first  for assistance with pumping and sore nipples.  Desiree Love MD

## 2018-08-04 NOTE — PLAN OF CARE
Problem: Patient Care Overview  Goal: Plan of Care/Patient Progress Review  Outcome: Improving  VSS. AFebrile. Up ad aishwarya. Voiding. Tolerating regular diet. BG monitoring with normal readings. Breast feeding attempts on baby but baby refuses to suck. C/o pain and medicated with relief. Will continue to monitor.

## 2018-08-04 NOTE — PLAN OF CARE
Problem: Patient Care Overview  Goal: Plan of Care/Patient Progress Review  Outcome: Improving  Data: Vital signs and postpartum checks WDL - see flow record. Patient eating and drinking normally. Patient able to empty bladder independently and is up ambulating.  Patient performing self cares and is able to care for infant. Breastfeeding and supplementing with formula. BG monitored no Insulin given. EDS is 0.   Action: Patient medicated during the shift for pain with Tylenol and Ibuprofen. Patient education done see education record.  Response: Positive attachment behaviors observed with infant. Support persons SO present.    Plan: Continue with the plan of care and notify provider if decline in status. Will continue to monitor and assess.

## 2018-08-04 NOTE — PLAN OF CARE
Problem: Patient Care Overview  Goal: Plan of Care/Patient Progress Review  Outcome: Adequate for Discharge Date Met: 08/04/18  Discharging to home today with baby. Discharge educations reviewed with teach back. Goals met.  Seen by Dayna for breastfeeding support and education.

## 2018-08-04 NOTE — LACTATION NOTE
This note was copied from a baby's chart.  Consult for nipple pain    Brit is a 38 year old  who delivered her daughter at 39.0 weeks via vaginal delivery on 8/3/18 at 0812. She has a history of GDM (insulin controlled), iron deficiency anemia and fibromyalgia.     Brit has  all of her 8 children and describes painful latch with each baby until about 2 weeks of age. She shares that one of  her other children was diagnosed with tongue tie, but she declined frenotomy, and pain resolved around 2 weeks and she continued breastfeeding. She has done a combination of breastfeeding and bottle feeding formula for her children. She is comfortable positioning and latching infant, but is having pain with latch that lasts the entire feeding; she describes this as the same as with her other children. Her nipples are reddened and she has some bruising on her areola. She has been able to achieve a more comfortable latch using a nipple shield and the football position. She has worked with [employer] in the past. She has used APNO cream in the past when her nipples have been cracked.     Baby has a recessed chin and jaw tightness. She has a shortened lingual frenulum that attaches to the middle of the tongue. She does not appear to be able to extend her tongue to her lower lip and when sucking she keeps her tongue back behind her lower gumline and makes more of a chomping motion. She appears unable to elevate her tongue well.    Reviewed: role of the tongue in breastfeeding/cause of nipple pain due to restricted tongue movement, interventions to manage/minimize discomfort (positioning in football or laidback breastfeeding position, nipple shield), benefits of skin to skin and hand expression to support milk supply, benefits of pumping to increase milk supply and provide milk for supplementation, risk for infection with cracked nipples (what to watch for and when/who to follow up with), benefits of exclusive breastfeeding  and importance of outpatient lactation support. Updated peds about oral assessment.     Plan: Brit would like to discharge today. Her plan is to continue breastfeeding with a nipple shield and continue supplementing with formula via bottle after breastfeeding. She has a WIC appointment for Monday and will follow up with her pediatrician on Monday or Tuesday. She has received a breastpump to use at home. Her nipples are intact, but if she has concerns about cracking/infection she will follow up with her provider.

## 2018-09-07 ENCOUNTER — OFFICE VISIT (OUTPATIENT)
Dept: FAMILY MEDICINE | Facility: CLINIC | Age: 38
End: 2018-09-07
Payer: COMMERCIAL

## 2018-09-07 ENCOUNTER — OFFICE VISIT (OUTPATIENT)
Dept: OPTOMETRY | Facility: CLINIC | Age: 38
End: 2018-09-07
Payer: COMMERCIAL

## 2018-09-07 VITALS
DIASTOLIC BLOOD PRESSURE: 86 MMHG | OXYGEN SATURATION: 100 % | TEMPERATURE: 97.5 F | HEIGHT: 65 IN | RESPIRATION RATE: 18 BRPM | BODY MASS INDEX: 34.24 KG/M2 | SYSTOLIC BLOOD PRESSURE: 120 MMHG | WEIGHT: 205.5 LBS | HEART RATE: 75 BPM

## 2018-09-07 DIAGNOSIS — R82.90 ABNORMAL URINE FINDING: ICD-10-CM

## 2018-09-07 DIAGNOSIS — M54.50 ACUTE BILATERAL LOW BACK PAIN WITHOUT SCIATICA: Primary | ICD-10-CM

## 2018-09-07 DIAGNOSIS — R10.2 PELVIC PAIN IN FEMALE: ICD-10-CM

## 2018-09-07 LAB
ALBUMIN SERPL-MCNC: 3.3 G/DL (ref 3.4–5)
ALBUMIN UR-MCNC: NEGATIVE MG/DL
ALP SERPL-CCNC: 70 U/L (ref 40–150)
ALT SERPL W P-5'-P-CCNC: 22 U/L (ref 0–50)
ANION GAP SERPL CALCULATED.3IONS-SCNC: 7 MMOL/L (ref 3–14)
APPEARANCE UR: CLEAR
AST SERPL W P-5'-P-CCNC: 16 U/L (ref 0–45)
BACTERIA #/AREA URNS HPF: ABNORMAL /HPF
BASOPHILS # BLD AUTO: 0 10E9/L (ref 0–0.2)
BASOPHILS NFR BLD AUTO: 0 %
BILIRUB SERPL-MCNC: 0.2 MG/DL (ref 0.2–1.3)
BILIRUB UR QL STRIP: NEGATIVE
BUN SERPL-MCNC: 5 MG/DL (ref 7–30)
CALCIUM SERPL-MCNC: 8.7 MG/DL (ref 8.5–10.1)
CHLORIDE SERPL-SCNC: 107 MMOL/L (ref 94–109)
CO2 SERPL-SCNC: 27 MMOL/L (ref 20–32)
COLOR UR AUTO: YELLOW
CREAT SERPL-MCNC: 0.52 MG/DL (ref 0.52–1.04)
DIFFERENTIAL METHOD BLD: ABNORMAL
EOSINOPHIL # BLD AUTO: 0.4 10E9/L (ref 0–0.7)
EOSINOPHIL NFR BLD AUTO: 6.6 %
ERYTHROCYTE [DISTWIDTH] IN BLOOD BY AUTOMATED COUNT: 15.6 % (ref 10–15)
GFR SERPL CREATININE-BSD FRML MDRD: >90 ML/MIN/1.7M2
GLUCOSE SERPL-MCNC: 100 MG/DL (ref 70–99)
GLUCOSE UR STRIP-MCNC: NEGATIVE MG/DL
HCT VFR BLD AUTO: 33.7 % (ref 35–47)
HGB BLD-MCNC: 10.9 G/DL (ref 11.7–15.7)
HGB UR QL STRIP: ABNORMAL
KETONES UR STRIP-MCNC: NEGATIVE MG/DL
LEUKOCYTE ESTERASE UR QL STRIP: ABNORMAL
LYMPHOCYTES # BLD AUTO: 1.7 10E9/L (ref 0.8–5.3)
LYMPHOCYTES NFR BLD AUTO: 25.9 %
MCH RBC QN AUTO: 26.2 PG (ref 26.5–33)
MCHC RBC AUTO-ENTMCNC: 32.3 G/DL (ref 31.5–36.5)
MCV RBC AUTO: 81 FL (ref 78–100)
MONOCYTES # BLD AUTO: 0.5 10E9/L (ref 0–1.3)
MONOCYTES NFR BLD AUTO: 7 %
NEUTROPHILS # BLD AUTO: 3.9 10E9/L (ref 1.6–8.3)
NEUTROPHILS NFR BLD AUTO: 60.5 %
NITRATE UR QL: NEGATIVE
NON-SQ EPI CELLS #/AREA URNS LPF: ABNORMAL /LPF
PH UR STRIP: 6 PH (ref 5–7)
PLATELET # BLD AUTO: 252 10E9/L (ref 150–450)
POTASSIUM SERPL-SCNC: 3.6 MMOL/L (ref 3.4–5.3)
PROT SERPL-MCNC: 7 G/DL (ref 6.8–8.8)
RBC # BLD AUTO: 4.16 10E12/L (ref 3.8–5.2)
RBC #/AREA URNS AUTO: ABNORMAL /HPF
SODIUM SERPL-SCNC: 141 MMOL/L (ref 133–144)
SOURCE: ABNORMAL
SP GR UR STRIP: 1.01 (ref 1–1.03)
UROBILINOGEN UR STRIP-ACNC: 0.2 EU/DL (ref 0.2–1)
WBC # BLD AUTO: 6.4 10E9/L (ref 4–11)
WBC #/AREA URNS AUTO: ABNORMAL /HPF

## 2018-09-07 PROCEDURE — 87086 URINE CULTURE/COLONY COUNT: CPT | Performed by: NURSE PRACTITIONER

## 2018-09-07 PROCEDURE — 36415 COLL VENOUS BLD VENIPUNCTURE: CPT | Performed by: NURSE PRACTITIONER

## 2018-09-07 PROCEDURE — 80053 COMPREHEN METABOLIC PANEL: CPT | Performed by: NURSE PRACTITIONER

## 2018-09-07 PROCEDURE — 85025 COMPLETE CBC W/AUTO DIFF WBC: CPT | Performed by: NURSE PRACTITIONER

## 2018-09-07 PROCEDURE — 99213 OFFICE O/P EST LOW 20 MIN: CPT | Performed by: NURSE PRACTITIONER

## 2018-09-07 PROCEDURE — 81001 URINALYSIS AUTO W/SCOPE: CPT | Performed by: NURSE PRACTITIONER

## 2018-09-07 PROCEDURE — V2020 VISION SVCS FRAMES PURCHASES: HCPCS | Performed by: STUDENT IN AN ORGANIZED HEALTH CARE EDUCATION/TRAINING PROGRAM

## 2018-09-07 PROCEDURE — V2100 LENS SPHER SINGLE PLANO 4.00: HCPCS | Mod: RT | Performed by: STUDENT IN AN ORGANIZED HEALTH CARE EDUCATION/TRAINING PROGRAM

## 2018-09-07 ASSESSMENT — PAIN SCALES - GENERAL: PAINLEVEL: EXTREME PAIN (8)

## 2018-09-07 NOTE — MR AVS SNAPSHOT
After Visit Summary   9/7/2018    Brit Romano    MRN: [id number]           Patient Information     Date Of Birth          1980        Visit Information        Provider Department      9/7/2018 12:20 PM Princess Hikcs APRN [facility]        Today's Diagnoses     Acute bilateral low back pain without sciatica    -  1    Pelvic pain in female        Abnormal urine finding          Care Instructions    [facility]    If you have any questions regarding to your visit please contact your care team:     Team Pink:   Clinic Hours Telephone Number   Internal Medicine:  Dr. Chen Hicks, NP 7am-7pm  Monday - Thursday   7am-5pm  Fridays  [phone]  (Appointment scheduling available 24/7)   Urgent Care - [place] and [facility] - 11am-9pm Monday-Friday Saturday-Sunday- 9am-5pm   [place] - 5pm-9pm Monday-Friday Saturday-Sunday- 9am-5pm  [phone] - [place]  [phone] - [place]       What options do I have for a visit other than an office visit? We offer electronic visits (e-visits) and telephone visits, when medically appropriate.  Please check with your medical insurance to see if these types of visits are covered, as you will be responsible for any charges that are not paid by your insurance.      You can use [employer] (secure electronic communication) to access to your chart, send your primary care provider a message, or make an appointment. Ask a team member how to get started.     For a price quote for your services, please call our Consumer Price Line at [phone] or our Imaging Cost estimation line at [phone] (for imaging tests).  Isabel Frances CMA             Follow-ups after your visit        Your next 10 appointments already scheduled     Oct 19, 2018  8:50 AM CDT   (Arrive by 8:35 AM)   RETURN DIABETES with Junie Feng MD   [facility] ([facility]  [facility])    [address]  3rd Floor  [place] [address] [phone]              Who to contact     If you have questions or need follow up information about today's clinic visit or your schedule please contact [facility] directly at [phone].  Normal or non-critical lab and imaging results will be communicated to you by MyChart, letter or phone within 4 business days after the clinic has received the results. If you do not hear from us within 7 days, please contact the clinic through MyChart or phone. If you have a critical or abnormal lab result, we will notify you by phone as soon as possible.  Submit refill requests through [employer] or call your pharmacy and they will forward the refill request to us. Please allow 3 business days for your refill to be completed.          Additional Information About Your Visit        Care EveryWhere ID     This is your Care EveryWhere ID. This could be used by other organizations to access your [place] medical records  [id number]        Your Vitals Were     Pulse Temperature Respirations Height Last Period Pulse Oximetry    75 97.5  F (36.4  C) (Oral) 18 5' 5\" (1.651 m) 11/03/2017 100%    BMI (Body Mass Index)                   34.2 kg/m2            Blood Pressure from Last 3 Encounters:   09/07/18 120/86   08/04/18 123/75   08/01/18 119/82    Weight from Last 3 Encounters:   09/07/18 205 lb 8 oz (93.2 kg)   08/01/18 219 lb (99.3 kg)   08/01/18 219 lb (99.3 kg)              We Performed the Following     *UA reflex to Microscopic and Culture ([place] and [facility] (except [place] and [place])     CBC with platelets and differential     Comprehensive metabolic panel     Urine Culture Aerobic Bacterial     Urine Microscopic        Primary Care Provider Office Phone # Fax #    MALACHI Mazariegos [facility] [phone] [address] [facility] [address]        Equal Access to Services     KERRI MOLINA" AH: Nasrin barboursupriyakarthik Dung, wasirenada luqadaha, qaybta kasteven fonseca pablo erasmo josefaleida ricksfranciscaemily ruiz. So [facility] [phone].    ATENCIÓN: Si habla aaron, tiene a smith disposición servicios gratuitos de asistencia lingüística. Llame al [phone].    We comply with applicable federal civil rights laws and [place] laws. We do not discriminate on the basis of race, color, national origin, age, disability, sex, sexual orientation, or gender identity.            Thank you!     Thank you for choosing [facility]  for your care. Our goal is always to provide you with excellent care. Hearing back from our patients is one way we can continue to improve our services. Please take a few minutes to complete the written survey that you may receive in the mail after your visit with us. Thank you!             Your Updated Medication List - Protect others around you: Learn how to safely use, store and throw away your medicines at www.disposemymeds.org.          This list is accurate as of 9/7/18  1:27 PM.  Always use your most recent med list.                   Brand Name Dispense Instructions for use Diagnosis    acetaminophen 325 MG tablet    TYLENOL    30 tablet    Take 2 tablets (650 mg) by mouth every 4 hours as needed for mild pain or fever (greater than or equal to 38? C /100.4? F (oral) or 38.5? C/ 101.4? F (core).)    Spontaneous vaginal delivery       blood glucose monitoring lancets     200 each    Use to test blood sugar 4 times daily or as directed.  Ok to substitute alternative if insurance prefers.    Abnormal maternal glucose tolerance, antepartum       blood glucose monitoring test strip    CONTOUR NEXT TEST    150 strip    Use to test blood sugar 4 times daily or as directed.  Ok to substitute alternative if insurance prefers.    Abnormal maternal glucose tolerance, antepartum       cyanocobalamin 1000 MCG tablet    vitamin  B-12    30 tablet    Take 1 tablet (1,000 mcg) by mouth  daily    Other iron deficiency anemia       fluticasone 50 MCG/ACT spray    FLONASE    1 Bottle    Spray 2 sprays into both nostrils daily    Allergic rhinitis due to dust mite, Chronic seasonal allergic rhinitis due to pollen, Allergic conjunctivitis of both eyes       ibuprofen 800 MG tablet    ADVIL/MOTRIN    30 tablet    Take 1 tablet (800 mg) by mouth every 6 hours as needed for other (cramping)    Spontaneous vaginal delivery       insulin pen needle 31G X 6 MM    ULTICARE MINI    200 each    Use 5 daily or as directed.    Gestational diabetes mellitus, antepartum       ketotifen 0.025 % Soln ophthalmic solution    ZADITOR/REFRESH ANTI-ITCH    1 Bottle    Place 1 drop into both eyes 2 times daily    Allergic conjunctivitis of both eyes       lanolin ointment     28 g    Apply topically daily as needed for dry skin    Sore nipple       montelukast 10 MG tablet    SINGULAIR    30 tablet    Take 1 tablet (10 mg) by mouth At Bedtime    Allergic rhinitis due to dust mite, Chronic seasonal allergic rhinitis due to pollen       omeprazole 40 MG capsule    priLOSEC    30 capsule    Take 1 capsule (40 mg) by mouth daily    Abdominal pain, generalized       prenatal multivitamin plus iron 27-0.8 MG Tabs per tablet     100 tablet    Take 1 tablet by mouth daily    Pregnancy examination or test, positive result       psyllium 28 % Pack    METAMUCIL SMOOTH TEXTURE    30 each    Take 1 Package by mouth daily    Irritable bowel syndrome with both constipation and diarrhea       senna-docusate 8.6-50 MG per tablet    SENOKOT-S;PERICOLACE    10 tablet    Take 1 tablet by mouth daily as needed for constipation    Spontaneous vaginal delivery

## 2018-09-07 NOTE — PATIENT INSTRUCTIONS
[facility]    If you have any questions regarding to your visit please contact your care team:     Team Pink:   Clinic Hours Telephone Number   Internal Medicine:  Dr. Chen Hicks NP 7am-7pm  Monday - Thursday   7am-5pm  Fridays  [phone]  (Appointment scheduling available 24/7)   Urgent Care - [place] and [facility] - 11am-9pm Monday-Friday Saturday-Sunday- 9am-5pm   [place] - 5pm-9pm Monday-Friday Saturday-Sunday- 9am-5pm  [phone] - [place]  [phone] - [place]       What options do I have for a visit other than an office visit? We offer electronic visits (e-visits) and telephone visits, when medically appropriate.  Please check with your medical insurance to see if these types of visits are covered, as you will be responsible for any charges that are not paid by your insurance.      You can use [employer] (secure electronic communication) to access to your chart, send your primary care provider a message, or make an appointment. Ask a team member how to get started.     For a price quote for your services, please call our Consumer Price Line at [phone] or our Imaging Cost estimation line at [phone] (for imaging tests).  Isabel Frances CMA

## 2018-09-07 NOTE — PROGRESS NOTES
SUBJECTIVE:   Brit Romano is a 38 year old female who presents to clinic today for the following health issues:      ABDOMINAL PAIN     Onset: last month    Description:   Character: Sharp  Location: pelvic region  Radiation: Pelvic region left    Intensity: severe    Progression of Symptoms:  worsening    Accompanying Signs & Symptoms:  Fever/Chills?: YES- chills  Gas/Bloating: YES  Nausea: YES- started last week  Vomitting: no   Diarrhea?: YES- started last week  Constipation:no   Dysuria or Hematuria: no    History:   Trauma: no   Previous similar pain: no    Previous tests done: none    Precipitating factors:   Does the pain change with:     Food: no      BM: no     Urination: no     Alleviating factors:  none    Therapies Tried and outcome: ibuprofen helps a litte    LMP:  Just delivered the baby 8/3/18     Patient notes that her son has had diarrhea and vomiting as well.  Patient denies melena, hematochezia.    Back Pain       Duration:  month        Specific cause: none    Description:   Location of pain: low back bilateral and middle of back bilateral  Character of pain: sharp  Pain radiation:none  New numbness or weakness in legs, not attributed to pain:  no     Intensity: Currently 8/10    History:   Pain interferes with job: YES, standing and bending  Is very difficult  History of back problems: no prior back problems  Any previous MRI or X-rays: None  Sees a specialist for back pain:  No  Therapies tried without relief:     Alleviating factors:   Improved by:  ibuprofen        Precipitating factors:  Worsened by: Bending and Standing        Accompanying Signs & Symptoms:  Risk of Fracture:  None  Risk of Cauda Equina:  None  Risk of Infection:  None  Risk of Cancer:  None  Risk of Ankylosing Spondylitis:  Onset at age <35, male, AND morning back stiffness. no       Problem list and histories reviewed & adjusted, as indicated.  Additional history: as documented    Patient Active Problem List    Diagnosis     Mixed incontinence urge and stress (male)(female)     Lactose intolerance     Iron deficiency anemia      Fatty liver disease, nonalcoholic     History of gestational diabetes     Fibromyalgia     Vitamin B12 deficiency (non anemic)     Supervision of high-risk pregnancy of elderly multigravida, [facility]     Low-lying placenta in second trimester     Allergic rhinitis due to dust mite     Chronic seasonal allergic rhinitis due to pollen     Allergic conjunctivitis of both eyes     Insulin controlled gestational diabetes mellitus (GDM) in third trimester     Breech presentation, single or unspecified fetus     Unstable fetal lie     Encounter for triage in pregnant patient     Decreased fetal movement     Spontaneous vaginal delivery     Past Surgical History:   Procedure Laterality Date     NO HISTORY OF SURGERY         Social History   Substance Use Topics     Smoking status: Never Smoker     Smokeless tobacco: Never Used     Alcohol use No     Family History   Problem Relation Age of Onset     Hypertension Mother      Diabetes Mother      KIDNEY DISEASE Mother      dialysis     Glaucoma Mother      Macular Degeneration No family hx of          Current Outpatient Prescriptions   Medication Sig Dispense Refill     acetaminophen (TYLENOL) 325 MG tablet Take 2 tablets (650 mg) by mouth every 4 hours as needed for mild pain or fever (greater than or equal to 38  C /100.4  F (oral) or 38.5  C/ 101.4  F (core).) 30 tablet 0     blood glucose monitoring (ROSHNI MICROLET) lancets Use to test blood sugar 4 times daily or as directed.  Ok to substitute alternative if insurance prefers. 200 each 11     blood glucose monitoring (CONTOUR NEXT TEST) test strip Use to test blood sugar 4 times daily or as directed.  Ok to substitute alternative if insurance prefers. 150 strip 11     cyanocobalamin (VITAMIN  B-12) 1000 MCG tablet Take 1 tablet (1,000 mcg) by mouth daily (Patient not taking: Reported on 8/1/2018) 30  "tablet 3     fluticasone (FLONASE) 50 MCG/ACT spray Spray 2 sprays into both nostrils daily 1 Bottle 11     ibuprofen (ADVIL/MOTRIN) 800 MG tablet Take 1 tablet (800 mg) by mouth every 6 hours as needed for other (cramping) 30 tablet 0     insulin pen needle (ULTICARE MINI) 31G X 6 MM Use 5 daily or as directed. 200 each 3     ketotifen (ZADITOR/REFRESH ANTI-ITCH) 0.025 % SOLN ophthalmic solution Place 1 drop into both eyes 2 times daily (Patient not taking: Reported on 8/1/2018) 1 Bottle 11     lanolin ointment Apply topically daily as needed for dry skin 28 g 0     montelukast (SINGULAIR) 10 MG tablet Take 1 tablet (10 mg) by mouth At Bedtime 30 tablet 11     omeprazole (PRILOSEC) 40 MG capsule Take 1 capsule (40 mg) by mouth daily 30 capsule 5     Prenatal Vit-Fe Fumarate-FA (PRENATAL MULTIVITAMIN PLUS IRON) 27-0.8 MG TABS per tablet Take 1 tablet by mouth daily 100 tablet 3     psyllium (METAMUCIL SMOOTH TEXTURE) 28 % PACK Take 1 Package by mouth daily (Patient not taking: Reported on 8/1/2018) 30 each 11     senna-docusate (SENOKOT-S;PERICOLACE) 8.6-50 MG per tablet Take 1 tablet by mouth daily as needed for constipation 10 tablet 0     Allergies   Allergen Reactions     Seasonal Allergies      BP Readings from Last 3 Encounters:   09/07/18 120/86   08/04/18 123/75   08/01/18 119/82    Wt Readings from Last 3 Encounters:   09/07/18 205 lb 8 oz (93.2 kg)   08/01/18 219 lb (99.3 kg)   08/01/18 219 lb (99.3 kg)                  Labs reviewed in EPIC    Reviewed and updated as needed this visit by clinical staff       Reviewed and updated as needed this visit by Provider         ROS:  Constitutional, HEENT, cardiovascular, pulmonary, gi and gu systems are negative, except as otherwise noted.    OBJECTIVE:     /86  Pulse 75  Temp 97.5  F (36.4  C) (Oral)  Resp 18  Ht 5' 5\" (1.651 m)  Wt 205 lb 8 oz (93.2 kg)  LMP 11/03/2017  SpO2 100%  BMI 34.2 kg/m2  Body mass index is 34.2 kg/(m^2).  GENERAL: " healthy, alert and no distress  RESP: lungs clear to auscultation - no rales, rhonchi or wheezes  CV: regular rate and rhythm, normal S1 S2, no S3 or S4, no murmur, click or rub, no peripheral edema and peripheral pulses strong  ABDOMEN: soft, nontender, no hepatosplenomegaly, no masses and bowel sounds normal  MS: no gross musculoskeletal defects noted, no edema  Comprehensive back pain exam:  No tenderness and Range of motion not limited by pain    Diagnostic Test Results:  Results for orders placed or performed in visit on 09/07/18 (from the past 24 hour(s))   *UA reflex to Microscopic and Culture ([facility] (except [place] and [place])   Result Value Ref Range    Color Urine Yellow     Appearance Urine Clear     Glucose Urine Negative NEG^Negative mg/dL    Bilirubin Urine Negative NEG^Negative    Ketones Urine Negative NEG^Negative mg/dL    Specific Gravity Urine 1.010 1.003 - 1.035    Blood Urine Trace (A) NEG^Negative    pH Urine 6.0 5.0 - 7.0 pH    Protein Albumin Urine Negative NEG^Negative mg/dL    Urobilinogen Urine 0.2 0.2 - 1.0 EU/dL    Nitrite Urine Negative NEG^Negative    Leukocyte Esterase Urine Small (A) NEG^Negative    Source Midstream Urine    Urine Microscopic   Result Value Ref Range    WBC Urine 5-10 (A) OTO5^0 - 5 /HPF    RBC Urine O - 2 OTO2^O - 2 /HPF    Squamous Epithelial /LPF Urine Few FEW^Few /LPF    Bacteria Urine Few (A) NEG^Negative /HPF       ASSESSMENT/PLAN:     1. Acute bilateral low back pain without sciatica  Possibly musculoskeletal, but will await urine culture results.  - *UA reflex to Microscopic and Culture ([facility] (except [place] and [place])  - Urine Microscopic    2. Pelvic pain in female  Will await urine culture results and check labs as below.  Diarrhea and vomiting are likely viral given patient's son is ill as well.  - CBC with platelets and differential  - Comprehensive metabolic panel    3. Abnormal urine finding    -  Urine Culture Aerobic Bacterial    FUTURE APPOINTMENTS:       - Follow-up for annual visit or as needed    MALACHI Reynolds CNP  [facility]

## 2018-09-08 LAB
BACTERIA SPEC CULT: NORMAL
BACTERIA SPEC CULT: NORMAL
SPECIMEN SOURCE: NORMAL

## 2018-09-20 ENCOUNTER — TELEPHONE (OUTPATIENT)
Dept: OBGYN | Facility: CLINIC | Age: 38
End: 2018-09-20

## 2018-09-20 NOTE — TELEPHONE ENCOUNTER
Spoke to Brit who saw her PCP 9/7/18 for back pain. I instructed her to return to her PCP d/t continued back pain.  She delivered 8/3/18 vaginally with no repair. She is also having vaginal itching. States very little, if any vaginal discharge. Recommended she try otc monistat and schedule her pp vist today.  And if vaginal itching symptoms don't improve or worsen to call back. Pt indicated understanding and agreed with plan.

## 2018-09-20 NOTE — TELEPHONE ENCOUNTER
----- Message from Crystal Hercules sent at 9/19/2018  4:38 PM CDT -----  Regarding: Pt needs 6-8 week postpartum appt; DD 8/3/18.  Contact: [phone]  Hello Team,    Pt called to verify the date/time of her 6-8 week postpartum appointment; DD 8/3/18.  She also has back pain and itching in vagina area.  Per her chart, there wasn't a postpartum appointment made and now first available is 9/27/18.  Pt needs to be seen sooner than that.  Please follow up with pt to get her in sooner.  Thank you!    Kind regards,  Crystal    Please DO NOT send this message and/or reply back to sender. Call Center Representatives DO NOT respond to messages.

## 2018-10-19 ENCOUNTER — OFFICE VISIT (OUTPATIENT)
Dept: ENDOCRINOLOGY | Facility: CLINIC | Age: 38
End: 2018-10-19
Payer: COMMERCIAL

## 2018-10-19 VITALS
DIASTOLIC BLOOD PRESSURE: 84 MMHG | SYSTOLIC BLOOD PRESSURE: 128 MMHG | HEIGHT: 65 IN | WEIGHT: 211.5 LBS | HEART RATE: 67 BPM | BODY MASS INDEX: 35.24 KG/M2

## 2018-10-19 DIAGNOSIS — O24.414 INSULIN CONTROLLED GESTATIONAL DIABETES MELLITUS (GDM) IN THIRD TRIMESTER: Primary | ICD-10-CM

## 2018-10-19 LAB — HBA1C MFR BLD: 5.8 % (ref 4.3–6)

## 2018-10-19 ASSESSMENT — PAIN SCALES - GENERAL: PAINLEVEL: NO PAIN (0)

## 2018-10-19 NOTE — PROGRESS NOTES
Diabetes Clinic Visit    Consulting provider: Princess Hicsk, MALACHI CNP  [address] [place] [address]    Reason for consultation: follow up diabetes after delivery    HPI:   Patient is a 38 year old [place]istanian woman with a history of fibromyalgia, NAFLD, iron deficiency anemia, and B12 deficiency, GDM who presents for evaluation of diabetes after pregnancy. Last seen by me and .    She delivered baby uneventfully on August, 3rd. This is her 8th child. During pregnancy, she was on very brief course of Levemir. Since delivery, she did not use insulin. Today's A1c is 5.8%.     She eats a diet consisting mostly of breads, pasta, and rice. Fair amount of vegetables. Very little meat - eats some beef and goat. Does not like chicken or fish. She does not exercise because she is tired from caring for her children and her mother who is on dialysis.       Review of Systems:  A 10-point ROS is otherwise negative except as noted in HPI or below.     Past Medical and Past Surgical History:  Past Medical History:   Diagnosis Date     Fatty liver      Fibromyalgia      History of gestational diabetes      Seasonal allergies        Past Surgical History:   Procedure Laterality Date     NO HISTORY OF SURGERY         Medications:   Current Outpatient Prescriptions   Medication Sig Dispense Refill     acetaminophen (TYLENOL) 325 MG tablet Take 2 tablets (650 mg) by mouth every 4 hours as needed for mild pain or fever (greater than or equal to 38  C /100.4  F (oral) or 38.5  C/ 101.4  F (core).) 30 tablet 0     blood glucose monitoring (ROSHNI MICROLET) lancets Use to test blood sugar 4 times daily or as directed.  Ok to substitute alternative if insurance prefers. 200 each 11     blood glucose monitoring (CONTOUR NEXT TEST) test strip Use to test blood sugar 4 times daily or as directed.  Ok to substitute alternative if insurance prefers. 150 strip 11     fluticasone (FLONASE) 50 MCG/ACT spray Spray 2 sprays  "into both nostrils daily 1 Bottle 11     ibuprofen (ADVIL/MOTRIN) 800 MG tablet Take 1 tablet (800 mg) by mouth every 6 hours as needed for other (cramping) 30 tablet 0     insulin pen needle (ULTICARE MINI) 31G X 6 MM Use 5 daily or as directed. 200 each 3     lanolin ointment Apply topically daily as needed for dry skin 28 g 0     montelukast (SINGULAIR) 10 MG tablet Take 1 tablet (10 mg) by mouth At Bedtime 30 tablet 11     omeprazole (PRILOSEC) 40 MG capsule Take 1 capsule (40 mg) by mouth daily 30 capsule 5     Prenatal Vit-Fe Fumarate-FA (PRENATAL MULTIVITAMIN PLUS IRON) 27-0.8 MG TABS per tablet Take 1 tablet by mouth daily 100 tablet 3     psyllium (METAMUCIL SMOOTH TEXTURE) 28 % PACK Take 1 Package by mouth daily 30 each 11     senna-docusate (SENOKOT-S;PERICOLACE) 8.6-50 MG per tablet Take 1 tablet by mouth daily as needed for constipation 10 tablet 0     cyanocobalamin (VITAMIN  B-12) 1000 MCG tablet Take 1 tablet (1,000 mcg) by mouth daily (Patient not taking: Reported on 8/1/2018) 30 tablet 3     ketotifen (ZADITOR/REFRESH ANTI-ITCH) 0.025 % SOLN ophthalmic solution Place 1 drop into both eyes 2 times daily (Patient not taking: Reported on 8/1/2018) 1 Bottle 11       Allergies:   Allergies   Allergen Reactions     Seasonal Allergies        Social History:  Social History   Substance Use Topics     Smoking status: Never Smoker     Smokeless tobacco: Never Used     Alcohol use No   She lives in [place] with her family, her mother, and her children.     Family History:  Family History   Problem Relation Age of Onset     Hypertension Mother      Diabetes Mother      KIDNEY DISEASE Mother      dialysis     Glaucoma Mother      Macular Degeneration No family hx of        Physical Examination:  Blood pressure 128/84, pulse 67, height 1.651 m (5' 5\"), weight 95.9 kg (211 lb 8 oz), last menstrual period 11/03/2017, unknown if currently breastfeeding.  Body mass index is 35.2 kg/(m^2).  Wt Readings from " Last 4 Encounters:   10/19/18 95.9 kg (211 lb 8 oz)   09/07/18 93.2 kg (205 lb 8 oz)   08/01/18 99.3 kg (219 lb)   08/01/18 99.3 kg (219 lb)   GEN: generally, well appearing.  HEENT: EOMI.  EXT: No peripheral edema.   SKIN: Normal skin temperature and turgor with no lesions.   NEURO: Non-focal.    Labs and Studies:   A1c 5.8% (Hb 10.9 g)  A1c 5.9% (Hb 10.7)     Assessment/Plan:  Brit is a 38 year old [place] woman with a history of fibromyalgia, NAFLD, iron deficiency anemia, and B12 deficiency who presents for evaluation of diabetes after pregnancy.    Prediabetes:  Strong hx of GDM with multiple pregnancy and family hx of type 2 diabetes, she is at high risk to progress to type 2 diabetes. A1c is 5.8% today but Hb was 10.9 so A1c is likely to be falsely low.   - Meals are mostly carbs and she does not walk/exercise following meals.  - discussed diet modification and start exercising regularly. Main barrier is lack of time due to .  - educated her today on prediabetes and risk of progression to diabetes.   - recommend her to see PCP in 6 months to follow up on A1c after intervention (diet modification and exercise).    Junie Feng MD    Division of Diabetes and Endocrinology  Department of Medicine  [phone]

## 2018-10-19 NOTE — MR AVS SNAPSHOT
After Visit Summary   10/19/2018    Brit Romano    MRN: [id number]           Patient Information     Date Of Birth          1980        Visit Information        Provider Department      10/19/2018 8:50 AM Junie Feng MD [facility] Endocrinology        Today's Diagnoses     Insulin controlled gestational diabetes mellitus (GDM) in third trimester    -  1      Care Instructions    - make an afford to cut down bread, pasta, rice, potato   - walk regularly at least 3-4 times per week about 30 minutes  - check in with Dr.Vander Wang for A1c in 6 months    If you have any questions, please do not hesitate to call [phone] and ask for Endocrinology clinic.      After clinic hours, please contact [phone] and ask for Endocrinologist-on call    Sincerely,    Junie Feng MD  Endocrinology            Follow-ups after your visit        Your next 10 appointments already scheduled     Oct 19, 2018  3:15 PM CDT   Annual Visit with Amy Schumer, MD   [facility] ([facility])    [facility] [address] [facility]  [address] [facility] [id number] [phone]              Who to contact     Please call your clinic at [phone] to:    Ask questions about your health    Make or cancel appointments    Discuss your medicines    Learn about your test results    Speak to your doctor            Additional Information About Your Visit        MyChart Information     [employer] is an electronic gateway that provides easy, online access to your medical records. With [employer], you can request a clinic appointment, read your test results, renew a prescription or communicate with your care team.     To sign up for [employer] visit the website at [email or web address]   You will be asked to enter the access code listed below, as well as some personal information. Please follow the directions to create your username and password.     Your access  "code is: [email or web address]  Expires: 2018  6:31 AM     Your access code will  in 90 days. If you need help or a new code, please contact your [facility] or call [phone] for assistance.        Care EveryWhere ID     This is your Care EveryWhere ID. This could be used by other organizations to access your [facility] medical records  [id number]        Your Vitals Were     Pulse Height Last Period BMI (Body Mass Index)          67 1.651 m (5' 5\") 2017 35.2 kg/m2         Blood Pressure from Last 3 Encounters:   10/19/18 128/84   18 120/86   18 123/75    Weight from Last 3 Encounters:   10/19/18 95.9 kg (211 lb 8 oz)   18 93.2 kg (205 lb 8 oz)   18 99.3 kg (219 lb)              We Performed the Following     Hemoglobin A1c POCT        Primary Care Provider Office Phone # Fax #    Princess Zenia Hicks, MALACHI [facility] [phone]       [address] [facility] [address]        Equal Access to Services     [facility]: Hadii aad ku hadasho Soomaali, waaxda luqadaha, qaybta kaalmada adeegyada, pablo zimmerman hayestefanin jax snyder . So [facility] [phone].    ATENCIÓN: Si habla español, tiene a smith disposición servicios gratuitos de asistencia lingüística. [facility] [phone].    We comply with applicable federal civil rights laws and [place] laws. We do not discriminate on the basis of race, color, national origin, age, disability, sex, sexual orientation, or gender identity.            Thank you!     Thank you for choosing [facility] ENDOCRINOLOGY  for your care. Our goal is always to provide you with excellent care. Hearing back from our patients is one way we can continue to improve our services. Please take a few minutes to complete the written survey that you may receive in the mail after your visit with us. Thank you!             Your Updated Medication List - Protect others around you: Learn how to safely use, store and throw " away your medicines at www.disposemymeds.org.          This list is accurate as of 10/19/18  9:16 AM.  Always use your most recent med list.                   Brand Name Dispense Instructions for use Diagnosis    acetaminophen 325 MG tablet    TYLENOL    30 tablet    Take 2 tablets (650 mg) by mouth every 4 hours as needed for mild pain or fever (greater than or equal to 38? C /100.4? F (oral) or 38.5? C/ 101.4? F (core).)    Spontaneous vaginal delivery       blood glucose monitoring lancets     200 each    Use to test blood sugar 4 times daily or as directed.  Ok to substitute alternative if insurance prefers.    Abnormal maternal glucose tolerance, antepartum       blood glucose monitoring test strip    CONTOUR NEXT TEST    150 strip    Use to test blood sugar 4 times daily or as directed.  Ok to substitute alternative if insurance prefers.    Abnormal maternal glucose tolerance, antepartum       cyanocobalamin 1000 MCG tablet    vitamin  B-12    30 tablet    Take 1 tablet (1,000 mcg) by mouth daily    Other iron deficiency anemia       fluticasone 50 MCG/ACT spray    FLONASE    1 Bottle    Spray 2 sprays into both nostrils daily    Allergic rhinitis due to dust mite, Chronic seasonal allergic rhinitis due to pollen, Allergic conjunctivitis of both eyes       ibuprofen 800 MG tablet    ADVIL/MOTRIN    30 tablet    Take 1 tablet (800 mg) by mouth every 6 hours as needed for other (cramping)    Spontaneous vaginal delivery       insulin pen needle 31G X 6 MM    ULTICARE MINI    200 each    Use 5 daily or as directed.    Gestational diabetes mellitus, antepartum       ketotifen 0.025 % Soln ophthalmic solution    ZADITOR/REFRESH ANTI-ITCH    1 Bottle    Place 1 drop into both eyes 2 times daily    Allergic conjunctivitis of both eyes       lanolin ointment     28 g    Apply topically daily as needed for dry skin    Sore nipple       montelukast 10 MG tablet    SINGULAIR    30 tablet    Take 1 tablet (10 mg) by mouth  At Bedtime    Allergic rhinitis due to dust mite, Chronic seasonal allergic rhinitis due to pollen       omeprazole 40 MG capsule    priLOSEC    30 capsule    Take 1 capsule (40 mg) by mouth daily    Abdominal pain, generalized       prenatal multivitamin plus iron 27-0.8 MG Tabs per tablet     100 tablet    Take 1 tablet by mouth daily    Pregnancy examination or test, positive result       psyllium 28 % Pack    METAMUCIL SMOOTH TEXTURE    30 each    Take 1 Package by mouth daily    Irritable bowel syndrome with both constipation and diarrhea       senna-docusate 8.6-50 MG per tablet    SENOKOT-S;PERICOLACE    10 tablet    Take 1 tablet by mouth daily as needed for constipation    Spontaneous vaginal delivery

## 2018-10-19 NOTE — PATIENT INSTRUCTIONS
- make an afford to cut down bread, pasta, rice, potato   - walk regularly at least 3-4 times per week about 30 minutes  - check in with Dr.Vander Wang for A1c in 6 months    If you have any questions, please do not hesitate to call [phone] and ask for Endocrinology clinic.      After clinic hours, please contact [phone] and ask for Endocrinologist-on call    Sincerely,    Junie Feng MD  Endocrinology

## 2018-10-19 NOTE — Clinical Note
She will f/u with you in 6 months for A1c after diet modification and exercise intervention. Thanks. Junie

## 2018-10-19 NOTE — LETTER
10/19/2018       RE: Brit Romano  [address] [facility] [facility] [address]     Dear Colleague,    Thank you for referring your patient, Brit Romano, to the [facility] ENDOCRINOLOGY at [facility]. Please see a copy of my visit note below.    Diabetes Clinic Visit    Consulting provider: Princess Hicks, APRN CNP  [address] [place] [address]    Reason for consultation: follow up diabetes after delivery    HPI:   Patient is a 38 year old [place] citizen woman with a history of fibromyalgia, NAFLD, iron deficiency anemia, and B12 deficiency, GDM who presents for evaluation of diabetes after pregnancy. Last seen by me and .    She delivered baby uneventfully on August, 3rd. This is her 8th child. During pregnancy, she was on very brief course of Levemir. Since delivery, she did not use insulin. Today's A1c is 5.8%.     She eats a diet consisting mostly of breads, pasta, and rice. Fair amount of vegetables. Very little meat - eats some beef and goat. Does not like chicken or fish. She does not exercise because she is tired from caring for her children and her mother who is on dialysis.       Review of Systems:  A 10-point ROS is otherwise negative except as noted in HPI or below.     Past Medical and Past Surgical History:  Past Medical History:   Diagnosis Date     Fatty liver      Fibromyalgia      History of gestational diabetes      Seasonal allergies        Past Surgical History:   Procedure Laterality Date     NO HISTORY OF SURGERY         Medications:   Current Outpatient Prescriptions   Medication Sig Dispense Refill     acetaminophen (TYLENOL) 325 MG tablet Take 2 tablets (650 mg) by mouth every 4 hours as needed for mild pain or fever (greater than or equal to 38  C /100.4  F (oral) or 38.5  C/ 101.4  F (core).) 30 tablet 0     blood glucose monitoring (ROSHNI MICROLET) lancets Use to test blood sugar 4 times daily or as directed.  Ok to  substitute alternative if insurance prefers. 200 each 11     blood glucose monitoring (CONTOUR NEXT TEST) test strip Use to test blood sugar 4 times daily or as directed.  Ok to substitute alternative if insurance prefers. 150 strip 11     fluticasone (FLONASE) 50 MCG/ACT spray Spray 2 sprays into both nostrils daily 1 Bottle 11     ibuprofen (ADVIL/MOTRIN) 800 MG tablet Take 1 tablet (800 mg) by mouth every 6 hours as needed for other (cramping) 30 tablet 0     insulin pen needle (ULTICARE MINI) 31G X 6 MM Use 5 daily or as directed. 200 each 3     lanolin ointment Apply topically daily as needed for dry skin 28 g 0     montelukast (SINGULAIR) 10 MG tablet Take 1 tablet (10 mg) by mouth At Bedtime 30 tablet 11     omeprazole (PRILOSEC) 40 MG capsule Take 1 capsule (40 mg) by mouth daily 30 capsule 5     Prenatal Vit-Fe Fumarate-FA (PRENATAL MULTIVITAMIN PLUS IRON) 27-0.8 MG TABS per tablet Take 1 tablet by mouth daily 100 tablet 3     psyllium (METAMUCIL SMOOTH TEXTURE) 28 % PACK Take 1 Package by mouth daily 30 each 11     senna-docusate (SENOKOT-S;PERICOLACE) 8.6-50 MG per tablet Take 1 tablet by mouth daily as needed for constipation 10 tablet 0     cyanocobalamin (VITAMIN  B-12) 1000 MCG tablet Take 1 tablet (1,000 mcg) by mouth daily (Patient not taking: Reported on 8/1/2018) 30 tablet 3     ketotifen (ZADITOR/REFRESH ANTI-ITCH) 0.025 % SOLN ophthalmic solution Place 1 drop into both eyes 2 times daily (Patient not taking: Reported on 8/1/2018) 1 Bottle 11       Allergies:   Allergies   Allergen Reactions     Seasonal Allergies        Social History:  Social History   Substance Use Topics     Smoking status: Never Smoker     Smokeless tobacco: Never Used     Alcohol use No   She lives in [place] with her family, her mother, and her children.     Family History:  Family History   Problem Relation Age of Onset     Hypertension Mother      Diabetes Mother      KIDNEY DISEASE Mother      dialysis      "Glaucoma Mother      Macular Degeneration No family hx of        Physical Examination:  Blood pressure 128/84, pulse 67, height 1.651 m (5' 5\"), weight 95.9 kg (211 lb 8 oz), last menstrual period 11/03/2017, unknown if currently breastfeeding.  Body mass index is 35.2 kg/(m^2).  Wt Readings from Last 4 Encounters:   10/19/18 95.9 kg (211 lb 8 oz)   09/07/18 93.2 kg (205 lb 8 oz)   08/01/18 99.3 kg (219 lb)   08/01/18 99.3 kg (219 lb)   GEN: generally, well appearing.  HEENT: EOMI.  EXT: No peripheral edema.   SKIN: Normal skin temperature and turgor with no lesions.   NEURO: Non-focal.    Labs and Studies:   A1c 5.8% (Hb 10.9 g)  A1c 5.9% (Hb 10.7)     Assessment/Plan:  Brit is a 38 year old [place] woman with a history of fibromyalgia, NAFLD, iron deficiency anemia, and B12 deficiency who presents for evaluation of diabetes after pregnancy.    Prediabetes:  Strong hx of GDM with multiple pregnancy and family hx of type 2 diabetes, she is at high risk to progress to type 2 diabetes. A1c is 5.8% today but Hb was 10.9 so A1c is likely to be falsely low.   - Meals are mostly carbs and she does not walk/exercise following meals.  - discussed diet modification and start exercising regularly. Main barrier is lack of time due to .  - educated her today on prediabetes and risk of progression to diabetes.   - recommend her to see PCP in 6 months to follow up on A1c after intervention (diet modification and exercise).    Junie Feng MD    Division of Diabetes and Endocrinology  Department of Medicine  [phone]            "

## 2018-10-25 NOTE — PROGRESS NOTES
SUBJECTIVE:   Brit Romano is a 38 year old female who presents to clinic today for the following health issues:      Back Pain       Duration: august 2018        Specific cause: fall when 38 weeks pregnant     Description:   Location of pain: left lower back  Character of pain: sharp  Pain radiation:none  New numbness or weakness in legs, not attributed to pain:  no     Intensity: Currently 8/10    History:   Pain interferes with job: No  History of back problems: no prior back problems  Any previous MRI or X-rays: None  Sees a specialist for back pain:  No  Therapies tried without relief: pain medication- ibuprofen and tylenol, cold and massage    Alleviating factors:   Improved by: none      Precipitating factors:  Worsened by: Lifting and standing up           Accompanying Signs & Symptoms:  Risk of Fracture:  Recent history of trauma or blunt force- fell when pregnant.  Risk of Cauda Equina:  None  Risk of Infection:  None  Risk of Cancer:  None  Risk of Ankylosing Spondylitis:  Onset at age <35, male, AND morning back stiffness. no     Patient notes intermittent dizziness.  She notes a spinning sensation, weakness, nausea.  She denies vision changes, hearing loss, tinnitus, palpitation, or chest pain with symptoms.  Symptoms are worse with turning head.    Problem list and histories reviewed & adjusted, as indicated.  Additional history: as documented    Patient Active Problem List   Diagnosis     Mixed incontinence urge and stress (male)(female)     Lactose intolerance     Iron deficiency anemia      Fatty liver disease, nonalcoholic     History of gestational diabetes     Fibromyalgia     Vitamin B12 deficiency (non anemic)     Supervision of high-risk pregnancy of elderly multigravida, [facility]     Low-lying placenta in second trimester     Allergic rhinitis due to dust mite     Chronic seasonal allergic rhinitis due to pollen     Allergic conjunctivitis of both eyes     Insulin controlled gestational  diabetes mellitus (GDM) in third trimester     Breech presentation, single or unspecified fetus     Unstable fetal lie     Encounter for triage in pregnant patient     Decreased fetal movement     Spontaneous vaginal delivery     Past Surgical History:   Procedure Laterality Date     NO HISTORY OF SURGERY         Social History   Substance Use Topics     Smoking status: Never Smoker     Smokeless tobacco: Never Used     Alcohol use No     Family History   Problem Relation Age of Onset     Hypertension Mother      Diabetes Mother      KIDNEY DISEASE Mother      dialysis     Glaucoma Mother      Macular Degeneration No family hx of          Current Outpatient Prescriptions   Medication Sig Dispense Refill     acetaminophen (TYLENOL) 325 MG tablet Take 2 tablets (650 mg) by mouth every 4 hours as needed for mild pain or fever (greater than or equal to 38  C /100.4  F (oral) or 38.5  C/ 101.4  F (core).) 30 tablet 0     blood glucose monitoring (ROSHNI MICROLET) lancets Use to test blood sugar 4 times daily or as directed.  Ok to substitute alternative if insurance prefers. 200 each 11     blood glucose monitoring (CONTOUR NEXT TEST) test strip Use to test blood sugar 4 times daily or as directed.  Ok to substitute alternative if insurance prefers. 150 strip 11     fluticasone (FLONASE) 50 MCG/ACT spray Spray 2 sprays into both nostrils daily 1 Bottle 11     ibuprofen (ADVIL/MOTRIN) 800 MG tablet Take 1 tablet (800 mg) by mouth every 6 hours as needed for other (cramping) 30 tablet 0     insulin pen needle (ULTICARE MINI) 31G X 6 MM Use 5 daily or as directed. 200 each 3     ketotifen (ZADITOR/REFRESH ANTI-ITCH) 0.025 % SOLN ophthalmic solution Place 1 drop into both eyes 2 times daily 1 Bottle 11     lanolin ointment Apply topically daily as needed for dry skin 28 g 0     meclizine (ANTIVERT) 25 MG tablet Take 1 tablet (25 mg) by mouth every 6 hours as needed for dizziness 30 tablet 1     methocarbamol (ROBAXIN) 500 MG  "tablet Take 1-2 tablets (500-1,000 mg) by mouth 3 times daily as needed for muscle spasms 30 tablet 1     methylPREDNISolone (MEDROL DOSEPAK) 4 MG tablet Follow package instructions 21 tablet 0     montelukast (SINGULAIR) 10 MG tablet Take 1 tablet (10 mg) by mouth At Bedtime 30 tablet 11     omeprazole (PRILOSEC) 40 MG capsule Take 1 capsule (40 mg) by mouth daily 30 capsule 5     Prenatal Vit-Fe Fumarate-FA (PRENATAL MULTIVITAMIN PLUS IRON) 27-0.8 MG TABS per tablet Take 1 tablet by mouth daily 100 tablet 3     psyllium (METAMUCIL SMOOTH TEXTURE) 28 % PACK Take 1 Package by mouth daily 30 each 11     senna-docusate (SENOKOT-S;PERICOLACE) 8.6-50 MG per tablet Take 1 tablet by mouth daily as needed for constipation 10 tablet 0     cyanocobalamin (VITAMIN  B-12) 1000 MCG tablet Take 1 tablet (1,000 mcg) by mouth daily (Patient not taking: Reported on 10/26/2018) 30 tablet 3     Allergies   Allergen Reactions     Seasonal Allergies      BP Readings from Last 3 Encounters:   10/26/18 112/72   10/19/18 128/84   09/07/18 120/86    Wt Readings from Last 3 Encounters:   10/26/18 211 lb 3.2 oz (95.8 kg)   10/19/18 211 lb 8 oz (95.9 kg)   09/07/18 205 lb 8 oz (93.2 kg)                  Labs reviewed in EPIC    Reviewed and updated as needed this visit by clinical staff       Reviewed and updated as needed this visit by Provider         ROS:  Constitutional, HEENT, cardiovascular, pulmonary, gi and gu systems are negative, except as otherwise noted.    OBJECTIVE:     /72  Pulse 74  Temp 97.3  F (36.3  C) (Oral)  Resp 20  Ht 5' 5\" (1.651 m)  Wt 211 lb 3.2 oz (95.8 kg)  LMP 11/03/2017  SpO2 100%  BMI 35.15 kg/m2  Body mass index is 35.15 kg/(m^2).  GENERAL: healthy, alert and no distress  EYES: Eyes grossly normal to inspection, PERRL and conjunctivae and sclerae normal  HENT: ear canals and TM's normal, nose and mouth without ulcers or lesions  NECK: no adenopathy, no asymmetry, masses, or scars and thyroid " normal to palpation  RESP: lungs clear to auscultation - no rales, rhonchi or wheezes  CV: regular rate and rhythm, normal S1 S2, no S3 or S4, no murmur, click or rub, no peripheral edema and peripheral pulses strong  MS: no gross musculoskeletal defects noted, no edema  NEURO: Normal strength and tone, sensory exam grossly normal, proprioception normal, mentation intact, cranial nerves 2-12 intact, gait normal including heel/toe/tandem walking, Romberg normal and rapid alternating movements normal  Comprehensive back pain exam:  Tenderness of left paralumbar muscles, Pain limits the following motions: all, Lower extremity strength functional and equal on both sides, Lower extremity sensation normal and equal on both sides and Straight leg raise negative bilaterally    Diagnostic Test Results:  pending    ASSESSMENT/PLAN:     1. Acute left-sided low back pain without sciatica    - XR Lumbar Spine 2/3 Views; Future  - methylPREDNISolone (MEDROL DOSEPAK) 4 MG tablet; Follow package instructions  Dispense: 21 tablet; Refill: 0  - [facility]; Future  - methocarbamol (ROBAXIN) 500 MG tablet; Take 1-2 tablets (500-1,000 mg) by mouth 3 times daily as needed for muscle spasms  Dispense: 30 tablet; Refill: 1    2. Benign paroxysmal positional vertigo, unspecified laterality  Patient to try Epley maneuver at home.  If no improve, she'll contact clinic.  - meclizine (ANTIVERT) 25 MG tablet; Take 1 tablet (25 mg) by mouth every 6 hours as needed for dizziness  Dispense: 30 tablet; Refill: 1    3. Need for prophylactic vaccination and inoculation against influenza  Patient declines.      FUTURE APPOINTMENTS:       - Follow-up for annual visit or as needed    [facility]

## 2018-10-26 ENCOUNTER — RADIANT APPOINTMENT (OUTPATIENT)
Dept: GENERAL RADIOLOGY | Facility: CLINIC | Age: 38
End: 2018-10-26
Attending: NURSE PRACTITIONER
Payer: COMMERCIAL

## 2018-10-26 ENCOUNTER — OFFICE VISIT (OUTPATIENT)
Dept: FAMILY MEDICINE | Facility: CLINIC | Age: 38
End: 2018-10-26
Payer: COMMERCIAL

## 2018-10-26 VITALS
DIASTOLIC BLOOD PRESSURE: 72 MMHG | BODY MASS INDEX: 35.19 KG/M2 | SYSTOLIC BLOOD PRESSURE: 112 MMHG | HEIGHT: 65 IN | HEART RATE: 74 BPM | OXYGEN SATURATION: 100 % | WEIGHT: 211.2 LBS | TEMPERATURE: 97.3 F | RESPIRATION RATE: 20 BRPM

## 2018-10-26 DIAGNOSIS — Z23 NEED FOR PROPHYLACTIC VACCINATION AND INOCULATION AGAINST INFLUENZA: ICD-10-CM

## 2018-10-26 DIAGNOSIS — M54.50 ACUTE LEFT-SIDED LOW BACK PAIN WITHOUT SCIATICA: ICD-10-CM

## 2018-10-26 DIAGNOSIS — H81.10 BENIGN PAROXYSMAL POSITIONAL VERTIGO, UNSPECIFIED LATERALITY: ICD-10-CM

## 2018-10-26 DIAGNOSIS — M54.50 ACUTE LEFT-SIDED LOW BACK PAIN WITHOUT SCIATICA: Primary | ICD-10-CM

## 2018-10-26 PROCEDURE — 99214 OFFICE O/P EST MOD 30 MIN: CPT | Performed by: NURSE PRACTITIONER

## 2018-10-26 PROCEDURE — 72100 X-RAY EXAM L-S SPINE 2/3 VWS: CPT

## 2018-10-26 RX ORDER — MECLIZINE HYDROCHLORIDE 25 MG/1
25 TABLET ORAL EVERY 6 HOURS PRN
Qty: 30 TABLET | Refills: 1 | Status: SHIPPED | OUTPATIENT
Start: 2018-10-26 | End: 2019-04-09

## 2018-10-26 RX ORDER — METHYLPREDNISOLONE 4 MG
TABLET, DOSE PACK ORAL
Qty: 21 TABLET | Refills: 0 | Status: SHIPPED | OUTPATIENT
Start: 2018-10-26 | End: 2019-04-09

## 2018-10-26 RX ORDER — METHOCARBAMOL 500 MG/1
500-1000 TABLET, FILM COATED ORAL 3 TIMES DAILY PRN
Qty: 30 TABLET | Refills: 1 | Status: SHIPPED | OUTPATIENT
Start: 2018-10-26 | End: 2019-04-09

## 2018-10-26 ASSESSMENT — PAIN SCALES - GENERAL: PAINLEVEL: EXTREME PAIN (8)

## 2018-10-26 NOTE — LETTER
[facility]  [address] [place] [address]    October 29, 2018    Brit Romano  [address] [address]          Dear Brit,    Normal spine x-ray.  Please continue with plan for physical therapy.     If you have any questions please feel free to contact [phone] or myself via [employer]    Enclosed is a copy of your results.     Results for orders placed or performed in visit on 10/26/18   XR Lumbar Spine 2/3 Views    Narrative    LUMBAR SPINE TWO - THREE VIEWS   10/26/2018 3:41 PM     HISTORY: Acute left-sided low back pain without sciatica.    COMPARISON: None.       Impression    IMPRESSION: No acute fracture or malalignment.    CHIVO DING MD       If you have any questions or concerns, please call myself or my nurse at [phone].      Sincerely,        Princess Hicks APRN CNP /vivas

## 2018-10-26 NOTE — MR AVS SNAPSHOT
After Visit Summary   10/26/2018    Brit Romano    MRN: [id number]           Patient Information     Date Of Birth          1980        Visit Information        Provider Department      10/26/2018 3:00 PM Princess Hicks APRN [facility]        Today's Diagnoses     Acute left-sided low back pain without sciatica    -  1    Benign paroxysmal positional vertigo, unspecified laterality        Need for prophylactic vaccination and inoculation against influenza          Care Instructions    Google Epley Maneuver and try right side to help dizziness.    [facility] (formerly [facility])    If you have any questions regarding to your visit please contact your care team:     Team Pink:   Clinic Hours Telephone Number   Internal Medicine:  Dr. Chen Hicks, NP 7am-7pm  Monday - Thursday   7am-5pm  Fridays  [phone]  (Appointment scheduling available 24/7)   Urgent Care - [place] - 11am-9pm Monday-Friday Saturday-Sunday- 9am-5pm   [place] - 5pm-9pm Monday-Friday Saturday-Sunday- 9am-5pm  [phone] - [place]  [phone] - [place]       What options do I have for a visit other than an office visit? We offer electronic visits (e-visits) and telephone visits, when medically appropriate.  Please check with your medical insurance to see if these types of visits are covered, as you will be responsible for any charges that are not paid by your insurance.      You can use 3C Plus (secure electronic communication) to access to your chart, send your primary care provider a message, or make an appointment. Ask a team member how to get started.     For a price quote for your services, please call our Consumer Price Line at [phone] or our Imaging Cost estimation line at [phone] (for imaging tests).            Follow-ups after your visit        Additional Services     [facility] PT, HAND, AND CHIROPRACTIC REFERRAL        Physical Therapy, Hand Therapy and Chiropractic Care are available through:  *[facility]  *Hand Therapy (Occupational Therapy or Physical Therapy)  *[facility]    Call one number to schedule at any of the above locations: [phone].    Physical therapy, Hand therapy and/or Chiropractic care has been recommended by your physician as an excellent treatment option to reduce pain and help people return to normal activities, including sports.  Therapy and/or chiropractic care services are a great complement or alternative to expensive and invasive surgery, injections, or long-term use of prescription medications. The primary goal is to identify the underlying problem and provide you the tools to manage your condition on your own.     Please be aware that coverage of these services is subject to the terms and limitations of your health insurance plan.  Call member services at your health plan with any benefit or coverage questions.      Please bring the following to your appointment:  *Your personal calendar for scheduling future appointments  *Comfortable clothing                  Follow-up notes from your care team     Return if symptoms worsen or fail to improve.      Future tests that were ordered for you today     Open Future Orders        Priority Expected Expires Ordered    XR Lumbar Spine 2/3 Views Routine 10/26/2018 10/26/2019 10/26/2018    [facility] PT, HAND, AND CHIROPRACTIC REFERRAL Routine  10/26/2019 10/26/2018            Who to contact     If you have questions or need follow up information about today's clinic visit or your schedule please contact [facility] directly at [phone].  Normal or non-critical lab and imaging results will be communicated to you by MyChart, letter or phone within 4 business days after the clinic has received the results. If you do not hear from us within 7 days, please contact the clinic through MyChart or phone.  "If you have a critical or abnormal lab result, we will notify you by phone as soon as possible.  Submit refill requests through FreeATM or call your pharmacy and they will forward the refill request to us. Please allow 3 business days for your refill to be completed.          Additional Information About Your Visit        [email or web address].comhart Information     FreeATM lets you send messages to your doctor, view your test results, renew your prescriptions, schedule appointments and more. To sign up, go to [email or web address] . Click on \"Log in\" on the left side of the screen, which will take you to the Welcome page. Then click on \"Sign up Now\" on the right side of the page.     You will be asked to enter the access code listed below, as well as some personal information. Please follow the directions to create your username and password.     Your access code is: [email or web address]  Expires: 2018  6:31 AM     Your access code will  in 90 days. If you need help or a new code, please call your [place] clinic or [phone].        Care EveryWhere ID     This is your Care EveryWhere ID. This could be used by other organizations to access your [place] medical records  [id number]        Your Vitals Were     Pulse Temperature Respirations Height Last Period Pulse Oximetry    74 97.3  F (36.3  C) (Oral) 20 5' 5\" (1.651 m) 2017 100%    BMI (Body Mass Index)                   35.15 kg/m2            Blood Pressure from Last 3 Encounters:   10/26/18 112/72   10/19/18 128/84   18 120/86    Weight from Last 3 Encounters:   10/26/18 211 lb 3.2 oz (95.8 kg)   10/19/18 211 lb 8 oz (95.9 kg)   18 205 lb 8 oz (93.2 kg)                 Today's Medication Changes          These changes are accurate as of 10/26/18  3:27 PM.  If you have any questions, ask your nurse or doctor.               Start taking these medicines.        Dose/Directions    meclizine 25 MG tablet   Commonly known as:  ANTIVERT   Used for:  Benign " paroxysmal positional vertigo, unspecified laterality   Started by:  Princess Hicks APRN CNP        Dose:  25 mg   Take 1 tablet (25 mg) by mouth every 6 hours as needed for dizziness   Quantity:  30 tablet   Refills:  1       methocarbamol 500 MG tablet   Commonly known as:  ROBAXIN   Used for:  Acute left-sided low back pain without sciatica   Started by:  Princess Hicks APRN CNP        Dose:  500-1000 mg   Take 1-2 tablets (500-1,000 mg) by mouth 3 times daily as needed for muscle spasms   Quantity:  30 tablet   Refills:  1       methylPREDNISolone 4 MG tablet   Commonly known as:  MEDROL DOSEPAK   Used for:  Acute left-sided low back pain without sciatica   Started by:  Princess Hicks APRN CNP        Follow package instructions   Quantity:  21 tablet   Refills:  0            Where to get your medicines      These medications were sent to [id number] - [place], [place] - [address] AT [facility] [address], [place] [address]     Phone:  [phone]     meclizine 25 MG tablet    methocarbamol 500 MG tablet    methylPREDNISolone 4 MG tablet                Primary Care Provider Office Phone # Fax #    MALACHI Mazariegos [phone] [address] [facility] [address]        Equal Access to Services     [facility]: Hadii aad ku hadasho Soomaali, waaxda luqadaha, qaybta kaalmada adeegyada, waxay idiin hayrosangela ruiz. So [facility] [phone].    ATENCIÓN: Si habla español, tiene a smith disposición servicios gratuitos de asistencia lingüística. Anujaame al [phone].    We comply with applicable federal civil rights laws and [place] laws. We do not discriminate on the basis of race, color, national origin, age, disability, sex, sexual orientation, or gender identity.            Thank you!     Thank you for choosing [facility]  for your care. Our goal is  always to provide you with excellent care. Hearing back from our patients is one way we can continue to improve our services. Please take a few minutes to complete the written survey that you may receive in the mail after your visit with us. Thank you!             Your Updated Medication List - Protect others around you: Learn how to safely use, store and throw away your medicines at www.disposemymeds.org.          This list is accurate as of 10/26/18  3:27 PM.  Always use your most recent med list.                   Brand Name Dispense Instructions for use Diagnosis    acetaminophen 325 MG tablet    TYLENOL    30 tablet    Take 2 tablets (650 mg) by mouth every 4 hours as needed for mild pain or fever (greater than or equal to 38? C /100.4? F (oral) or 38.5? C/ 101.4? F (core).)    Spontaneous vaginal delivery       blood glucose monitoring lancets     200 each    Use to test blood sugar 4 times daily or as directed.  Ok to substitute alternative if insurance prefers.    Abnormal maternal glucose tolerance, antepartum       blood glucose monitoring test strip    CONTOUR NEXT TEST    150 strip    Use to test blood sugar 4 times daily or as directed.  Ok to substitute alternative if insurance prefers.    Abnormal maternal glucose tolerance, antepartum       cyanocobalamin 1000 MCG tablet    vitamin  B-12    30 tablet    Take 1 tablet (1,000 mcg) by mouth daily    Other iron deficiency anemia       fluticasone 50 MCG/ACT spray    FLONASE    1 Bottle    Spray 2 sprays into both nostrils daily    Allergic rhinitis due to dust mite, Chronic seasonal allergic rhinitis due to pollen, Allergic conjunctivitis of both eyes       ibuprofen 800 MG tablet    ADVIL/MOTRIN    30 tablet    Take 1 tablet (800 mg) by mouth every 6 hours as needed for other (cramping)    Spontaneous vaginal delivery       insulin pen needle 31G X 6 MM    ULTICARE MINI    200 each    Use 5 daily or as directed.    Gestational diabetes mellitus,  antepartum       ketotifen 0.025 % Soln ophthalmic solution    ZADITOR/REFRESH ANTI-ITCH    1 Bottle    Place 1 drop into both eyes 2 times daily    Allergic conjunctivitis of both eyes       lanolin ointment     28 g    Apply topically daily as needed for dry skin    Sore nipple       meclizine 25 MG tablet    ANTIVERT    30 tablet    Take 1 tablet (25 mg) by mouth every 6 hours as needed for dizziness    Benign paroxysmal positional vertigo, unspecified laterality       methocarbamol 500 MG tablet    ROBAXIN    30 tablet    Take 1-2 tablets (500-1,000 mg) by mouth 3 times daily as needed for muscle spasms    Acute left-sided low back pain without sciatica       methylPREDNISolone 4 MG tablet    MEDROL DOSEPAK    21 tablet    Follow package instructions    Acute left-sided low back pain without sciatica       montelukast 10 MG tablet    SINGULAIR    30 tablet    Take 1 tablet (10 mg) by mouth At Bedtime    Allergic rhinitis due to dust mite, Chronic seasonal allergic rhinitis due to pollen       omeprazole 40 MG capsule    priLOSEC    30 capsule    Take 1 capsule (40 mg) by mouth daily    Abdominal pain, generalized       prenatal multivitamin plus iron 27-0.8 MG Tabs per tablet     100 tablet    Take 1 tablet by mouth daily    Pregnancy examination or test, positive result       psyllium 28 % Pack    METAMUCIL SMOOTH TEXTURE    30 each    Take 1 Package by mouth daily    Irritable bowel syndrome with both constipation and diarrhea       senna-docusate 8.6-50 MG per tablet    SENOKOT-S;PERICOLACE    10 tablet    Take 1 tablet by mouth daily as needed for constipation    Spontaneous vaginal delivery

## 2018-10-26 NOTE — PATIENT INSTRUCTIONS
Google Epley Maneuver and try right side to help dizziness.    [facility]    If you have any questions regarding to your visit please contact your care team:     Team Pink:   Clinic Hours Telephone Number   Internal Medicine:  Dr. Chen Hicks, NP 7am-7pm  Monday - Thursday   7am-5pm  Fridays  [phone]  (Appointment scheduling available 24/7)   Urgent Care - [place] and [facility] - 11am-9pm Monday-Friday Saturday-Sunday- 9am-5pm   [place] - 5pm-9pm Monday-Friday Saturday-Sunday- 9am-5pm  [phone] - [place]  [phone] - [place]       What options do I have for a visit other than an office visit? We offer electronic visits (e-visits) and telephone visits, when medically appropriate.  Please check with your medical insurance to see if these types of visits are covered, as you will be responsible for any charges that are not paid by your insurance.      You can use [employer] (secure electronic communication) to access to your chart, send your primary care provider a message, or make an appointment. Ask a team member how to get started.     For a price quote for your services, please call our Consumer Price Line at [phone] or our Imaging Cost estimation line at [phone] (for imaging tests).

## 2018-10-27 NOTE — PROGRESS NOTES
Dear Brit,    Your recent test results are attached.      Normal spine x-ray.  Please continue with plan for physical therapy.    If you have any questions please feel free to contact [phone] or myself via [employer].    Sincerely,  Princess Hicks, CNP

## 2018-11-12 ENCOUNTER — THERAPY VISIT (OUTPATIENT)
Dept: PHYSICAL THERAPY | Facility: CLINIC | Age: 38
End: 2018-11-12
Payer: COMMERCIAL

## 2018-11-12 DIAGNOSIS — M54.50 ACUTE LEFT-SIDED LOW BACK PAIN WITHOUT SCIATICA: Primary | ICD-10-CM

## 2018-11-12 PROCEDURE — 97110 THERAPEUTIC EXERCISES: CPT | Mod: GP | Performed by: PHYSICAL THERAPIST

## 2018-11-12 PROCEDURE — 97161 PT EVAL LOW COMPLEX 20 MIN: CPT | Mod: GP | Performed by: PHYSICAL THERAPIST

## 2018-11-12 NOTE — MR AVS SNAPSHOT
"              After Visit Summary   11/12/2018    Brit Romano    MRN: [id number]           Patient Information     Date Of Birth          1980        Visit Information        Provider Department      11/12/2018 4:40 PM Michelet Esparza, PT [facility]        Today's Diagnoses     Acute left-sided low back pain without sciatica    -  1       Follow-ups after your visit        Your next 10 appointments already scheduled     Nov 19, 2018  4:40 PM CST   [facility] Spine with Michelet Esparza PT   [facility]    [address]  [facility] [address] [phone]            Nov 26, 2018  4:40 PM CST   [facility] Spine with Michelet Esparza PT   [facility]    [address] [facility] [id number] [phone]              Who to contact     If you have questions or need follow up information about today's clinic visit or your schedule please contact [facility] directly at [phone].  Normal or non-critical lab and imaging results will be communicated to you by Quantum OPShart, letter or phone within 4 business days after the clinic has received the results. If you do not hear from us within 7 days, please contact the clinic through Quantum OPShart or phone. If you have a critical or abnormal lab result, we will notify you by phone as soon as possible.  Submit refill requests through Sirigen or call your pharmacy and they will forward the refill request to us. Please allow 3 business days for your refill to be completed.          Additional Information About Your Visit        Quantum OPShart Information     Sirigen lets you send messages to your doctor, view your test results, renew your prescriptions, schedule appointments and more. To sign up, go to [email or web address] . Click on \"Log in\" on the left side of the " "screen, which will take you to the Welcome page. Then click on \"Sign up Now\" on the right side of the page.     You will be asked to enter the access code listed below, as well as some personal information. Please follow the directions to create your username and password.     Your access code is: [email or web address]  Expires: 2018  5:31 AM     Your access code will  in 90 days. If you need help or a new code, please call your [place] clinic or [phone].        Care EveryWhere ID     This is your Care EveryWhere ID. This could be used by other organizations to access your [place] medical records  [id number]         Blood Pressure from Last 3 Encounters:   10/26/18 112/72   10/19/18 128/84   18 120/86    Weight from Last 3 Encounters:   10/26/18 95.8 kg (211 lb 3.2 oz)   10/19/18 95.9 kg (211 lb 8 oz)   18 93.2 kg (205 lb 8 oz)              We Performed the Following     HC PT EVAL, LOW COMPLEXITY     [facility] INITIAL EVAL REPORT     THERAPEUTIC EXERCISES        Primary Care Provider Office Phone # Fax #    Princess MALACHI Mcnamara [facility] [phone]       [address] [facility] [address]        Equal Access to Services     KERRI MOLINA : Hadii aad ku hadasho Soomaali, waaxda luqadaha, qaybta kaalmada adeegyada, waxay erasmo hayrosangela snyder . So [facility] [phone].    ATENCIÓN: Si habla español, tiene a smith disposición servicios gratuitos de asistencia lingüística. Llame al [phone].    We comply with applicable federal civil rights laws and [place] laws. We do not discriminate on the basis of race, color, national origin, age, disability, sex, sexual orientation, or gender identity.            Thank you!     Thank you for choosing [facility]  for your care. Our goal is always to provide you with excellent care. Hearing back from our patients is one way we can continue to improve our services. Please take a few minutes " to complete the written survey that you may receive in the mail after your visit with us. Thank you!             Your Updated Medication List - Protect others around you: Learn how to safely use, store and throw away your medicines at www.disposemymeds.org.          This list is accurate as of 11/12/18  8:48 PM.  Always use your most recent med list.                   Brand Name Dispense Instructions for use Diagnosis    acetaminophen 325 MG tablet    TYLENOL    30 tablet    Take 2 tablets (650 mg) by mouth every 4 hours as needed for mild pain or fever (greater than or equal to 38? C /100.4? F (oral) or 38.5? C/ 101.4? F (core).)    Spontaneous vaginal delivery       blood glucose monitoring lancets     200 each    Use to test blood sugar 4 times daily or as directed.  Ok to substitute alternative if insurance prefers.    Abnormal maternal glucose tolerance, antepartum       blood glucose monitoring test strip    CONTOUR NEXT TEST    150 strip    Use to test blood sugar 4 times daily or as directed.  Ok to substitute alternative if insurance prefers.    Abnormal maternal glucose tolerance, antepartum       cyanocobalamin 1000 MCG tablet    vitamin  B-12    30 tablet    Take 1 tablet (1,000 mcg) by mouth daily    Other iron deficiency anemia       fluticasone 50 MCG/ACT spray    FLONASE    1 Bottle    Spray 2 sprays into both nostrils daily    Allergic rhinitis due to dust mite, Chronic seasonal allergic rhinitis due to pollen, Allergic conjunctivitis of both eyes       ibuprofen 800 MG tablet    ADVIL/MOTRIN    30 tablet    Take 1 tablet (800 mg) by mouth every 6 hours as needed for other (cramping)    Spontaneous vaginal delivery       insulin pen needle 31G X 6 MM    ULTICARE MINI    200 each    Use 5 daily or as directed.    Gestational diabetes mellitus, antepartum       ketotifen 0.025 % Soln ophthalmic solution    ZADITOR/REFRESH ANTI-ITCH    1 Bottle    Place 1 drop into both eyes 2 times daily    Allergic  conjunctivitis of both eyes       lanolin ointment     28 g    Apply topically daily as needed for dry skin    Sore nipple       meclizine 25 MG tablet    ANTIVERT    30 tablet    Take 1 tablet (25 mg) by mouth every 6 hours as needed for dizziness    Benign paroxysmal positional vertigo, unspecified laterality       methocarbamol 500 MG tablet    ROBAXIN    30 tablet    Take 1-2 tablets (500-1,000 mg) by mouth 3 times daily as needed for muscle spasms    Acute left-sided low back pain without sciatica       methylPREDNISolone 4 MG tablet    MEDROL DOSEPAK    21 tablet    Follow package instructions    Acute left-sided low back pain without sciatica       montelukast 10 MG tablet    SINGULAIR    30 tablet    Take 1 tablet (10 mg) by mouth At Bedtime    Allergic rhinitis due to dust mite, Chronic seasonal allergic rhinitis due to pollen       omeprazole 40 MG capsule    priLOSEC    30 capsule    Take 1 capsule (40 mg) by mouth daily    Abdominal pain, generalized       prenatal multivitamin plus iron 27-0.8 MG Tabs per tablet     100 tablet    Take 1 tablet by mouth daily    Pregnancy examination or test, positive result       psyllium 28 % Pack    METAMUCIL SMOOTH TEXTURE    30 each    Take 1 Package by mouth daily    Irritable bowel syndrome with both constipation and diarrhea       senna-docusate 8.6-50 MG per tablet    SENOKOT-S;PERICOLACE    10 tablet    Take 1 tablet by mouth daily as needed for constipation    Spontaneous vaginal delivery

## 2018-11-12 NOTE — PROGRESS NOTES
[place] [facility] Initial Evaluation  Subjective:  Patient is a 38 year old female presenting with rehab back hpi. The history is provided by the patient.   Brit Romano is a 38 year old female with a lumbar (L side) condition.  Condition occurred with:  A fall/slip.  Condition occurred: at home.  This is a new condition  Pain for a few months.  Slipped and fell.  Both legs went out (abducted).    Was 38 weeks pregnant at the time.  MD referral 10/26/208..    Patient reports pain:  Lumbar spine left, lower lumbar spine and mid lumbar spine.  Radiates to:  No radiation.  Pain is described as aching and sharp and is intermittent and reported as 5/10.  Associated symptoms:  Loss of motion/stiffness. Pain is the same all the time.  Symptoms are exacerbated by bending, walking, nothing, carrying and lifting (first few steps are the worse) Relieved by: ibuprofen.  Since onset symptoms are unchanged.        General health as reported by patient is good.  Pertinent medical history includes:  Diabetes.  Medical allergies: no.  Other surgeries include:  No.  Current medications:  None as reported by the patient.  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    Primary job tasks include:  Driving and lifting.    Barriers include:  None as reported by the patient.    Red flags:  None as reported by the patient.                        Objective:  System         Lumbar/SI Evaluation  ROM:    AROM Lumbar:   Flexion:          Hands to shins  Ext:                    Normal   Side Bend:        Left:  Normal    Right:  Normal  Rotation:           Left:     Right:   Side Glide:        Left:     Right:           Lumbar Myotomes:  normal                  Neural Tension/Mobility:      Left side:SLR or Slump  negative.     Right side:   Slump or SLR  negative.   Lumbar Palpation:    Tenderness present at Left:    Quadratus Lumborum and Erector Spinae    Tenderness not present at Right:  Quadratus Lumborum or Erector  Spinae      Spinal Segmental Conclusions:     Level: Hypo noted at L2, L4 and L3      SI joint/Sacrum:    FABERS + on L to LB                                                       General     ROS    Assessment/Plan:    Patient is a 38 year old female with lumbar complaints.    Patient has the following significant findings with corresponding treatment plan.                Diagnosis 1:  L LBP  Pain -  manual therapy, self management, education, directional preference exercise and home program  Decreased ROM/flexibility - manual therapy and therapeutic exercise  Decreased joint mobility - manual therapy and therapeutic exercise  Decreased strength - therapeutic exercise and therapeutic activities  Decreased function - therapeutic activities    Therapy Evaluation Codes:   1) History comprised of:   Personal factors that impact the plan of care:      None.    Comorbidity factors that impact the plan of care are:      Diabetes.     Medications impacting care: None.  2) Examination of Body Systems comprised of:   Body structures and functions that impact the plan of care:      Lumbar spine.   Activity limitations that impact the plan of care are:      Bending and Lifting.  3) Clinical presentation characteristics are:   Stable/Uncomplicated.  4) Decision-Making    Low complexity using standardized patient assessment instrument and/or measureable assessment of functional outcome.  Cumulative Therapy Evaluation is: Low complexity.    Previous and current functional limitations:  (See Goal Flow Sheet for this information)    Short term and Long term goals: (See Goal Flow Sheet for this information)     Communication ability:  Patient appears to be able to clearly communicate and understand verbal and written communication and follow directions correctly.  Treatment Explanation - The following has been discussed with the patient:   RX ordered/plan of care  Anticipated outcomes  Possible risks and side effects  This patient  would benefit from PT intervention to resume normal activities.   Rehab potential is good.    Frequency:  1 X week, once daily  Duration:  for 8 weeks  Discharge Plan:  Achieve all LTG.  Independent in home treatment program.  Reach maximal therapeutic benefit.    Please refer to the daily flowsheet for treatment today, total treatment time and time spent performing 1:1 timed codes.

## 2018-11-26 ENCOUNTER — THERAPY VISIT (OUTPATIENT)
Dept: PHYSICAL THERAPY | Facility: CLINIC | Age: 38
End: 2018-11-26
Payer: COMMERCIAL

## 2018-11-26 DIAGNOSIS — M54.50 ACUTE LEFT-SIDED LOW BACK PAIN WITHOUT SCIATICA: ICD-10-CM

## 2018-11-26 PROCEDURE — 97140 MANUAL THERAPY 1/> REGIONS: CPT | Mod: GP | Performed by: PHYSICAL THERAPIST

## 2018-11-26 PROCEDURE — 97110 THERAPEUTIC EXERCISES: CPT | Mod: GP | Performed by: PHYSICAL THERAPIST

## 2018-11-26 NOTE — MR AVS SNAPSHOT
"              After Visit Summary   2018    Brit Romano    MRN: [id number]           Patient Information     Date Of Birth          1980        Visit Information        Provider Department      2018 4:40 PM Michelet Esparza, PT [facility]        Today's Diagnoses     Acute left-sided low back pain without sciatica           Follow-ups after your visit        Who to contact     If you have questions or need follow up information about today's clinic visit or your schedule please contact [facility] directly at [phone].  Normal or non-critical lab and imaging results will be communicated to you by BitInstanthart, letter or phone within 4 business days after the clinic has received the results. If you do not hear from us within 7 days, please contact the clinic through BitInstanthart or phone. If you have a critical or abnormal lab result, we will notify you by phone as soon as possible.  Submit refill requests through Zelgor or call your pharmacy and they will forward the refill request to us. Please allow 3 business days for your refill to be completed.          Additional Information About Your Visit        MyChart Information     Zelgor lets you send messages to your doctor, view your test results, renew your prescriptions, schedule appointments and more. To sign up, go to [email or web address] . Click on \"Log in\" on the left side of the screen, which will take you to the Welcome page. Then click on \"Sign up Now\" on the right side of the page.     You will be asked to enter the access code listed below, as well as some personal information. Please follow the directions to create your username and password.     Your access code is: [email or web address]  Expires: 2018  5:31 AM     Your access code will  in 90 days. If you need help or a new code, please call your [facility] clinic or [phone].        Care EveryWhere ID "     This is your Care EveryWhere ID. This could be used by other organizations to access your [facility] medical records  [id number]         Blood Pressure from Last 3 Encounters:   10/26/18 112/72   10/19/18 128/84   09/07/18 120/86    Weight from Last 3 Encounters:   10/26/18 95.8 kg (211 lb 3.2 oz)   10/19/18 95.9 kg (211 lb 8 oz)   09/07/18 93.2 kg (205 lb 8 oz)              We Performed the Following     MANUAL THER TECH,1+REGIONS,EA 15 MIN     THERAPEUTIC EXERCISES        Primary Care Provider Office Phone # Fax #    Princess Knutson Lawrence Wang, MALACHI [facility] [phone] [address] [facility] [address]        Equal Access to Services     KERRI MOLINA : Hadii aad ku hadasho Soomaali, waaxda luqadaha, qaybta kaalmada adeegyada, waxay idiin hayestefanin jax khdena snyder . So [facility] [phone].    ATENCIÓN: Si habla español, tiene a smith disposición servicios gratuitos de asistencia lingüística. Llame al [phone].    We comply with applicable federal civil rights laws and [place] laws. We do not discriminate on the basis of race, color, national origin, age, disability, sex, sexual orientation, or gender identity.            Thank you!     Thank you for choosing [facility]  for your care. Our goal is always to provide you with excellent care. Hearing back from our patients is one way we can continue to improve our services. Please take a few minutes to complete the written survey that you may receive in the mail after your visit with us. Thank you!             Your Updated Medication List - Protect others around you: Learn how to safely use, store and throw away your medicines at www.disposemymeds.org.          This list is accurate as of 11/26/18 11:59 PM.  Always use your most recent med list.                   Brand Name Dispense Instructions for use Diagnosis    acetaminophen 325 MG tablet    TYLENOL    30 tablet    Take 2 tablets (650 mg) by  mouth every 4 hours as needed for mild pain or fever (greater than or equal to 38? C /100.4? F (oral) or 38.5? C/ 101.4? F (core).)    Spontaneous vaginal delivery       blood glucose monitoring lancets     200 each    Use to test blood sugar 4 times daily or as directed.  Ok to substitute alternative if insurance prefers.    Abnormal maternal glucose tolerance, antepartum       blood glucose monitoring test strip    CONTOUR NEXT TEST    150 strip    Use to test blood sugar 4 times daily or as directed.  Ok to substitute alternative if insurance prefers.    Abnormal maternal glucose tolerance, antepartum       cyanocobalamin 1000 MCG tablet    vitamin  B-12    30 tablet    Take 1 tablet (1,000 mcg) by mouth daily    Other iron deficiency anemia       fluticasone 50 MCG/ACT nasal spray    FLONASE    1 Bottle    Spray 2 sprays into both nostrils daily    Allergic rhinitis due to dust mite, Chronic seasonal allergic rhinitis due to pollen, Allergic conjunctivitis of both eyes       ibuprofen 800 MG tablet    ADVIL/MOTRIN    30 tablet    Take 1 tablet (800 mg) by mouth every 6 hours as needed for other (cramping)    Spontaneous vaginal delivery       insulin pen needle 31G X 6 MM miscellaneous    ULTICARE MINI    200 each    Use 5 daily or as directed.    Gestational diabetes mellitus, antepartum       ketotifen 0.025 % ophthalmic solution    ZADITOR/REFRESH ANTI-ITCH    1 Bottle    Place 1 drop into both eyes 2 times daily    Allergic conjunctivitis of both eyes       lanolin ointment     28 g    Apply topically daily as needed for dry skin    Sore nipple       meclizine 25 MG tablet    ANTIVERT    30 tablet    Take 1 tablet (25 mg) by mouth every 6 hours as needed for dizziness    Benign paroxysmal positional vertigo, unspecified laterality       methocarbamol 500 MG tablet    ROBAXIN    30 tablet    Take 1-2 tablets (500-1,000 mg) by mouth 3 times daily as needed for muscle spasms    Acute left-sided low back pain  without sciatica       methylPREDNISolone 4 MG tablet    MEDROL DOSEPAK    21 tablet    Follow package instructions    Acute left-sided low back pain without sciatica       montelukast 10 MG tablet    SINGULAIR    30 tablet    Take 1 tablet (10 mg) by mouth At Bedtime    Allergic rhinitis due to dust mite, Chronic seasonal allergic rhinitis due to pollen       omeprazole 40 MG DR capsule    priLOSEC    30 capsule    Take 1 capsule (40 mg) by mouth daily    Abdominal pain, generalized       pediatric multivitamin w/iron 27-0.8 MG tablet     100 tablet    Take 1 tablet by mouth daily    Pregnancy examination or test, positive result       psyllium 28 % packet    METAMUCIL SMOOTH TEXTURE    30 each    Take 1 Package by mouth daily    Irritable bowel syndrome with both constipation and diarrhea       senna-docusate 8.6-50 MG per tablet    SENOKOT-S;PERICOLACE    10 tablet    Take 1 tablet by mouth daily as needed for constipation    Spontaneous vaginal delivery

## 2019-03-31 DIAGNOSIS — R10.84 ABDOMINAL PAIN, GENERALIZED: ICD-10-CM

## 2019-04-01 DIAGNOSIS — Z00.00 ENCOUNTER FOR WELLNESS EXAMINATION: Primary | ICD-10-CM

## 2019-04-01 PROBLEM — M54.50 ACUTE LEFT-SIDED LOW BACK PAIN WITHOUT SCIATICA: Status: RESOLVED | Noted: 2018-11-12 | Resolved: 2019-04-01

## 2019-04-01 NOTE — TELEPHONE ENCOUNTER
"Routing refill request to provider for review/approval because:  A break in medication      Requested Prescriptions   Pending Prescriptions Disp Refills     omeprazole (PRILOSEC) 40 MG DR capsule [Pharmacy Med Name: OMEPRAZOLE 40MG CAPSULES]  Last Written Prescription Date:  9/15/17  Last Fill Quantity: 30,  # refills: 5   Last office visit: 8/30/2017 with prescribing provider:     Future Office Visit:     30 capsule 0     Sig: TAKE 1 CAPSULE(40 MG) BY MOUTH DAILY    PPI Protocol Passed - 4/1/2019  9:37 AM       Passed - Not on Clopidogrel (unless Pantoprazole ordered)       Passed - No diagnosis of osteoporosis on record       Passed - Recent (12 mo) or future (30 days) visit within the authorizing provider's specialty    Patient had office visit in the last 12 months or has a visit in the next 30 days with authorizing provider or within the authorizing provider's specialty.  See \"Patient Info\" tab in inbasket, or \"Choose Columns\" in Meds & Orders section of the refill encounter.             Passed - Medication is active on med list       Passed - Patient is age 18 or older       Passed - No active pregnacy on record       Passed - No positive pregnancy test in past 12 months        Monisha Hollis RN - BC      "

## 2019-04-01 NOTE — PROGRESS NOTES
Subjective:  HPI                    Objective:  System    Physical Exam    General     ROS    Assessment/Plan:    DISCHARGE REPORT    Progress reporting period is from 11/12/2018 to 11/26/2018.     SUBJECTIVE  Subjective: Not much change yet.   Current Pain level: 5/10   Initial Pain level: 5/10   Changes in function: No changes noted in function since last SOAP note   Adverse reactions: None;   ,     Patient has failed to return to therapy so current objective findings are unknown.  The subjective and objective information are from the last SOAP note on this patient.    OBJECTIVE  Objective: Moves without distress      ASSESSMENT/PLAN  Updated problem list and treatment plan: Diagnosis 1:  L LBP  Pain -  manual therapy, self management, education, directional preference exercise and home program  Decreased ROM/flexibility - manual therapy and therapeutic exercise  Decreased joint mobility - manual therapy and therapeutic exercise  Decreased strength - therapeutic exercise and therapeutic activities  Decreased function - therapeutic activities     STG/LTGs have been met or progress has been made towards goals:  unknown  Assessment of Progress: The patient has not returned to therapy. Current status is unknown.  Self Management Plans:  Patient has been instructed in a home treatment program.    Brit continues to require the following intervention to meet STG and LTG's: PT intervention is no longer required to meet STG/LTG.  The patient failed to complete scheduled/ordered appointments so current information is unknown.  We will discharge this patient from PT.    Recommendations:  Pt did not schedule additional visits.  Discharge pt from PT.      Please refer to the daily flowsheet for treatment today, total treatment time and time spent performing 1:1 timed codes.

## 2019-04-02 RX ORDER — OMEPRAZOLE 40 MG/1
CAPSULE, DELAYED RELEASE ORAL
Qty: 30 CAPSULE | Refills: 3 | Status: SHIPPED | OUTPATIENT
Start: 2019-04-02 | End: 2019-08-12

## 2019-04-09 ENCOUNTER — OFFICE VISIT (OUTPATIENT)
Dept: FAMILY MEDICINE | Facility: CLINIC | Age: 39
End: 2019-04-09
Payer: COMMERCIAL

## 2019-04-09 VITALS
OXYGEN SATURATION: 100 % | WEIGHT: 212 LBS | HEART RATE: 82 BPM | BODY MASS INDEX: 35.32 KG/M2 | HEIGHT: 65 IN | TEMPERATURE: 97.6 F | SYSTOLIC BLOOD PRESSURE: 123 MMHG | DIASTOLIC BLOOD PRESSURE: 73 MMHG

## 2019-04-09 DIAGNOSIS — Z11.3 SCREEN FOR STD (SEXUALLY TRANSMITTED DISEASE): ICD-10-CM

## 2019-04-09 DIAGNOSIS — R10.9 GASTRIC PAIN: ICD-10-CM

## 2019-04-09 DIAGNOSIS — N89.8 VAGINAL DISCHARGE: ICD-10-CM

## 2019-04-09 DIAGNOSIS — E66.812 CLASS 2 OBESITY DUE TO EXCESS CALORIES WITHOUT SERIOUS COMORBIDITY WITH BODY MASS INDEX (BMI) OF 35.0 TO 35.9 IN ADULT: ICD-10-CM

## 2019-04-09 DIAGNOSIS — M79.10 MYALGIA: Primary | ICD-10-CM

## 2019-04-09 DIAGNOSIS — Z12.4 CERVICAL CANCER SCREENING: ICD-10-CM

## 2019-04-09 DIAGNOSIS — E66.09 CLASS 2 OBESITY DUE TO EXCESS CALORIES WITHOUT SERIOUS COMORBIDITY WITH BODY MASS INDEX (BMI) OF 35.0 TO 35.9 IN ADULT: ICD-10-CM

## 2019-04-09 LAB
ALBUMIN UR-MCNC: NEGATIVE MG/DL
APPEARANCE UR: CLEAR
BILIRUB UR QL STRIP: NEGATIVE
COLOR UR AUTO: YELLOW
ERYTHROCYTE [SEDIMENTATION RATE] IN BLOOD BY WESTERGREN METHOD: 22 MM/H (ref 0–20)
GLUCOSE UR STRIP-MCNC: NEGATIVE MG/DL
HGB UR QL STRIP: NEGATIVE
KETONES UR STRIP-MCNC: NEGATIVE MG/DL
LEUKOCYTE ESTERASE UR QL STRIP: NEGATIVE
NITRATE UR QL: NEGATIVE
PH UR STRIP: 6 PH (ref 5–7)
SOURCE: NORMAL
SP GR UR STRIP: 1.01 (ref 1–1.03)
SPECIMEN SOURCE: NORMAL
UROBILINOGEN UR STRIP-ACNC: 0.2 EU/DL (ref 0.2–1)
WET PREP SPEC: NORMAL
WET PREP SPEC: NORMAL

## 2019-04-09 PROCEDURE — 99214 OFFICE O/P EST MOD 30 MIN: CPT | Performed by: NURSE PRACTITIONER

## 2019-04-09 PROCEDURE — G0145 SCR C/V CYTO,THINLAYER,RESCR: HCPCS | Performed by: NURSE PRACTITIONER

## 2019-04-09 PROCEDURE — 87389 HIV-1 AG W/HIV-1&-2 AB AG IA: CPT | Performed by: NURSE PRACTITIONER

## 2019-04-09 PROCEDURE — 0064U ANTB TP TOTAL&RPR IA QUAL: CPT | Performed by: NURSE PRACTITIONER

## 2019-04-09 PROCEDURE — 86140 C-REACTIVE PROTEIN: CPT | Performed by: NURSE PRACTITIONER

## 2019-04-09 PROCEDURE — 82306 VITAMIN D 25 HYDROXY: CPT | Performed by: NURSE PRACTITIONER

## 2019-04-09 PROCEDURE — 36415 COLL VENOUS BLD VENIPUNCTURE: CPT | Performed by: NURSE PRACTITIONER

## 2019-04-09 PROCEDURE — 87210 SMEAR WET MOUNT SALINE/INK: CPT | Performed by: NURSE PRACTITIONER

## 2019-04-09 PROCEDURE — 86038 ANTINUCLEAR ANTIBODIES: CPT | Performed by: NURSE PRACTITIONER

## 2019-04-09 PROCEDURE — 86431 RHEUMATOID FACTOR QUANT: CPT | Performed by: NURSE PRACTITIONER

## 2019-04-09 PROCEDURE — 85652 RBC SED RATE AUTOMATED: CPT | Performed by: NURSE PRACTITIONER

## 2019-04-09 PROCEDURE — 81003 URINALYSIS AUTO W/O SCOPE: CPT | Performed by: NURSE PRACTITIONER

## 2019-04-09 PROCEDURE — 84443 ASSAY THYROID STIM HORMONE: CPT | Performed by: NURSE PRACTITIONER

## 2019-04-09 PROCEDURE — 87491 CHLMYD TRACH DNA AMP PROBE: CPT | Performed by: NURSE PRACTITIONER

## 2019-04-09 PROCEDURE — 87591 N.GONORRHOEAE DNA AMP PROB: CPT | Performed by: NURSE PRACTITIONER

## 2019-04-09 PROCEDURE — 87624 HPV HI-RISK TYP POOLED RSLT: CPT | Performed by: NURSE PRACTITIONER

## 2019-04-09 ASSESSMENT — PAIN SCALES - GENERAL: PAINLEVEL: SEVERE PAIN (7)

## 2019-04-09 ASSESSMENT — MIFFLIN-ST. JEOR: SCORE: 1632.51

## 2019-04-09 NOTE — PROGRESS NOTES
"  SUBJECTIVE:   Brit Romano is a 39 year old female who presents to clinic today for the following   health issues:      She complains of generalized body aches going on for years  \"all over body\"  Muscles and joints    She thinks she may have H pylori  Gastric pain, loose stools  She has been on omeprazole for 11 years  Increased dose to 40 mg 1 week ago which has been helping  Denies N/V    Wants to be checked for yeast infection and bladder infection  Itching  Bought over the counter product last week without improvement in symptoms  She is sexually active  Getting regular monthly periods    Hx gestational diabetes  Delivered Aug 2018  Last A1c 5.8%  Stopped insulin after delivery  She is not breast feeding    She lives with her mom and 8 children  She is very tired  No time for exercise    She has a history of fibromyalgia in problem list but denies any past diagnosis of fibromyalgia          Additional history: as documented    Reviewed  and updated as needed this visit by clinical staff  Tobacco  Allergies  Meds  Med Hx  Surg Hx  Fam Hx  Soc Hx        Reviewed and updated as needed this visit by Provider         Patient Active Problem List   Diagnosis     Mixed incontinence urge and stress (male)(female)     Lactose intolerance     Iron deficiency anemia      Fatty liver disease, nonalcoholic     History of gestational diabetes     Fibromyalgia     Vitamin B12 deficiency (non anemic)     Supervision of high-risk pregnancy of elderly multigravida, [facility]     Allergic rhinitis due to dust mite     Chronic seasonal allergic rhinitis due to pollen     Allergic conjunctivitis of both eyes     Insulin controlled gestational diabetes mellitus (GDM) in third trimester     Unstable fetal lie     Encounter for triage in pregnant patient     Spontaneous vaginal delivery     Past Surgical History:   Procedure Laterality Date     NO HISTORY OF SURGERY         Social History     Tobacco Use     Smoking status: Never " "Smoker     Smokeless tobacco: Never Used   Substance Use Topics     Alcohol use: No     Alcohol/week: 0.0 oz     Family History   Problem Relation Age of Onset     Hypertension Mother      Diabetes Mother      Kidney Disease Mother         dialysis     Glaucoma Mother      Macular Degeneration No family hx of          BP Readings from Last 3 Encounters:   04/09/19 123/73   10/26/18 112/72   10/19/18 128/84    Wt Readings from Last 3 Encounters:   04/09/19 96.2 kg (212 lb)   10/26/18 95.8 kg (211 lb 3.2 oz)   10/19/18 95.9 kg (211 lb 8 oz)                    ROS:  Constitutional, HEENT, cardiovascular, pulmonary, gi and gu systems are negative, except as otherwise noted.    OBJECTIVE:     /73 (BP Location: Right arm, Patient Position: Chair, Cuff Size: Adult Regular)   Pulse 82   Temp 97.6  F (36.4  C) (Oral)   Ht 1.651 m (5' 5\")   Wt 96.2 kg (212 lb)   SpO2 100%   BMI 35.28 kg/m    Body mass index is 35.28 kg/m .  GENERAL: healthy, alert and no distress  NECK: no adenopathy, no asymmetry, masses, or scars and thyroid normal to palpation  RESP: lungs clear to auscultation - no rales, rhonchi or wheezes  CV: regular rate and rhythm, normal S1 S2, no S3 or S4, no murmur, click or rub, no peripheral edema and peripheral pulses strong  ABDOMEN: soft, nontender, no hepatosplenomegaly, no masses and bowel sounds normal  MS: no gross musculoskeletal defects noted, no edema  SKIN: no suspicious lesions or rashes  NEURO: Normal strength and tone, mentation intact and speech normal  PSYCH: mentation appears normal, affect normal/bright    Diagnostic Test Results:  Results for orders placed or performed in visit on 04/09/19   Erythrocyte sedimentation rate auto   Result Value Ref Range    Sed Rate 22 (H) 0 - 20 mm/h   CRP, inflammation   Result Value Ref Range    CRP Inflammation <2.9 0.0 - 8.0 mg/L   TSH with free T4 reflex   Result Value Ref Range    TSH 2.23 0.40 - 4.00 mU/L   Rheumatoid factor   Result Value Ref " Range    Rheumatoid Factor <20 <20 IU/mL   *UA reflex to Microscopic and Culture ([place] and [facility] (except [place] and [place])   Result Value Ref Range    Color Urine Yellow     Appearance Urine Clear     Glucose Urine Negative NEG^Negative mg/dL    Bilirubin Urine Negative NEG^Negative    Ketones Urine Negative NEG^Negative mg/dL    Specific Gravity Urine 1.015 1.003 - 1.035    Blood Urine Negative NEG^Negative    pH Urine 6.0 5.0 - 7.0 pH    Protein Albumin Urine Negative NEG^Negative mg/dL    Urobilinogen Urine 0.2 0.2 - 1.0 EU/dL    Nitrite Urine Negative NEG^Negative    Leukocyte Esterase Urine Negative NEG^Negative    Source Midstream Urine    HIV Antigen Antibody Combo   Result Value Ref Range    HIV Antigen Antibody Combo Nonreactive NR^Nonreactive       Treponema Abs w Reflex to RPR and Titer   Result Value Ref Range    Treponema Antibodies Nonreactive NR^Nonreactive   Vitamin D Deficiency   Result Value Ref Range    Vitamin D Deficiency screening 19 (L) 20 - 75 ug/L   Wet prep   Result Value Ref Range    Specimen Description Vagina     Wet Prep       No Trichomonas seen  No clue cells seen  No yeast seen      Wet Prep Rare  WBC'S seen      NEISSERIA GONORRHOEA PCR   Result Value Ref Range    Specimen Descrip Cervical     N Gonorrhea PCR Negative NEG^Negative   CHLAMYDIA TRACHOMATIS PCR   Result Value Ref Range    Specimen Description Cervical     Chlamydia Trachomatis PCR Negative NEG^Negative       ASSESSMENT/PLAN:   1. Myalgia  - Auto-immune workup. If normal, suspect fibromyalgia. Consider rheum referral versus treatment with duloxetine, amitriptyline or gabapentin. Will discuss at f/u  - Erythrocyte sedimentation rate auto  - CRP, inflammation  - TSH with free T4 reflex  - Rheumatoid factor  - Anti Nuclear Lianne IgG by IFA with Reflex  - Vitamin D Deficiency    2. Gastric pain  - improving with increased PPI dose. Unable to test for H pylori while on ppi. Follow up in 3 weeks. If not  improved, or unable to wean back to 20 mg omeprazole, consider endoscopy    3. Vaginal discharge  - workup negative  - Wet prep  - *UA reflex to Microscopic and Culture ([place] and [facility] (except [place] and [place])    4. Screen for STD (sexually transmitted disease)  - NEISSERIA GONORRHOEA PCR  - CHLAMYDIA TRACHOMATIS PCR  - HIV Antigen Antibody Combo  - Treponema Abs w Reflex to RPR and Titer    5. Cervical cancer screening  - Pap imaged thin layer screen with HPV - recommended age 30 - 65 years (select HPV order below)  - HPV High Risk Types DNA Cervical    6. Class 2 obesity due to excess calories without comorbidity  - Strongly encourage weight loss and regular exercise to help with myalgias. She has 8 children and does not feel like she can make time for exercise. I discussed the importance of this and suggest aquatic therapy and she declined    Patient Instructions   Continue with 40 mg omeprazole for another three weeks    It is very important to be exercising regularly. You should be exercising 150 minutes a week however you want to split it up.     Follow up with in 3 weeks      MALACHI Jorgensen [facility]

## 2019-04-09 NOTE — LETTER
April 17, 2019    Brit Romano  [address] [address]    Dear ,  This letter is regarding your recent Pap smear (cervical cancer screening) and Human Papillomavirus (HPV) test.  We are happy to inform you that your Pap smear result is normal. Cervical cancer is closely linked with certain types of HPV. Your results showed no evidence of high-risk HPV.  Therefore we recommend you return in 5 years for your next pap smear and HPV test.  You will still need to return to the clinic every year for an annual exam and other preventive tests.  If you have additional questions regarding this result, please call our registered nurse, Radha at [phone].  Sincerely,    MALACHI Jorgensen CNP/[facility]

## 2019-04-09 NOTE — PATIENT INSTRUCTIONS
Continue with 40 mg omeprazole for another three weeks    It is very important to be exercising regularly. You should be exercising 150 minutes a week however you want to split it up.     Follow up with in 3 weeks

## 2019-04-10 LAB
CRP SERPL-MCNC: <2.9 MG/L (ref 0–8)
DEPRECATED CALCIDIOL+CALCIFEROL SERPL-MC: 19 UG/L (ref 20–75)
HIV 1+2 AB+HIV1 P24 AG SERPL QL IA: NONREACTIVE
RHEUMATOID FACT SER NEPH-ACNC: <20 IU/ML (ref 0–20)
T PALLIDUM AB SER QL: NONREACTIVE
TSH SERPL DL<=0.005 MIU/L-ACNC: 2.23 MU/L (ref 0.4–4)

## 2019-04-11 LAB
ANA SER QL IF: NEGATIVE
C TRACH DNA SPEC QL NAA+PROBE: NEGATIVE
N GONORRHOEA DNA SPEC QL NAA+PROBE: NEGATIVE
SPECIMEN SOURCE: NORMAL
SPECIMEN SOURCE: NORMAL

## 2019-04-12 DIAGNOSIS — E55.9 VITAMIN D DEFICIENCY: Primary | ICD-10-CM

## 2019-04-12 LAB
COPATH REPORT: NORMAL
PAP: NORMAL

## 2019-04-12 RX ORDER — ERGOCALCIFEROL 1.25 MG/1
50000 CAPSULE, LIQUID FILLED ORAL WEEKLY
Qty: 8 CAPSULE | Refills: 0 | Status: SHIPPED | OUTPATIENT
Start: 2019-04-12 | End: 2019-10-02

## 2019-04-12 NOTE — RESULT ENCOUNTER NOTE
[address] [address]  Phone: [phone]  Fax: [phone]      04/12/19    Brit [place] [facility] [address]      Dear Brit,    The results of your recent labs are enclosed.  Vitamin D is low. This can contribute to fatigue and lethargy. I sent a prescription for high dose vitamin D to the [facility] in [facility]. Take 1 capsule once a week for 8 weeks. After that take a daily low dose vitamin D. This can be bought over the counter. Look for 2000 IU vitamin D daily.   The remainder of your labs were normal.   STD screening was negative. I strongly encourage regular exercise like we discussed clinic. Staying physically active is especially important for patients with chronic pain and would help with weight loss which would reduce strain on your joints.  We can discuss further, along with potential medications for your chronic pain, at your follow up visit.     Please call the clinic if you have any concerns.    Sincerely,    MALACHI Jorgensen CNP    Your [facility] Care Team

## 2019-04-16 LAB
FINAL DIAGNOSIS: NORMAL
HPV HR 12 DNA CVX QL NAA+PROBE: NEGATIVE
HPV16 DNA SPEC QL NAA+PROBE: NEGATIVE
HPV18 DNA SPEC QL NAA+PROBE: NEGATIVE
SPECIMEN DESCRIPTION: NORMAL
SPECIMEN SOURCE CVX/VAG CYTO: NORMAL

## 2019-05-10 ENCOUNTER — OFFICE VISIT (OUTPATIENT)
Dept: FAMILY MEDICINE | Facility: CLINIC | Age: 39
End: 2019-05-10
Payer: COMMERCIAL

## 2019-05-10 VITALS
BODY MASS INDEX: 35.28 KG/M2 | DIASTOLIC BLOOD PRESSURE: 77 MMHG | WEIGHT: 212 LBS | OXYGEN SATURATION: 100 % | TEMPERATURE: 98 F | SYSTOLIC BLOOD PRESSURE: 116 MMHG | HEART RATE: 78 BPM

## 2019-05-10 DIAGNOSIS — L91.8 SKIN TAG: Primary | ICD-10-CM

## 2019-05-10 PROCEDURE — 11200 RMVL SKIN TAGS UP TO&INC 15: CPT | Performed by: NURSE PRACTITIONER

## 2019-05-10 ASSESSMENT — PAIN SCALES - GENERAL: PAINLEVEL: EXTREME PAIN (8)

## 2019-05-10 NOTE — PROGRESS NOTES
SUBJECTIVE:   Brit Romano is a 39 year old female who presents to clinic today for the following   health issues:    Skin tag in right inner thigh  Had for years  Growing  More painful when thighs rub together        Additional history: as documented    Reviewed  and updated as needed this visit by clinical staff  Tobacco  Allergies  Med Hx  Surg Hx  Fam Hx  Soc Hx        Reviewed and updated as needed this visit by Provider         Patient Active Problem List   Diagnosis     Mixed incontinence urge and stress (male)(female)     Lactose intolerance     Iron deficiency anemia      Fatty liver disease, nonalcoholic     History of gestational diabetes     Fibromyalgia     Vitamin B12 deficiency (non anemic)     Allergic rhinitis due to dust mite     Chronic seasonal allergic rhinitis due to pollen     Allergic conjunctivitis of both eyes     Insulin controlled gestational diabetes mellitus (GDM) in third trimester     Unstable fetal lie     Encounter for triage in pregnant patient     Spontaneous vaginal delivery     Past Surgical History:   Procedure Laterality Date     NO HISTORY OF SURGERY         Social History     Tobacco Use     Smoking status: Never Smoker     Smokeless tobacco: Never Used   Substance Use Topics     Alcohol use: No     Alcohol/week: 0.0 oz     Family History   Problem Relation Age of Onset     Hypertension Mother      Diabetes Mother      Kidney Disease Mother         dialysis     Glaucoma Mother      Macular Degeneration No family hx of            ROS:  Noncontributory except as above    OBJECTIVE:     /77 (BP Location: Right arm, Patient Position: Chair, Cuff Size: Adult Large)   Pulse 78   Temp 98  F (36.7  C) (Oral)   Wt 96.2 kg (212 lb)   SpO2 100%   Breastfeeding? No   BMI 35.28 kg/m    Body mass index is 35.28 kg/m .  SKIN: 5 mm wide skin tag with small (1 mm) base    Cleansed with alcohol pad and removed with sharp scissors. Patient tolerated procedure. Hemostasis  achieved and applied topical antibiotic and bandaid. Patient tolerated procedure    Diagnostic Test Results:  none     ASSESSMENT/PLAN:       ICD-10-CM    1. Skin tag L91.8 REMOVAL OF SKIN TAGS, FIRST 15       Advised to keep covered and change daily for 1 week    MALACHI Jorgensen [facility]

## 2019-06-10 ENCOUNTER — TRANSFERRED RECORDS (OUTPATIENT)
Dept: HEALTH INFORMATION MANAGEMENT | Facility: CLINIC | Age: 39
End: 2019-06-10

## 2019-06-10 DIAGNOSIS — J30.1 CHRONIC SEASONAL ALLERGIC RHINITIS DUE TO POLLEN: Primary | ICD-10-CM

## 2019-06-10 NOTE — TELEPHONE ENCOUNTER
Pending Prescriptions:                       Disp   Refills    cetirizine (ZYRTEC) 10 MG tablet          30 tab*0            Sig: Take 1 tablet (10 mg) by mouth daily    Routing refill request to provider for review/approval because:  Last office visit: 05/07/2018    Jewell Crockett RN

## 2019-06-11 RX ORDER — CETIRIZINE HYDROCHLORIDE 10 MG/1
10 TABLET ORAL DAILY
Qty: 30 TABLET | Refills: 0 | Status: SHIPPED | OUTPATIENT
Start: 2019-06-11 | End: 2019-08-21

## 2019-06-11 NOTE — TELEPHONE ENCOUNTER
Rx sent for 30 day supply. Patient was last seen in clinic over 1 year ago and will need a follow-up visit for additional medication refills.

## 2019-07-09 DIAGNOSIS — J30.1 CHRONIC SEASONAL ALLERGIC RHINITIS DUE TO POLLEN: ICD-10-CM

## 2019-07-10 RX ORDER — CETIRIZINE HYDROCHLORIDE 10 MG/1
10 TABLET ORAL DAILY
Qty: 30 TABLET | Refills: 0 | OUTPATIENT
Start: 2019-07-10

## 2019-07-10 NOTE — TELEPHONE ENCOUNTER
Patient will need to be seen for additional medication refills. Alternatively she may purchase OTC or request medication from her PCP.

## 2019-08-02 ENCOUNTER — TRANSFERRED RECORDS (OUTPATIENT)
Dept: HEALTH INFORMATION MANAGEMENT | Facility: CLINIC | Age: 39
End: 2019-08-02

## 2019-08-03 ENCOUNTER — TRANSFERRED RECORDS (OUTPATIENT)
Dept: HEALTH INFORMATION MANAGEMENT | Facility: CLINIC | Age: 39
End: 2019-08-03

## 2019-08-12 DIAGNOSIS — R10.84 ABDOMINAL PAIN, GENERALIZED: ICD-10-CM

## 2019-08-13 RX ORDER — OMEPRAZOLE 40 MG/1
CAPSULE, DELAYED RELEASE ORAL
Qty: 30 CAPSULE | Refills: 1 | Status: SHIPPED | OUTPATIENT
Start: 2019-08-13 | End: 2020-12-21

## 2019-08-21 ENCOUNTER — OFFICE VISIT (OUTPATIENT)
Dept: ALLERGY | Facility: CLINIC | Age: 39
End: 2019-08-21
Payer: COMMERCIAL

## 2019-08-21 VITALS
BODY MASS INDEX: 34.55 KG/M2 | WEIGHT: 207.6 LBS | DIASTOLIC BLOOD PRESSURE: 81 MMHG | HEART RATE: 88 BPM | OXYGEN SATURATION: 100 % | SYSTOLIC BLOOD PRESSURE: 124 MMHG

## 2019-08-21 DIAGNOSIS — J30.89 ALLERGIC RHINITIS DUE TO DUST MITE: ICD-10-CM

## 2019-08-21 DIAGNOSIS — J30.1 CHRONIC SEASONAL ALLERGIC RHINITIS DUE TO POLLEN: ICD-10-CM

## 2019-08-21 DIAGNOSIS — H10.13 ALLERGIC CONJUNCTIVITIS OF BOTH EYES: ICD-10-CM

## 2019-08-21 PROCEDURE — 99213 OFFICE O/P EST LOW 20 MIN: CPT | Performed by: ALLERGY & IMMUNOLOGY

## 2019-08-21 RX ORDER — CETIRIZINE HYDROCHLORIDE 10 MG/1
10 TABLET ORAL DAILY
Qty: 30 TABLET | Refills: 11 | Status: SHIPPED | OUTPATIENT
Start: 2019-08-21 | End: 2020-10-08

## 2019-08-21 NOTE — PROGRESS NOTES
Brit Romano was seen in the Allergy Clinic at [facility]. The following are my recommendations regarding her Allergic Rhinitis Due to Pollen, Allergic Rhinitis Due to Dust Mites and Allergic Conjunctivitis    1. Continue cetirizine 10mg daily  2. Resume fluticasone nasal spray, 1-2 sprays in each nostril daily if needed for persistent symptoms  3. May use nasal saline as needed  4. Follow-up in 1 year      Brit Romano is a 39 year old [place] female who is seen today for follow-up of allergic rhinitis. She states that she continues to have perennial symptoms of skin itching, itchy and watery eyes, sneezing, rhinorrhea, and headaches. At times she also has headaches and feels lightheaded. These symptoms are completely controlled as long as she takes cetirizine daily. Her skin will become very itchy and her rhinoconjunctivitis symptoms will return if she misses the medication. Brit reports she is tolerating the medication well and has not developed any side effects such as sedation. Previous IgE testing from 10/2016 was positive for sensitization to dust mite and maple tree pollen.    Brit also complains that triggers such as strong smells and smoke cause rhinoconjunctivitis symptoms. While the cetirizine is helpful for these symptoms she tries to avoid all scented products and does not burn candles or incense in her home.      Past Medical History:   Diagnosis Date     Fatty liver      Fibromyalgia      History of gestational diabetes      Seasonal allergies      Family History   Problem Relation Age of Onset     Hypertension Mother      Diabetes Mother      Kidney Disease Mother         dialysis     Glaucoma Mother      Macular Degeneration No family hx of      Social History     Tobacco Use     Smoking status: Never Smoker     Smokeless tobacco: Never Used   Substance Use Topics     Alcohol use: No     Alcohol/week: 0.0 oz     Drug use: No       Past medical, family, and social history were  reviewed.    REVIEW OF SYSTEMS:  General: negative for weight gain. negative for weight loss. negative for changes in sleep.   Eyes: negative for itching. negative for redness. negative for tearing/watering. negative for vision changes  Ears: negative for fullness. negative for hearing loss. negative for dizziness.   Nose: negative for snoring.negative for changes in smell. negative for drainage.   Throat: negative for hoarseness. negative for sore throat. negative for trouble swallowing.   Lungs: negative for cough. negative for shortness of breath.negative for wheezing. negative for sputum production.   Cardiovascular: negative for chest pain. negative for swelling of ankles. negative for fast or irregular heartbeat.   Gastrointestinal: negative for nausea. negative for heartburn. negative for acid reflux.   Musculoskeletal: negative for joint pain. negative for joint stiffness. negative for joint swelling.   Neurologic: negative for seizures. negative for fainting. negative for weakness.   Psychiatric: negative for changes in mood. negative for anxiety.   Endocrine: negative for cold intolerance. negative for heat intolerance. negative for tremors.   Hematologic: negative for easy bruising. negative for easy bleeding.  Integumentary: negative for rash. negative for scaling. negative for nail changes. Positive for itching.      Current Outpatient Medications:      acetaminophen (TYLENOL) 325 MG tablet, Take 2 tablets (650 mg) by mouth every 4 hours as needed for mild pain or fever (greater than or equal to 38  C /100.4  F (oral) or 38.5  C/ 101.4  F (core).), Disp: 30 tablet, Rfl: 0     cetirizine (ZYRTEC) 10 MG tablet, Take 1 tablet (10 mg) by mouth daily Needs appointment for further refills, Disp: 30 tablet, Rfl: 0     fluticasone (FLONASE) 50 MCG/ACT spray, Spray 2 sprays into both nostrils daily, Disp: 1 Bottle, Rfl: 11     ibuprofen (ADVIL/MOTRIN) 800 MG tablet, Take 1 tablet (800 mg) by mouth every 6 hours  as needed for other (cramping), Disp: 30 tablet, Rfl: 0     omeprazole (PRILOSEC) 40 MG DR capsule, TAKE 1 CAPSULE(40 MG) BY MOUTH DAILY, Disp: 30 capsule, Rfl: 1     cholecalciferol (VITAMIN D3) 5000 units (125 mcg) capsule, Take 1 capsule (5,000 Units) by mouth daily Take one capsule daily. (Patient not taking: Reported on 8/21/2019), Disp: 90 capsule, Rfl: 3     cyanocobalamin (VITAMIN  B-12) 1000 MCG tablet, Take 1 tablet (1,000 mcg) by mouth daily (Patient not taking: Reported on 8/21/2019), Disp: 30 tablet, Rfl: 3     ketotifen (ZADITOR/REFRESH ANTI-ITCH) 0.025 % SOLN ophthalmic solution, Place 1 drop into both eyes 2 times daily (Patient not taking: Reported on 8/21/2019), Disp: 1 Bottle, Rfl: 11     lanolin ointment, Apply topically daily as needed for dry skin (Patient not taking: Reported on 8/21/2019), Disp: 28 g, Rfl: 0     vitamin D2 (ERGOCALCIFEROL) 67294 units (1250 mcg) capsule, Take 1 capsule (50,000 Units) by mouth once a week (Patient not taking: Reported on 8/21/2019), Disp: 8 capsule, Rfl: 0    EXAM:   /81 (BP Location: Left arm, Patient Position: Sitting, Cuff Size: Adult Large)   Pulse 88   Wt 94.2 kg (207 lb 9.6 oz)   SpO2 100%   BMI 34.55 kg/m    GENERAL APPEARANCE: alert, cooperative and not in distress  SKIN: no rashes, no lesions  HEAD: atraumatic, normocephalic  EYES: lids and lashes normal, conjunctivae and sclerae clear  ENT: no scars or lesions, nasal exam showed no discharge, swelling or lesions noted, tongue midline and normal, soft palate, uvula, and tonsils normal  NECK: no asymmetry, masses, or scars, supple without significant adenopathy  LUNGS: unlabored respirations, no intercostal retractions or accessory muscle use, clear to auscultation without rales or wheezes  HEART: regular rate and rhythm without murmurs and normal S1 and S2  MUSCULOSKELETAL: no musculoskeletal defects are noted  NEURO: no focal deficits noted  PSYCH: does not appear depressed or  anxious      WORKUP:  None    ASSESSMENT/PLAN:  Brit Romano is a 39 year old female here for follow-up of allergic rhinoconjunctivitis. She reports that cetirizine has been working well to control her symptoms however if she misses a day she experiences intense skin itching along with rhinoconjunctivitis symptoms. We discussed long-term use of cetirizine vs other treatment such as immunotherapy and she wished to continue with her current medication management. Avoidance measures to dust mite were also reviewed. Brit also has nonallergic triggers such as scents and smoke that also cause rhinoconjunctivitis symptoms.     1. Continue cetirizine 10mg daily  2. Resume fluticasone nasal spray, 1-2 sprays in each nostril daily if needed for persistent symptoms  3. May use nasal saline as needed  4. Follow-up in 1 year      Thank you for allowing me to participate in the care of Brit Romano.      Mo Natarajan MD  Allergy/Immunology  [place], [place]      Chart documentation done in part with Dragon Voice Recognition Software. Although reviewed after completion, some word and grammatical errors may remain.

## 2019-08-21 NOTE — LETTER
8/21/2019         RE: Brit Romano  [address] [address]        Dear Colleague,    Thank you for referring your patient, Brit Romano, to the [facility]. Please see a copy of my visit note below.    Brit Romano was seen in the Allergy Clinic at [facility]. The following are my recommendations regarding her Allergic Rhinitis Due to Pollen, Allergic Rhinitis Due to Dust Mites and Allergic Conjunctivitis    1. Continue cetirizine 10mg daily  2. Resume fluticasone nasal spray, 1-2 sprays in each nostril daily if needed for persistent symptoms  3. May use nasal saline as needed  4. Follow-up in 1 year      Brit Romano is a 39 year old [place] female who is seen today for follow-up of allergic rhinitis. She states that she continues to have perennial symptoms of skin itching, itchy and watery eyes, sneezing, rhinorrhea, and headaches. At times she also has headaches and feels lightheaded. These symptoms are completely controlled as long as she takes cetirizine daily. Her skin will become very itchy and her rhinoconjunctivitis symptoms will return if she misses the medication. Brit reports she is tolerating the medication well and has not developed any side effects such as sedation. Previous IgE testing from 10/2016 was positive for sensitization to dust mite and maple tree pollen.    Brit also complains that triggers such as strong smells and smoke cause rhinoconjunctivitis symptoms. While the cetirizine is helpful for these symptoms she tries to avoid all scented products and does not burn candles or incense in her home.      Past Medical History:   Diagnosis Date     Fatty liver      Fibromyalgia      History of gestational diabetes      Seasonal allergies      Family History   Problem Relation Age of Onset     Hypertension Mother      Diabetes Mother      Kidney Disease Mother         dialysis     Glaucoma Mother      Macular Degeneration No family hx  of      Social History     Tobacco Use     Smoking status: Never Smoker     Smokeless tobacco: Never Used   Substance Use Topics     Alcohol use: No     Alcohol/week: 0.0 oz     Drug use: No       Past medical, family, and social history were reviewed.    REVIEW OF SYSTEMS:  General: negative for weight gain. negative for weight loss. negative for changes in sleep.   Eyes: negative for itching. negative for redness. negative for tearing/watering. negative for vision changes  Ears: negative for fullness. negative for hearing loss. negative for dizziness.   Nose: negative for snoring.negative for changes in smell. negative for drainage.   Throat: negative for hoarseness. negative for sore throat. negative for trouble swallowing.   Lungs: negative for cough. negative for shortness of breath.negative for wheezing. negative for sputum production.   Cardiovascular: negative for chest pain. negative for swelling of ankles. negative for fast or irregular heartbeat.   Gastrointestinal: negative for nausea. negative for heartburn. negative for acid reflux.   Musculoskeletal: negative for joint pain. negative for joint stiffness. negative for joint swelling.   Neurologic: negative for seizures. negative for fainting. negative for weakness.   Psychiatric: negative for changes in mood. negative for anxiety.   Endocrine: negative for cold intolerance. negative for heat intolerance. negative for tremors.   Hematologic: negative for easy bruising. negative for easy bleeding.  Integumentary: negative for rash. negative for scaling. negative for nail changes. Positive for itching.      Current Outpatient Medications:      acetaminophen (TYLENOL) 325 MG tablet, Take 2 tablets (650 mg) by mouth every 4 hours as needed for mild pain or fever (greater than or equal to 38  C /100.4  F (oral) or 38.5  C/ 101.4  F (core).), Disp: 30 tablet, Rfl: 0     cetirizine (ZYRTEC) 10 MG tablet, Take 1 tablet (10 mg) by mouth daily Needs appointment  for further refills, Disp: 30 tablet, Rfl: 0     fluticasone (FLONASE) 50 MCG/ACT spray, Spray 2 sprays into both nostrils daily, Disp: 1 Bottle, Rfl: 11     ibuprofen (ADVIL/MOTRIN) 800 MG tablet, Take 1 tablet (800 mg) by mouth every 6 hours as needed for other (cramping), Disp: 30 tablet, Rfl: 0     omeprazole (PRILOSEC) 40 MG DR capsule, TAKE 1 CAPSULE(40 MG) BY MOUTH DAILY, Disp: 30 capsule, Rfl: 1     cholecalciferol (VITAMIN D3) 5000 units (125 mcg) capsule, Take 1 capsule (5,000 Units) by mouth daily Take one capsule daily. (Patient not taking: Reported on 8/21/2019), Disp: 90 capsule, Rfl: 3     cyanocobalamin (VITAMIN  B-12) 1000 MCG tablet, Take 1 tablet (1,000 mcg) by mouth daily (Patient not taking: Reported on 8/21/2019), Disp: 30 tablet, Rfl: 3     ketotifen (ZADITOR/REFRESH ANTI-ITCH) 0.025 % SOLN ophthalmic solution, Place 1 drop into both eyes 2 times daily (Patient not taking: Reported on 8/21/2019), Disp: 1 Bottle, Rfl: 11     lanolin ointment, Apply topically daily as needed for dry skin (Patient not taking: Reported on 8/21/2019), Disp: 28 g, Rfl: 0     vitamin D2 (ERGOCALCIFEROL) 04041 units (1250 mcg) capsule, Take 1 capsule (50,000 Units) by mouth once a week (Patient not taking: Reported on 8/21/2019), Disp: 8 capsule, Rfl: 0    EXAM:   /81 (BP Location: Left arm, Patient Position: Sitting, Cuff Size: Adult Large)   Pulse 88   Wt 94.2 kg (207 lb 9.6 oz)   SpO2 100%   BMI 34.55 kg/m     GENERAL APPEARANCE: alert, cooperative and not in distress  SKIN: no rashes, no lesions  HEAD: atraumatic, normocephalic  EYES: lids and lashes normal, conjunctivae and sclerae clear  ENT: no scars or lesions, nasal exam showed no discharge, swelling or lesions noted, tongue midline and normal, soft palate, uvula, and tonsils normal  NECK: no asymmetry, masses, or scars, supple without significant adenopathy  LUNGS: unlabored respirations, no intercostal retractions or accessory muscle use, clear to  auscultation without rales or wheezes  HEART: regular rate and rhythm without murmurs and normal S1 and S2  MUSCULOSKELETAL: no musculoskeletal defects are noted  NEURO: no focal deficits noted  PSYCH: does not appear depressed or anxious      WORKUP:  None    ASSESSMENT/PLAN:  Brit Romano is a 39 year old female here for follow-up of allergic rhinoconjunctivitis. She reports that cetirizine has been working well to control her symptoms however if she misses a day she experiences intense skin itching along with rhinoconjunctivitis symptoms. We discussed long-term use of cetirizine vs other treatment such as immunotherapy and she wished to continue with her current medication management. Avoidance measures to dust mite were also reviewed. Brit also has nonallergic triggers such as scents and smoke that also cause rhinoconjunctivitis symptoms.     1. Continue cetirizine 10mg daily  2. Resume fluticasone nasal spray, 1-2 sprays in each nostril daily if needed for persistent symptoms  3. May use nasal saline as needed  4. Follow-up in 1 year      Thank you for allowing me to participate in the care of Brit Romano.      Mo Natarajan MD  Allergy/Immunology  [place], [place]      Chart documentation done in part with Dragon Voice Recognition Software. Although reviewed after completion, some word and grammatical errors may remain.    Again, thank you for allowing me to participate in the care of your patient.        Sincerely,        Mo Natarajan MD

## 2019-08-22 ENCOUNTER — TELEPHONE (OUTPATIENT)
Dept: FAMILY MEDICINE | Facility: CLINIC | Age: 39
End: 2019-08-22

## 2019-08-22 RX ORDER — FLUTICASONE PROPIONATE 50 MCG
2 SPRAY, SUSPENSION (ML) NASAL DAILY
Qty: 16 G | Refills: 11 | Status: ON HOLD | OUTPATIENT
Start: 2019-08-22 | End: 2020-04-06

## 2019-08-22 NOTE — TELEPHONE ENCOUNTER
Prior Authorization Retail Medication Request    Medication/Dose: omeprazole (PRILOSEC) 40 MG DR capsule  ICD code (if different than what is on RX):    Previously Tried and Failed:   Rationale:  Insurance does not cover medication     Insurance Name:  [employer] [facility]  Insurance ID:  [id number]      Pharmacy Information (if different than what is on RX)  [facility]  Phone:  [phone]

## 2019-08-28 NOTE — TELEPHONE ENCOUNTER
Central Prior Authorization Team  Phone: [phone]    PA Initiation    Medication: omeprazole (PRILOSEC) 40 MG DR capsule  Insurance Company: [employer] - Phone [phone] Fax [phone]  Pharmacy Filling the Rx: [id number] - [place], [place] - [address] AT [facility] [address]  Filling Pharmacy Phone: [phone]  Filling Pharmacy Fax:    Start Date: 8/28/2019

## 2019-08-29 NOTE — TELEPHONE ENCOUNTER
PA was Denied would you like to do an Appeal,Change medication or patient pay out of pocket.  Lissa Coulter,

## 2019-08-29 NOTE — TELEPHONE ENCOUNTER
PRIOR AUTHORIZATION DENIED    Medication: omeprazole (PRILOSEC) 40 MG DR capsule- DENIED     Denial Date: 8/29/2019    Denial Rational: Patient has reached the quantity limit for PPIs. Insurance allows 30 capsules per 30 days for a maximum of 120/ 365 days. If a higher quantity or duration must be used, prescriber must provide reason.        Appeal Information: If provider would like to appeal please provide a letter of medical necessity.

## 2019-09-03 NOTE — TELEPHONE ENCOUNTER
Contacted patient informed her of message below patient is wanting to know why this is not covered as she said she has taken this medication before and they covered it. Patient states the OTC kind does not help her and that's why she is wanting a prescription one. Patient is wondering if there is anything else we can do for this medication or if there is a different one to try. Patient also scheduled an appointment for 9/18/2019 at 12:20 with PCP for fatigue, head aches, body aches etc..   Thank you  LS

## 2019-09-04 NOTE — TELEPHONE ENCOUNTER
The medication over the counter is the same as the medication prescribed.  Insurance has limits on how many over the counter medications they will pay for each year.    Princess Hicks, CNP

## 2019-09-04 NOTE — TELEPHONE ENCOUNTER
Called patient and informed PA has been denied and can buy medication OTC. Patient is upset and informed that she has not reached her limit for medication requested. MA informed that it has been informed to us that PA has been denied. Patient informs will contact her insurance company. Anali Wu MA

## 2019-09-19 ENCOUNTER — OFFICE VISIT (OUTPATIENT)
Dept: FAMILY MEDICINE | Facility: CLINIC | Age: 39
End: 2019-09-19
Payer: COMMERCIAL

## 2019-09-19 VITALS
SYSTOLIC BLOOD PRESSURE: 124 MMHG | TEMPERATURE: 97.7 F | HEART RATE: 86 BPM | BODY MASS INDEX: 34.49 KG/M2 | OXYGEN SATURATION: 100 % | HEIGHT: 65 IN | DIASTOLIC BLOOD PRESSURE: 78 MMHG | WEIGHT: 207 LBS

## 2019-09-19 DIAGNOSIS — Z32.01 PREGNANCY EXAMINATION OR TEST, POSITIVE RESULT: ICD-10-CM

## 2019-09-19 DIAGNOSIS — R00.2 PALPITATIONS: ICD-10-CM

## 2019-09-19 DIAGNOSIS — R68.2 DRY MOUTH, UNSPECIFIED: ICD-10-CM

## 2019-09-19 DIAGNOSIS — R42 DIZZINESS: ICD-10-CM

## 2019-09-19 DIAGNOSIS — R53.83 FATIGUE, UNSPECIFIED TYPE: Primary | ICD-10-CM

## 2019-09-19 LAB
ANION GAP SERPL CALCULATED.3IONS-SCNC: 8 MMOL/L (ref 3–14)
BUN SERPL-MCNC: 3 MG/DL (ref 7–30)
CALCIUM SERPL-MCNC: 9.1 MG/DL (ref 8.5–10.1)
CHLORIDE SERPL-SCNC: 104 MMOL/L (ref 94–109)
CO2 SERPL-SCNC: 25 MMOL/L (ref 20–32)
CREAT SERPL-MCNC: 0.42 MG/DL (ref 0.52–1.04)
DIFFERENTIAL METHOD BLD: ABNORMAL
EOSINOPHIL # BLD AUTO: 0.1 10E9/L (ref 0–0.7)
EOSINOPHIL NFR BLD AUTO: 2 %
ERYTHROCYTE [DISTWIDTH] IN BLOOD BY AUTOMATED COUNT: 18.7 % (ref 10–15)
GFR SERPL CREATININE-BSD FRML MDRD: >90 ML/MIN/{1.73_M2}
GLUCOSE SERPL-MCNC: 105 MG/DL (ref 70–99)
HBA1C MFR BLD: 5.6 % (ref 0–5.6)
HCG UR QL: POSITIVE
HCT VFR BLD AUTO: 28.5 % (ref 35–47)
HGB BLD-MCNC: 8.7 G/DL (ref 11.7–15.7)
HYPOCHROMIA BLD QL: PRESENT
LYMPHOCYTES # BLD AUTO: 1.8 10E9/L (ref 0.8–5.3)
LYMPHOCYTES NFR BLD AUTO: 26 %
MACROCYTES BLD QL SMEAR: PRESENT
MCH RBC QN AUTO: 20.6 PG (ref 26.5–33)
MCHC RBC AUTO-ENTMCNC: 30.5 G/DL (ref 31.5–36.5)
MCV RBC AUTO: 67 FL (ref 78–100)
MICROCYTES BLD QL SMEAR: PRESENT
MONOCYTES # BLD AUTO: 0.6 10E9/L (ref 0–1.3)
MONOCYTES NFR BLD AUTO: 9 %
NEUTROPHILS # BLD AUTO: 4.5 10E9/L (ref 1.6–8.3)
NEUTROPHILS NFR BLD AUTO: 63 %
OVALOCYTES BLD QL SMEAR: SLIGHT
PLATELET # BLD AUTO: 258 10E9/L (ref 150–450)
PLATELET # BLD EST: ABNORMAL 10*3/UL
POTASSIUM SERPL-SCNC: 3.5 MMOL/L (ref 3.4–5.3)
RBC # BLD AUTO: 4.23 10E12/L (ref 3.8–5.2)
SODIUM SERPL-SCNC: 137 MMOL/L (ref 133–144)
TSH SERPL DL<=0.005 MIU/L-ACNC: 1.78 MU/L (ref 0.4–4)
VIT B12 SERPL-MCNC: 126 PG/ML (ref 193–986)
WBC # BLD AUTO: 7 10E9/L (ref 4–11)

## 2019-09-19 PROCEDURE — 36415 COLL VENOUS BLD VENIPUNCTURE: CPT | Performed by: PHYSICIAN ASSISTANT

## 2019-09-19 PROCEDURE — 81025 URINE PREGNANCY TEST: CPT | Performed by: PHYSICIAN ASSISTANT

## 2019-09-19 PROCEDURE — 93000 ELECTROCARDIOGRAM COMPLETE: CPT | Performed by: PHYSICIAN ASSISTANT

## 2019-09-19 PROCEDURE — 99214 OFFICE O/P EST MOD 30 MIN: CPT | Performed by: PHYSICIAN ASSISTANT

## 2019-09-19 PROCEDURE — 84443 ASSAY THYROID STIM HORMONE: CPT | Performed by: PHYSICIAN ASSISTANT

## 2019-09-19 PROCEDURE — 83036 HEMOGLOBIN GLYCOSYLATED A1C: CPT | Performed by: PHYSICIAN ASSISTANT

## 2019-09-19 PROCEDURE — 82607 VITAMIN B-12: CPT | Performed by: PHYSICIAN ASSISTANT

## 2019-09-19 PROCEDURE — 85025 COMPLETE CBC W/AUTO DIFF WBC: CPT | Performed by: PHYSICIAN ASSISTANT

## 2019-09-19 PROCEDURE — 80048 BASIC METABOLIC PNL TOTAL CA: CPT | Performed by: PHYSICIAN ASSISTANT

## 2019-09-19 RX ORDER — VITAMIN A, VITAMIN C, VITAMIN D-3, VITAMIN E, VITAMIN B-1, VITAMIN B-2, NIACIN, VITAMIN B-6, CALCIUM, IRON, ZINC, COPPER 4000; 120; 400; 22; 1.84; 3; 20; 10; 1; 12; 200; 27; 25; 2 [IU]/1; MG/1; [IU]/1; MG/1; MG/1; MG/1; MG/1; MG/1; MG/1; UG/1; MG/1; MG/1; MG/1; MG/1
1 TABLET ORAL DAILY
Qty: 90 TABLET | Refills: 3 | Status: SHIPPED | OUTPATIENT
Start: 2019-09-19 | End: 2020-09-29

## 2019-09-19 ASSESSMENT — MIFFLIN-ST. JEOR: SCORE: 1609.83

## 2019-09-19 NOTE — PATIENT INSTRUCTIONS
Your pregnancy test is positive. We will contact you with the remainder of the lab results and further recommendations.   For now start taking the prenatal vitamin daily and schedule an appointment with an OB

## 2019-09-19 NOTE — PROGRESS NOTES
"Subjective     Brit Romano is a 39 year old female who presents to clinic today for the following health issues:    HPI     Fatigue      Duration: 2 months    Description (location/character/radiation): states she feels tired, weak, dizzy     Intensity:  mild    Accompanying signs and symptoms: excessive thrist    History (similar episodes/previous evaluation): None    Precipitating or alleviating factors: None    Therapies tried and outcome: None     Patient is here with concern about feeling tired all the time, dizzy and weak. She says this has been going on for the last 2 months. She is not on any vitamins for history of vitamin D and vitamin B12 deficiency. She says her last period was in July. She also has been having SOB and palpitations with activity. She denies any chest pain. She was seen and had some lab work the beginning of August that showed evidence of anemia, mildly low potassium.  Her  states that she has been given supplements to treat low B12 as well as low Vit D and iron in the past but does not take them as she does not like taking pills. She also refuses to take any liquid medication. He is wondering about injection of B12   -------------------------------------    BP Readings from Last 3 Encounters:   09/19/19 124/78   08/21/19 124/81   05/10/19 116/77    Wt Readings from Last 3 Encounters:   09/19/19 93.9 kg (207 lb)   08/21/19 94.2 kg (207 lb 9.6 oz)   05/10/19 96.2 kg (212 lb)                    -------------------------------------  Reviewed and updated as needed this visit by Provider         Review of Systems   ROS COMP: Constitutional, HEENT, cardiovascular, pulmonary, GI, , musculoskeletal, neuro, skin, endocrine and psych systems are negative, except as otherwise noted.      Objective    /78 (BP Location: Right arm, Patient Position: Chair, Cuff Size: Adult Large)   Pulse 86   Temp 97.7  F (36.5  C) (Oral)   Ht 1.651 m (5' 5\")   Wt 93.9 kg (207 lb)   SpO2 100%   " BMI 34.45 kg/m    Body mass index is 34.45 kg/m .  Physical Exam   GENERAL: fatigued  EYES: Eyes grossly normal to inspection  NECK: no adenopathy, no asymmetry, masses, or scars and thyroid normal to palpation  RESP: lungs clear to auscultation - no rales, rhonchi or wheezes  CV: regular rate and rhythm, normal S1 S2, no S3 or S4, no murmur, click or rub, no peripheral edema and peripheral pulses strong  ABDOMEN: soft, nontender, no hepatosplenomegaly, no masses and bowel sounds normal  MS: no gross musculoskeletal defects noted, no edema  SKIN: no suspicious lesions or rashes    Diagnostic Test Results:  Results for orders placed or performed in visit on 09/19/19   CBC with platelets and differential   Result Value Ref Range    WBC 7.0 4.0 - 11.0 10e9/L    RBC Count 4.23 3.8 - 5.2 10e12/L    Hemoglobin 8.7 (L) 11.7 - 15.7 g/dL    Hematocrit 28.5 (L) 35.0 - 47.0 %    MCV 67 (L) 78 - 100 fl    MCH 20.6 (L) 26.5 - 33.0 pg    MCHC 30.5 (L) 31.5 - 36.5 g/dL    RDW 18.7 (H) 10.0 - 15.0 %    Platelet Count 258 150 - 450 10e9/L    % Neutrophils 63.0 %    % Lymphocytes 26.0 %    % Monocytes 9.0 %    % Eosinophils 2.0 %    Absolute Neutrophil 4.5 1.6 - 8.3 10e9/L    Absolute Lymphocytes 1.8 0.8 - 5.3 10e9/L    Absolute Monocytes 0.6 0.0 - 1.3 10e9/L    Absolute Eosinophils 0.1 0.0 - 0.7 10e9/L    Ovalocytes Slight     Microcytes Present     Macrocytes Present     Hypochromasia Present     Platelet Estimate       Automated count confirmed.  Platelet morphology is normal.    Diff Method Automated Method    HCG Qual, Urine ([id number])   Result Value Ref Range    HCG Qual Urine Positive (A) NEG^Negative   TSH with free T4 reflex   Result Value Ref Range    TSH 1.78 0.40 - 4.00 mU/L   Vitamin B12   Result Value Ref Range    Vitamin B12 126 (L) 193 - 986 pg/mL   Basic metabolic panel   Result Value Ref Range    Sodium 137 133 - 144 mmol/L    Potassium 3.5 3.4 - 5.3 mmol/L    Chloride 104 94 - 109 mmol/L    Carbon Dioxide 25 20 - 32  "mmol/L    Anion Gap 8 3 - 14 mmol/L    Glucose 105 (H) 70 - 99 mg/dL    Urea Nitrogen 3 (L) 7 - 30 mg/dL    Creatinine 0.42 (L) 0.52 - 1.04 mg/dL    GFR Estimate >90 >60 mL/min/[1.73_m2]    GFR Estimate If Black >90 >60 mL/min/[1.73_m2]    Calcium 9.1 8.5 - 10.1 mg/dL   Hemoglobin A1c   Result Value Ref Range    Hemoglobin A1C 5.6 0 - 5.6 %           Assessment & Plan       ICD-10-CM    1. Fatigue, unspecified type R53.83 CBC with platelets and differential     HCG Qual, Urine ([id number])     TSH with free T4 reflex     Vitamin B12     Basic metabolic panel     Hemoglobin A1c   2. Dry mouth, unspecified R68.2 Basic metabolic panel     Hemoglobin A1c   3. Dizziness R42 CBC with platelets and differential     HCG Qual, Urine ([id number])     Basic metabolic panel   4. Palpitations R00.2 TSH with free T4 reflex     EKG 12-lead complete w/read - Clinics     CANCELED: EKG 12-lead complete w/read - Clinics   5. Pregnancy examination or test, positive result Z32.01 Prenatal Vit-Fe Fumarate-FA (PRENATAL VITAMIN PLUS LOW IRON) 27-1 MG TABS     OB/GYN REFERRAL        BMI:   Estimated body mass index is 34.45 kg/m  as calculated from the following:    Height as of this encounter: 1.651 m (5' 5\").    Weight as of this encounter: 93.9 kg (207 lb).           I discussed positive pregnancy result and the importance of taking her prenatal vitamin with iron to help with anemia, baby development and her fatigue concerns. I will have her follow up with her OB in the next few weeks and also follow up in clinic with her PCP to follow up on anemia and other fatigue concerns.   See Patient Instructions    No follow-ups on file.    Kirsten Stephenson PA-C  [facility]        "

## 2019-10-02 ENCOUNTER — OFFICE VISIT (OUTPATIENT)
Dept: FAMILY MEDICINE | Facility: CLINIC | Age: 39
End: 2019-10-02
Payer: COMMERCIAL

## 2019-10-02 VITALS
BODY MASS INDEX: 34.49 KG/M2 | TEMPERATURE: 97.2 F | OXYGEN SATURATION: 100 % | RESPIRATION RATE: 18 BRPM | HEIGHT: 65 IN | DIASTOLIC BLOOD PRESSURE: 84 MMHG | HEART RATE: 99 BPM | SYSTOLIC BLOOD PRESSURE: 138 MMHG | WEIGHT: 207 LBS

## 2019-10-02 DIAGNOSIS — Z32.01 PREGNANCY EXAMINATION OR TEST, POSITIVE RESULT: Primary | ICD-10-CM

## 2019-10-02 DIAGNOSIS — D64.9 ANEMIA, UNSPECIFIED TYPE: ICD-10-CM

## 2019-10-02 DIAGNOSIS — E53.8 VITAMIN B12 DEFICIENCY (NON ANEMIC): ICD-10-CM

## 2019-10-02 LAB
FOLATE SERPL-MCNC: 19 NG/ML
HGB BLD-MCNC: 8.4 G/DL (ref 11.7–15.7)

## 2019-10-02 PROCEDURE — 83550 IRON BINDING TEST: CPT | Performed by: NURSE PRACTITIONER

## 2019-10-02 PROCEDURE — 99213 OFFICE O/P EST LOW 20 MIN: CPT | Mod: 25 | Performed by: NURSE PRACTITIONER

## 2019-10-02 PROCEDURE — 36415 COLL VENOUS BLD VENIPUNCTURE: CPT | Performed by: NURSE PRACTITIONER

## 2019-10-02 PROCEDURE — 96372 THER/PROPH/DIAG INJ SC/IM: CPT | Performed by: NURSE PRACTITIONER

## 2019-10-02 PROCEDURE — 85018 HEMOGLOBIN: CPT | Performed by: NURSE PRACTITIONER

## 2019-10-02 PROCEDURE — 83540 ASSAY OF IRON: CPT | Performed by: NURSE PRACTITIONER

## 2019-10-02 PROCEDURE — 82728 ASSAY OF FERRITIN: CPT | Performed by: NURSE PRACTITIONER

## 2019-10-02 PROCEDURE — 82746 ASSAY OF FOLIC ACID SERUM: CPT | Performed by: NURSE PRACTITIONER

## 2019-10-02 RX ORDER — CYANOCOBALAMIN 1000 UG/ML
1000 INJECTION, SOLUTION INTRAMUSCULAR; SUBCUTANEOUS
Status: DISCONTINUED | OUTPATIENT
Start: 2019-10-02 | End: 2019-11-12

## 2019-10-02 RX ADMIN — CYANOCOBALAMIN 1000 MCG: 1000 INJECTION, SOLUTION INTRAMUSCULAR; SUBCUTANEOUS at 14:46

## 2019-10-02 ASSESSMENT — PAIN SCALES - GENERAL: PAINLEVEL: MODERATE PAIN (5)

## 2019-10-02 ASSESSMENT — MIFFLIN-ST. JEOR: SCORE: 1609.83

## 2019-10-02 NOTE — PROGRESS NOTES
Subjective     Brit Romano is a 39 year old female who presents to clinic today for the following health issues:    HPI   Chief Complaint   Patient presents with     Anemia     Patient was recently noted to be pregnant.  She thinks her LMP was in July.  She has not yet scheduled with Ob/Gyn.  She was also noted to be anemia at that time.  She notes some intermittent dizziness, extreme fatigue.  B12 level was noted to be decreased as well.  She notes that it is difficult for her to take pills and requests a B12 injection.  She is taking her prenatal vitamin with iron.          Patient Active Problem List   Diagnosis     Mixed incontinence urge and stress (male)(female)     Lactose intolerance     Iron deficiency anemia      Fatty liver disease, nonalcoholic     History of gestational diabetes     Fibromyalgia     Vitamin B12 deficiency (non anemic)     Allergic rhinitis due to dust mite     Chronic seasonal allergic rhinitis due to pollen     Allergic conjunctivitis of both eyes     Insulin controlled gestational diabetes mellitus (GDM) in third trimester     Unstable fetal lie     Encounter for triage in pregnant patient     Spontaneous vaginal delivery     Past Surgical History:   Procedure Laterality Date     NO HISTORY OF SURGERY         Social History     Tobacco Use     Smoking status: Never Smoker     Smokeless tobacco: Never Used   Substance Use Topics     Alcohol use: No     Alcohol/week: 0.0 standard drinks     Family History   Problem Relation Age of Onset     Hypertension Mother      Diabetes Mother      Kidney Disease Mother         dialysis     Glaucoma Mother      Macular Degeneration No family hx of          Current Outpatient Medications   Medication Sig Dispense Refill     acetaminophen (TYLENOL) 325 MG tablet Take 2 tablets (650 mg) by mouth every 4 hours as needed for mild pain or fever (greater than or equal to 38  C /100.4  F (oral) or 38.5  C/ 101.4  F (core).) 30 tablet 0     cetirizine  "(ZYRTEC) 10 MG tablet Take 1 tablet (10 mg) by mouth daily 30 tablet 11     fluticasone (FLONASE) 50 MCG/ACT nasal spray Spray 2 sprays into both nostrils daily 16 g 11     omeprazole (PRILOSEC) 40 MG DR capsule TAKE 1 CAPSULE(40 MG) BY MOUTH DAILY 30 capsule 1     Prenatal Vit-Fe Fumarate-FA (PRENATAL VITAMIN PLUS LOW IRON) 27-1 MG TABS Take 1 Dose by mouth daily 90 tablet 3     No Known Allergies  BP Readings from Last 3 Encounters:   10/02/19 138/84   09/19/19 124/78   08/21/19 124/81    Wt Readings from Last 3 Encounters:   10/02/19 93.9 kg (207 lb)   09/19/19 93.9 kg (207 lb)   08/21/19 94.2 kg (207 lb 9.6 oz)                    Reviewed and updated as needed this visit by Provider  Tobacco  Allergies  Meds  Problems  Med Hx  Surg Hx  Fam Hx         Review of Systems   ROS COMP: Constitutional, HEENT, cardiovascular, pulmonary, gi and gu systems are negative, except as otherwise noted.      Objective    /84   Pulse 99   Temp 97.2  F (36.2  C) (Oral)   Resp 18   Ht 1.651 m (5' 5\")   Wt 93.9 kg (207 lb)   SpO2 100%   BMI 34.45 kg/m    Body mass index is 34.45 kg/m .  Physical Exam   GENERAL: healthy, alert and no distress  RESP: lungs clear to auscultation - no rales, rhonchi or wheezes  CV: regular rate and rhythm, normal S1 S2, no S3 or S4, no murmur, click or rub, no peripheral edema and peripheral pulses strong  MS: no gross musculoskeletal defects noted, no edema    Diagnostic Test Results:  In process        Assessment & Plan     1. Pregnancy examination or test, positive result  Patient encouraged to schedule Ob/Gyn follow-up.    2. Vitamin B12 deficiency (non anemic)  Injection given today and to be given monthly.  - cyanocobalamin injection 1,000 mcg    3. Anemia, unspecified type  Consider additional iron supplement pending lab work.  - Hemoglobin  - Ferritin  - Iron and iron binding capacity  - Folate          Return in about 2 weeks (around 10/16/2019) for Schedule follow-up with " Ob/Gyn.    MALACHI Reynolds CNP  [facility]

## 2019-10-03 LAB
FERRITIN SERPL-MCNC: 6 NG/ML (ref 12–150)
IRON SATN MFR SERPL: 6 % (ref 15–46)
IRON SERPL-MCNC: 25 UG/DL (ref 35–180)
TIBC SERPL-MCNC: 414 UG/DL (ref 240–430)

## 2019-10-04 DIAGNOSIS — D50.9 IRON DEFICIENCY ANEMIA: Primary | ICD-10-CM

## 2019-10-04 RX ORDER — FERROUS SULFATE 325(65) MG
325 TABLET ORAL EVERY OTHER DAY
Qty: 15 TABLET | Refills: 0 | Status: ON HOLD | OUTPATIENT
Start: 2019-10-04 | End: 2020-04-08

## 2019-10-29 ENCOUNTER — PRENATAL OFFICE VISIT (OUTPATIENT)
Dept: OBGYN | Facility: CLINIC | Age: 39
End: 2019-10-29
Payer: COMMERCIAL

## 2019-10-29 ENCOUNTER — TRANSCRIBE ORDERS (OUTPATIENT)
Dept: MATERNAL FETAL MEDICINE | Facility: CLINIC | Age: 39
End: 2019-10-29

## 2019-10-29 VITALS
OXYGEN SATURATION: 100 % | HEIGHT: 65 IN | TEMPERATURE: 98.4 F | SYSTOLIC BLOOD PRESSURE: 118 MMHG | HEART RATE: 81 BPM | DIASTOLIC BLOOD PRESSURE: 81 MMHG | BODY MASS INDEX: 35.35 KG/M2 | WEIGHT: 212.2 LBS

## 2019-10-29 DIAGNOSIS — M79.671 RIGHT FOOT PAIN: ICD-10-CM

## 2019-10-29 DIAGNOSIS — Z34.92 PRENATAL CARE IN SECOND TRIMESTER: ICD-10-CM

## 2019-10-29 DIAGNOSIS — N89.8 VAGINAL ITCHING: ICD-10-CM

## 2019-10-29 DIAGNOSIS — O26.90 PREGNANCY RELATED CONDITION, ANTEPARTUM: Primary | ICD-10-CM

## 2019-10-29 DIAGNOSIS — N76.0 BV (BACTERIAL VAGINOSIS): ICD-10-CM

## 2019-10-29 DIAGNOSIS — O99.619 CONSTIPATION DURING PREGNANCY, ANTEPARTUM: ICD-10-CM

## 2019-10-29 DIAGNOSIS — B96.89 BV (BACTERIAL VAGINOSIS): ICD-10-CM

## 2019-10-29 DIAGNOSIS — O23.42 URINARY TRACT INFECTION IN MOTHER DURING SECOND TRIMESTER OF PREGNANCY: ICD-10-CM

## 2019-10-29 DIAGNOSIS — Z86.32 HISTORY OF GESTATIONAL DIABETES: ICD-10-CM

## 2019-10-29 DIAGNOSIS — D50.8 IRON DEFICIENCY ANEMIA SECONDARY TO INADEQUATE DIETARY IRON INTAKE: ICD-10-CM

## 2019-10-29 DIAGNOSIS — O09.529 SUPERVISION OF HIGH-RISK PREGNANCY OF ELDERLY MULTIGRAVIDA: Primary | ICD-10-CM

## 2019-10-29 DIAGNOSIS — K59.00 CONSTIPATION DURING PREGNANCY, ANTEPARTUM: ICD-10-CM

## 2019-10-29 DIAGNOSIS — B37.31 VAGINAL YEAST INFECTION: ICD-10-CM

## 2019-10-29 LAB
ABO + RH BLD: NORMAL
ABO + RH BLD: NORMAL
ALBUMIN UR-MCNC: NEGATIVE MG/DL
APPEARANCE UR: CLEAR
BACTERIA #/AREA URNS HPF: ABNORMAL /HPF
BILIRUB UR QL STRIP: NEGATIVE
BLD GP AB SCN SERPL QL: NORMAL
BLOOD BANK CMNT PATIENT-IMP: NORMAL
COLOR UR AUTO: YELLOW
ERYTHROCYTE [DISTWIDTH] IN BLOOD BY AUTOMATED COUNT: 27.5 % (ref 10–15)
GLUCOSE BLD-MCNC: 101 MG/DL (ref 70–99)
GLUCOSE UR STRIP-MCNC: NEGATIVE MG/DL
HBA1C MFR BLD: 5 % (ref 0–5.6)
HCT VFR BLD AUTO: 31.1 % (ref 35–47)
HGB BLD-MCNC: 9.9 G/DL (ref 11.7–15.7)
HGB UR QL STRIP: NEGATIVE
KETONES UR STRIP-MCNC: NEGATIVE MG/DL
LEUKOCYTE ESTERASE UR QL STRIP: ABNORMAL
MCH RBC QN AUTO: 24.1 PG (ref 26.5–33)
MCHC RBC AUTO-ENTMCNC: 31.8 G/DL (ref 31.5–36.5)
MCV RBC AUTO: 76 FL (ref 78–100)
NITRATE UR QL: NEGATIVE
NON-SQ EPI CELLS #/AREA URNS LPF: ABNORMAL /LPF
PH UR STRIP: 7 PH (ref 5–7)
PLATELET # BLD AUTO: 276 10E9/L (ref 150–450)
RBC # BLD AUTO: 4.11 10E12/L (ref 3.8–5.2)
RBC #/AREA URNS AUTO: ABNORMAL /HPF
SOURCE: ABNORMAL
SP GR UR STRIP: 1.01 (ref 1–1.03)
SPECIMEN EXP DATE BLD: NORMAL
SPECIMEN SOURCE: ABNORMAL
UROBILINOGEN UR STRIP-ACNC: 0.2 EU/DL (ref 0.2–1)
WBC # BLD AUTO: 8.6 10E9/L (ref 4–11)
WBC #/AREA URNS AUTO: ABNORMAL /HPF
WET PREP SPEC: ABNORMAL

## 2019-10-29 PROCEDURE — 81001 URINALYSIS AUTO W/SCOPE: CPT | Performed by: OBSTETRICS & GYNECOLOGY

## 2019-10-29 PROCEDURE — 82947 ASSAY GLUCOSE BLOOD QUANT: CPT | Performed by: OBSTETRICS & GYNECOLOGY

## 2019-10-29 PROCEDURE — 87389 HIV-1 AG W/HIV-1&-2 AB AG IA: CPT | Performed by: OBSTETRICS & GYNECOLOGY

## 2019-10-29 PROCEDURE — 86850 RBC ANTIBODY SCREEN: CPT | Performed by: OBSTETRICS & GYNECOLOGY

## 2019-10-29 PROCEDURE — 86900 BLOOD TYPING SEROLOGIC ABO: CPT | Performed by: OBSTETRICS & GYNECOLOGY

## 2019-10-29 PROCEDURE — 87210 SMEAR WET MOUNT SALINE/INK: CPT | Performed by: OBSTETRICS & GYNECOLOGY

## 2019-10-29 PROCEDURE — 99213 OFFICE O/P EST LOW 20 MIN: CPT | Performed by: OBSTETRICS & GYNECOLOGY

## 2019-10-29 PROCEDURE — 36415 COLL VENOUS BLD VENIPUNCTURE: CPT | Performed by: OBSTETRICS & GYNECOLOGY

## 2019-10-29 PROCEDURE — 87086 URINE CULTURE/COLONY COUNT: CPT | Performed by: OBSTETRICS & GYNECOLOGY

## 2019-10-29 PROCEDURE — 85027 COMPLETE CBC AUTOMATED: CPT | Performed by: OBSTETRICS & GYNECOLOGY

## 2019-10-29 PROCEDURE — 86780 TREPONEMA PALLIDUM: CPT | Performed by: OBSTETRICS & GYNECOLOGY

## 2019-10-29 PROCEDURE — 86762 RUBELLA ANTIBODY: CPT | Performed by: OBSTETRICS & GYNECOLOGY

## 2019-10-29 PROCEDURE — 99207 ZZC FIRST OB VISIT: CPT | Performed by: OBSTETRICS & GYNECOLOGY

## 2019-10-29 PROCEDURE — 83036 HEMOGLOBIN GLYCOSYLATED A1C: CPT | Performed by: OBSTETRICS & GYNECOLOGY

## 2019-10-29 PROCEDURE — 86901 BLOOD TYPING SEROLOGIC RH(D): CPT | Performed by: OBSTETRICS & GYNECOLOGY

## 2019-10-29 PROCEDURE — G0499 HEPB SCREEN HIGH RISK INDIV: HCPCS | Performed by: OBSTETRICS & GYNECOLOGY

## 2019-10-29 RX ORDER — FLUCONAZOLE 150 MG/1
150 TABLET ORAL ONCE
Qty: 1 TABLET | Refills: 0 | Status: SHIPPED | OUTPATIENT
Start: 2019-10-29 | End: 2019-11-12

## 2019-10-29 RX ORDER — NITROFURANTOIN 25; 75 MG/1; MG/1
100 CAPSULE ORAL 2 TIMES DAILY
Qty: 14 CAPSULE | Refills: 0 | Status: SHIPPED | OUTPATIENT
Start: 2019-10-29 | End: 2019-11-12

## 2019-10-29 RX ORDER — METRONIDAZOLE 500 MG/1
500 TABLET ORAL 2 TIMES DAILY
Qty: 14 TABLET | Refills: 0 | Status: SHIPPED | OUTPATIENT
Start: 2019-10-29 | End: 2019-11-12

## 2019-10-29 RX ORDER — SENNA AND DOCUSATE SODIUM 50; 8.6 MG/1; MG/1
1 TABLET, FILM COATED ORAL 2 TIMES DAILY PRN
Qty: 60 TABLET | Refills: 1 | Status: ON HOLD | OUTPATIENT
Start: 2019-10-29 | End: 2020-04-08

## 2019-10-29 ASSESSMENT — MIFFLIN-ST. JEOR: SCORE: 1633.41

## 2019-10-29 NOTE — PROGRESS NOTES
"Chief Complaint   Patient presents with     Prenatal Care       Initial /81 (BP Location: Left arm, Patient Position: Sitting, Cuff Size: Adult Large)   Pulse 81   Temp 98.4  F (36.9  C) (Oral)   Ht 1.651 m (5' 5\")   Wt 96.3 kg (212 lb 3.2 oz)   LMP 2019 (Approximate)   SpO2 100%   Breastfeeding? No   BMI 35.31 kg/m   Estimated body mass index is 35.31 kg/m  as calculated from the following:    Height as of this encounter: 1.651 m (5' 5\").    Weight as of this encounter: 96.3 kg (212 lb 3.2 oz).  BP completed using cuff size: large    Questioned patient about current smoking habits.  Pt. has never smoked.          The following HM Due: NONE      The following patient reported/Care Every where data was sent to:  P ABSTRACT QUALITY INITIATIVES [[id number]]  n/a      n/a and patient has appointment for today     SUBJECTIVE:   Leah Romano is a  39 year old female   @ 14w2d by unsure LMP  who presents for a new Ob visit.  Feeling ok, mild nausea. Feeling tired.    She has a h/o gestational diabetes and at least one previous pregnancy.  She has been feeling very thirsty and peeing a lot since pregnant.   No bleeding.      Additional concerns she wanted to address today include:  Vaginal itching and irritation has been going on for couple weeks.  Also burning when she pees.  Would like to be checked for UTI, and vaginitis.    In addition, she is complaining of a 2 month h/o right foot pain in the region of her heel.  Very painful. Has not done anything for it so far. Would like to see the foot doctor for this.        POBHx:  OB History    Para Term  AB Living   10 9 9 0 0 8   SAB TAB Ectopic Multiple Live Births   0 0 0 0 8      # Outcome Date GA Lbr Jamal/2nd Weight Sex Delivery Anes PTL Lv   10 Current            9 Term 18 39w0d 07:36 / 00:36 3.27 kg (7 lb 3.3 oz) F Vag-Spont EPI N ROXANNA      Complications: Preeclampsia/Hypertension      Name: MALU,BABY1 LEAH      " Apgar1: 9  Apgar5: 9   8 Term 16 40w2d 04:29 / 00:17 3.52 kg (7 lb 12.2 oz) F Vag-Spont EPI N ROXANNA      Apgar1: 9  Apgar5: 9   7 Term 12    F    ROXANNA   6 Term 11    F   N ROXANNA   5 Term 09    M   N ROXANNA      Complications: GDM (gestational diabetes mellitus)   4 Term 07    M I.U. FETAL D  N FD   3 Term 05    M   N ROXANNA   2 Term 03    M   N ROXANNA   1 Term 12/10/00    M   N ROXANNA      Obstetric Comments   No PTL, of HTN. GDM x 2 diet controlled          Patient Active Problem List   Diagnosis     Mixed incontinence urge and stress (male)(female)     Lactose intolerance     Iron deficiency anemia      Fatty liver disease, nonalcoholic     History of gestational diabetes     Fibromyalgia     Vitamin B12 deficiency (non anemic)     Allergic rhinitis due to dust mite     Chronic seasonal allergic rhinitis due to pollen     Allergic conjunctivitis of both eyes     Insulin controlled gestational diabetes mellitus (GDM) in third trimester     Unstable fetal lie     Encounter for triage in pregnant patient     Spontaneous vaginal delivery       Past Medical History:   Diagnosis Date     Fatty liver      Fibromyalgia      History of gestational diabetes      Seasonal allergies        Past Surgical History:   Procedure Laterality Date     NO HISTORY OF SURGERY          Current Outpatient Medications   Medication     cetirizine (ZYRTEC) 10 MG tablet     ferrous sulfate (FEROSUL) 325 (65 Fe) MG tablet     omeprazole (PRILOSEC) 40 MG DR capsule     Prenatal Vit-Fe Fumarate-FA (PRENATAL VITAMIN PLUS LOW IRON) 27-1 MG TABS     acetaminophen (TYLENOL) 325 MG tablet     fluticasone (FLONASE) 50 MCG/ACT nasal spray     Current Facility-Administered Medications   Medication     cyanocobalamin injection 1,000 mcg       No Known Allergies      EXAM:  /81 (BP Location: Left arm, Patient Position: Sitting, Cuff Size: Adult Large)   Pulse 81   Temp 98.4  F (36.9  C) (Oral)   Ht  "1.651 m (5' 5\")   Wt 96.3 kg (212 lb 3.2 oz)   LMP 07/21/2019 (Approximate)   SpO2 100%   Breastfeeding? No   BMI 35.31 kg/m      GENERAL: WDWN F in NAD  HEENT: no abnormalities  NECK: without thyromegaly or adenopathy  CHEST: clear to auscultation  CV: RRR without murmur  BREASTS: no palpable masses or adenopathy, no asymmetry or skin dimpling  ABDOMEN: S, NT, no palpable masses or hepatosplenomegaly  MUSCULOSKELETAL: no obvious abnormalities.  NEUROLOGICAL: normal strength, sensation, mental status  PSYCH: normal affect, appropriate  PELVIC: EG - normal adult female.  BUS - within normal limits.  Vagina - well rugated, moderate discharge.  Cervix - no lesions, no CMT.  Uterus - ~ 16 wk size and nontender.  Adnexae - no masses or tenderness.  RV - deferred.  RLE:  No swelling over the ankle or heel of the foot.     Bedside US done to confirm dates and viability:  Single live INTRAUTERINE PREGNANCY with normal cardiac activity and fetal movement.  CRL= 15w5d       ASSESSMENT/ PLAN:  IUP @ ~ 14w2d by unsure dates, FRANCISCO JAVIER needs to be determined by formal US. approx 4/16/20  Grand multip with h/o GDM     Encounter Diagnoses   Name Primary?     Supervision of high-risk pregnancy of elderly multigravida Yes     Prenatal care in second trimester      History of gestational diabetes      Vaginal itching      BV (bacterial vaginosis)      Vaginal yeast infection      Right foot pain      Constipation during pregnancy, antepartum      Urinary tract infection in mother during second trimester of pregnancy      Iron deficiency anemia        discussed routine ob care.  With h/o GDM, will need to test for GDM with 3 hour test this week.  Will also get a random blood Glc today.  If> 200 will go straight to diabetic ed.      Orders Placed This Encounter   Procedures     UA reflex to Microscopic and Culture     Hepatitis B surface antigen     CBC with platelets     HIV Antigen Antibody Combo     Urine Microscopic     Treponema " Abs w Reflex to RPR and Titer     Hemoglobin A1c     Glucose, whole blood     Rubella Antibody IgG Quantitative     Glucose tolerance, gest std 100 gm, 3 hr     [facility] REFERRAL-PREGNANCY     PODIATRY/FOOT & ANKLE SURGERY REFERRAL     ABO/Rh type and screen      RX given for metronidazole, monistat, macrobid, senokot   MFM US for dating    Patient was counselled on healthy lifestyle habits and all questions answered.    New Ob labs drawn today.    Return to Clinic in 4 weeks or sooner if problems arise.    For her other issues:   A/P bv and yeast, right foot pain:  Wet prep today indicated both BV and yeast.  Will treat with metronidazole and monistat.   UA suggests UTI so given macrobid as well.     No findings of swelling on foot exam but patient will be referred to podiatry for right foot pain.        Grace Badillo MD

## 2019-10-30 LAB
BACTERIA SPEC CULT: NORMAL
HBV SURFACE AG SERPL QL IA: NONREACTIVE
HIV 1+2 AB+HIV1 P24 AG SERPL QL IA: NONREACTIVE
RUBV IGG SERPL IA-ACNC: 16 IU/ML
SPECIMEN SOURCE: NORMAL
T PALLIDUM AB SER QL: NONREACTIVE

## 2019-10-31 PROBLEM — O24.414 INSULIN CONTROLLED GESTATIONAL DIABETES MELLITUS (GDM) IN THIRD TRIMESTER: Status: RESOLVED | Noted: 2018-07-16 | Resolved: 2019-10-31

## 2019-10-31 PROBLEM — Z36.89 ENCOUNTER FOR TRIAGE IN PREGNANT PATIENT: Status: RESOLVED | Noted: 2018-08-01 | Resolved: 2019-10-31

## 2019-10-31 PROBLEM — O32.0XX0 UNSTABLE FETAL LIE: Status: RESOLVED | Noted: 2018-07-27 | Resolved: 2019-10-31

## 2019-11-01 ENCOUNTER — OFFICE VISIT (OUTPATIENT)
Dept: PODIATRY | Facility: CLINIC | Age: 39
End: 2019-11-01
Payer: COMMERCIAL

## 2019-11-01 ENCOUNTER — PRENATAL OFFICE VISIT (OUTPATIENT)
Dept: NURSING | Facility: CLINIC | Age: 39
End: 2019-11-01
Payer: COMMERCIAL

## 2019-11-01 VITALS
HEART RATE: 74 BPM | SYSTOLIC BLOOD PRESSURE: 130 MMHG | BODY MASS INDEX: 35.28 KG/M2 | WEIGHT: 212 LBS | DIASTOLIC BLOOD PRESSURE: 70 MMHG

## 2019-11-01 VITALS
DIASTOLIC BLOOD PRESSURE: 76 MMHG | HEIGHT: 64 IN | WEIGHT: 210.8 LBS | HEART RATE: 80 BPM | SYSTOLIC BLOOD PRESSURE: 126 MMHG | BODY MASS INDEX: 35.99 KG/M2

## 2019-11-01 DIAGNOSIS — M72.2 PLANTAR FASCIAL FIBROMATOSIS: Primary | ICD-10-CM

## 2019-11-01 DIAGNOSIS — O09.529 SUPERVISION OF HIGH-RISK PREGNANCY OF ELDERLY MULTIGRAVIDA: ICD-10-CM

## 2019-11-01 PROCEDURE — 99207 ZZC NO CHARGE NURSE ONLY: CPT

## 2019-11-01 PROCEDURE — 99243 OFF/OP CNSLTJ NEW/EST LOW 30: CPT | Performed by: PODIATRIST

## 2019-11-01 ASSESSMENT — MIFFLIN-ST. JEOR: SCORE: 1611.18

## 2019-11-01 NOTE — PATIENT INSTRUCTIONS
Weight management plan: Patient was referred to their PCP to discuss a diet and exercise plan.  We wish you continued good healing. If you have any questions or concerns, please do not hesitate to contact us at [phone]    Please remember to call and schedule a follow up appointment if one was recommended at your earliest convenience.   PODIATRY CLINIC HOURS  TELEPHONE NUMBER    Dr. Pete Gonzalez D.P.M [facility]    Clinics:  [facility]    Dominique Chaudhary [facility]   Tuesday 1PM-6PM  [place]  Wednesday 7AM-2PM  [facility]  Thursday 10AM-6PM  [place]  Friday 7AM-3PM  [place]  Specialty schedulers:   [phone] to make an appointment with any Specialty Provider.        Urgent Care locations:    [place] Monday-Friday 5 pm - 9 pm. Saturday-Sunday 9 am -5pm    Monday-Friday 11 am - 9 pm Saturday 9 am - 5 pm     Monday-Sunday 12 noon-8PM [phone]     If you need a medication refill, please contact us you may need lab work and/or a follow up visit prior to your refill (i.e. Antifungal medications).    [facility] (secure e-mail communication and access to your chart) to send a message or to make an appointment.    If MRI needed please call [place] Moser at [phone]

## 2019-11-01 NOTE — PROGRESS NOTES
Prenatal OB Questionnaire  Past Medical History  Diabetes   Yes Reports hx of gestational diabetes.  Glucose recently 101 with A1C WNL.   Hypertension   No  Heart Disease, mitral valve prolapse, or rheumatic fever?   No  An autoimmune disorder such as Lupus or Rheumatoid Arthritis?   No  Kidney Disease or Urinary Tract Infection?   No  Epilepsy, seizures or spells?   No  Migraine headaches?   No  A stroke or loss of function or sensation?   No  Any other neurological problems?   No  Have you ever been treated for depression?  No  Are you having problems with crying spells or loss of self-esteem?   No  Have you ever required psychiatric care?   No  Have you ever hepatitis, liver disease or jaundice?   No  Have you ever been treated for blood clots in your veins, deep venous thrombosis, inflammation in the veins, thrombosis, phlebitis, pulmonary embolism or varicosities?   No  Have you had excessive bleeding after surgery or dental work?   No  Do you bleed more than other women after a cut or scratch?   No  Do you have a history of anemia?   Yes  Recent hemoglobin was 9.9-baseline for patient and taking iron supplement.  Have you ever been treated for thyroid problems or taken thyroid medication?  No  Do you have any other endocrine problems?  No  Have you ever been in a major accident or suffered serious trauma?   No  Within the last year, has anyone hit slapped, kicked or otherwise hurt you?  No  In the last year, has anyone forced you to have sex when you didn't want to?  No  Have you ever had a blood transfusion?   No  Would you refuse a blood transfusion if a doctor judged it to be medically necessary?   No  If you answered yes, would you rather die than have a blood transfusion?   No  If you answered yes, is this for Nondenominational reasons?   No  Does anyone in your home smoke?   No  Do you use tobacco products?  No  Do you drink beer, wine, hard liquor?  No  Do you use any of the following: marijuana, speed,  cocaine, heroine, hallucinogens, or other drugs?  No  Is your blood type Rh negative?   No  Have you ever had abnormal antibodies in your blood?   No  Have you ever had asthma?   No  Have you ever had tuberculosis?   No  Do you have any allergies to drugs or over-the-counter medications?   No    Allergies as of 11/1/2019:    Allergies as of 11/01/2019     (No Known Allergies)       Do you have any breast problems?   Yes Patient reports history of breast pain and yeast toward end of her pregnancies. No current symptoms/concerns.   Have you ever ?   Yes. Youngest child is 15 months and not currently breastfeeding.   Have you had any gynecological surgical procedures such as cervical conization, a LEEP procedure, laser treatment, cryosurgery of the cervix, or a dilation and curettage, etc?  No  Have you had any other surgical procedures?  No  Have you been hospitalized for a nonsurgical reason excluding normal delivery?   No  Have you ever had any anesthetic complications?   No  Have you ever had an abnormal pap smear?   No  Do you have a history of abnormalities of the uterus?   No  Did it take you more than one year to become pregnant?   No  Have you ever been evaluated or treated for infertility?   No  Is there a history of medical problems in your family, which you feel might adversely affect your health or pregnancy?   No  Do you have any other problems we have not asked you about which you feel may be important to this pregnancy?  No    Symptoms since Last Menstrual Period  Do you have any of the following:    *abdominal pain  No  *blood in stool or urine  No  *chest pain  No  *shortness of breath  No  *coughing or vomiting up blood No  *heart racing or skipping beats  No  *nausea and vomiting  Yes Nausea and some mild vomiting/gagging toward beginning of pregnancy, but this has subsided/improved.  No recent vomiting.   *pain with urination  Yes Patient recently negative UA/UC and wet prep indicating  yeast and BV.  Patient was taking Macrobid, will stop this and start vaginal Monistat and oral Flagyl.   *vaginal discharge or bleeding  Yes  See above-symptoms of BV and yeast and will likely resolve with above treatment. No new symptoms.   Current medications are:  Current Outpatient Medications   Medication Sig Dispense Refill     acetaminophen (TYLENOL) 325 MG tablet Take 2 tablets (650 mg) by mouth every 4 hours as needed for mild pain or fever (greater than or equal to 38  C /100.4  F (oral) or 38.5  C/ 101.4  F (core).) 30 tablet 0     cetirizine (ZYRTEC) 10 MG tablet Take 1 tablet (10 mg) by mouth daily 30 tablet 11     ferrous sulfate (FEROSUL) 325 (65 Fe) MG tablet Take 1 tablet (325 mg) by mouth every other day 15 tablet 0     fluticasone (FLONASE) 50 MCG/ACT nasal spray Spray 2 sprays into both nostrils daily 16 g 11     nitroFURantoin macrocrystal-monohydrate (MACROBID) 100 MG capsule Take 1 capsule (100 mg) by mouth 2 times daily for 7 days 14 capsule 0     omeprazole (PRILOSEC) 40 MG DR capsule TAKE 1 CAPSULE(40 MG) BY MOUTH DAILY 30 capsule 1     Prenatal Vit-Fe Fumarate-FA (PRENATAL VITAMIN PLUS LOW IRON) 27-1 MG TABS Take 1 Dose by mouth daily 90 tablet 3     SENNA-docusate sodium (SENNA S) 8.6-50 MG tablet Take 1 tablet by mouth 2 times daily as needed (constipation) 60 tablet 1     metroNIDAZOLE (FLAGYL) 500 MG tablet Take 1 tablet (500 mg) by mouth 2 times daily for 7 days 14 tablet 0     miconazole nitrate (MONISTAT 3) 4 % cream Place 1 applicator vaginally At Bedtime 1 Tube 0       Genetic Screening  At the time of birth, will you be 35 years old or older?  Yes Patient has upcoming appointment with genetic counselor on 11/21/19.  Has the patient, baby s father, or anyone in either family had:  Thalassemia (Italian, Greek, Mediterranean, or  background only) and an MCV result less than 80?  No  Neural tube defect such as meningomyelocele, spina bifida or anencephaly?  No  Congenital heart  defect?  No  Down s syndrome?  No  Guy-Sach s disease ([place], [place]un, Citizen of [place])?  No  Sickle cell disease or trait ([place])?  No  Hemophilia or other inherited problems of blood coagulation? No  Muscular dystrophy?  No  Cystic Fibrosis?  No  Bates s chorea?  No  Mental retardation/autism? No   If yes, was the person tested for fragile X?  No  Any other inherited genetic or chromosomal disorder?  No  Maternal metabolic disorder (e.g. insulin-dependent diabetes, PKU)? No  A child with birth defects not listed above?  No  Recurrent pregnancy loss or a stillbirth?  No  Has the patient had any medications/street drugs/alcohol since her last menstrual period? No  Does the patient or baby s father have any other genetic risks?  No  Infection History  Do you object to being tested for Hepatitis B? No  Do you object to being tested for HIV? No  Do you feel that you are at high risk for coming in contact with the AIDS virus?  No  Have you ever been treated for tuberculosis?  No  Have you ever received the BCG vaccine for tuberculosis?  No  Have you ever had a positive skin test for tuberculosis? No  Do you live with someone who has tuberculosis?  No  Have you ever been exposed to tuberculosis?  No  Do you have genital herpes?  No  Does your partner have genital herpes?  No  Have you had a rash or viral illness since your last period?  No  Have you ever had Gonorrhea, Chlamydia, Syphilis, venereal warts, trichomoniasis, pelvic inflammatory disease or any other sexually transmitted disease?  No  Do you know if you are a genital group B streptococcus carrier? No  You have not had chicken pox/varicella  No  Have you been vaccinated against chicken pox?  No  Have you had any other infectious disease? No        Early ultrasound screening tool:    Does patient have irregular periods?  No  Did patient use hormonal birth control in the three months prior to positive urine pregnancy test? No  Is the patient breastfeeding?   No  Is the patient 10 weeks or greater at time of education visit?  Yes Patient has ultrasound scheduled for 19.    Patient presents to clinic today prenatal education. This is the patient's 10th pregnancy. She has had 0 miscarriage(s), 0 (s), 9 term birth(s) and 0  birth(s). She has 8 living child(oziel).   This was not a planned pregnancy.  Reviewed answers to patient's Prenatal OB Questionnaire. Screening is positive for See above.     Medical, surgical, family and ObGyn history updated as appropriate. Reviewed current medications and allergies. Patient is taking prenatal vitamins.     Discussed all of the options within [place] for her prenatal care including Dr. Foster and Dr. Rodriguez at [facility], midwives at [facility] and OB/GYN-Dr. Lopez and Dr. Cantu. Next visit scheduled with Dr. Badillo on 19 for established prenatal, as patient already had initial visit and prenatal labs on 10/29/19.     Reviewed and discussed OB 1st Trimester Plans/Education  and also summarized in detail handouts and brochures included in OB Prenatal Folder that patient is able to keep and bring home with her.    Discussed all of the blood tests with pt who agrees to have all including HIV. Pt aware that results will be discussed at next office visit with MD unless abnl results are indicated and phone call will be made.    Will forward chart on to Prenatal Care Provider to review.    Lorena Beth RN

## 2019-11-01 NOTE — LETTER
11/1/2019         RE: Brit Romano  [address] [address]        Dear Colleague,    Thank you for referring your patient, Brit Romano, to the [facility]. Please see a copy of my visit note below.    Subjective:    Patient is seen today in consult from Dr. Badillo with a 4 month(s) hx of right heel pain.  Points to the plantarmedial calcaneal tubercle.  Most painful upon rising in a.m. or after prolonged sitting.  Aggravated by activity and relieved by rest.  Has tried changing shoewear, OTC inserts, without much success.  Denies erythema, edema, ecchymosis, numbness, loss of strength.  Patient is pregnant.  She is barefoot or wear slippers around the house.    ROS:  A 10-point review of systems was performed and is positive for that noted in the HPI and as seen below.  All other areas are negative.      No Known Allergies    Current Outpatient Medications   Medication Sig Dispense Refill     acetaminophen (TYLENOL) 325 MG tablet Take 2 tablets (650 mg) by mouth every 4 hours as needed for mild pain or fever (greater than or equal to 38  C /100.4  F (oral) or 38.5  C/ 101.4  F (core).) (Patient not taking: Reported on 10/29/2019) 30 tablet 0     cetirizine (ZYRTEC) 10 MG tablet Take 1 tablet (10 mg) by mouth daily 30 tablet 11     ferrous sulfate (FEROSUL) 325 (65 Fe) MG tablet Take 1 tablet (325 mg) by mouth every other day 15 tablet 0     fluticasone (FLONASE) 50 MCG/ACT nasal spray Spray 2 sprays into both nostrils daily (Patient not taking: Reported on 10/29/2019) 16 g 11     metroNIDAZOLE (FLAGYL) 500 MG tablet Take 1 tablet (500 mg) by mouth 2 times daily for 7 days 14 tablet 0     miconazole nitrate (MONISTAT 3) 4 % cream Place 1 applicator vaginally At Bedtime 1 Tube 0     nitroFURantoin macrocrystal-monohydrate (MACROBID) 100 MG capsule Take 1 capsule (100 mg) by mouth 2 times daily for 7 days 14 capsule 0     omeprazole (PRILOSEC) 40 MG DR capsule  TAKE 1 CAPSULE(40 MG) BY MOUTH DAILY 30 capsule 1     Prenatal Vit-Fe Fumarate-FA (PRENATAL VITAMIN PLUS LOW IRON) 27-1 MG TABS Take 1 Dose by mouth daily 90 tablet 3     SENNA-docusate sodium (SENNA S) 8.6-50 MG tablet Take 1 tablet by mouth 2 times daily as needed (constipation) 60 tablet 1       Patient Active Problem List   Diagnosis     Mixed incontinence urge and stress (male)(female)     Lactose intolerance     Iron deficiency anemia      Fatty liver disease, nonalcoholic     History of gestational diabetes     Fibromyalgia     Vitamin B12 deficiency (non anemic)     Allergic rhinitis due to dust mite     Chronic seasonal allergic rhinitis due to pollen     Allergic conjunctivitis of both eyes       Past Medical History:   Diagnosis Date     Fatty liver      Fibromyalgia      History of gestational diabetes      Seasonal allergies        Past Surgical History:   Procedure Laterality Date     NO HISTORY OF SURGERY         Family History   Problem Relation Age of Onset     Hypertension Mother      Diabetes Mother      Kidney Disease Mother         dialysis     Glaucoma Mother      Diabetes Son      Macular Degeneration No family hx of        Social History     Tobacco Use     Smoking status: Never Smoker     Smokeless tobacco: Never Used   Substance Use Topics     Alcohol use: No     Alcohol/week: 0.0 standard drinks         Objective:    Vitals: /70 (BP Location: Right arm, Patient Position: Sitting)   Pulse 74   Wt 96.2 kg (212 lb)   LMP 07/21/2019 (Approximate)   BMI 35.28 kg/m     BMI: Body mass index is 35.28 kg/m .  Height: Data Unavailable    Constitutional/ general:  Pt is in no apparent distress, appears well-nourished.  Cooperative with history and physical exam.     Psych:  The patient answered questions appropriately.  Normal affect.  Seems to have reasonable expectations, in terms of treatment.     Eyes:  Visual scanning/ tracking without deficit.     Ears:  Response to auditory stimuli  is normal.  No hearing aid devices.  Auricles in proper alignment.     Lymphatic:  Popliteal lymph nodes not enlarged.     Lungs:  Non labored breathing, non labored speech. No cough.  No audible wheezing. Even, quiet breathing.       Vascular:  Pedal pulses are palpable bilaterally for both the DP and PT arteries.  CFT < 3 sec.  No edema.  Pedal hair growth noted.     Neuro:  Alert and oriented x 3. Coordinated gait.  Light touch sensation is intact to the L4, L5, S1 distributions. No obvious deficits.  No evidence of neurological-based weakness, spasticity, or contracture in the lower extremities.     Derm: Normal texture and turgor.  No erythema, ecchymosis, or cyanosis.  No open lesions.     Musculoskeletal:    Lower extremity muscle strength is normal.  Patient is ambulatory without an assistive device or brace.  No gross deformities.      Normal arch with weightbearing.   ROM is within normal limits.  Negative tinel's sign.  No side to side compression pain of the calcaneus.  Pain upon palpation to the right plantarmedial  of the calcaneus.  No erythema, edema, ecchymosis, or subcutaneous masses noted.  No pain on palpation or stressing any tendons.  Negative Tinel's sign      Patient wearing not supportive shoes today      Assessment:  Plantar Fasciitis right foot     Plan: Discussed etiology and treatment options with the patient.  The potential causes and nature of plantar fasciitis were discussed with the patient.  We reviewed the natural history/prognosis of the condition and risks if left untreated.  These include chronic pain, other sites of pain due to gait changes, and potential plantar fascial rupture.      We discussed possible causes of the condition as it relates to the patients specific situation.      Conservative treatment options were reviewed:  appropriate shoes, avoidance of barefoot walking, inserts/orthoses, stretching, ice, massage, immobilization and NSAIDs.     We also reviewed the  options of injection therapy and surgery.  However, it was made clear that surgery is only considered when conservative therapy fails.  The risks and benefits of injection therapy, and surgery were discussed.  Dispensed handout.       After thorough discussion and answering all questions, the patient elected to modifying activities, supportive shoes, ice, stretching, and not going barefoot.  Discussed good shoes  at all times are the mainstay of treating this.  Good house shoes at all times and I made recommendations.  We will also get good outside shoes.  We discussed icing and stretching.  RTC return to clinic prn.  Thank you for allowing me participate in the care of this patient.    Pete Gonzalez DPM DPM, FACFAS      Again, thank you for allowing me to participate in the care of your patient.        Sincerely,        Pete Gonzalez DPM

## 2019-11-01 NOTE — PROGRESS NOTES
Subjective:    Patient is seen today in consult from Dr. Badillo with a 4 month(s) hx of right heel pain.  Points to the plantarmedial calcaneal tubercle.  Most painful upon rising in a.m. or after prolonged sitting.  Aggravated by activity and relieved by rest.  Has tried changing shoewear, OTC inserts, without much success.  Denies erythema, edema, ecchymosis, numbness, loss of strength.  Patient is pregnant.  She is barefoot or wear slippers around the house.    ROS:  A 10-point review of systems was performed and is positive for that noted in the HPI and as seen below.  All other areas are negative.      No Known Allergies    Current Outpatient Medications   Medication Sig Dispense Refill     acetaminophen (TYLENOL) 325 MG tablet Take 2 tablets (650 mg) by mouth every 4 hours as needed for mild pain or fever (greater than or equal to 38  C /100.4  F (oral) or 38.5  C/ 101.4  F (core).) (Patient not taking: Reported on 10/29/2019) 30 tablet 0     cetirizine (ZYRTEC) 10 MG tablet Take 1 tablet (10 mg) by mouth daily 30 tablet 11     ferrous sulfate (FEROSUL) 325 (65 Fe) MG tablet Take 1 tablet (325 mg) by mouth every other day 15 tablet 0     fluticasone (FLONASE) 50 MCG/ACT nasal spray Spray 2 sprays into both nostrils daily (Patient not taking: Reported on 10/29/2019) 16 g 11     metroNIDAZOLE (FLAGYL) 500 MG tablet Take 1 tablet (500 mg) by mouth 2 times daily for 7 days 14 tablet 0     miconazole nitrate (MONISTAT 3) 4 % cream Place 1 applicator vaginally At Bedtime 1 Tube 0     nitroFURantoin macrocrystal-monohydrate (MACROBID) 100 MG capsule Take 1 capsule (100 mg) by mouth 2 times daily for 7 days 14 capsule 0     omeprazole (PRILOSEC) 40 MG DR capsule TAKE 1 CAPSULE(40 MG) BY MOUTH DAILY 30 capsule 1     Prenatal Vit-Fe Fumarate-FA (PRENATAL VITAMIN PLUS LOW IRON) 27-1 MG TABS Take 1 Dose by mouth daily 90 tablet 3     SENNA-docusate sodium (SENNA S) 8.6-50 MG tablet Take 1 tablet by mouth 2 times daily  as needed (constipation) 60 tablet 1       Patient Active Problem List   Diagnosis     Mixed incontinence urge and stress (male)(female)     Lactose intolerance     Iron deficiency anemia      Fatty liver disease, nonalcoholic     History of gestational diabetes     Fibromyalgia     Vitamin B12 deficiency (non anemic)     Allergic rhinitis due to dust mite     Chronic seasonal allergic rhinitis due to pollen     Allergic conjunctivitis of both eyes       Past Medical History:   Diagnosis Date     Fatty liver      Fibromyalgia      History of gestational diabetes      Seasonal allergies        Past Surgical History:   Procedure Laterality Date     NO HISTORY OF SURGERY         Family History   Problem Relation Age of Onset     Hypertension Mother      Diabetes Mother      Kidney Disease Mother         dialysis     Glaucoma Mother      Diabetes Son      Macular Degeneration No family hx of        Social History     Tobacco Use     Smoking status: Never Smoker     Smokeless tobacco: Never Used   Substance Use Topics     Alcohol use: No     Alcohol/week: 0.0 standard drinks         Objective:    Vitals: /70 (BP Location: Right arm, Patient Position: Sitting)   Pulse 74   Wt 96.2 kg (212 lb)   LMP 07/21/2019 (Approximate)   BMI 35.28 kg/m    BMI: Body mass index is 35.28 kg/m .  Height: Data Unavailable    Constitutional/ general:  Pt is in no apparent distress, appears well-nourished.  Cooperative with history and physical exam.     Psych:  The patient answered questions appropriately.  Normal affect.  Seems to have reasonable expectations, in terms of treatment.     Eyes:  Visual scanning/ tracking without deficit.     Ears:  Response to auditory stimuli is normal.  No hearing aid devices.  Auricles in proper alignment.     Lymphatic:  Popliteal lymph nodes not enlarged.     Lungs:  Non labored breathing, non labored speech. No cough.  No audible wheezing. Even, quiet breathing.       Vascular:  Pedal pulses  are palpable bilaterally for both the DP and PT arteries.  CFT < 3 sec.  No edema.  Pedal hair growth noted.     Neuro:  Alert and oriented x 3. Coordinated gait.  Light touch sensation is intact to the L4, L5, S1 distributions. No obvious deficits.  No evidence of neurological-based weakness, spasticity, or contracture in the lower extremities.     Derm: Normal texture and turgor.  No erythema, ecchymosis, or cyanosis.  No open lesions.     Musculoskeletal:    Lower extremity muscle strength is normal.  Patient is ambulatory without an assistive device or brace.  No gross deformities.      Normal arch with weightbearing.   ROM is within normal limits.  Negative tinel's sign.  No side to side compression pain of the calcaneus.  Pain upon palpation to the right plantarmedial  of the calcaneus.  No erythema, edema, ecchymosis, or subcutaneous masses noted.  No pain on palpation or stressing any tendons.  Negative Tinel's sign      Patient wearing not supportive shoes today      Assessment:  Plantar Fasciitis right foot     Plan: Discussed etiology and treatment options with the patient.  The potential causes and nature of plantar fasciitis were discussed with the patient.  We reviewed the natural history/prognosis of the condition and risks if left untreated.  These include chronic pain, other sites of pain due to gait changes, and potential plantar fascial rupture.      We discussed possible causes of the condition as it relates to the patients specific situation.      Conservative treatment options were reviewed:  appropriate shoes, avoidance of barefoot walking, inserts/orthoses, stretching, ice, massage, immobilization and NSAIDs.     We also reviewed the options of injection therapy and surgery.  However, it was made clear that surgery is only considered when conservative therapy fails.  The risks and benefits of injection therapy, and surgery were discussed.  Dispensed handout.       After thorough discussion  and answering all questions, the patient elected to modifying activities, supportive shoes, ice, stretching, and not going barefoot.  Discussed good shoes  at all times are the mainstay of treating this.  Good house shoes at all times and I made recommendations.  We will also get good outside shoes.  We discussed icing and stretching.  RTC return to clinic prn.  Thank you for allowing me participate in the care of this patient.    Pete Gonzalez, FLASH DPJUNIOR, FACFAS

## 2019-11-12 ENCOUNTER — PRENATAL OFFICE VISIT (OUTPATIENT)
Dept: OBGYN | Facility: CLINIC | Age: 39
End: 2019-11-12
Payer: COMMERCIAL

## 2019-11-12 ENCOUNTER — DOCUMENTATION ONLY (OUTPATIENT)
Dept: LAB | Facility: CLINIC | Age: 39
End: 2019-11-12

## 2019-11-12 VITALS
OXYGEN SATURATION: 100 % | BODY MASS INDEX: 36.12 KG/M2 | TEMPERATURE: 98.1 F | WEIGHT: 211.6 LBS | HEIGHT: 64 IN | DIASTOLIC BLOOD PRESSURE: 84 MMHG | SYSTOLIC BLOOD PRESSURE: 124 MMHG | HEART RATE: 86 BPM

## 2019-11-12 DIAGNOSIS — O09.529 SUPERVISION OF HIGH-RISK PREGNANCY OF ELDERLY MULTIGRAVIDA: Primary | ICD-10-CM

## 2019-11-12 PROCEDURE — 36415 COLL VENOUS BLD VENIPUNCTURE: CPT | Performed by: OBSTETRICS & GYNECOLOGY

## 2019-11-12 PROCEDURE — 99207 ZZC PRENATAL VISIT: CPT | Performed by: OBSTETRICS & GYNECOLOGY

## 2019-11-12 PROCEDURE — 99000 SPECIMEN HANDLING OFFICE-LAB: CPT | Performed by: OBSTETRICS & GYNECOLOGY

## 2019-11-12 PROCEDURE — 81511 FTL CGEN ABNOR FOUR ANAL: CPT | Mod: 90 | Performed by: OBSTETRICS & GYNECOLOGY

## 2019-11-12 ASSESSMENT — ANXIETY QUESTIONNAIRES
2. NOT BEING ABLE TO STOP OR CONTROL WORRYING: NOT AT ALL
3. WORRYING TOO MUCH ABOUT DIFFERENT THINGS: NOT AT ALL
IF YOU CHECKED OFF ANY PROBLEMS ON THIS QUESTIONNAIRE, HOW DIFFICULT HAVE THESE PROBLEMS MADE IT FOR YOU TO DO YOUR WORK, TAKE CARE OF THINGS AT HOME, OR GET ALONG WITH OTHER PEOPLE: NOT DIFFICULT AT ALL
1. FEELING NERVOUS, ANXIOUS, OR ON EDGE: NOT AT ALL
5. BEING SO RESTLESS THAT IT IS HARD TO SIT STILL: NOT AT ALL
GAD7 TOTAL SCORE: 0
7. FEELING AFRAID AS IF SOMETHING AWFUL MIGHT HAPPEN: NOT AT ALL
6. BECOMING EASILY ANNOYED OR IRRITABLE: NOT AT ALL

## 2019-11-12 ASSESSMENT — MIFFLIN-ST. JEOR: SCORE: 1614.81

## 2019-11-12 ASSESSMENT — PATIENT HEALTH QUESTIONNAIRE - PHQ9
SUM OF ALL RESPONSES TO PHQ QUESTIONS 1-9: 1
5. POOR APPETITE OR OVEREATING: NOT AT ALL

## 2019-11-12 NOTE — PROGRESS NOTES
17w5d  Feeling well.  Vaginitis has cleared up. Feet still hurt.  She saw the podiatrist and we reviewed recommended treatment for plantar fasciitis.   No FM yet.  Has MFM level II US scheduled.  Will get quad screen today. Patient will come back for early GCT next week.  Reviewed weight gain. RR

## 2019-11-12 NOTE — PROGRESS NOTES
Pt has appointment for 1 hour GTT, but 3 hour test is ordered.  Clarification as to which test is to be done is needed.  No one hour test has yet been completed.    Thanks,  [facility]

## 2019-11-13 LAB
# FETUSES US: NORMAL
# FETUSES: NORMAL
AFP ADJ MOM AMN: 0.9
AFP SERPL-MCNC: 32 NG/ML
AGE - REPORTED: 41.3 YR
CURRENT SMOKER: NO
CURRENT SMOKER: NO
DIABETES STATUS PATIENT: NO
FAMILY MEMBER DISEASES HX: NO
FAMILY MEMBER DISEASES HX: NO
GA METHOD: NORMAL
GA METHOD: NORMAL
GA: NORMAL WK
HCG MOM SERPL: 0.83
HCG SERPL-ACNC: NORMAL IU/L
HX OF HEREDITARY DISORDERS: NO
IDDM PATIENT QL: NO
INHIBIN A MOM SERPL: 1.41
INHIBIN A SERPL-MCNC: 178 PG/ML
INTEGRATED SCN PATIENT-IMP: NORMAL
IVF PREGNANCY: NO
LMP START DATE: NORMAL
MONOCHORIONIC TWINS: NO
PATHOLOGY STUDY: NORMAL
PREV FETUS DEFECT: NO
SERVICE CMNT-IMP: NO
SPECIMEN DRAWN SERPL: NORMAL
U ESTRIOL MOM SERPL: 0.64
U ESTRIOL SERPL-MCNC: 0.86 NG/ML
VALPROIC/CARBAMAZEPINE STATUS: NO
WEIGHT UNITS: NORMAL

## 2019-11-13 ASSESSMENT — ANXIETY QUESTIONNAIRES: GAD7 TOTAL SCORE: 0

## 2019-11-13 NOTE — PROGRESS NOTES
Thanks for the heads up. It should be the GCT one hour test. Can you pls change it for me? Thanks.

## 2019-11-20 ENCOUNTER — PRE VISIT (OUTPATIENT)
Dept: MATERNAL FETAL MEDICINE | Facility: CLINIC | Age: 39
End: 2019-11-20

## 2019-11-21 ENCOUNTER — HOSPITAL ENCOUNTER (OUTPATIENT)
Dept: ULTRASOUND IMAGING | Facility: CLINIC | Age: 39
Discharge: HOME | End: 2019-11-21
Attending: OBSTETRICS & GYNECOLOGY | Admitting: OBSTETRICS & GYNECOLOGY
Payer: COMMERCIAL

## 2019-11-21 ENCOUNTER — OFFICE VISIT (OUTPATIENT)
Dept: MATERNAL FETAL MEDICINE | Facility: CLINIC | Age: 39
End: 2019-11-21
Attending: OBSTETRICS & GYNECOLOGY
Payer: COMMERCIAL

## 2019-11-21 DIAGNOSIS — O26.90 PREGNANCY RELATED CONDITION, ANTEPARTUM: ICD-10-CM

## 2019-11-21 DIAGNOSIS — O09.522 SUPERVISION OF ELDERLY MULTIGRAVIDA IN SECOND TRIMESTER: Primary | ICD-10-CM

## 2019-11-21 DIAGNOSIS — O99.212 OBESITY AFFECTING PREGNANCY IN SECOND TRIMESTER: ICD-10-CM

## 2019-11-21 DIAGNOSIS — O28.1 ABNORMAL BIOCHEMICAL FINDING ON ANTENATAL SCREENING OF MOTHER: ICD-10-CM

## 2019-11-21 PROCEDURE — 76811 OB US DETAILED SNGL FETUS: CPT

## 2019-11-21 PROCEDURE — 96040 ZZH GENETIC COUNSELING, EACH 30 MINUTES: CPT | Mod: ZF | Performed by: GENETIC COUNSELOR, MS

## 2019-11-21 NOTE — PROGRESS NOTES
Please see the imaging tab for details of the ultrasound performed today.    Melanie Funez MD  Specialist in Maternal-Fetal Medicine

## 2019-11-21 NOTE — PROGRESS NOTES
[facility]  Genetic Counseling Consult    Patient:  Brit Romano YOB: 1980   Date of Service:  19      Brit Romano was seen at the [facility] for genetic consultation as part of her appointment for comprehensive ultrasound.  The indication for genetic counseling is advanced maternal age.       Impression/Plan:   1. Brit had a quad screen earlier in pregnancy, which reduced the risks for Down syndrome, trisomy 18, and open neural tube defects, but is still considered a positive or high risk screen because the post test risk for Down syndrome () is still above the lab threshold for a positive screen.      2. Brit had a comprehensive (level II) ultrasound today.  Please see the ultrasound report for further details.    Pregnancy History:   /Parity:    Age at Delivery: 40 year old  FRANCISCO JAVIER: 2020, by Ultrasound  Gestational Age: 19w0d    No significant complications or exposures were reported in the current pregnancy.    Brit s pregnancy history is significant for 8 prior full term pregnancies with no reported complications and 1 IUFD with no further information available.    Medical History:   Brit s reported medical history is not expected to impact pregnancy management or risks to fetal development.       Family History:   A three-generation pedigree was obtained, and is scanned under the  Media  tab.   The following significant findings were reported by Brit:    Brit reports that she and her  are distantly related.  She reports that her mother and her husbands father are cousins, but that the exact line of relationship is not clear.  They are more distantly related than first cousins.      Otherwise, the reported family history is negative for multiple miscarriages, stillbirths, birth defects, cognitive impairment, known genetic conditions, and consanguinity.       Carrier Screening:        We discussed that when parents are related to each other, even distantly, it can increase the probability that they are both carriers for the same genetic conditions.  We reviewed the nature and inheritance of autosomal recessive conditions and discussed that carrier screening would be one way to gather additional information regarding risks for the couple to have a child with a recessive condition.  The patient has declined the carrier screening options reviewed today.       Risk Assessment for Chromosome Conditions:   We explained that the risk for fetal chromosome abnormalities increases with maternal age. We discussed specific features of common chromosome abnormalities, including Down syndrome, trisomy 13, trisomy 18, and sex chromosome trisomies.      - At age 39 at midtrimester, the risk to have a baby with Down syndrome is 1 in 74.     - At age 39 at midtrimester, the risk to have a baby with any chromosome abnormality is 1 in 40.       Brit had maternal serum screening earlier in pregnancy.    Quad screen    Maternal plasma AFP, hCG, estriol, and inhibin measurement between 15-24 weeks gestation    Provides risk assessment for fetal Down syndrome, trisomy 18, and neural tube defects    Brit had a quad screen earlier in pregnancy; we reviewed the results today, which were Screen Positive    Chemical values were as follows    AFP 0.90 MoM, hCG 0.83 MoM, estriol 0.64 MoM, and YUE 1.41 MoM    This screen gave the following risk assessments:    Down syndrome risk= 1:115    Trisomy 18 risk= 1:561    Open neural tube defects risk= 1:10,000     We discussed that the quad screen is designed to sort pregnancies into high risk and low risk categories, and that it does have a risk for false positive results.  Approximately 1 in every 20 women who has a quad screen will get a high risk result when their pregnancy is not affected.  The purpose of the quad screen is to help identify pregnancies that warrant  additional testing, and the results of Brit's test indicates that she is at higher risk than her age related risk alone would suggest.  We discussed that a 1/115 risk corresponds to an <1% risk for her pregnancy to be affected with Down syndrome, and that this also means that based on the information currently available, we would say there is a >99% chance that her pregnancy does not have Down syndrome.  We discussed that definitive testing is available through amniocentesis, but that this procedure has a small risk for miscarriage associated with it.  We also discussed NIPT as an alternate screening test that has much lower false positive rates, higher detection rates, and is less impacted by maternal age.  Brit declines any additional testing beyond today's ultrasound but understands that additional testing is available if desired.          Testing Options:   We discussed the following options:   Non-invasive Prenatal Testing (NIPT)    Maternal plasma cell-free DNA testing; first trimester ultrasound with nuchal translucency and nasal bone assessment is recommended, when appropriate    Screens for fetal trisomy 21, trisomy 13, trisomy 18, and sex chromosome aneuploidy    Cannot screen for open neural tube defects; maternal serum AFP after 15 weeks is recommended       Quad screen:     Maternal plasma AFP, hCG, estriol, and inhibin measurement between 15-24 weeks gestation    Provides risk assessment for fetal Down syndrome, trisomy 18, and neural tube defects     Genetic Amniocentesis    Invasive procedure typically performed in the second trimester by which amniotic fluid is obtained for the purpose of chromosome analysis and/or other prenatal genetic analysis    Diagnostic results; >99% sensitivity for fetal chromosome abnormalities    AFAFP measurement tests for open neural tube defects       Comprehensive (Level II) ultrasound: Detailed ultrasound performed between 18-22 weeks gestation to screen for major  birth defects and markers for aneuploidy.        We reviewed the benefits and limitations of this testing.  Screening tests provide a risk assessment specific to the pregnancy for certain fetal chromosome abnormalities, but cannot definitively diagnose or exclude a fetal chromosome abnormality.  Follow-up genetic counseling and consideration of diagnostic testing is recommended with any abnormal screening result.     Diagnostic tests carry inherent risks- including risk of miscarriage- that require careful consideration.  These tests can detect fetal chromosome abnormalities with greater than 99% certainty.  Results can be compromised by maternal cell contamination or mosaicism, and are limited by the resolution of cytogenetic G-banding technology.  There is no screening nor diagnostic test that can detect all forms of birth defects or mental disability.    It was a pleasure to be involved with [facility] care. Face-to-face time of the meeting was 25 minutes.    Stephen Estrada MS, [facility]  Licensed Genetic Counselor  Phone: [phone]  Pager: [phone]

## 2019-12-13 ENCOUNTER — AMBULATORY - HEALTHEAST (OUTPATIENT)
Dept: MATERNAL FETAL MEDICINE | Facility: HOSPITAL | Age: 39
End: 2019-12-13

## 2019-12-13 DIAGNOSIS — O26.90 PREGNANCY, ANTEPARTUM, COMPLICATIONS: ICD-10-CM

## 2019-12-20 ENCOUNTER — AMBULATORY - HEALTHEAST (OUTPATIENT)
Dept: MATERNAL FETAL MEDICINE | Facility: HOSPITAL | Age: 39
End: 2019-12-20

## 2019-12-26 ENCOUNTER — OFFICE VISIT - HEALTHEAST (OUTPATIENT)
Dept: MATERNAL FETAL MEDICINE | Facility: HOSPITAL | Age: 39
End: 2019-12-26

## 2019-12-26 ENCOUNTER — RECORDS - HEALTHEAST (OUTPATIENT)
Dept: ADMINISTRATIVE | Facility: OTHER | Age: 39
End: 2019-12-26

## 2019-12-26 ENCOUNTER — RECORDS - HEALTHEAST (OUTPATIENT)
Dept: ULTRASOUND IMAGING | Facility: HOSPITAL | Age: 39
End: 2019-12-26

## 2019-12-26 DIAGNOSIS — O26.90 PREGNANCY RELATED CONDITIONS, UNSPECIFIED, UNSPECIFIED TRIMESTER: ICD-10-CM

## 2019-12-26 DIAGNOSIS — O09.522 MULTIGRAVIDA OF ADVANCED MATERNAL AGE IN SECOND TRIMESTER: ICD-10-CM

## 2020-01-03 DIAGNOSIS — O09.529 SUPERVISION OF HIGH-RISK PREGNANCY OF ELDERLY MULTIGRAVIDA: Primary | ICD-10-CM

## 2020-01-03 DIAGNOSIS — Z86.32 HISTORY OF GESTATIONAL DIABETES: ICD-10-CM

## 2020-01-03 LAB
GLUCOSE 1H P 100 G GLC PO SERPL-MCNC: 209 MG/DL (ref 60–179)
GLUCOSE 2H P 100 G GLC PO SERPL-MCNC: 154 MG/DL (ref 60–154)
GLUCOSE 3H P 100 G GLC PO SERPL-MCNC: 140 MG/DL (ref 60–139)
GLUCOSE P FAST SERPL-MCNC: 98 MG/DL (ref 60–94)

## 2020-01-03 PROCEDURE — 36415 COLL VENOUS BLD VENIPUNCTURE: CPT | Performed by: OBSTETRICS & GYNECOLOGY

## 2020-01-03 PROCEDURE — 82951 GLUCOSE TOLERANCE TEST (GTT): CPT | Performed by: OBSTETRICS & GYNECOLOGY

## 2020-01-03 PROCEDURE — 82952 GTT-ADDED SAMPLES: CPT | Performed by: OBSTETRICS & GYNECOLOGY

## 2020-01-08 ENCOUNTER — TELEPHONE (OUTPATIENT)
Dept: OBGYN | Facility: CLINIC | Age: 40
End: 2020-01-08

## 2020-01-08 DIAGNOSIS — O24.419 GDM (GESTATIONAL DIABETES MELLITUS): Primary | ICD-10-CM

## 2020-01-14 ENCOUNTER — PRENATAL OFFICE VISIT (OUTPATIENT)
Dept: OBGYN | Facility: CLINIC | Age: 40
End: 2020-01-14
Payer: COMMERCIAL

## 2020-01-14 VITALS
OXYGEN SATURATION: 98 % | WEIGHT: 217.4 LBS | HEIGHT: 64 IN | TEMPERATURE: 98.4 F | SYSTOLIC BLOOD PRESSURE: 109 MMHG | DIASTOLIC BLOOD PRESSURE: 74 MMHG | BODY MASS INDEX: 37.11 KG/M2 | HEART RATE: 88 BPM

## 2020-01-14 DIAGNOSIS — O24.410 DIET CONTROLLED GESTATIONAL DIABETES MELLITUS (GDM) IN SECOND TRIMESTER: Primary | ICD-10-CM

## 2020-01-14 DIAGNOSIS — O09.529 SUPERVISION OF HIGH-RISK PREGNANCY OF ELDERLY MULTIGRAVIDA: ICD-10-CM

## 2020-01-14 DIAGNOSIS — D50.8 IRON DEFICIENCY ANEMIA SECONDARY TO INADEQUATE DIETARY IRON INTAKE: ICD-10-CM

## 2020-01-14 DIAGNOSIS — E53.8 VITAMIN B12 DEFICIENCY (NON ANEMIC): ICD-10-CM

## 2020-01-14 DIAGNOSIS — E55.9 VITAMIN D DEFICIENCY: ICD-10-CM

## 2020-01-14 DIAGNOSIS — E87.6 HYPOKALEMIA: ICD-10-CM

## 2020-01-14 LAB
ERYTHROCYTE [DISTWIDTH] IN BLOOD BY AUTOMATED COUNT: 14.7 % (ref 10–15)
HBA1C MFR BLD: 5.9 % (ref 0–5.6)
HCT VFR BLD AUTO: 34.7 % (ref 35–47)
HGB BLD-MCNC: 11.7 G/DL (ref 11.7–15.7)
MCH RBC QN AUTO: 28.1 PG (ref 26.5–33)
MCHC RBC AUTO-ENTMCNC: 33.7 G/DL (ref 31.5–36.5)
MCV RBC AUTO: 83 FL (ref 78–100)
PLATELET # BLD AUTO: 195 10E9/L (ref 150–450)
RBC # BLD AUTO: 4.17 10E12/L (ref 3.8–5.2)
WBC # BLD AUTO: 9.6 10E9/L (ref 4–11)

## 2020-01-14 PROCEDURE — 82728 ASSAY OF FERRITIN: CPT | Performed by: OBSTETRICS & GYNECOLOGY

## 2020-01-14 PROCEDURE — 36415 COLL VENOUS BLD VENIPUNCTURE: CPT | Performed by: OBSTETRICS & GYNECOLOGY

## 2020-01-14 PROCEDURE — 82607 VITAMIN B-12: CPT | Performed by: OBSTETRICS & GYNECOLOGY

## 2020-01-14 PROCEDURE — 99207 ZZC PRENATAL VISIT: CPT | Performed by: OBSTETRICS & GYNECOLOGY

## 2020-01-14 PROCEDURE — 82947 ASSAY GLUCOSE BLOOD QUANT: CPT | Performed by: OBSTETRICS & GYNECOLOGY

## 2020-01-14 PROCEDURE — 83036 HEMOGLOBIN GLYCOSYLATED A1C: CPT | Performed by: OBSTETRICS & GYNECOLOGY

## 2020-01-14 PROCEDURE — 82306 VITAMIN D 25 HYDROXY: CPT | Performed by: OBSTETRICS & GYNECOLOGY

## 2020-01-14 PROCEDURE — 85027 COMPLETE CBC AUTOMATED: CPT | Performed by: OBSTETRICS & GYNECOLOGY

## 2020-01-14 PROCEDURE — 83540 ASSAY OF IRON: CPT | Performed by: OBSTETRICS & GYNECOLOGY

## 2020-01-14 PROCEDURE — 83550 IRON BINDING TEST: CPT | Performed by: OBSTETRICS & GYNECOLOGY

## 2020-01-14 ASSESSMENT — ANXIETY QUESTIONNAIRES
1. FEELING NERVOUS, ANXIOUS, OR ON EDGE: NOT AT ALL
3. WORRYING TOO MUCH ABOUT DIFFERENT THINGS: NOT AT ALL
7. FEELING AFRAID AS IF SOMETHING AWFUL MIGHT HAPPEN: NOT AT ALL
2. NOT BEING ABLE TO STOP OR CONTROL WORRYING: NOT AT ALL
IF YOU CHECKED OFF ANY PROBLEMS ON THIS QUESTIONNAIRE, HOW DIFFICULT HAVE THESE PROBLEMS MADE IT FOR YOU TO DO YOUR WORK, TAKE CARE OF THINGS AT HOME, OR GET ALONG WITH OTHER PEOPLE: NOT DIFFICULT AT ALL
5. BEING SO RESTLESS THAT IT IS HARD TO SIT STILL: NOT AT ALL
6. BECOMING EASILY ANNOYED OR IRRITABLE: NOT AT ALL
GAD7 TOTAL SCORE: 0

## 2020-01-14 ASSESSMENT — PATIENT HEALTH QUESTIONNAIRE - PHQ9
5. POOR APPETITE OR OVEREATING: NOT AT ALL
SUM OF ALL RESPONSES TO PHQ QUESTIONS 1-9: 2

## 2020-01-14 ASSESSMENT — MIFFLIN-ST. JEOR: SCORE: 1636.12

## 2020-01-14 NOTE — PROGRESS NOTES
25w5d  Feeling HA, tired and dizzy after she eats.  Feeling FM.   Failed 3/4 of her early GTT test. Can't get in with diabetic ed until 1/21.  Reviewed dietary changes in GDM and encouraged to try and follow diet until can get education.   Will check multiple labs today:  Orders Placed This Encounter   Procedures     Hemoglobin A1c     CBC with platelets     Ferritin     Iron and iron binding capacity     Vitamin D Deficiency     Vitamin B12     Glucose   Grace Badillo MD

## 2020-01-15 LAB
DEPRECATED CALCIDIOL+CALCIFEROL SERPL-MC: 18 UG/L (ref 20–75)
FERRITIN SERPL-MCNC: 15 NG/ML (ref 12–150)
GLUCOSE SERPL-MCNC: 88 MG/DL (ref 70–99)
IRON SATN MFR SERPL: 16 % (ref 15–46)
IRON SERPL-MCNC: 81 UG/DL (ref 35–180)
TIBC SERPL-MCNC: 520 UG/DL (ref 240–430)
VIT B12 SERPL-MCNC: 127 PG/ML (ref 193–986)

## 2020-01-15 ASSESSMENT — ANXIETY QUESTIONNAIRES: GAD7 TOTAL SCORE: 0

## 2020-01-16 RX ORDER — CHOLECALCIFEROL (VITAMIN D3) 125 MCG
1 CAPSULE ORAL DAILY
Qty: 100 CAPSULE | Refills: 3 | Status: SHIPPED | OUTPATIENT
Start: 2020-01-16 | End: 2020-04-25

## 2020-01-21 ENCOUNTER — HOSPITAL ENCOUNTER (OUTPATIENT)
Facility: CLINIC | Age: 40
End: 2020-01-21
Payer: COMMERCIAL

## 2020-01-23 ENCOUNTER — APPOINTMENT (OUTPATIENT)
Dept: GENERAL RADIOLOGY | Facility: CLINIC | Age: 40
End: 2020-01-23
Attending: FAMILY MEDICINE
Payer: COMMERCIAL

## 2020-01-23 ENCOUNTER — HOSPITAL ENCOUNTER (EMERGENCY)
Facility: CLINIC | Age: 40
Discharge: HOME | End: 2020-01-23
Attending: FAMILY MEDICINE | Admitting: FAMILY MEDICINE
Payer: COMMERCIAL

## 2020-01-23 ENCOUNTER — PRENATAL OFFICE VISIT (OUTPATIENT)
Dept: OBGYN | Facility: CLINIC | Age: 40
End: 2020-01-23
Payer: COMMERCIAL

## 2020-01-23 VITALS
HEART RATE: 116 BPM | HEIGHT: 64 IN | DIASTOLIC BLOOD PRESSURE: 73 MMHG | SYSTOLIC BLOOD PRESSURE: 107 MMHG | OXYGEN SATURATION: 99 % | BODY MASS INDEX: 36.29 KG/M2 | TEMPERATURE: 98.4 F | WEIGHT: 212.6 LBS

## 2020-01-23 VITALS
HEART RATE: 122 BPM | SYSTOLIC BLOOD PRESSURE: 122 MMHG | RESPIRATION RATE: 16 BRPM | OXYGEN SATURATION: 99 % | BODY MASS INDEX: 36.56 KG/M2 | TEMPERATURE: 97.4 F | DIASTOLIC BLOOD PRESSURE: 72 MMHG | WEIGHT: 213 LBS

## 2020-01-23 DIAGNOSIS — O24.419 GESTATIONAL DIABETES MELLITUS (GDM) IN SECOND TRIMESTER, GESTATIONAL DIABETES METHOD OF CONTROL UNSPECIFIED: ICD-10-CM

## 2020-01-23 DIAGNOSIS — J10.1 INFLUENZA A: ICD-10-CM

## 2020-01-23 DIAGNOSIS — E53.8 VITAMIN B12 DEFICIENCY (NON ANEMIC): ICD-10-CM

## 2020-01-23 DIAGNOSIS — O09.529 SUPERVISION OF HIGH-RISK PREGNANCY OF ELDERLY MULTIGRAVIDA: Primary | ICD-10-CM

## 2020-01-23 DIAGNOSIS — J11.1 INFLUENZA: ICD-10-CM

## 2020-01-23 DIAGNOSIS — Z34.93 THIRD TRIMESTER PREGNANCY: ICD-10-CM

## 2020-01-23 LAB
ALBUMIN SERPL-MCNC: 2.6 G/DL (ref 3.4–5)
ALP SERPL-CCNC: 57 U/L (ref 40–150)
ALT SERPL W P-5'-P-CCNC: 17 U/L (ref 0–50)
ANION GAP SERPL CALCULATED.3IONS-SCNC: 10 MMOL/L (ref 3–14)
AST SERPL W P-5'-P-CCNC: 17 U/L (ref 0–45)
BASOPHILS # BLD AUTO: 0 10E9/L (ref 0–0.2)
BASOPHILS NFR BLD AUTO: 0.2 %
BILIRUB SERPL-MCNC: 0.1 MG/DL (ref 0.2–1.3)
BUN SERPL-MCNC: 2 MG/DL (ref 7–30)
CALCIUM SERPL-MCNC: 8.6 MG/DL (ref 8.5–10.1)
CAPILLARY BLOOD COLLECTION: NORMAL
CHLORIDE SERPL-SCNC: 105 MMOL/L (ref 94–109)
CO2 SERPL-SCNC: 22 MMOL/L (ref 20–32)
CREAT SERPL-MCNC: 0.26 MG/DL (ref 0.52–1.04)
DIFFERENTIAL METHOD BLD: ABNORMAL
EOSINOPHIL # BLD AUTO: 0.1 10E9/L (ref 0–0.7)
EOSINOPHIL NFR BLD AUTO: 0.8 %
ERYTHROCYTE [DISTWIDTH] IN BLOOD BY AUTOMATED COUNT: 13.7 % (ref 10–15)
FLUAV+FLUBV AG SPEC QL: NEGATIVE
FLUAV+FLUBV AG SPEC QL: POSITIVE
GFR SERPL CREATININE-BSD FRML MDRD: >90 ML/MIN/{1.73_M2}
GLUCOSE BLD-MCNC: 128 MG/DL (ref 70–99)
GLUCOSE SERPL-MCNC: 156 MG/DL (ref 70–99)
HCT VFR BLD AUTO: 34.1 % (ref 35–47)
HGB BLD-MCNC: 11.4 G/DL (ref 11.7–15.7)
IMM GRANULOCYTES # BLD: 0.1 10E9/L (ref 0–0.4)
IMM GRANULOCYTES NFR BLD: 1.2 %
LYMPHOCYTES # BLD AUTO: 1.2 10E9/L (ref 0.8–5.3)
LYMPHOCYTES NFR BLD AUTO: 18.4 %
MCH RBC QN AUTO: 28.6 PG (ref 26.5–33)
MCHC RBC AUTO-ENTMCNC: 33.4 G/DL (ref 31.5–36.5)
MCV RBC AUTO: 86 FL (ref 78–100)
MONOCYTES # BLD AUTO: 0.3 10E9/L (ref 0–1.3)
MONOCYTES NFR BLD AUTO: 4.7 %
NEUTROPHILS # BLD AUTO: 4.9 10E9/L (ref 1.6–8.3)
NEUTROPHILS NFR BLD AUTO: 74.7 %
NRBC # BLD AUTO: 0 10*3/UL
NRBC BLD AUTO-RTO: 0 /100
PLATELET # BLD AUTO: 133 10E9/L (ref 150–450)
POTASSIUM SERPL-SCNC: 2.8 MMOL/L (ref 3.4–5.3)
PROT SERPL-MCNC: 6.7 G/DL (ref 6.8–8.8)
RBC # BLD AUTO: 3.99 10E12/L (ref 3.8–5.2)
SODIUM SERPL-SCNC: 137 MMOL/L (ref 133–144)
SPECIMEN SOURCE: ABNORMAL
WBC # BLD AUTO: 6.6 10E9/L (ref 4–11)

## 2020-01-23 PROCEDURE — 99284 EMERGENCY DEPT VISIT MOD MDM: CPT | Mod: Z6 | Performed by: FAMILY MEDICINE

## 2020-01-23 PROCEDURE — 87804 INFLUENZA ASSAY W/OPTIC: CPT | Performed by: OBSTETRICS & GYNECOLOGY

## 2020-01-23 PROCEDURE — 25800030 ZZH RX IP 258 OP 636: Performed by: FAMILY MEDICINE

## 2020-01-23 PROCEDURE — 25000132 ZZH RX MED GY IP 250 OP 250 PS 637: Performed by: FAMILY MEDICINE

## 2020-01-23 PROCEDURE — 36416 COLLJ CAPILLARY BLOOD SPEC: CPT | Performed by: OBSTETRICS & GYNECOLOGY

## 2020-01-23 PROCEDURE — 25000128 H RX IP 250 OP 636: Performed by: FAMILY MEDICINE

## 2020-01-23 PROCEDURE — 96365 THER/PROPH/DIAG IV INF INIT: CPT | Performed by: FAMILY MEDICINE

## 2020-01-23 PROCEDURE — 71046 X-RAY EXAM CHEST 2 VIEWS: CPT

## 2020-01-23 PROCEDURE — 85025 COMPLETE CBC W/AUTO DIFF WBC: CPT | Performed by: FAMILY MEDICINE

## 2020-01-23 PROCEDURE — 99284 EMERGENCY DEPT VISIT MOD MDM: CPT | Mod: 25 | Performed by: FAMILY MEDICINE

## 2020-01-23 PROCEDURE — 82947 ASSAY GLUCOSE BLOOD QUANT: CPT | Performed by: OBSTETRICS & GYNECOLOGY

## 2020-01-23 PROCEDURE — 80053 COMPREHEN METABOLIC PANEL: CPT | Performed by: FAMILY MEDICINE

## 2020-01-23 PROCEDURE — 99213 OFFICE O/P EST LOW 20 MIN: CPT | Performed by: OBSTETRICS & GYNECOLOGY

## 2020-01-23 RX ORDER — POTASSIUM CHLORIDE 1.5 G/1.58G
20 POWDER, FOR SOLUTION ORAL ONCE
Status: COMPLETED | OUTPATIENT
Start: 2020-01-23 | End: 2020-01-23

## 2020-01-23 RX ORDER — SODIUM CHLORIDE 9 MG/ML
1000 INJECTION, SOLUTION INTRAVENOUS CONTINUOUS
Status: DISCONTINUED | OUTPATIENT
Start: 2020-01-23 | End: 2020-01-23 | Stop reason: HOSPADM

## 2020-01-23 RX ORDER — LANOLIN ALCOHOL/MO/W.PET/CERES
1000 CREAM (GRAM) TOPICAL DAILY
Qty: 100 TABLET | Refills: 3 | Status: SHIPPED | OUTPATIENT
Start: 2020-01-23 | End: 2020-03-03

## 2020-01-23 RX ORDER — OSELTAMIVIR PHOSPHATE 75 MG/1
75 CAPSULE ORAL 2 TIMES DAILY
Qty: 10 CAPSULE | Refills: 0 | Status: SHIPPED | OUTPATIENT
Start: 2020-01-23 | End: 2020-02-04

## 2020-01-23 RX ORDER — POTASSIUM CL/LIDO/0.9 % NACL 10MEQ/0.1L
10 INTRAVENOUS SOLUTION, PIGGYBACK (ML) INTRAVENOUS ONCE
Status: COMPLETED | OUTPATIENT
Start: 2020-01-23 | End: 2020-01-23

## 2020-01-23 RX ADMIN — SODIUM CHLORIDE 1000 ML: 9 INJECTION, SOLUTION INTRAVENOUS at 18:55

## 2020-01-23 RX ADMIN — POTASSIUM CHLORIDE 20 MEQ: 1.5 POWDER, FOR SOLUTION ORAL at 19:14

## 2020-01-23 RX ADMIN — Medication 10 MEQ: at 19:14

## 2020-01-23 RX ADMIN — POTASSIUM CHLORIDE 20 MEQ: 1.5 POWDER, FOR SOLUTION ORAL at 20:19

## 2020-01-23 ASSESSMENT — MIFFLIN-ST. JEOR: SCORE: 1614.35

## 2020-01-23 NOTE — ED PROVIDER NOTES
"    [facility] EMERGENCY DEPARTMENT ([facility])    20      History     Chief Complaint   Patient presents with     Chest Wall Pain     AO weeks, Dx: Gestational DM. per pt she has been sick x 1 week, feels weak, coughing, \"heart beating fast\" Went to PMD today was told \" I have influenza A.\" PMD sent pt to ED for \"CXR to R/O Pneumonia, get IV fluids\"     HPI  Brit Romano is a 40 year old female who is  with a past medical history of fibromyalgia and gestational diabetes who presents to the Emergency Department for evaluation of chest wall pain.  Per chart review, the patient was seen at [facility] ER yesterday (2020) for evaluation of fevers, myalgias, cough, generalized weakness, diaphoresis, dizziness, and lightheadedness for the past week.  Patient was not worked up with influenza swab yesterday as they reported she was out of the window for Tamiflu.  Patient did give a UA did not show any sign of infection.  Patient was noted to have quite a bit of glucose in her urine, which was consistent with her gestational diabetes.  The treatment team ruled out UTI, pyelonephritis, retropharyngeal abscess, or strep pharyngitis.  Patient was instructed to follow-up with OB and was given return precautions to the ER.  Patient then saw [facility] OB/[facility]N today, which documented her to be 27 weeks pregnant.  Patient did not undergo a swab influenza a test which proved to be positive.  Patient was then started on cyanocobalamin 1000 MCG for vitamin B12 deficiency.    Patient was then sent from her OB/GYN visit to the [facility] for evaluation of acute onset chest wall pain.  Patient states that she has been increasingly tachycardic today with bilateral lower chest wall pain.  Patient states that this pain resides \"below the breasts\" and worsens with cough, and deep breath.  Patient states she has also been fairly short of breath for the " last 5 days.  Patient does endorse a fever, and cough that is productive with yellow sputum.    Past Medical History:   Diagnosis Date     Fatty liver      Fibromyalgia      History of gestational diabetes      Seasonal allergies        Past Surgical History:   Procedure Laterality Date     NO HISTORY OF SURGERY         Family History   Problem Relation Age of Onset     Hypertension Mother      Diabetes Mother      Kidney Disease Mother         dialysis     Glaucoma Mother      Diabetes Son      Macular Degeneration No family hx of        Social History     Tobacco Use     Smoking status: Never Smoker     Smokeless tobacco: Never Used   Substance Use Topics     Alcohol use: No     Alcohol/week: 0.0 standard drinks       Current Facility-Administered Medications   Medication     sodium chloride 0.9% infusion     Current Outpatient Medications   Medication     acetaminophen (TYLENOL) 325 MG tablet     cetirizine (ZYRTEC) 10 MG tablet     Cholecalciferol (VITAMIN D) 125 MCG (5000 UT) CAPS     cyanocobalamin (VITAMIN B-12) 1000 MCG tablet     ferrous sulfate (FEROSUL) 325 (65 Fe) MG tablet     fluticasone (FLONASE) 50 MCG/ACT nasal spray     Nutritional Supplements (COLD AND FLU PO)     omeprazole (PRILOSEC) 40 MG DR capsule     oseltamivir (TAMIFLU) 75 MG capsule     Prenatal Vit-Fe Fumarate-FA (PRENATAL VITAMIN PLUS LOW IRON) 27-1 MG TABS     SENNA-docusate sodium (SENNA S) 8.6-50 MG tablet      No Known Allergies    I have reviewed the Medications, Allergies, Past Medical and Surgical History, and Social History in the Epic system.    Review of Systems  ROS: 14 point ROS neg other than the symptoms noted above in the HPI.  Physical Exam   BP: 122/72  Pulse: 122  Temp: 97.4  F (36.3  C)  Resp: 16  Weight: 96.6 kg (213 lb)  SpO2: 99 %      Physical Exam  Constitutional:       General: She is not in acute distress.     Appearance: She is not diaphoretic.   HENT:      Head: Atraumatic.      Mouth/Throat:      Pharynx: No  oropharyngeal exudate.   Eyes:      General: No scleral icterus.     Pupils: Pupils are equal, round, and reactive to light.   Cardiovascular:      Rate and Rhythm: Regular rhythm. Tachycardia present.      Heart sounds: Normal heart sounds.   Pulmonary:      Effort: No respiratory distress.      Breath sounds: Normal breath sounds.   Abdominal:      General: Bowel sounds are normal.      Palpations: Abdomen is soft.      Tenderness: There is no abdominal tenderness.   Musculoskeletal:         General: No tenderness.   Skin:     General: Skin is warm.      Findings: No rash.         ED Course     Medications   0.9% sodium chloride BOLUS (0 mLs Intravenous Stopped 1/23/20 2014)     Followed by   sodium chloride 0.9% infusion (has no administration in time range)   potassium chloride 10 mEq in 100 mL intermittent infusion with 10 mg lidocaine (0 mEq Intravenous Stopped 1/23/20 2023)   potassium chloride (KLOR-CON) Packet 20 mEq (20 mEq Oral Given 1/23/20 1914)   potassium chloride (KLOR-CON) Packet 20 mEq (20 mEq Oral Given 1/23/20 2019)        Procedures             Critical Care time:  none             Labs Ordered and Resulted from Time of ED Arrival Up to the Time of Departure from the ED   CBC WITH PLATELETS DIFFERENTIAL - Abnormal; Notable for the following components:       Result Value    Hemoglobin 11.4 (*)     Hematocrit 34.1 (*)     Platelet Count 133 (*)     All other components within normal limits   COMPREHENSIVE METABOLIC PANEL - Abnormal; Notable for the following components:    Potassium 2.8 (*)     Glucose 156 (*)     Urea Nitrogen 2 (*)     Creatinine 0.26 (*)     Bilirubin Total 0.1 (*)     Albumin 2.6 (*)     Protein Total 6.7 (*)     All other components within normal limits     Results for orders placed or performed during the hospital encounter of 01/23/20 (from the past 24 hour(s))   CBC with platelets differential   Result Value Ref Range    WBC 6.6 4.0 - 11.0 10e9/L    RBC Count 3.99 3.8 -  5.2 10e12/L    Hemoglobin 11.4 (L) 11.7 - 15.7 g/dL    Hematocrit 34.1 (L) 35.0 - 47.0 %    MCV 86 78 - 100 fl    MCH 28.6 26.5 - 33.0 pg    MCHC 33.4 31.5 - 36.5 g/dL    RDW 13.7 10.0 - 15.0 %    Platelet Count 133 (L) 150 - 450 10e9/L    Diff Method Automated Method     % Neutrophils 74.7 %    % Lymphocytes 18.4 %    % Monocytes 4.7 %    % Eosinophils 0.8 %    % Basophils 0.2 %    % Immature Granulocytes 1.2 %    Nucleated RBCs 0 0 /100    Absolute Neutrophil 4.9 1.6 - 8.3 10e9/L    Absolute Lymphocytes 1.2 0.8 - 5.3 10e9/L    Absolute Monocytes 0.3 0.0 - 1.3 10e9/L    Absolute Eosinophils 0.1 0.0 - 0.7 10e9/L    Absolute Basophils 0.0 0.0 - 0.2 10e9/L    Abs Immature Granulocytes 0.1 0 - 0.4 10e9/L    Absolute Nucleated RBC 0.0    Comprehensive metabolic panel   Result Value Ref Range    Sodium 137 133 - 144 mmol/L    Potassium 2.8 (L) 3.4 - 5.3 mmol/L    Chloride 105 94 - 109 mmol/L    Carbon Dioxide 22 20 - 32 mmol/L    Anion Gap 10 3 - 14 mmol/L    Glucose 156 (H) 70 - 99 mg/dL    Urea Nitrogen 2 (L) 7 - 30 mg/dL    Creatinine 0.26 (L) 0.52 - 1.04 mg/dL    GFR Estimate >90 >60 mL/min/[1.73_m2]    GFR Estimate If Black >90 >60 mL/min/[1.73_m2]    Calcium 8.6 8.5 - 10.1 mg/dL    Bilirubin Total 0.1 (L) 0.2 - 1.3 mg/dL    Albumin 2.6 (L) 3.4 - 5.0 g/dL    Protein Total 6.7 (L) 6.8 - 8.8 g/dL    Alkaline Phosphatase 57 40 - 150 U/L    ALT 17 0 - 50 U/L    AST 17 0 - 45 U/L   XR Chest 2 Views    Narrative    CHEST TWO VIEWS 1/23/2020 6:48 PM     HISTORY: Cough, dyspnea.    COMPARISON: None.    FINDINGS: Heart size and pulmonary vascularity are within normal  limits. The lungs are clear. No pneumothorax or pleural effusion.       Impression    IMPRESSION: No radiographic evidence of acute chest abnormality.      DOUGLAS RAMIREZ MD          Assessments & Plan (with Medical Decision Making)   40-year-old woman who is 27 weeks pregnant.  She was seen in her OB/GYN clinic today where her obstetric check was  unremarkable but is referred to the ED due to cough, tachycardia, and feeling dehydrated.  Her rapid influenza swab is positive.  Clinical symptoms fit closely and this is her fifth day of symptoms.  Today she is not hypoxic and her tachycardia and nausea improved with IV fluids.  X-ray shows no infiltrate.  Her potassium was replaced with both oral and intravenous means.  Patient reports she is feeling much better and would like to go home.  Based on the clinical findings and the entire clinical scenario, the patient appears stable at this time to be treated symptomatically, and to follow-up as an outpatient for any further evaluation and treatment.  Discussed expected course, need for follow up, and indications for return with the patient.  See discharge instructions.      I have reviewed the nursing notes.    I have reviewed the findings, diagnosis, plan and need for follow up with the patient.    New Prescriptions    OSELTAMIVIR (TAMIFLU) 75 MG CAPSULE    Take 1 capsule (75 mg) by mouth 2 times daily for 5 days       Final diagnoses:   Influenza A   Third trimester pregnancy   ICosmo, am serving as a trained medical scribe to document services personally performed by Garret Ahuja MD, based on the provider's statements to me.      Garret ALDANA MD, was physically present and have reviewed and verified the accuracy of this note documented by Cosmo Ramirez.     1/23/2020   [facility], [place], EMERGENCY DEPARTMENT     Garret Ahuja MD  01/23/20 2031

## 2020-01-23 NOTE — ED AVS SNAPSHOT
[facility], [place], Emergency Department  [address]  [facility] [address]  Phone:  [phone]  Fax:  [phone]                                    Brit Romano   MRN: [id number]    Department:  [facility], Emergency Department   Date of Visit:  1/23/2020           After Visit Summary Signature Page    I have received my discharge instructions, and my questions have been answered. I have discussed any challenges I see with this plan with the nurse or doctor.    ..........................................................................................................................................  Patient/Patient Representative Signature      ..........................................................................................................................................  Patient Representative Print Name and Relationship to Patient    ..................................................               ................................................  Date                                   Time    ..........................................................................................................................................  Reviewed by Signature/Title    ...................................................              ..............................................  Date                                               Time          22EPIC Rev 08/18

## 2020-01-23 NOTE — PROGRESS NOTES
"27w0d  Patient complains of feeling weak, sweaty, dizzy.  Had a fever for 5 days, cough, chest pain, SOB.    My heart is beating fast.  Not eating.  Feeling very thirsty. Missed the diabetic ed appt d/t feeling sick.  Normal FM.   Patient deficient in vitamin D and B12.  Rx for supplements given.   Vitals:    01/23/20 1323   BP: 107/73   BP Location: Left arm   Patient Position: Sitting   Cuff Size: Adult Large   Pulse: 116   Temp: 98.4  F (36.9  C)   TempSrc: Oral   SpO2: 99%   Weight: 96.4 kg (212 lb 9.6 oz)   Height: 1.626 m (5' 4\")    Lungs with normal breath sounds bilaterally.   RRR  Nasal swab + influenza A  A:  Influenza in pregnancy at 27 wks with persistent symptoms x 5-7 days.  No improvement and now has chest pain and tachycardia, weight loss  P: discussed patient with ED team at [facility].  Patient will go to ED for further assessment, hydration, possible CXR.   Grace Badillo MD     "

## 2020-01-24 NOTE — DISCHARGE INSTRUCTIONS
Thank you for choosing [facility].     Please closely monitor for further symptoms. Return to the Emergency Department if you develop any new or worsening signs or symptoms.    If you received any opiate pain medications or sedatives during your visit, please do not drive for at least 8 hours.     Labs, cultures or final xray interpretations may still need to be reviewed.  We will call you if your plan of care needs to be changed.    Please follow up with your OB/GYN next week for recheck.

## 2020-02-04 ENCOUNTER — PRENATAL OFFICE VISIT (OUTPATIENT)
Dept: OBGYN | Facility: CLINIC | Age: 40
End: 2020-02-04
Payer: COMMERCIAL

## 2020-02-04 VITALS
SYSTOLIC BLOOD PRESSURE: 110 MMHG | BODY MASS INDEX: 37.52 KG/M2 | TEMPERATURE: 98.2 F | HEIGHT: 64 IN | DIASTOLIC BLOOD PRESSURE: 75 MMHG | HEART RATE: 93 BPM | WEIGHT: 219.8 LBS

## 2020-02-04 DIAGNOSIS — E87.6 HYPOKALEMIA: ICD-10-CM

## 2020-02-04 DIAGNOSIS — B37.31 YEAST INFECTION OF THE VAGINA: ICD-10-CM

## 2020-02-04 DIAGNOSIS — O09.529 SUPERVISION OF HIGH-RISK PREGNANCY OF ELDERLY MULTIGRAVIDA: Primary | ICD-10-CM

## 2020-02-04 DIAGNOSIS — O24.419 GESTATIONAL DIABETES MELLITUS (GDM) IN SECOND TRIMESTER, GESTATIONAL DIABETES METHOD OF CONTROL UNSPECIFIED: ICD-10-CM

## 2020-02-04 LAB
HBA1C MFR BLD: 6.2 % (ref 0–5.6)
SPECIMEN SOURCE: ABNORMAL
WET PREP SPEC: ABNORMAL

## 2020-02-04 PROCEDURE — 99213 OFFICE O/P EST LOW 20 MIN: CPT | Performed by: OBSTETRICS & GYNECOLOGY

## 2020-02-04 PROCEDURE — 84132 ASSAY OF SERUM POTASSIUM: CPT | Performed by: OBSTETRICS & GYNECOLOGY

## 2020-02-04 PROCEDURE — 36415 COLL VENOUS BLD VENIPUNCTURE: CPT | Performed by: OBSTETRICS & GYNECOLOGY

## 2020-02-04 PROCEDURE — 83036 HEMOGLOBIN GLYCOSYLATED A1C: CPT | Performed by: OBSTETRICS & GYNECOLOGY

## 2020-02-04 PROCEDURE — 87210 SMEAR WET MOUNT SALINE/INK: CPT | Performed by: OBSTETRICS & GYNECOLOGY

## 2020-02-04 ASSESSMENT — MIFFLIN-ST. JEOR: SCORE: 1647.01

## 2020-02-04 NOTE — PROGRESS NOTES
28w5d  Normal FM.  Has not been checking BG levels.  Has GDM appt later this week.    Patient complains of ongoing cough and wheezing, worse at night. Was diagnosed with influenza A at last visit 2 wks ago.  Went to ED and noted to have hypokalemia.  CXR was fine.   She did not take her tamiflu.  Also complains of back pain and vaginal discharge.  Thinks it is a yeast infection.   On exam today:   Pelvic:  EG - normal adult female.  BUS - within normal limits.  Vagina - well rugated, + discharge.  Cervix - closed  Wet prep + yeast.  A: vaginal yeast infection   P: rx sent for miconizole vaginal suppositories.   Patient to follow up with primary care for her ongoing cough and wheezing.  Plan to do tdap after cold symptoms resolve. RR

## 2020-02-05 LAB — POTASSIUM SERPL-SCNC: 3 MMOL/L (ref 3.4–5.3)

## 2020-02-06 ENCOUNTER — ALLIED HEALTH/NURSE VISIT (OUTPATIENT)
Dept: EDUCATION SERVICES | Facility: CLINIC | Age: 40
End: 2020-02-06
Payer: COMMERCIAL

## 2020-02-06 VITALS — WEIGHT: 220 LBS | BODY MASS INDEX: 37.76 KG/M2

## 2020-02-06 DIAGNOSIS — O99.810 ABNORMAL MATERNAL GLUCOSE TOLERANCE, ANTEPARTUM: Primary | ICD-10-CM

## 2020-02-06 DIAGNOSIS — O24.419 GDM (GESTATIONAL DIABETES MELLITUS): ICD-10-CM

## 2020-02-06 PROCEDURE — G0108 DIAB MANAGE TRN  PER INDIV: HCPCS

## 2020-02-06 RX ORDER — POTASSIUM CHLORIDE 750 MG/1
10 TABLET, EXTENDED RELEASE ORAL DAILY
Qty: 30 TABLET | Refills: 3 | Status: ON HOLD | OUTPATIENT
Start: 2020-02-06 | End: 2020-04-06

## 2020-02-06 RX ORDER — POTASSIUM CHLORIDE 1500 MG/1
20 TABLET, EXTENDED RELEASE ORAL 2 TIMES DAILY
OUTPATIENT
Start: 2020-02-06 | End: 2024-08-08

## 2020-02-06 NOTE — PATIENT INSTRUCTIONS
1. Check blood sugar 4 times a day, before breakfast and 1 hour after the start of each meal.     2. Check urine ketones when you wake up every morning for 7 days. If negative everyday, reduce testing to once a week.    3. Follow the recommended meal plan: eat something every 2-3 hours, include protein/fat and carbohydrate at every meal and snack, have 2-3 carbs at breakfast, 3-4 carbs at lunch, 3-4 carbs at supper, 1-2 carbs at 3 snacks per day.     4. Add activity to every day, try walking or being active after each meal to help control blood sugar levels.    5.Call in with blood sugar results tomorrow after breakfast. Call with any other questions or concerns.    Chrystal Lemus, MPH, RD, LD, CDE   [phone]    [facility] for the [facility]:  For Your Diabetes Education or Nutrition Appointments Call:  [phone]   For Diabetes Education and Nutrition Related Questions:   Phone: [phone]  E-mail: [email or web address]  Fax: [phone]   If you need a medication refill please contact your pharmacy. Please allow 3 business days for your refills to be completed.

## 2020-02-10 ENCOUNTER — PATIENT OUTREACH (OUTPATIENT)
Dept: EDUCATION SERVICES | Facility: CLINIC | Age: 40
End: 2020-02-10
Payer: COMMERCIAL

## 2020-02-10 DIAGNOSIS — O24.419 GDM (GESTATIONAL DIABETES MELLITUS): Primary | ICD-10-CM

## 2020-02-10 DIAGNOSIS — O24.414 INSULIN CONTROLLED GESTATIONAL DIABETES MELLITUS (GDM) DURING PREGNANCY, ANTEPARTUM: ICD-10-CM

## 2020-02-10 NOTE — PROGRESS NOTES
GDM BG report    * means 1.5 to 2+ hours    Date BB AB* AL Valerie  6  160 140 160  7 125 203 213 163  8 118 168 166 179  9 108 202 157 151  10 125

## 2020-02-10 NOTE — PROGRESS NOTES
Gestational Diabetes Follow-up    Subjective/Objective:    Brit Romano sent in blood glucose log for review. Last date of communication was: 2/6/20.      Gestational diabetes is being managed with diet and activity    Taking diabetes medications: no    Estimated Date of Delivery: Apr 23, 2020    BG/Food Log:   See below    Assessment:  Blood sugars are significantly elevated recommend starting insulin.  Reviewed past pregnancy insulin notes and appeared patient had itching with NPH and was switched to Levemir.  Recommend starting with Levemir vs. NPH. Patient additionally required Novolog insulin for meals, given some post meal readings are in the 200s would also recommend starting mealtime insulin.      Wt Readings from Last 3 Encounters:   02/06/20 99.8 kg (220 lb)   02/04/20 99.7 kg (219 lb 12.8 oz)   01/23/20 96.6 kg (213 lb)     Recommend starting with 0.2 unit(s)/kg for bedtime dose to address significantly high fasting readings, and adding in 3 units mealtime insulin with each meal.  Appears insurance only covers Levemir vials vs. Pens.    CDE call out to patient: Discussed blood sugars and need to use a similar insulin plan as last pregnancy.  Patient reports only using pens.  Discussed 2 injection forms, offered education tomorrow, patient declined and would prefer sticking with pens.  Informed patient appears we would need to submit a PA if not covered.  Other long acting insulins are not approved in pregnancy.       Ketones: none reported.   Fasting blood glucoses: 0% in target.  After breakfast: 0% in target.  After lunch: 0% in target.  After dinner: 0% in target.    Plan/Response:  Recommend starting 0-0-0-20 Levemir insulin,  Pen order pended, anticipate PA needed.  Recommend starting 3-3-3-0 Novolog insulin before meals, appears to be covered.     Insulin orders routed to OBGYN provider for approval.     Follow up with CDE 2/13 as planned for further education and titration.      Coleen Chambers MS,  RD, LD, CDE      Any diabetes medication dose changes were made via the CDE Protocol and Collaborative Practice Agreement with the patient's OB/GYN provider. A copy of this encounter was shared with the provider.    Please see CDE progress note for continuous glucose monitoring (CGM) reports, assessment and recommendations.

## 2020-02-10 NOTE — TELEPHONE ENCOUNTER
Hi Dr Badillo, please see pended insulin order for your approval and signature.    Thanks!  Chrystal

## 2020-02-11 RX ORDER — PEN NEEDLE, DIABETIC 30 GX5/16"
1 NEEDLE, DISPOSABLE MISCELLANEOUS DAILY
Qty: 100 EACH | Refills: 3 | Status: SHIPPED | OUTPATIENT
Start: 2020-02-11 | End: 2020-12-15

## 2020-02-11 NOTE — PROGRESS NOTES
Attempted to call patient informing her insulin orders were signed.  Unable to leave voicemail.  Will try later today.    2:40pm- talked to patient.  No further questions.    Coleen Chambers MS, RD, LD, CDE

## 2020-02-12 ENCOUNTER — TELEPHONE (OUTPATIENT)
Dept: OBGYN | Facility: CLINIC | Age: 40
End: 2020-02-12

## 2020-02-12 DIAGNOSIS — B37.2 YEAST DERMATITIS: Primary | ICD-10-CM

## 2020-02-12 RX ORDER — FLUCONAZOLE 150 MG/1
150 TABLET ORAL
Qty: 2 TABLET | Refills: 0 | Status: SHIPPED | OUTPATIENT
Start: 2020-02-12 | End: 2020-03-03

## 2020-02-12 NOTE — TELEPHONE ENCOUNTER
Pt did not get miconazole for yeast, said the pharmacy didn't have it.  Said I would call the pharmacy but then pt questioned whether she could have oral medication.  She stated that she thinks she also has yeast on her nipples (they are red and painful) and she needs the pill instead of vaginal cream.  Please advise.  Divya Viveros RN

## 2020-02-13 ENCOUNTER — APPOINTMENT (OUTPATIENT)
Dept: EDUCATION SERVICES | Facility: CLINIC | Age: 40
End: 2020-02-13
Payer: COMMERCIAL

## 2020-02-13 NOTE — PROGRESS NOTES
Pt missed her GDM follow-up today, over slept. She is r/s'ed for 2-20-20. She said her BB today was 112, . Recently started insulin.

## 2020-02-20 ENCOUNTER — PATIENT OUTREACH (OUTPATIENT)
Dept: EDUCATION SERVICES | Facility: CLINIC | Age: 40
End: 2020-02-20
Payer: COMMERCIAL

## 2020-02-20 DIAGNOSIS — O24.414 INSULIN CONTROLLED GESTATIONAL DIABETES MELLITUS (GDM) DURING PREGNANCY, ANTEPARTUM: ICD-10-CM

## 2020-02-20 NOTE — PROGRESS NOTES
GDM BG report; pt had a family  yesterday.    Date BB AB Insul AL Insul Valerie Insu  12 - 152 3u 152 3u 162 22u  13 112 245 3u 169 3u 172 22u  14 116 203 3u 234 3u - -  15 132 218 3u No lunch/insl 124* 22u *2 hour  16 128 166 3u 210 3u 145 22u  17 - 173 3u 175 3u 136 22u  18 133 88* - 164 3u 170 22u *3 hour  19 123 134 3u 163 3u - 22u  20 122 168 3u No lunch/insl- just woke up 3pm when I spoke with pt

## 2020-02-20 NOTE — PROGRESS NOTES
Gestational Diabetes Follow-up    Subjective/Objective:    Brit Romano sent in blood glucose log for review. Last date of communication was: 2/10/20.    Gestational diabetes is being managed with medications    Taking diabetes medications:   yes:     Diabetes Medication(s)     Insulin       insulin aspart (NOVOLOG FLEXPEN) 100 UNIT/ML pen    Inject 3 units before breakfast, lunch, and dinner.  Use 2 units to prime pen before each use.  TDD max= 12 units.     insulin detemir (LEVEMIR PEN) 100 UNIT/ML pen    Inject 20 Units Subcutaneous At Bedtime Use 2 units to prime pen.  Max TDD=22 units.     Patient taking differently:  Inject 22 Units Subcutaneous At Bedtime Use 2 units to prime pen.  Max TDD=22 units.        Estimated Date of Delivery: Apr 23, 2020    BG/Food Log:   See BG log in this encounter    Assessment:    Ketones: not available.   Fasting blood glucoses: 0% in target.  After breakfast: 0% in target.  After lunch: 0% in target.  After dinner: 0% in target.    Plan/Response:  Recommend increase to insulin - Levemir from 22 to 26 units (20% increase); NovoLog from 3 units to 4 units at each meal.    Called and spoke with Brit garrison of above and she verbalized understanding. Established plan to call her on Monday next week (2/24) to review glucose and adjust insulin doses, as needed.    Chrystal Lemus, MPH, RD, LD, CDE     Any diabetes medication dose changes were made via the CDE Protocol and Collaborative Practice Agreement with the patient's OB/GYN provider. A copy of this encounter was shared with the provider.

## 2020-02-25 ENCOUNTER — PATIENT OUTREACH (OUTPATIENT)
Dept: EDUCATION SERVICES | Facility: CLINIC | Age: 40
End: 2020-02-25
Payer: COMMERCIAL

## 2020-02-25 DIAGNOSIS — O24.414 INSULIN CONTROLLED GESTATIONAL DIABETES MELLITUS (GDM) DURING PREGNANCY, ANTEPARTUM: ICD-10-CM

## 2020-02-25 NOTE — PROGRESS NOTES
GDM BG report, on insulin per pt- 4 units at each meal [3x's] and 26 units at bed    Pt has a cold.    Date BB AB AL Valerie  21 115 136 172 81  22 116 189 - 126  23 122 196 167 144  24 114 102 192 144  25 133

## 2020-02-25 NOTE — PROGRESS NOTES
Gestational Diabetes Follow-up    Subjective/Objective:    Brit Romano sent in blood glucose log for review. Last date of communication was: 2/20/20.    Gestational diabetes is being managed with diet, activity and medications    Taking diabetes medications:   yes:     Diabetes Medication(s)     Insulin       insulin aspart (NOVOLOG FLEXPEN) 100 UNIT/ML SC pen    Inject 4 units before breakfast, lunch, and dinner.  Use 2 units to prime pen before each use.  TDD max= 14 units.     insulin detemir 100 UNIT/ML SC pen    Inject 26 Units Subcutaneous At Bedtime Use 2 units to prime pen.  Max TDD=28 units.          Estimated Date of Delivery: Apr 23, 2020    BG/Food Log:   Date     BB       AB       AL        Valerie  21        115      136      172      81  22        116      189      -           126  23        122      196      167      144  24        114      102      192*      144  25        133    Assessment:  Fasting blood sugars remain significantly elevated.  Post lunch is consistently above target, but breakfast and dinner are inconsistently elevated. Recommend increase to levemir and lunch novolog doses.    Post lunch doses are 1 sometimes but most time 2 hour after eating.    Wt Readings from Last 3 Encounters:   02/06/20 99.8 kg (220 lb)   02/04/20 99.7 kg (219 lb 12.8 oz)   01/23/20 96.6 kg (213 lb)       Ketones:  None reported.   Fasting blood glucoses: 0% in target.  After breakfast: 50% in target.  After lunch: 0% in target.  After dinner: 50% in target.    Plan/Response:  Recommend increase to insulin - Levemir 0-0-0-26 --> 31, Novolog 4-4-4-0 --> 4-7-4-0.  Patient has CDE follow up 2/27/20.  Patient verbalized understanding.    Coleen Chambers MS, RD, LD, CDE      Any diabetes medication dose changes were made via the CDE Protocol and Collaborative Practice Agreement with the patient's OB/GYN provider. A copy of this encounter was shared with the provider.

## 2020-02-26 ENCOUNTER — RECORDS - HEALTHEAST (OUTPATIENT)
Dept: ULTRASOUND IMAGING | Facility: HOSPITAL | Age: 40
End: 2020-02-26

## 2020-02-26 ENCOUNTER — OFFICE VISIT - HEALTHEAST (OUTPATIENT)
Dept: MATERNAL FETAL MEDICINE | Facility: HOSPITAL | Age: 40
End: 2020-02-26

## 2020-02-26 ENCOUNTER — RECORDS - HEALTHEAST (OUTPATIENT)
Dept: ADMINISTRATIVE | Facility: OTHER | Age: 40
End: 2020-02-26

## 2020-02-26 DIAGNOSIS — O09.522 SUPERVISION OF ELDERLY MULTIGRAVIDA, SECOND TRIMESTER: ICD-10-CM

## 2020-02-26 DIAGNOSIS — O09.523 AMA (ADVANCED MATERNAL AGE) MULTIGRAVIDA 35+, THIRD TRIMESTER: ICD-10-CM

## 2020-02-26 DIAGNOSIS — O24.414 GESTATIONAL DIABETES REQUIRING INSULIN: ICD-10-CM

## 2020-02-27 ENCOUNTER — ALLIED HEALTH/NURSE VISIT (OUTPATIENT)
Dept: EDUCATION SERVICES | Facility: CLINIC | Age: 40
End: 2020-02-27
Payer: COMMERCIAL

## 2020-02-27 VITALS — BODY MASS INDEX: 38.07 KG/M2 | WEIGHT: 221.8 LBS

## 2020-02-27 DIAGNOSIS — O24.414 INSULIN CONTROLLED GESTATIONAL DIABETES MELLITUS (GDM) DURING PREGNANCY, ANTEPARTUM: ICD-10-CM

## 2020-02-27 PROCEDURE — G0108 DIAB MANAGE TRN  PER INDIV: HCPCS

## 2020-02-27 NOTE — PROGRESS NOTES
Diabetes Self-Management Education & Support  Follow-up Gestational Diabetes Self-Management Education & Support    SUBJECTIVE/OBJECTIVE:  Presents for education related to gestational diabetes.    Accompanied by: Self  Diabetes management related comments/concerns: None today    Cultural Influences/Ethnic Background:  [place]    Wt 100.6 kg (221 lb 12.8 oz)   LMP 2019 (Approximate)   BMI 38.07 kg/m      Weight gain ~15 lbs at 32 weeks gestation.    Estimated Date of Delivery: 2020    Blood Glucose/Ketone Log since insulin doses increased on 20:     Levemir: 31 units  NovoLo-7-4-0    Date Ketones Fasting Post Breakfast Post Lunch Post Supper Comments    - 114 102 192 144     - 133 170 - 115 Levemir and lunchtime NovoLog increased    - 109 161 137 204     neg 90             Lifestyle and Health Behaviors:  Pre-pregnancy weight (lbs): 211  Exercise:: Currently not exercising  Meals include: Breakfast, Lunch, Dinner(11 am breakfast - anjera + boiled or scrambled eggs OR tomato sauce OR oil with tea; 1:30-2pm - lunch - rice or spaghetti + beef OR sandwich; 9-10 pm dinner - bread OR sandwich OR anjera [place] red beans + barley OR Fuul (bean dish) with chapati)  Beverages: Coffee, Water, Juice  Biggest challenges to healthy eating: Portion control  Pre-keiko vitamin?: Yes  Supplements?: Yes  Experiencing nausea?: Yes    Healthy Coping:  Emotional response to diabetes: Ready to learn, Acceptance, Confidence diabetes can be controlled, Concern for health and well-being  Informal Support system:: Children, Family, Spouse  Stage of change: ACTION (Actively working towards change)    Current Management:  Taking medications for gestational diabetes?: Yes  Difficulty affording diabetes management supplies?: No    ASSESSMENT:  Ketones: negative.   Fasting blood glucoses: 50% in target since change to insulin dose.  After breakfast: 33% in target in the last 3 days.  After lunch: 100% in  target since change to insulin dose.  After dinner: 33% in target in the last 3 days.    Since breakfast and dinner NovoLog were not changed at last follow-up and >50% are not in goal in the last 3 days, recommend changes to breakfast insulin dose - take 6 units if eating anjera, as these are days that glucose is elevated after breakfast, continue 4 units if eating eggs/2 toast since glucose has been in goal on these days. Increase dinner insulin dose to 5 units.     INTERVENTION:  Educational topics covered today:  Insulin dose increase plan.    Educational Materials provided today:  No new materials provided today    PLAN:  Check glucose 4 times daily.  Check ketones once a week as readings are consistently negative.  Continue with recommended physical activity.  Continue to follow recommended meal plan: 3-4 carbs at breakfast, 3-4 carbs at lunch, 3-4 carbs at supper, 1-2 carbs at snacks.  Follow consistent CHO meal plan, eat CHO and protein/fat at all meals/snacks.    Follow-up on Monday, 3/2.    Chrystal Lemus, MPH, RD, LD, CDE   Time Spent: 30 minutes  Encounter Type: Individual    Any diabetes medication dose changes were made via the CDE Protocol and Collaborative Practice Agreement with the patient's OB/GYN provider. A copy of this encounter was shared with the provider.

## 2020-02-27 NOTE — PATIENT INSTRUCTIONS
For mealtime insulin:    Breakfast:   Take 4 units if eggs/toast  Take 6 units if anjera    Lunch:  Take 7 units    Dinner:   Take 5 units    For bedtime insulin:  If fasting blood sugar is above 95 tomorrow, increase to 37 units

## 2020-03-03 ENCOUNTER — PRENATAL OFFICE VISIT (OUTPATIENT)
Dept: OBGYN | Facility: CLINIC | Age: 40
End: 2020-03-03
Payer: COMMERCIAL

## 2020-03-03 VITALS
WEIGHT: 223.8 LBS | OXYGEN SATURATION: 100 % | TEMPERATURE: 97.9 F | DIASTOLIC BLOOD PRESSURE: 73 MMHG | HEART RATE: 100 BPM | BODY MASS INDEX: 38.21 KG/M2 | SYSTOLIC BLOOD PRESSURE: 113 MMHG | HEIGHT: 64 IN

## 2020-03-03 DIAGNOSIS — E53.8 VITAMIN B12 DEFICIENCY (NON ANEMIC): ICD-10-CM

## 2020-03-03 DIAGNOSIS — O09.529 SUPERVISION OF HIGH-RISK PREGNANCY OF ELDERLY MULTIGRAVIDA: Primary | ICD-10-CM

## 2020-03-03 DIAGNOSIS — E87.6 HYPOKALEMIA: ICD-10-CM

## 2020-03-03 DIAGNOSIS — O24.414 INSULIN CONTROLLED GESTATIONAL DIABETES MELLITUS (GDM) IN THIRD TRIMESTER: ICD-10-CM

## 2020-03-03 LAB
ALBUMIN UR-MCNC: NEGATIVE MG/DL
APPEARANCE UR: CLEAR
BILIRUB UR QL STRIP: NEGATIVE
COLOR UR AUTO: YELLOW
GLUCOSE UR STRIP-MCNC: NEGATIVE MG/DL
HBA1C MFR BLD: 6.1 % (ref 0–5.6)
HGB UR QL STRIP: NEGATIVE
KETONES UR STRIP-MCNC: NEGATIVE MG/DL
LEUKOCYTE ESTERASE UR QL STRIP: NEGATIVE
NITRATE UR QL: NEGATIVE
PH UR STRIP: 7 PH (ref 5–7)
SOURCE: NORMAL
SP GR UR STRIP: 1.01 (ref 1–1.03)
UROBILINOGEN UR STRIP-ACNC: 0.2 EU/DL (ref 0.2–1)

## 2020-03-03 PROCEDURE — 36415 COLL VENOUS BLD VENIPUNCTURE: CPT | Performed by: OBSTETRICS & GYNECOLOGY

## 2020-03-03 PROCEDURE — 81003 URINALYSIS AUTO W/O SCOPE: CPT | Performed by: OBSTETRICS & GYNECOLOGY

## 2020-03-03 PROCEDURE — 99207 ZZC PRENATAL VISIT: CPT | Performed by: OBSTETRICS & GYNECOLOGY

## 2020-03-03 PROCEDURE — 83036 HEMOGLOBIN GLYCOSYLATED A1C: CPT | Performed by: OBSTETRICS & GYNECOLOGY

## 2020-03-03 PROCEDURE — 84132 ASSAY OF SERUM POTASSIUM: CPT | Performed by: OBSTETRICS & GYNECOLOGY

## 2020-03-03 RX ORDER — LANOLIN ALCOHOL/MO/W.PET/CERES
1000 CREAM (GRAM) TOPICAL DAILY
Qty: 100 TABLET | Refills: 3 | Status: SHIPPED | OUTPATIENT
Start: 2020-03-03 | End: 2023-11-24

## 2020-03-03 ASSESSMENT — MIFFLIN-ST. JEOR: SCORE: 1665.15

## 2020-03-04 ENCOUNTER — RECORDS - HEALTHEAST (OUTPATIENT)
Dept: ADMINISTRATIVE | Facility: OTHER | Age: 40
End: 2020-03-04

## 2020-03-04 ENCOUNTER — RECORDS - HEALTHEAST (OUTPATIENT)
Dept: ULTRASOUND IMAGING | Facility: HOSPITAL | Age: 40
End: 2020-03-04

## 2020-03-04 ENCOUNTER — OFFICE VISIT - HEALTHEAST (OUTPATIENT)
Dept: MATERNAL FETAL MEDICINE | Facility: HOSPITAL | Age: 40
End: 2020-03-04

## 2020-03-04 DIAGNOSIS — O09.523 AMA (ADVANCED MATERNAL AGE) MULTIGRAVIDA 35+, THIRD TRIMESTER: ICD-10-CM

## 2020-03-04 DIAGNOSIS — O24.414 GESTATIONAL DIABETES MELLITUS IN PREGNANCY, INSULIN CONTROLLED: ICD-10-CM

## 2020-03-04 LAB — POTASSIUM SERPL-SCNC: 3.5 MMOL/L (ref 3.4–5.3)

## 2020-03-09 ENCOUNTER — RECORDS - HEALTHEAST (OUTPATIENT)
Dept: ADMINISTRATIVE | Facility: OTHER | Age: 40
End: 2020-03-09

## 2020-03-09 ENCOUNTER — OFFICE VISIT - HEALTHEAST (OUTPATIENT)
Dept: MATERNAL FETAL MEDICINE | Facility: HOSPITAL | Age: 40
End: 2020-03-09

## 2020-03-09 ENCOUNTER — RECORDS - HEALTHEAST (OUTPATIENT)
Dept: ULTRASOUND IMAGING | Facility: HOSPITAL | Age: 40
End: 2020-03-09

## 2020-03-09 DIAGNOSIS — O24.414 GESTATIONAL DIABETES MELLITUS IN PREGNANCY, INSULIN CONTROLLED: ICD-10-CM

## 2020-03-09 DIAGNOSIS — O24.414 GESTATIONAL DIABETES REQUIRING INSULIN: ICD-10-CM

## 2020-03-10 ENCOUNTER — PATIENT OUTREACH (OUTPATIENT)
Dept: EDUCATION SERVICES | Facility: CLINIC | Age: 40
End: 2020-03-10
Payer: COMMERCIAL

## 2020-03-10 DIAGNOSIS — O24.414 INSULIN CONTROLLED GESTATIONAL DIABETES MELLITUS (GDM) DURING PREGNANCY, ANTEPARTUM: ICD-10-CM

## 2020-03-10 RX ORDER — INSULIN ASPART 100 [IU]/ML
INJECTION, SOLUTION INTRAVENOUS; SUBCUTANEOUS
Qty: 6 ML | Refills: 3 | Status: SHIPPED | OUTPATIENT
Start: 2020-03-10 | End: 2020-03-18

## 2020-03-10 NOTE — PROGRESS NOTES
GDM BG report, no interp needed; * is 2 hours    Date BB AB AL Valerie  3 122 122 145 196  4 111 180 215 129  5 107 114* - 142*  6 109 163* 150* 178  7 103 - - 181  8 - - - -  9 122 169 - 115*  10 115

## 2020-03-10 NOTE — PROGRESS NOTES
Gestational Diabetes Follow-up    Subjective/Objective:    Brit Romano sent in blood glucose log for review. Last date of communication was: 2/27/20.    Gestational diabetes is being managed with diet, activity and medications    Taking diabetes medications:   yes:     Diabetes Medication(s)     Insulin       insulin aspart (NOVOLOG FLEXPEN) 100 UNIT/ML pen    Inject 4-6 units before breakfast, 7 units before lunch, and 5 units before dinner.  Use 2 units to prime pen before each use.  TDD max= 24 units.     insulin detemir (LEVEMIR PEN) 100 UNIT/ML pen    Inject 31 Units Subcutaneous At Bedtime Use 2 units to prime pen.  Max TDD=33 units.        Estimated Date of Delivery: Apr 23, 2020    BG/Food Log:   See below    Assessment:  Blood sugars remain above target. Recommend insulin dose increase.    3/10-2:03pm- left message    Patient called back: reports she is eating the same foods, but increased her insulin to Novolog 7-7-7-0, and Levemir to 0-0-0-37    Wt Readings from Last 5 Encounters:   03/03/20 101.5 kg (223 lb 12.8 oz)   02/27/20 100.6 kg (221 lb 12.8 oz)   02/06/20 99.8 kg (220 lb)   02/04/20 99.7 kg (219 lb 12.8 oz)   01/23/20 96.6 kg (213 lb)           Ketones: none reported.   Fasting blood glucoses: 0% in target.  After breakfast: 40% in target.  After lunch: 0% in target.  After dinner: 33% in target.    Plan/Response:  Recommend increase to insulin - Levemir 0-0-0-37 --> 0-0-0-45, Novolog 7-7-7-0 --> 10-10-10-0.    Requested patient call on Friday and to call each week on Monday/Thursday and Tuesday/Friday until she has the baby.        Coleen Chambers MS, RD, LD, CDE      Any diabetes medication dose changes were made via the CDE Protocol and Collaborative Practice Agreement with the patient's OB/GYN provider. A copy of this encounter was shared with the provider.

## 2020-03-17 ENCOUNTER — HOSPITAL ENCOUNTER (OUTPATIENT)
Dept: ULTRASOUND IMAGING | Facility: CLINIC | Age: 40
End: 2020-03-17
Attending: OBSTETRICS & GYNECOLOGY
Payer: COMMERCIAL

## 2020-03-17 ENCOUNTER — PRENATAL OFFICE VISIT (OUTPATIENT)
Dept: OBGYN | Facility: CLINIC | Age: 40
End: 2020-03-17
Payer: COMMERCIAL

## 2020-03-17 ENCOUNTER — OFFICE VISIT (OUTPATIENT)
Dept: MATERNAL FETAL MEDICINE | Facility: CLINIC | Age: 40
End: 2020-03-17
Attending: OBSTETRICS & GYNECOLOGY
Payer: COMMERCIAL

## 2020-03-17 VITALS
TEMPERATURE: 98.4 F | WEIGHT: 226.2 LBS | DIASTOLIC BLOOD PRESSURE: 84 MMHG | HEIGHT: 64 IN | HEART RATE: 86 BPM | SYSTOLIC BLOOD PRESSURE: 123 MMHG | BODY MASS INDEX: 38.62 KG/M2 | OXYGEN SATURATION: 98 %

## 2020-03-17 DIAGNOSIS — O24.414 INSULIN CONTROLLED GESTATIONAL DIABETES MELLITUS (GDM) IN THIRD TRIMESTER: ICD-10-CM

## 2020-03-17 DIAGNOSIS — O09.529 SUPERVISION OF HIGH-RISK PREGNANCY OF ELDERLY MULTIGRAVIDA: Primary | ICD-10-CM

## 2020-03-17 DIAGNOSIS — R10.84 ABDOMINAL PAIN, GENERALIZED: ICD-10-CM

## 2020-03-17 DIAGNOSIS — O24.419 GESTATIONAL DIABETES MELLITUS (GDM) IN THIRD TRIMESTER, GESTATIONAL DIABETES METHOD OF CONTROL UNSPECIFIED: Primary | ICD-10-CM

## 2020-03-17 DIAGNOSIS — O99.213 OBESITY AFFECTING PREGNANCY IN THIRD TRIMESTER: ICD-10-CM

## 2020-03-17 DIAGNOSIS — O26.90 PREGNANCY RELATED CONDITION, ANTEPARTUM: ICD-10-CM

## 2020-03-17 LAB
ALBUMIN UR-MCNC: NEGATIVE MG/DL
APPEARANCE UR: CLEAR
BILIRUB UR QL STRIP: NEGATIVE
COLOR UR AUTO: YELLOW
GLUCOSE UR STRIP-MCNC: NEGATIVE MG/DL
HGB UR QL STRIP: NEGATIVE
KETONES UR STRIP-MCNC: NEGATIVE MG/DL
LEUKOCYTE ESTERASE UR QL STRIP: NEGATIVE
NITRATE UR QL: NEGATIVE
PH UR STRIP: 7 PH (ref 5–7)
SOURCE: NORMAL
SP GR UR STRIP: 1.01 (ref 1–1.03)
UROBILINOGEN UR STRIP-ACNC: 0.2 EU/DL (ref 0.2–1)

## 2020-03-17 PROCEDURE — 99207 ZZC PRENATAL VISIT: CPT | Performed by: OBSTETRICS & GYNECOLOGY

## 2020-03-17 PROCEDURE — 76819 FETAL BIOPHYS PROFIL W/O NST: CPT

## 2020-03-17 PROCEDURE — 87086 URINE CULTURE/COLONY COUNT: CPT | Performed by: OBSTETRICS & GYNECOLOGY

## 2020-03-17 PROCEDURE — 81003 URINALYSIS AUTO W/O SCOPE: CPT | Performed by: OBSTETRICS & GYNECOLOGY

## 2020-03-17 ASSESSMENT — MIFFLIN-ST. JEOR: SCORE: 1676.04

## 2020-03-17 NOTE — PROGRESS NOTES
34w5d  Feeling tired and low abdominal cramping.  Wondering about a possible UTI.   UA done today and perfectly clean.  Reviewed BG numbers, by patient verbal report.  Fasting 113.  2 hr PP running 125-135.  Increased her insulin about a week ago but looks like she still needs to increase further. Did not check Glc last 2 days d/t ran out of test strips.  Will be going to pick more up today.  Baby has been moving a lot and seems breech today.  Has MFM US today.  discussed timing of delivery given poorly controlled DM.  Looking at ~ 37 wks.  RR

## 2020-03-18 ENCOUNTER — PATIENT OUTREACH (OUTPATIENT)
Dept: EDUCATION SERVICES | Facility: CLINIC | Age: 40
End: 2020-03-18
Payer: COMMERCIAL

## 2020-03-18 DIAGNOSIS — O24.414 INSULIN CONTROLLED GESTATIONAL DIABETES MELLITUS (GDM) DURING PREGNANCY, ANTEPARTUM: ICD-10-CM

## 2020-03-18 LAB
BACTERIA SPEC CULT: NORMAL
SPECIMEN SOURCE: NORMAL

## 2020-03-18 RX ORDER — INSULIN ASPART 100 [IU]/ML
INJECTION, SOLUTION INTRAVENOUS; SUBCUTANEOUS
Qty: 15 ML | Refills: 3 | Status: SHIPPED | OUTPATIENT
Start: 2020-03-18 | End: 2020-03-23

## 2020-03-18 NOTE — PROGRESS NOTES
Gestational Diabetes Follow-up    Subjective/Objective:    CDE called Brit Romano for blood sugar follow up for review. Last date of communication was: 3/10/20.    Gestational diabetes is being managed with diet, activity and medications    Taking diabetes medications:   yes:     Diabetes Medication(s)     Insulin       insulin aspart (NOVOLOG FLEXPEN) 100 UNIT/ML pen    Inject 10 units before breakfast, 10 units before lunch, and 10 units before dinner.  Use 2 units to prime pen before each use.  TDD max= 36 units.     insulin detemir (LEVEMIR PEN) 100 UNIT/ML pen    Inject 45 Units Subcutaneous At Bedtime Use 2 units to prime pen.  Max TDD=47 units.      Confirmed patient taking insulin as above.    Estimated Date of Delivery: Apr 23, 2020    BG/Food Log:     Date Ketones Fasting Post Breakfast Post Lunch Post Supper   3/10  116 131 134 116   3/11  110   127   3/12  123 126 133 Didn't eat fell asleep   3/13  89 119 156 123   3/14  101 135 144 201  Ate something sweet, fruit   3/15  113 162 145 153   3/16  118 115 138 148   3/17  113 128 163 141   3/18  110 141 162 133       Assessment:  Blood sugars significantly elevated recommend insulin dose increase.  When fasting blood sugar is in target the post breakfast reading is in target.  Recommend increase to Levemir and lunch and dinner novolog.     Ketones: not checking.   Fasting blood glucoses: 0% in target.  After breakfast: 25% in target.  After lunch: 0% in target.  After dinner: 12% in target.    Plan/Response:  Recommend increase to insulin - Levemir 0-0-0-45 --> 0-0-0-55, Novolog 10-10-10-0--> 10-13-13-0.  CDE to call patient Monday.    Coleen Chambers MS, RD, LD, CDE      Any diabetes medication dose changes were made via the CDE Protocol and Collaborative Practice Agreement with the patient's OB/GYN provider. A copy of this encounter was shared with the provider.

## 2020-03-23 ENCOUNTER — PATIENT OUTREACH (OUTPATIENT)
Dept: EDUCATION SERVICES | Facility: CLINIC | Age: 40
End: 2020-03-23
Payer: COMMERCIAL

## 2020-03-23 DIAGNOSIS — O24.414 INSULIN CONTROLLED GESTATIONAL DIABETES MELLITUS (GDM) DURING PREGNANCY, ANTEPARTUM: ICD-10-CM

## 2020-03-23 RX ORDER — INSULIN ASPART 100 [IU]/ML
INJECTION, SOLUTION INTRAVENOUS; SUBCUTANEOUS
Qty: 15 ML | Refills: 3 | Status: SHIPPED | OUTPATIENT
Start: 2020-03-23 | End: 2020-03-26

## 2020-03-23 NOTE — PROGRESS NOTES
Gestational Diabetes Follow-up    Subjective/Objective:    Brit Romano sent in blood glucose log for review. Last date of communication was: 3/18/20.    Gestational diabetes is being managed with diet, activity and medications    Taking diabetes medications:   yes:     Diabetes Medication(s)     Insulin       insulin aspart (NOVOLOG FLEXPEN) 100 UNIT/ML pen    Inject 10 units before breakfast, 13 units before lunch, and 10 units before dinner.  Use 2 units to prime pen before each use.  TDD max= 42 units.     insulin detemir (LEVEMIR PEN) 100 UNIT/ML pen    Inject 55 Units Subcutaneous At Bedtime Use 2 units to prime pen.  Max TDD=47 units.          Estimated Date of Delivery: Apr 23, 2020    BG/Food Log:     Date Fasting Post Breakfast Post Lunch Post Supper   3/18 110 141* 152* 133*   3/19 107 171 134* 168*   3/20 97 103* 114*    3/21 99 122* 131* 125*   3/22 100 130* 125* 120   3/23 92            Assessment:  Blood sugars remain elevated, but are significantly improved.  Since blood sugars are trending down recommend 10% adjustment.    Ketones: none reported.   Fasting blood glucoses: 17% in target.  After breakfast: 20% in target.  After lunch: 20% in target.  After dinner: 25% in target.    Plan/Response:  Recommend increase to insulin - Levemir 0-0-0-55 --> 0-0-0-57, Novolog 10-13-10-0 --> 13-15-11-0.  CDE to call 3/26/20.    Coleen Chambers MS, RD, LD, CDE      Any diabetes medication dose changes were made via the CDE Protocol and Collaborative Practice Agreement with the patient's OB/GYN provider. A copy of this encounter was shared with the provider.

## 2020-03-27 ENCOUNTER — RECORDS - HEALTHEAST (OUTPATIENT)
Dept: ULTRASOUND IMAGING | Facility: HOSPITAL | Age: 40
End: 2020-03-27

## 2020-03-27 ENCOUNTER — OFFICE VISIT - HEALTHEAST (OUTPATIENT)
Dept: MATERNAL FETAL MEDICINE | Facility: HOSPITAL | Age: 40
End: 2020-03-27

## 2020-03-27 ENCOUNTER — RECORDS - HEALTHEAST (OUTPATIENT)
Dept: ADMINISTRATIVE | Facility: OTHER | Age: 40
End: 2020-03-27

## 2020-03-27 DIAGNOSIS — O09.523 AMA (ADVANCED MATERNAL AGE) MULTIGRAVIDA 35+, THIRD TRIMESTER: ICD-10-CM

## 2020-03-27 DIAGNOSIS — O24.414 GESTATIONAL DIABETES REQUIRING INSULIN: ICD-10-CM

## 2020-03-27 DIAGNOSIS — O24.414 GESTATIONAL DIABETES MELLITUS IN PREGNANCY, INSULIN CONTROLLED: ICD-10-CM

## 2020-03-30 ENCOUNTER — RECORDS - HEALTHEAST (OUTPATIENT)
Dept: ULTRASOUND IMAGING | Facility: HOSPITAL | Age: 40
End: 2020-03-30

## 2020-03-30 ENCOUNTER — OFFICE VISIT - HEALTHEAST (OUTPATIENT)
Dept: MATERNAL FETAL MEDICINE | Facility: HOSPITAL | Age: 40
End: 2020-03-30

## 2020-03-30 ENCOUNTER — PATIENT OUTREACH (OUTPATIENT)
Dept: EDUCATION SERVICES | Facility: CLINIC | Age: 40
End: 2020-03-30
Payer: COMMERCIAL

## 2020-03-30 ENCOUNTER — RECORDS - HEALTHEAST (OUTPATIENT)
Dept: ADMINISTRATIVE | Facility: OTHER | Age: 40
End: 2020-03-30

## 2020-03-30 DIAGNOSIS — O24.414 INSULIN CONTROLLED GESTATIONAL DIABETES MELLITUS (GDM) DURING PREGNANCY, ANTEPARTUM: ICD-10-CM

## 2020-03-30 DIAGNOSIS — O24.414 GESTATIONAL DIABETES MELLITUS IN PREGNANCY, INSULIN CONTROLLED: ICD-10-CM

## 2020-03-30 DIAGNOSIS — O24.414 GESTATIONAL DIABETES REQUIRING INSULIN: ICD-10-CM

## 2020-03-30 DIAGNOSIS — O09.523 SUPERVISION OF ELDERLY MULTIGRAVIDA, THIRD TRIMESTER: ICD-10-CM

## 2020-03-30 RX ORDER — INSULIN ASPART 100 [IU]/ML
INJECTION, SOLUTION INTRAVENOUS; SUBCUTANEOUS
Qty: 15 ML | Refills: 3 | Status: ON HOLD | OUTPATIENT
Start: 2020-03-30 | End: 2020-04-08

## 2020-03-30 NOTE — PROGRESS NOTES
Gestational Diabetes Follow-up    Subjective/Objective:    Brit Romano sent in blood glucose log for review. Last date of communication was: 3/26.    Gestational diabetes is being managed with diet, activity and medications    Taking diabetes medications:   yes:     Diabetes Medication(s)     Insulin       insulin aspart (NOVOLOG FLEXPEN) 100 UNIT/ML pen    Inject 14 units before breakfast, 16 units before lunch, and 11 units before dinner.  Use 2 units to prime pen before each use.  TDD max= 47 units.     insulin detemir (LEVEMIR PEN) 100 UNIT/ML pen    Inject 59 Units Subcutaneous At Bedtime Use 2 units to prime pen.  Max TDD=47 units.          Estimated Date of Delivery: 2020      Blood Glucose/Ketone Log:    Date Fasting Post Breakfast Post Lunch Post Supper   3/26  Didn't check Didn't check 164   3/27 94 70* 161* 136*   3/28 105 88* 119* 129*   3/29 92 123* 102* 109*   3/30 108          Assessment:  Fasting blood glucoses: 50% in target.  After breakfast: 66% in target.  After lunch: 66% in target.  After dinner: 33% in target.    Brit states that she has an appt with Dr. Badillo on Wednesday to determine induction/ date.     Plan/Response:  Recommend increase to insulin -   Levemir: 0-0-0-59 --> 0-0-0-61  Novolo-16-11-0 --> 14-16-12-0  Follow-up on     Shannen Puga RD, LD, CDE    Any diabetes medication dose changes were made via the CDE Protocol and Collaborative Practice Agreement with the patient's referring provider. A copy of this encounter was shared with the provider.

## 2020-04-01 ENCOUNTER — PRENATAL OFFICE VISIT (OUTPATIENT)
Dept: OBGYN | Facility: CLINIC | Age: 40
End: 2020-04-01
Payer: COMMERCIAL

## 2020-04-01 VITALS
DIASTOLIC BLOOD PRESSURE: 80 MMHG | BODY MASS INDEX: 38.16 KG/M2 | HEART RATE: 104 BPM | TEMPERATURE: 98.3 F | OXYGEN SATURATION: 99 % | HEIGHT: 64 IN | SYSTOLIC BLOOD PRESSURE: 108 MMHG | WEIGHT: 223.5 LBS

## 2020-04-01 DIAGNOSIS — O09.529 SUPERVISION OF HIGH-RISK PREGNANCY OF ELDERLY MULTIGRAVIDA: Primary | ICD-10-CM

## 2020-04-01 DIAGNOSIS — O24.414 INSULIN CONTROLLED GESTATIONAL DIABETES MELLITUS (GDM) IN THIRD TRIMESTER: ICD-10-CM

## 2020-04-01 LAB
CAPILLARY BLOOD COLLECTION: NORMAL
HGB BLD-MCNC: 10.3 G/DL (ref 11.7–15.7)

## 2020-04-01 PROCEDURE — 87653 STREP B DNA AMP PROBE: CPT | Performed by: OBSTETRICS & GYNECOLOGY

## 2020-04-01 PROCEDURE — 36416 COLLJ CAPILLARY BLOOD SPEC: CPT | Performed by: OBSTETRICS & GYNECOLOGY

## 2020-04-01 PROCEDURE — 00000218 ZZHCL STATISTIC OBHBG - HEMOGLOBIN: Performed by: OBSTETRICS & GYNECOLOGY

## 2020-04-01 PROCEDURE — 99207 ZZC PRENATAL VISIT: CPT | Performed by: OBSTETRICS & GYNECOLOGY

## 2020-04-01 PROCEDURE — 87186 SC STD MICRODIL/AGAR DIL: CPT | Performed by: OBSTETRICS & GYNECOLOGY

## 2020-04-01 ASSESSMENT — MIFFLIN-ST. JEOR: SCORE: 1663.79

## 2020-04-01 ASSESSMENT — PATIENT HEALTH QUESTIONNAIRE - PHQ9: SUM OF ALL RESPONSES TO PHQ QUESTIONS 1-9: 2

## 2020-04-01 NOTE — LETTER
[place] [id number] [place] [id number]  [phone]      April 2, 2020      Brit Romano  [address] [facility] [address]              Dear Brit,    Your vaginal beta strep culture was positive so you will need to be given antibiotics in labor. Your hemoglobin was low - you should start taking over the counter iron supplements twice a day.  If you have any questions or concerns, please call the clinic at [phone].  Component      Latest Ref Rng & Units 4/1/2020   Group B Strep PCR Spec Kenneth       Vaginal Rectal   Group B Strep PCR      NEG:Negative Positive (A)   Hemoglobin      11.7 - 15.7 g/dL 10.3 (L)       Sincerely,  Grace Badillo MD/wilfrido

## 2020-04-01 NOTE — PATIENT INSTRUCTIONS
The best way to prevent an infection is to avoid being exposed to the virus. We recommend that you wash your hands frequently and avoid touching your eyes, nose or mouth.  Practice social distancing by staying home as much as possible, avoiding gatherings and minimize trips for essentials. You should especially avoid any contact with people who are sick. It is possible that people who have the virus have little or no symptoms which is why social distancing is so important to avoid spreading the virus. Clean frequently touched surfaces daily. If you do start to have symptoms such as cough, fever or mild shortness of breath, you should stay home for at least 14 days.  If your symptoms are worrisome or severe or you are scheduled during this time to have a clinic visit you should call us at [phone].    Due to anticipated shortage of blood products we recommend all pregnant women take an iron supplement (ferrous gluconate or ferrous sulfate) in addition to a prenatal vitamin.     The hospital has strict visitor restrictions at this time for your safety. Please be aware that visitor restrictions are subject to change. You are allowed to have one healthy adult visitor during your hospital stay, it must be the same person the entire time and they must stay with you at the hospital the entire time (they are not allowed to come and go).     For information about Covid-19 and pregnancy we look to the center for disease control, the CDC. You can look at this information online at:   https://www.cdc.gov/coronavirus/2019-ncov/hcp/pregnant-women-faq.html    Patient Education     Birth Control: IUD (Intrauterine Device)    The IUD (intrauterine device) is small, flexible, and T-shaped. A trained healthcare provider places it in the uterus. The IUD is one of the most effective birth control methods. It is also reversible. This means it can be removed at any time by a trained healthcare provider. New IUDs are safe and do not  have the risks of older types of IUDs.  Pregnancy rates  Talk to your healthcare provider about the effectiveness of this birth control method.  Types of IUDs  IUD insertion is done in the healthcare provider s office. Two types of IUDs are available:    The copper IUD releases a small amount of copper into the uterus. The copper makes it harder for sperm to reach the egg. The device works for at least 10 years.    The progestin IUD releases a hormone called progestin. It causes changes in the uterus to help prevent pregnancy. The device works for 3 to 5 years, depending on which device is chosen. It may be recommended for women who have anemia or heavy and painful periods.  IUDs have thin strings that hang from the opening of the uterus into the vagina. This lets you check that the IUD stays in place.  Things to know about IUDs    IUDs can be used by women who have never been pregnant or by women with a history of sexually transmitted infections (STIs) or tubal pregnancy.    It won't move from the uterus to any other part of the body.    There is a slight risk of the device coming out of the vagina (expulsion).    It may not work in women who have an abnormally shaped uterus.    A copper IUD may cause heavier periods and cramping.    Progestin IUD may cause light periods or no periods at all (irregular bleeding or spotting is possible and normal during first 3 to 6 months).    If you get a sexually transmitted infection with an IUD in place, symptoms may be more severe.  When to call your healthcare provider  Be sure your healthcare provider knows if you have:    A sexually transmitted infection (STI) or possible STI    Liver problems    Blood clots (for progestin IUD only)    Breast cancer or a history of breast cancer (progestin IUD only)   Date Last Reviewed: 3/1/2017    5177-9961 [employer]. [address] [place] [address], [place] [address]. All rights reserved. This information is not intended as a  substitute for professional medical care. Always follow your healthcare professional's instructions.

## 2020-04-01 NOTE — Clinical Note
This patient wants a PPTL but based on her insurance, she does not qualify since she has not signed the fed consent correct? Any loopholes to that?

## 2020-04-02 LAB
GP B STREP DNA SPEC QL NAA+PROBE: POSITIVE
SPECIMEN SOURCE: ABNORMAL

## 2020-04-02 NOTE — PROGRESS NOTES
36w6d  complains of feeling VERY tired.   Patient asking today about contraceptive options.  discussed IUD would work well for her.  She is afraid on IUD's based on a friend's experience.  Reassured her that most of the time the IUD's work quite well.  Then she asked if she could have a PPTL.   discussed permanent and irreversible.  She wants something permanent.     Recent US baby was at 9 lbs EFW and transverse.    Today on bedside US baby is cephalic.  She is not celso much.    Reviewed BG log today and at least 50% of the numbers are elevated.  fastings are .   discussed IOL for poorly controlled blood sugars and diabetic fetopathy.   Patient would like to come in next week on Monday will be 37w4d.  Her  is a  and out of town right now.       We discussed COVID restrictions, and visitor policy.  COVID instructions sent to her AVS and she was encouraged to read through that as well.  GBS and hgb done today.  RR

## 2020-04-05 LAB
BACTERIA SPEC CULT: ABNORMAL
SPECIMEN SOURCE: ABNORMAL

## 2020-04-06 ENCOUNTER — HOSPITAL ENCOUNTER (INPATIENT)
Facility: CLINIC | Age: 40
LOS: 3 days | Discharge: HOME | End: 2020-04-09
Attending: OBSTETRICS & GYNECOLOGY | Admitting: OBSTETRICS & GYNECOLOGY
Payer: COMMERCIAL

## 2020-04-06 DIAGNOSIS — O99.810 ABNORMAL MATERNAL GLUCOSE TOLERANCE, ANTEPARTUM: ICD-10-CM

## 2020-04-06 PROBLEM — Z34.90 PREGNANCY: Status: ACTIVE | Noted: 2020-04-06

## 2020-04-06 PROBLEM — Z34.90 ENCOUNTER FOR INDUCTION OF LABOR: Status: ACTIVE | Noted: 2020-04-06

## 2020-04-06 LAB
ABO + RH BLD: NORMAL
ABO + RH BLD: NORMAL
BASOPHILS # BLD AUTO: 0 10E9/L (ref 0–0.2)
BASOPHILS NFR BLD AUTO: 0.1 %
BLD GP AB SCN SERPL QL: NORMAL
BLOOD BANK CMNT PATIENT-IMP: NORMAL
DIFFERENTIAL METHOD BLD: ABNORMAL
EOSINOPHIL # BLD AUTO: 0.1 10E9/L (ref 0–0.7)
EOSINOPHIL NFR BLD AUTO: 1.4 %
ERYTHROCYTE [DISTWIDTH] IN BLOOD BY AUTOMATED COUNT: 14.5 % (ref 10–15)
GLUCOSE BLDC GLUCOMTR-MCNC: 102 MG/DL (ref 70–99)
GLUCOSE BLDC GLUCOMTR-MCNC: 126 MG/DL (ref 70–99)
GLUCOSE BLDC GLUCOMTR-MCNC: 138 MG/DL (ref 70–99)
GLUCOSE BLDC GLUCOMTR-MCNC: 87 MG/DL (ref 70–99)
HCT VFR BLD AUTO: 32.7 % (ref 35–47)
HGB BLD-MCNC: 10.9 G/DL (ref 11.7–15.7)
IMM GRANULOCYTES # BLD: 0.1 10E9/L (ref 0–0.4)
IMM GRANULOCYTES NFR BLD: 0.5 %
LYMPHOCYTES # BLD AUTO: 2 10E9/L (ref 0.8–5.3)
LYMPHOCYTES NFR BLD AUTO: 21.2 %
MCH RBC QN AUTO: 28 PG (ref 26.5–33)
MCHC RBC AUTO-ENTMCNC: 33.3 G/DL (ref 31.5–36.5)
MCV RBC AUTO: 84 FL (ref 78–100)
MONOCYTES # BLD AUTO: 0.6 10E9/L (ref 0–1.3)
MONOCYTES NFR BLD AUTO: 6.8 %
NEUTROPHILS # BLD AUTO: 6.6 10E9/L (ref 1.6–8.3)
NEUTROPHILS NFR BLD AUTO: 70 %
NRBC # BLD AUTO: 0 10*3/UL
NRBC BLD AUTO-RTO: 0 /100
PLATELET # BLD AUTO: 166 10E9/L (ref 150–450)
RBC # BLD AUTO: 3.89 10E12/L (ref 3.8–5.2)
SPECIMEN EXP DATE BLD: NORMAL
WBC # BLD AUTO: 9.5 10E9/L (ref 4–11)

## 2020-04-06 PROCEDURE — 25000128 H RX IP 250 OP 636: Performed by: STUDENT IN AN ORGANIZED HEALTH CARE EDUCATION/TRAINING PROGRAM

## 2020-04-06 PROCEDURE — 86900 BLOOD TYPING SEROLOGIC ABO: CPT | Performed by: STUDENT IN AN ORGANIZED HEALTH CARE EDUCATION/TRAINING PROGRAM

## 2020-04-06 PROCEDURE — 85025 COMPLETE CBC W/AUTO DIFF WBC: CPT | Performed by: STUDENT IN AN ORGANIZED HEALTH CARE EDUCATION/TRAINING PROGRAM

## 2020-04-06 PROCEDURE — 25000131 ZZH RX MED GY IP 250 OP 636 PS 637: Performed by: STUDENT IN AN ORGANIZED HEALTH CARE EDUCATION/TRAINING PROGRAM

## 2020-04-06 PROCEDURE — 86850 RBC ANTIBODY SCREEN: CPT | Performed by: STUDENT IN AN ORGANIZED HEALTH CARE EDUCATION/TRAINING PROGRAM

## 2020-04-06 PROCEDURE — 00000146 ZZHCL STATISTIC GLUCOSE BY METER IP

## 2020-04-06 PROCEDURE — 25000132 ZZH RX MED GY IP 250 OP 250 PS 637: Performed by: STUDENT IN AN ORGANIZED HEALTH CARE EDUCATION/TRAINING PROGRAM

## 2020-04-06 PROCEDURE — 25800030 ZZH RX IP 258 OP 636: Performed by: STUDENT IN AN ORGANIZED HEALTH CARE EDUCATION/TRAINING PROGRAM

## 2020-04-06 PROCEDURE — 3E0P7VZ INTRODUCTION OF HORMONE INTO FEMALE REPRODUCTIVE, VIA NATURAL OR ARTIFICIAL OPENING: ICD-10-PCS | Performed by: OBSTETRICS & GYNECOLOGY

## 2020-04-06 PROCEDURE — 86901 BLOOD TYPING SEROLOGIC RH(D): CPT | Performed by: STUDENT IN AN ORGANIZED HEALTH CARE EDUCATION/TRAINING PROGRAM

## 2020-04-06 PROCEDURE — 12000001 ZZH R&B MED SURG/OB UMMC

## 2020-04-06 RX ORDER — SODIUM CHLORIDE, SODIUM LACTATE, POTASSIUM CHLORIDE, CALCIUM CHLORIDE 600; 310; 30; 20 MG/100ML; MG/100ML; MG/100ML; MG/100ML
INJECTION, SOLUTION INTRAVENOUS CONTINUOUS
Status: DISCONTINUED | OUTPATIENT
Start: 2020-04-06 | End: 2020-04-07

## 2020-04-06 RX ORDER — NICOTINE POLACRILEX 4 MG
15-30 LOZENGE BUCCAL
Status: DISCONTINUED | OUTPATIENT
Start: 2020-04-06 | End: 2020-04-07

## 2020-04-06 RX ORDER — ONDANSETRON 2 MG/ML
4 INJECTION INTRAMUSCULAR; INTRAVENOUS EVERY 6 HOURS PRN
Status: DISCONTINUED | OUTPATIENT
Start: 2020-04-06 | End: 2020-04-07

## 2020-04-06 RX ORDER — DEXTROSE MONOHYDRATE 25 G/50ML
25-50 INJECTION, SOLUTION INTRAVENOUS
Status: DISCONTINUED | OUTPATIENT
Start: 2020-04-06 | End: 2020-04-07

## 2020-04-06 RX ORDER — SODIUM CHLORIDE 9 MG/ML
INJECTION, SOLUTION INTRAVENOUS CONTINUOUS
Status: DISCONTINUED | OUTPATIENT
Start: 2020-04-06 | End: 2020-04-07

## 2020-04-06 RX ORDER — PENICILLIN G POTASSIUM 5000000 [IU]/1
5 INJECTION, POWDER, FOR SOLUTION INTRAMUSCULAR; INTRAVENOUS ONCE
Status: COMPLETED | OUTPATIENT
Start: 2020-04-06 | End: 2020-04-06

## 2020-04-06 RX ORDER — MISOPROSTOL 100 UG/1
25 TABLET ORAL EVERY 4 HOURS PRN
Status: DISCONTINUED | OUTPATIENT
Start: 2020-04-06 | End: 2020-04-07

## 2020-04-06 RX ORDER — OXYTOCIN 10 [USP'U]/ML
INJECTION, SOLUTION INTRAMUSCULAR; INTRAVENOUS
Status: DISCONTINUED
Start: 2020-04-06 | End: 2020-04-06 | Stop reason: WASHOUT

## 2020-04-06 RX ORDER — CETIRIZINE HYDROCHLORIDE 10 MG/1
10 TABLET ORAL DAILY
Status: DISCONTINUED | OUTPATIENT
Start: 2020-04-07 | End: 2020-04-07

## 2020-04-06 RX ORDER — LIDOCAINE HYDROCHLORIDE 10 MG/ML
INJECTION, SOLUTION EPIDURAL; INFILTRATION; INTRACAUDAL; PERINEURAL
Status: DISCONTINUED
Start: 2020-04-06 | End: 2020-04-06 | Stop reason: HOSPADM

## 2020-04-06 RX ORDER — DEXTROSE MONOHYDRATE 25 G/50ML
25-50 INJECTION, SOLUTION INTRAVENOUS
Status: DISCONTINUED | OUTPATIENT
Start: 2020-04-06 | End: 2020-04-06

## 2020-04-06 RX ORDER — MISOPROSTOL 200 UG/1
TABLET ORAL
Status: DISCONTINUED
Start: 2020-04-06 | End: 2020-04-06 | Stop reason: HOSPADM

## 2020-04-06 RX ORDER — NICOTINE POLACRILEX 4 MG
15-30 LOZENGE BUCCAL
Status: DISCONTINUED | OUTPATIENT
Start: 2020-04-06 | End: 2020-04-06

## 2020-04-06 RX ORDER — OXYTOCIN/0.9 % SODIUM CHLORIDE 30/500 ML
PLASTIC BAG, INJECTION (ML) INTRAVENOUS
Status: COMPLETED
Start: 2020-04-06 | End: 2020-04-07

## 2020-04-06 RX ADMIN — Medication 2.5 MILLION UNITS: at 23:05

## 2020-04-06 RX ADMIN — Medication 2.5 MILLION UNITS: at 19:10

## 2020-04-06 RX ADMIN — Medication 25 MCG: at 15:01

## 2020-04-06 RX ADMIN — INSULIN ASPART 16 UNITS: 100 INJECTION, SOLUTION INTRAVENOUS; SUBCUTANEOUS at 14:51

## 2020-04-06 RX ADMIN — PENICILLIN G POTASSIUM 5 MILLION UNITS: 5000000 POWDER, FOR SOLUTION INTRAMUSCULAR; INTRAPLEURAL; INTRATHECAL; INTRAVENOUS at 15:09

## 2020-04-06 RX ADMIN — Medication 25 MCG: at 11:00

## 2020-04-06 RX ADMIN — INSULIN ASPART 12 UNITS: 100 INJECTION, SOLUTION INTRAVENOUS; SUBCUTANEOUS at 19:55

## 2020-04-06 RX ADMIN — SODIUM CHLORIDE, POTASSIUM CHLORIDE, SODIUM LACTATE AND CALCIUM CHLORIDE: 600; 310; 30; 20 INJECTION, SOLUTION INTRAVENOUS at 18:27

## 2020-04-06 RX ADMIN — INSULIN DETEMIR 61 UNITS: 100 INJECTION, SOLUTION SUBCUTANEOUS at 22:10

## 2020-04-06 ASSESSMENT — MIFFLIN-ST. JEOR: SCORE: 1672.87

## 2020-04-06 NOTE — PROGRESS NOTES
"Strip Review Note     O:   Vitals:    20 0915 20 1100   BP: 129/73 120/58   Pulse: 86    Resp: 20 18   Temp: 98.2  F (36.8  C) 98.1  F (36.7  C)   TempSrc: Oral Oral   Weight: 100.7 kg (222 lb)    Height: 1.651 m (5' 5\")      FHT: 140 baseline, moderate variability, pos accels, no decels  TOCO: quiet     A/P: Ms. Brit Romano is a 40 year old , at 37w4d by 18w0d US who presents for induction of labor for poorly controlled GDMA2.    #Induction of labor  -Cervix unfavorable on admit, ripening with PV misoprostol  -GBS positive, planning PCN. Will start when patient starting to get uncomfortable with contractions as she is a grandmultip  -Anticipate      #Suspected fetal macrosomia  -Projected EFW 4319. Reviewed guidelines for primary  section recommended in diabetic mothers with EFW >4500g.   -S/p discussion of risks of shoulder dystocia including permanent nerve injury and brain damage     #Poorly controlled GDMA2  -PTA regimen: Levemir 61Uqhs, Novolog   - Plan for preprandial and 2 hr postprandial blood sugar checks while eating in early labor.  - Medium sliding scale ordered, will continue mealtime insulin  - Once patient in labor will transition to Q4H check in early labor and in active labor Q1H checks. Insulin gtt will be ordered if indicated  - Carb consistent diet ordered     #PNC/FWB  -Prenatal labs reviewed: Rh pos, Rubella immune,  GBS pos  -Genetic screening: QUAD screen T21 risk 1:115, Level II wnl  -Placenta anterior, EFW 9.5# as above  -Category I, reactive FHT     Sasha Orta MD  Ob/Gyn PGY-2  2020 1:57 PM      "

## 2020-04-06 NOTE — H&P
[facility]  OB History and Physical      Brit Romano MRN# [id number]   Age: 40 year old YOB: 1980     CC:  Induction of labor    HPI:  Ms. Brit Romano is a 40 year old  at 37w4d by 18w0d US who presents for induction of labor for poorly controlled GDMA2. Reports fasting GB in low 100s. 2 hour post-prandial 130s-140s, rarely as high as 180-200. She reports some contractions the other day. No vaginal bleeding or loss of fluid.   + normal fetal movement. Feels like this baby feels bigger than prior (largest 8lb)    Pregnancy Complications:  -GDMA2, Levemir 61Uqhs, Novolog   -Suspected fetal macrosomia, projected 4319g  -Grandmultiparity  -AMA  -Vitamin B12, D deficiencies  -History of IUFD  -GBS positive    Prenatal Labs:   Lab Results   Component Value Date    ABO A 10/29/2019    RH Pos 10/29/2019    AS Neg 10/29/2019    HEPBANG Nonreactive 10/29/2019    CHPCRT Negative 2019    GCPCRT Negative 2019    TREPAB Negative 2018    HGB 10.3 (L) 2020       GBS Status:   Lab Results   Component Value Date    GBS Positive (A) 2020       Ultrasounds  US 3/2020  EFW 4093g 99%ile, TRISTA 23.9g  1) Pierre intrauterine pregnancy at 36 & 4/7 weeks gestational age.  2) None of the anomalies commonly detected by ultrasound were evident in the limited fetal anatomic survey as described above, anatomy limited by gestational  age and fetal lie.  3) Growth parameters and estimated fetal weight were consistent with established dates. The abdominal circumference is accelerated.  4) The amniotic fluid volume appeared normal.  5) The BPP is 8/8.  6) The fetus is TRANSVERSE.    OB History  OB History    Para Term  AB Living   10 9 9 0 0 8   SAB TAB Ectopic Multiple Live Births   0 0 0 0 8      # Outcome Date GA Lbr Jamal/2nd Weight Sex Delivery Anes PTL Lv   10 Current            9 Term 18 39w0d 07:36 / 00:36 3.27 kg (7 lb 3.3 oz) F Vag-Spont EPI  "N ROXANNA      Complications: Preeclampsia/Hypertension      Name: KAVON ROMANO      Apgar1: 9  Apgar5: 9   8 Term 16 40w2d 04:29 / 00:17 3.52 kg (7 lb 12.2 oz) F Vag-Spont EPI N ROXANNA      Apgar1: 9  Apgar5: 9   7 Term 12    F    ROXANNA   6 Term 11    F   N ROXANNA   5 Term 09    M   N ROXANNA      Complications: GDM (gestational diabetes mellitus)   4 Term 07    M I.U. FETAL D  N FD   3 Term 05    M   N ROXANNA   2 Term 03    M   N ROXANNA   1 Term 12/10/00    M   N ROXANNA      Obstetric Comments   No PTL, of HTN. GDM x 2 diet controlled       PMHx:   Past Medical History:   Diagnosis Date     Fatty liver      Fibromyalgia      History of gestational diabetes      Seasonal allergies      PSHx: Reviewed  Past Surgical History:   Procedure Laterality Date     NO HISTORY OF SURGERY       Meds:   -Levemir  -Aspart  -B12  -Vitamin D  -Prenatal vitamins     Allergies:  No Known Allergies   FmHx:   Family History   Problem Relation Age of Onset     Hypertension Mother      Diabetes Mother      Kidney Disease Mother         dialysis     Glaucoma Mother      Diabetes Son      Macular Degeneration No family hx of      SocHx: She denies any tobacco, alcohol, or other drug use during this pregnancy.    ROS:   Complete 10-point ROS negative except as noted in HPI    PE:  Vit:   Patient Vitals for the past 4 hrs:   BP Temp Temp src Pulse Resp Height Weight   20 0915 129/73 98.2  F (36.8  C) Oral 86 20 1.651 m (5' 5\") 100.7 kg (222 lb)      Gen: Well-appearing, NAD, comfortable   CV: rrr, well perfused  Pulm: Unlabored breathing on room air  Abd: Soft, gravid, non-tender  Ext: trace LE edema b/l  Cx: Closed, thick, high    Pres:  ceph by BSUS  EFW:  9.5# by Leopolds  Memb: intact              FHT: Baseline 140, moderate variability, pos accelerations no decelerations   [place]: 0 contractions in 10 minutes      Assessment  Ms. Brit Romano is a 40 year old , at 37w4d by " 18w0d US who presents for induction of labor for poorly controlled GDMA2.    Plan    #Induction of labor  -Cervix unfavorable, will start with PV misoprostol  -GBS positive, planning PCN. Will start when patient starting to get uncomfortable with contractions as she is a grandmultip  -Anticipate     #Suspected fetal macrosomia  -Projected EFW 4319. Reviewed guidelines for primary  section recommended in diabetic mothers with EFW >4500g.   -Reviewed risks of shoulder dystocia including permanent nerve injury and brain damage    #Poorly controlled GDMA2  -PTA regimen: Levemir 61Uqhs, Novolog   -Will continue mealtime insulin with MSSI correction  -Fasting, mealtime, 2 hour postprandial, and bedtime checks  -Insulin gtt in active labor    #PNC/FWB  -Prenatal labs reviewed: Rh pos, Rubella immune,  GBS pos  -Genetic screening: QUAD screen T21 risk 1:115, Level II wnl  -Placenta anterior, EFW 9.5# as above  -Category I, reactive FHT     The patient was discussed with Dr. Toussaint who is in agreement with the treatment plan.    Sasha Orta MD  Ob/Gyn PGY-2  2020 9:45 AM      Physician Attestation   I, Diann Toussaint MD, personally examined and evaluated this patient.  I discussed the patient with the resident/fellow and care team, and agree with the assessment and plan of care as documented in the note of 20.      I personally reviewed vital signs, medications, labs, imaging and exam and fetal tracing..    Key findings: 40 year old  at 37w4d here for IOL for poorly controlled A2 GDM. Cervix unfavorable. Agree with vaginal misoprostol for ripening. GBS pos, plan PCN. A2 GDM, will check BG per protocol, insulin gtt as needed. Anticipate .   Diann Toussaint MD  Date of Service (when I saw the patient): 20

## 2020-04-06 NOTE — PLAN OF CARE
Data: Patient presented to [facility] at 0842. Reason for maternal/fetal assessment per patient is Induction Of Labor  Patient is a . Prenatal record reviewed.      OB History    Para Term  AB Living   10 9 9 0 0 8   SAB TAB Ectopic Multiple Live Births   0 0 0 0 8      # Outcome Date GA Lbr Jamal/2nd Weight Sex Delivery Anes PTL Lv   10 Current            9 Term 18 39w0d 07:36 / 00:36 3.27 kg (7 lb 3.3 oz) F Vag-Spont EPI N ROXANNA      Complications: Preeclampsia/Hypertension      Name: KAVON TORIBIO LEAH      Apgar1: 9  Apgar5: 9   8 Term 16 40w2d 04:29 / 00:17 3.52 kg (7 lb 12.2 oz) F Vag-Spont EPI N ROXANNA      Apgar1: 9  Apgar5: 9   7 Term 12    F    ROXANNA   6 Term 11    F   N ROXANNA   5 Term 09    M   N ROXANNA      Complications: GDM (gestational diabetes mellitus)   4 Term 07    M I.U. FETAL D  N FD   3 Term 05    M   N ROXANNA   2 Term 03    M   N ROXANNA   1 Term 12/10/00    M   N ROXANNA      Obstetric Comments   No PTL, of HTN. GDM x 2 diet controlled   . Medical history:   Past Medical History:   Diagnosis Date    Fatty liver     Fibromyalgia     History of gestational diabetes     Seasonal allergies    . Gestational Age 37w4d. VSS. Fetal movement present. Patient denies cramping, backache, vaginal discharge, pelvic pressure, UTI symptoms, GI problems, bloody show, vaginal bleeding, edema, headache, visual disturbances, epigastric or URQ pain, abdominal pain, rupture of membranes.  is a  is out of town; will come tomorrow.   Action: EFM applied for monitoring. Uterine assessment none. Fetal assessment: Presumed adequate fetal oxygenation documented (see flow record). Baby cephalic per US.   Response:  and Dr Toussaint informed of pt arrival and status. Plan per provider is start induction. Patient verbalized agreement with plan.

## 2020-04-06 NOTE — PROGRESS NOTES
"   [facility] PROGRESS NOTE:   2020 6:27 PM         SUBJECTIVE:   Patient complains of mild contractions .    Contractions:  q 3-6 minutes  Leakage of fluid:  No  Vaginal bleeding:  No  Pain controlled:  Yes           OBJECTIVE:     Vitals:    20 0915 20 1100 20 1500   BP: 129/73 120/58 118/57   Pulse: 86     Resp:    Temp: 98.2  F (36.8  C) 98.1  F (36.7  C) 98.2  F (36.8  C)   TempSrc: Oral Oral Oral   Weight: 100.7 kg (222 lb)     Height: 1.651 m (5' 5\")           NST:  Fetal Heart Rate Tracin bpm baseline, mod variability, + accels, no decels  Category 2 due to fetal tachycardia  Tocometer: q 3-6 minutes    Gen: alert, oriented, no distress  Abdomen:  Gravid, nontender  Cervix:   Dilation: closed   Effacement: 30%   Station:-4   Consistency: soft   Position: Posterior         LABS:     Recent Results (from the past 12 hour(s))   CBC with platelets differential    Collection Time: 20 10:46 AM   Result Value Ref Range    WBC 9.5 4.0 - 11.0 10e9/L    RBC Count 3.89 3.8 - 5.2 10e12/L    Hemoglobin 10.9 (L) 11.7 - 15.7 g/dL    Hematocrit 32.7 (L) 35.0 - 47.0 %    MCV 84 78 - 100 fl    MCH 28.0 26.5 - 33.0 pg    MCHC 33.3 31.5 - 36.5 g/dL    RDW 14.5 10.0 - 15.0 %    Platelet Count 166 150 - 450 10e9/L    Diff Method Automated Method     % Neutrophils 70.0 %    % Lymphocytes 21.2 %    % Monocytes 6.8 %    % Eosinophils 1.4 %    % Basophils 0.1 %    % Immature Granulocytes 0.5 %    Nucleated RBCs 0 0 /100    Absolute Neutrophil 6.6 1.6 - 8.3 10e9/L    Absolute Lymphocytes 2.0 0.8 - 5.3 10e9/L    Absolute Monocytes 0.6 0.0 - 1.3 10e9/L    Absolute Eosinophils 0.1 0.0 - 0.7 10e9/L    Absolute Basophils 0.0 0.0 - 0.2 10e9/L    Abs Immature Granulocytes 0.1 0 - 0.4 10e9/L    Absolute Nucleated RBC 0.0    ABO/Rh type and screen    Collection Time: 20 10:46 AM   Result Value Ref Range    ABO A     RH(D) Pos     Antibody Screen Neg     Test Valid Only At      "     [facility]    Specimen Expires 2020    Glucose by meter    Collection Time: 20  2:05 PM   Result Value Ref Range    Glucose 102 (H) 70 - 99 mg/dL   Glucose by meter    Collection Time: 20  4:58 PM   Result Value Ref Range    Glucose 126 (H) 70 - 99 mg/dL              ASSESSMENT / PLAN:   40 year old  at 37w4d admitted for IOl for poorly controlled A2 GDM.    Labor: s/p miso x2, contractions too frequent for more miso. Continue to monitor  Fetal well being: Category 2 tracing due to fetal tachycardia. Will give IV fluid bolus now  GBS: pos, PCN  A2 GDM: BG checks per protocol, insulin gtt as needed  Pain: expectant  Anticipate .    Diann Toussaint MD

## 2020-04-07 ENCOUNTER — ANESTHESIA EVENT (OUTPATIENT)
Dept: OBGYN | Facility: CLINIC | Age: 40
End: 2020-04-07
Payer: COMMERCIAL

## 2020-04-07 ENCOUNTER — ANESTHESIA (OUTPATIENT)
Dept: OBGYN | Facility: CLINIC | Age: 40
End: 2020-04-07
Payer: COMMERCIAL

## 2020-04-07 LAB
ALT SERPL W P-5'-P-CCNC: 12 U/L (ref 0–50)
AST SERPL W P-5'-P-CCNC: 13 U/L (ref 0–45)
BASOPHILS # BLD AUTO: 0 10E9/L (ref 0–0.2)
BASOPHILS NFR BLD AUTO: 0.1 %
CREAT SERPL-MCNC: 0.3 MG/DL (ref 0.52–1.04)
DIFFERENTIAL METHOD BLD: ABNORMAL
EOSINOPHIL # BLD AUTO: 0 10E9/L (ref 0–0.7)
EOSINOPHIL NFR BLD AUTO: 0.1 %
ERYTHROCYTE [DISTWIDTH] IN BLOOD BY AUTOMATED COUNT: 14.4 % (ref 10–15)
GFR SERPL CREATININE-BSD FRML MDRD: >90 ML/MIN/{1.73_M2}
GLUCOSE BLDC GLUCOMTR-MCNC: 103 MG/DL (ref 70–99)
GLUCOSE BLDC GLUCOMTR-MCNC: 112 MG/DL (ref 70–99)
GLUCOSE BLDC GLUCOMTR-MCNC: 115 MG/DL (ref 70–99)
GLUCOSE BLDC GLUCOMTR-MCNC: 123 MG/DL (ref 70–99)
GLUCOSE BLDC GLUCOMTR-MCNC: 125 MG/DL (ref 70–99)
GLUCOSE BLDC GLUCOMTR-MCNC: 127 MG/DL (ref 70–99)
GLUCOSE BLDC GLUCOMTR-MCNC: 134 MG/DL (ref 70–99)
GLUCOSE BLDC GLUCOMTR-MCNC: 136 MG/DL (ref 70–99)
GLUCOSE BLDC GLUCOMTR-MCNC: 161 MG/DL (ref 70–99)
GLUCOSE BLDC GLUCOMTR-MCNC: 175 MG/DL (ref 70–99)
GLUCOSE BLDC GLUCOMTR-MCNC: 90 MG/DL (ref 70–99)
GLUCOSE BLDC GLUCOMTR-MCNC: 96 MG/DL (ref 70–99)
GLUCOSE BLDC GLUCOMTR-MCNC: 98 MG/DL (ref 70–99)
GLUCOSE BLDC GLUCOMTR-MCNC: 98 MG/DL (ref 70–99)
HCT VFR BLD AUTO: 33.9 % (ref 35–47)
HGB BLD-MCNC: 11.4 G/DL (ref 11.7–15.7)
IMM GRANULOCYTES # BLD: 0.1 10E9/L (ref 0–0.4)
IMM GRANULOCYTES NFR BLD: 0.4 %
KETONES BLD-SCNC: 0.3 MMOL/L (ref 0–0.6)
LYMPHOCYTES # BLD AUTO: 1.4 10E9/L (ref 0.8–5.3)
LYMPHOCYTES NFR BLD AUTO: 9 %
MCH RBC QN AUTO: 28.4 PG (ref 26.5–33)
MCHC RBC AUTO-ENTMCNC: 33.6 G/DL (ref 31.5–36.5)
MCV RBC AUTO: 85 FL (ref 78–100)
MONOCYTES # BLD AUTO: 0.5 10E9/L (ref 0–1.3)
MONOCYTES NFR BLD AUTO: 3.5 %
NEUTROPHILS # BLD AUTO: 13.2 10E9/L (ref 1.6–8.3)
NEUTROPHILS NFR BLD AUTO: 86.9 %
NRBC # BLD AUTO: 0 10*3/UL
NRBC BLD AUTO-RTO: 0 /100
PLATELET # BLD AUTO: 179 10E9/L (ref 150–450)
RBC # BLD AUTO: 4.01 10E12/L (ref 3.8–5.2)
WBC # BLD AUTO: 15.2 10E9/L (ref 4–11)

## 2020-04-07 PROCEDURE — 0KQM0ZZ REPAIR PERINEUM MUSCLE, OPEN APPROACH: ICD-10-PCS | Performed by: OBSTETRICS & GYNECOLOGY

## 2020-04-07 PROCEDURE — 85025 COMPLETE CBC W/AUTO DIFF WBC: CPT

## 2020-04-07 PROCEDURE — 72200001 ZZH LABOR CARE VAGINAL DELIVERY SINGLE

## 2020-04-07 PROCEDURE — 25000128 H RX IP 250 OP 636: Performed by: STUDENT IN AN ORGANIZED HEALTH CARE EDUCATION/TRAINING PROGRAM

## 2020-04-07 PROCEDURE — 84450 TRANSFERASE (AST) (SGOT): CPT

## 2020-04-07 PROCEDURE — 25000125 ZZHC RX 250: Performed by: STUDENT IN AN ORGANIZED HEALTH CARE EDUCATION/TRAINING PROGRAM

## 2020-04-07 PROCEDURE — 36416 COLLJ CAPILLARY BLOOD SPEC: CPT | Performed by: STUDENT IN AN ORGANIZED HEALTH CARE EDUCATION/TRAINING PROGRAM

## 2020-04-07 PROCEDURE — 82010 KETONE BODYS QUAN: CPT | Performed by: STUDENT IN AN ORGANIZED HEALTH CARE EDUCATION/TRAINING PROGRAM

## 2020-04-07 PROCEDURE — 82565 ASSAY OF CREATININE: CPT

## 2020-04-07 PROCEDURE — 3E0R3BZ INTRODUCTION OF ANESTHETIC AGENT INTO SPINAL CANAL, PERCUTANEOUS APPROACH: ICD-10-PCS | Performed by: STUDENT IN AN ORGANIZED HEALTH CARE EDUCATION/TRAINING PROGRAM

## 2020-04-07 PROCEDURE — 00000146 ZZHCL STATISTIC GLUCOSE BY METER IP

## 2020-04-07 PROCEDURE — 25000125 ZZHC RX 250

## 2020-04-07 PROCEDURE — 84460 ALANINE AMINO (ALT) (SGPT): CPT

## 2020-04-07 PROCEDURE — 40000671 ZZH STATISTIC ANESTHESIA CASE

## 2020-04-07 PROCEDURE — 00HU33Z INSERTION OF INFUSION DEVICE INTO SPINAL CANAL, PERCUTANEOUS APPROACH: ICD-10-PCS | Performed by: STUDENT IN AN ORGANIZED HEALTH CARE EDUCATION/TRAINING PROGRAM

## 2020-04-07 PROCEDURE — 25000128 H RX IP 250 OP 636

## 2020-04-07 PROCEDURE — 36415 COLL VENOUS BLD VENIPUNCTURE: CPT

## 2020-04-07 PROCEDURE — 59400 OBSTETRICAL CARE: CPT | Mod: GC | Performed by: OBSTETRICS & GYNECOLOGY

## 2020-04-07 PROCEDURE — 12000001 ZZH R&B MED SURG/OB UMMC

## 2020-04-07 PROCEDURE — 25800030 ZZH RX IP 258 OP 636: Performed by: STUDENT IN AN ORGANIZED HEALTH CARE EDUCATION/TRAINING PROGRAM

## 2020-04-07 PROCEDURE — 25000132 ZZH RX MED GY IP 250 OP 250 PS 637: Performed by: STUDENT IN AN ORGANIZED HEALTH CARE EDUCATION/TRAINING PROGRAM

## 2020-04-07 PROCEDURE — 10907ZC DRAINAGE OF AMNIOTIC FLUID, THERAPEUTIC FROM PRODUCTS OF CONCEPTION, VIA NATURAL OR ARTIFICIAL OPENING: ICD-10-PCS | Performed by: OBSTETRICS & GYNECOLOGY

## 2020-04-07 RX ORDER — AMOXICILLIN 250 MG
2 CAPSULE ORAL 2 TIMES DAILY
Status: DISCONTINUED | OUTPATIENT
Start: 2020-04-07 | End: 2020-04-09 | Stop reason: HOSPADM

## 2020-04-07 RX ORDER — ONDANSETRON 2 MG/ML
4 INJECTION INTRAMUSCULAR; INTRAVENOUS EVERY 6 HOURS PRN
Status: DISCONTINUED | OUTPATIENT
Start: 2020-04-07 | End: 2020-04-07

## 2020-04-07 RX ORDER — MISOPROSTOL 200 UG/1
400 TABLET ORAL
Status: COMPLETED | OUTPATIENT
Start: 2020-04-07 | End: 2020-04-07

## 2020-04-07 RX ORDER — FENTANYL CITRATE 50 UG/ML
100 INJECTION, SOLUTION INTRAMUSCULAR; INTRAVENOUS ONCE
Status: COMPLETED | OUTPATIENT
Start: 2020-04-07 | End: 2020-04-07

## 2020-04-07 RX ORDER — FENTANYL CITRATE 50 UG/ML
INJECTION, SOLUTION INTRAMUSCULAR; INTRAVENOUS PRN
Status: DISCONTINUED | OUTPATIENT
Start: 2020-04-07 | End: 2023-11-24

## 2020-04-07 RX ORDER — EPHEDRINE SULFATE 50 MG/ML
5 INJECTION, SOLUTION INTRAMUSCULAR; INTRAVENOUS; SUBCUTANEOUS
Status: DISCONTINUED | OUTPATIENT
Start: 2020-04-07 | End: 2020-04-07

## 2020-04-07 RX ORDER — METHYLERGONOVINE MALEATE 0.2 MG/ML
200 INJECTION INTRAVENOUS
Status: DISCONTINUED | OUTPATIENT
Start: 2020-04-07 | End: 2020-04-09 | Stop reason: HOSPADM

## 2020-04-07 RX ORDER — HYDROCORTISONE 2.5 %
CREAM (GRAM) TOPICAL 3 TIMES DAILY PRN
Status: DISCONTINUED | OUTPATIENT
Start: 2020-04-07 | End: 2020-04-09 | Stop reason: HOSPADM

## 2020-04-07 RX ORDER — NALOXONE HYDROCHLORIDE 0.4 MG/ML
.1-.4 INJECTION, SOLUTION INTRAMUSCULAR; INTRAVENOUS; SUBCUTANEOUS
Status: DISCONTINUED | OUTPATIENT
Start: 2020-04-07 | End: 2020-04-09 | Stop reason: HOSPADM

## 2020-04-07 RX ORDER — LIDOCAINE 40 MG/G
CREAM TOPICAL
Status: DISCONTINUED | OUTPATIENT
Start: 2020-04-07 | End: 2020-04-07

## 2020-04-07 RX ORDER — OXYTOCIN 10 [USP'U]/ML
10 INJECTION, SOLUTION INTRAMUSCULAR; INTRAVENOUS
Status: DISCONTINUED | OUTPATIENT
Start: 2020-04-07 | End: 2020-04-09 | Stop reason: HOSPADM

## 2020-04-07 RX ORDER — LIDOCAINE HYDROCHLORIDE AND EPINEPHRINE 15; 5 MG/ML; UG/ML
INJECTION, SOLUTION EPIDURAL PRN
Status: DISCONTINUED | OUTPATIENT
Start: 2020-04-07 | End: 2023-11-24

## 2020-04-07 RX ORDER — OXYTOCIN/0.9 % SODIUM CHLORIDE 30/500 ML
100-340 PLASTIC BAG, INJECTION (ML) INTRAVENOUS CONTINUOUS PRN
Status: DISCONTINUED | OUTPATIENT
Start: 2020-04-07 | End: 2020-04-07

## 2020-04-07 RX ORDER — IBUPROFEN 800 MG/1
800 TABLET, FILM COATED ORAL EVERY 6 HOURS PRN
Status: DISCONTINUED | OUTPATIENT
Start: 2020-04-07 | End: 2020-04-09 | Stop reason: HOSPADM

## 2020-04-07 RX ORDER — METHYLERGONOVINE MALEATE 0.2 MG/ML
200 INJECTION INTRAVENOUS
Status: DISCONTINUED | OUTPATIENT
Start: 2020-04-07 | End: 2020-04-07

## 2020-04-07 RX ORDER — LANOLIN 100 %
OINTMENT (GRAM) TOPICAL
Status: DISCONTINUED | OUTPATIENT
Start: 2020-04-07 | End: 2020-04-09 | Stop reason: HOSPADM

## 2020-04-07 RX ORDER — FENTANYL/BUPIVACAINE/NS/PF 2-1250MCG
PLASTIC BAG, INJECTION (ML) INJECTION
Status: COMPLETED
Start: 2020-04-07 | End: 2020-04-07

## 2020-04-07 RX ORDER — DEXTROSE MONOHYDRATE 25 G/50ML
25-50 INJECTION, SOLUTION INTRAVENOUS
Status: DISCONTINUED | OUTPATIENT
Start: 2020-04-07 | End: 2020-04-07

## 2020-04-07 RX ORDER — OXYCODONE AND ACETAMINOPHEN 5; 325 MG/1; MG/1
1 TABLET ORAL
Status: DISCONTINUED | OUTPATIENT
Start: 2020-04-07 | End: 2020-04-07

## 2020-04-07 RX ORDER — FENTANYL/BUPIVACAINE/NS/PF 2-1250MCG
PLASTIC BAG, INJECTION (ML) INJECTION CONTINUOUS PRN
Status: DISCONTINUED | OUTPATIENT
Start: 2020-04-07 | End: 2023-11-24

## 2020-04-07 RX ORDER — CARBOPROST TROMETHAMINE 250 UG/ML
250 INJECTION, SOLUTION INTRAMUSCULAR
Status: DISCONTINUED | OUTPATIENT
Start: 2020-04-07 | End: 2020-04-09 | Stop reason: HOSPADM

## 2020-04-07 RX ORDER — OXYTOCIN/0.9 % SODIUM CHLORIDE 30/500 ML
340 PLASTIC BAG, INJECTION (ML) INTRAVENOUS CONTINUOUS PRN
Status: DISCONTINUED | OUTPATIENT
Start: 2020-04-07 | End: 2020-04-09 | Stop reason: HOSPADM

## 2020-04-07 RX ORDER — NICOTINE POLACRILEX 4 MG
15-30 LOZENGE BUCCAL
Status: DISCONTINUED | OUTPATIENT
Start: 2020-04-07 | End: 2020-04-07

## 2020-04-07 RX ORDER — OXYTOCIN/0.9 % SODIUM CHLORIDE 30/500 ML
100 PLASTIC BAG, INJECTION (ML) INTRAVENOUS CONTINUOUS
Status: DISCONTINUED | OUTPATIENT
Start: 2020-04-07 | End: 2020-04-09 | Stop reason: HOSPADM

## 2020-04-07 RX ORDER — NALOXONE HYDROCHLORIDE 0.4 MG/ML
.1-.4 INJECTION, SOLUTION INTRAMUSCULAR; INTRAVENOUS; SUBCUTANEOUS
Status: DISCONTINUED | OUTPATIENT
Start: 2020-04-07 | End: 2020-04-07

## 2020-04-07 RX ORDER — BISACODYL 10 MG
10 SUPPOSITORY, RECTAL RECTAL DAILY PRN
Status: DISCONTINUED | OUTPATIENT
Start: 2020-04-09 | End: 2020-04-09 | Stop reason: HOSPADM

## 2020-04-07 RX ORDER — OXYTOCIN/0.9 % SODIUM CHLORIDE 30/500 ML
1-24 PLASTIC BAG, INJECTION (ML) INTRAVENOUS CONTINUOUS
Status: DISCONTINUED | OUTPATIENT
Start: 2020-04-07 | End: 2020-04-07

## 2020-04-07 RX ORDER — AMOXICILLIN 250 MG
1 CAPSULE ORAL 2 TIMES DAILY
Status: DISCONTINUED | OUTPATIENT
Start: 2020-04-07 | End: 2020-04-09 | Stop reason: HOSPADM

## 2020-04-07 RX ORDER — NALBUPHINE HYDROCHLORIDE 20 MG/ML
2.5-5 INJECTION, SOLUTION INTRAMUSCULAR; INTRAVENOUS; SUBCUTANEOUS EVERY 6 HOURS PRN
Status: DISCONTINUED | OUTPATIENT
Start: 2020-04-07 | End: 2020-04-07

## 2020-04-07 RX ORDER — CARBOPROST TROMETHAMINE 250 UG/ML
250 INJECTION, SOLUTION INTRAMUSCULAR
Status: DISCONTINUED | OUTPATIENT
Start: 2020-04-07 | End: 2020-04-07

## 2020-04-07 RX ORDER — SODIUM CHLORIDE, SODIUM LACTATE, POTASSIUM CHLORIDE, CALCIUM CHLORIDE 600; 310; 30; 20 MG/100ML; MG/100ML; MG/100ML; MG/100ML
INJECTION, SOLUTION INTRAVENOUS CONTINUOUS
Status: DISCONTINUED | OUTPATIENT
Start: 2020-04-07 | End: 2020-04-07

## 2020-04-07 RX ORDER — NICOTINE POLACRILEX 4 MG
15-30 LOZENGE BUCCAL
Status: DISCONTINUED | OUTPATIENT
Start: 2020-04-07 | End: 2020-04-09 | Stop reason: HOSPADM

## 2020-04-07 RX ORDER — TERBUTALINE SULFATE 1 MG/ML
0.25 INJECTION, SOLUTION SUBCUTANEOUS
Status: DISCONTINUED | OUTPATIENT
Start: 2020-04-07 | End: 2020-04-07

## 2020-04-07 RX ORDER — OXYTOCIN 10 [USP'U]/ML
10 INJECTION, SOLUTION INTRAMUSCULAR; INTRAVENOUS
Status: DISCONTINUED | OUTPATIENT
Start: 2020-04-07 | End: 2020-04-07

## 2020-04-07 RX ORDER — DEXTROSE MONOHYDRATE 25 G/50ML
25-50 INJECTION, SOLUTION INTRAVENOUS
Status: DISCONTINUED | OUTPATIENT
Start: 2020-04-07 | End: 2020-04-09 | Stop reason: HOSPADM

## 2020-04-07 RX ORDER — IBUPROFEN 800 MG/1
800 TABLET, FILM COATED ORAL
Status: DISCONTINUED | OUTPATIENT
Start: 2020-04-07 | End: 2020-04-07

## 2020-04-07 RX ORDER — ACETAMINOPHEN 325 MG/1
650 TABLET ORAL EVERY 4 HOURS PRN
Status: DISCONTINUED | OUTPATIENT
Start: 2020-04-07 | End: 2020-04-07

## 2020-04-07 RX ORDER — ACETAMINOPHEN 325 MG/1
650 TABLET ORAL EVERY 4 HOURS PRN
Status: DISCONTINUED | OUTPATIENT
Start: 2020-04-07 | End: 2020-04-09 | Stop reason: HOSPADM

## 2020-04-07 RX ORDER — SODIUM CHLORIDE 9 MG/ML
INJECTION, SOLUTION INTRAVENOUS CONTINUOUS
Status: DISCONTINUED | OUTPATIENT
Start: 2020-04-07 | End: 2020-04-07

## 2020-04-07 RX ORDER — LIDOCAINE HYDROCHLORIDE 10 MG/ML
INJECTION, SOLUTION INFILTRATION; PERINEURAL PRN
Status: DISCONTINUED | OUTPATIENT
Start: 2020-04-07 | End: 2020-04-07

## 2020-04-07 RX ADMIN — Medication 340 ML/HR: at 10:08

## 2020-04-07 RX ADMIN — Medication 2.5 MILLION UNITS: at 07:12

## 2020-04-07 RX ADMIN — LIDOCAINE HYDROCHLORIDE,EPINEPHRINE BITARTRATE 3 ML: 15; .005 INJECTION, SOLUTION EPIDURAL; INFILTRATION; INTRACAUDAL; PERINEURAL at 05:37

## 2020-04-07 RX ADMIN — FENTANYL CITRATE 16 MCG: 50 INJECTION, SOLUTION INTRAMUSCULAR; INTRAVENOUS at 05:41

## 2020-04-07 RX ADMIN — OMEPRAZOLE 40 MG: 20 CAPSULE, DELAYED RELEASE ORAL at 08:56

## 2020-04-07 RX ADMIN — ACETAMINOPHEN 650 MG: 325 TABLET ORAL at 13:27

## 2020-04-07 RX ADMIN — Medication 100 ML/HR: at 10:52

## 2020-04-07 RX ADMIN — Medication 2.5 MILLION UNITS: at 03:04

## 2020-04-07 RX ADMIN — FENTANYL CITRATE 100 MCG: 50 INJECTION INTRAMUSCULAR; INTRAVENOUS at 03:46

## 2020-04-07 RX ADMIN — SODIUM CHLORIDE: 9 INJECTION, SOLUTION INTRAVENOUS at 07:54

## 2020-04-07 RX ADMIN — SENNOSIDES AND DOCUSATE SODIUM 2 TABLET: 8.6; 5 TABLET ORAL at 20:05

## 2020-04-07 RX ADMIN — IBUPROFEN 800 MG: 800 TABLET ORAL at 17:21

## 2020-04-07 RX ADMIN — Medication 10 ML/HR: at 05:42

## 2020-04-07 RX ADMIN — HUMAN INSULIN 1 UNITS/HR: 100 INJECTION, SOLUTION SUBCUTANEOUS at 09:02

## 2020-04-07 RX ADMIN — SODIUM CHLORIDE, POTASSIUM CHLORIDE, SODIUM LACTATE AND CALCIUM CHLORIDE: 600; 310; 30; 20 INJECTION, SOLUTION INTRAVENOUS at 09:13

## 2020-04-07 RX ADMIN — Medication 2 MILLI-UNITS/MIN: at 08:45

## 2020-04-07 RX ADMIN — Medication: at 05:45

## 2020-04-07 RX ADMIN — IBUPROFEN 800 MG: 800 TABLET ORAL at 23:29

## 2020-04-07 RX ADMIN — IBUPROFEN 800 MG: 800 TABLET ORAL at 11:21

## 2020-04-07 RX ADMIN — LIDOCAINE HYDROCHLORIDE: 10 INJECTION, SOLUTION EPIDURAL; INFILTRATION; INTRACAUDAL; PERINEURAL at 10:14

## 2020-04-07 RX ADMIN — Medication 8 ML: at 05:41

## 2020-04-07 RX ADMIN — ACETAMINOPHEN 650 MG: 325 TABLET ORAL at 21:49

## 2020-04-07 RX ADMIN — CETIRIZINE HYDROCHLORIDE 10 MG: 10 TABLET, FILM COATED ORAL at 08:56

## 2020-04-07 RX ADMIN — SODIUM CHLORIDE, POTASSIUM CHLORIDE, SODIUM LACTATE AND CALCIUM CHLORIDE: 600; 310; 30; 20 INJECTION, SOLUTION INTRAVENOUS at 00:00

## 2020-04-07 RX ADMIN — MISOPROSTOL 400 MCG: 200 TABLET ORAL at 10:15

## 2020-04-07 RX ADMIN — LIDOCAINE HYDROCHLORIDE 1 ML: 10 INJECTION, SOLUTION INFILTRATION; PERINEURAL at 07:46

## 2020-04-07 RX ADMIN — ACETAMINOPHEN 650 MG: 325 TABLET ORAL at 17:21

## 2020-04-07 NOTE — PLAN OF CARE
Data: Vital signs within normal limits. Postpartum checks within normal limits - see flow record. Patient eating and drinking normally. Patient able to empty bladder independently and is up ambulating. Patient performing self cares and is able to care for infant.  Action: Patient medicated during the shift for uterine cramping - heat applied.   Response: Positive attachment behaviors observed with infant. Cousin present and knows she is the official support person and cannot leave.   Will continue to monitor and provide support.

## 2020-04-07 NOTE — PLAN OF CARE
Vaginal Delivery Note   of viable Male with Dr Acuna and Dr Mir in attendance. NICU/Nursery RN Nisreen present.  Infant with spontaneous cry, to mother's abdomen, dried and stimulated.  APGAR at 1 minute:  9 and 9 at 5 minutes.  Placenta delivered with out complication, pitocin IV started and 400 mcg Miso given d/t grand multip 2nd degree laceration, with repair, jamaal cares provided.  Mother and baby in stable condition.

## 2020-04-07 NOTE — CONSULTS
Diabetes/Hyperglycemia Management Consult    Chief Complaint To determine transition plan for glycemic management from labor to postpartum.   Consult requested by: Patty Montilla MD  History of Present Illness Brit Romano is a 40-year-old  admitted at 37 weeks 4 days gestation on 20 for induction of labor in the setting of poorly controlled gestational diabetes and suspected fetal macrosomia.  Past medical history significant for fibromyalgia, NAFLD, iron deficiency anemia.  Prior to admission fasting glucose values were reported to be in the low 100s and 2-hour postprandial glucose values reported to be 130s to 140s.  Patient recalled rare escalations to 180-200 postprandially.  Hemoglobin A1c in early March was 6.1%.  To manage her glucose she was using Levemir and NovoLog.  Yesterday she was eating and thus received her home insulin doses, including 61 units Levemir at at bedtime.  2-hour postprandials yesterday were 120s to 130s.  0200 glucose today was 90.  Active labor insulin infusion used [id number].  BG 98 postpartum and prior to eating late breakfast.  Ate a full meal and plans to order lunch soon.   Receiving no MIVF.  She plans on a combination of breastfeeding and formula feeding.  Complains of feeling dizzy.  Denies pain so far.  Reports she and her son Ozzy are doing well overall.  Used lower doses insulin during last pregnancy.  She notes she gained more weight with this pregnancy.  After 2018 delivery discharged with no pharmacological mgmnt for glucose. Weight 99.3 kg on that admission, and 100.7 kg current admission.       Evaluated by endocrinology in 2018 At which time  her A1c was 5.8% = pre-diabetes.  She was counseled on activity increase and diet modification.      Recent Labs   Lab 20  0951 20  0848 20  0751 20  0624 20  0443 20  0226   * 123* 125* 127* 112* 90         Diabetes Type:  GDM in three previous pregnancies,  pre-diabetes A1C following last pregnancy  Usual Diabetes Regimen:   4x/day BG monitoring frequency  Unrestricted diet  Main physical activity, care-taking children  Levemir 61 units at bedtime  Novolog Inject 14 units before breakfast, 16 units before lunch, and 12 units before dinner.    Ability to [place] Prescribed Regimen: good  Diabetes Control:   Lab Results   Component Value Date    A1C 6.1 03/03/2020    A1C 6.2 02/04/2020    A1C 5.9 01/14/2020    A1C 5.0 10/29/2019    A1C 5.6 09/19/2019     Component      Latest Ref Rng & Units 10/19/2018 9/19/2019 10/29/2019 1/14/2020   Hemoglobin A1C      4.3 - 6 % 5.8      Hemoglobin A1C      0 - 5.6 %  5.6  Hgb only 8.7  +pregnancy test this date 5.0 5.9 (H)     Component      Latest Ref Rng & Units 2/4/2020 3/3/2020   Hemoglobin A1C      4.3 - 6 %     Hemoglobin A1C      0 - 5.6 % 6.2 (H) 6.1 (H)       History of DKA: No  Able to Detect Hypoglycemia: yes  Usual Diabetes Care Provider: Dietician Shannen Puga at [facility], Chrystal Lemus at [facility], PCP is at [facility]  Factors Impacting Glucose Control: delivery, high dose Levemir, breastfeeding, diet resumption      Review of Systems  10 point ROS completed with pertinent positives and negatives noted in the HPI    Past medical, family and social histories are reviewed and updated.    Past Medical History  Past Medical History:   Diagnosis Date     Fatty liver      Fibromyalgia      History of gestational diabetes      Seasonal allergies        Family History  Family History   Problem Relation Age of Onset     Hypertension Mother      Diabetes Mother      Kidney Disease Mother         dialysis     Glaucoma Mother      Diabetes Son      Macular Degeneration No family hx of        Social History  Social History     Socioeconomic History     Marital status:      Spouse name: None     Number of children: None     Years of education: None     Highest education level: None   Occupational History      Occupation: Housewife   Social Needs     Financial resource strain: None     Food insecurity     Worry: None     Inability: None     Transportation needs     Medical: None     Non-medical: None   Tobacco Use     Smoking status: Never Smoker     Smokeless tobacco: Never Used   Substance and Sexual Activity     Alcohol use: No     Alcohol/week: 0.0 standard drinks     Drug use: No     Sexual activity: Yes     Partners: Male     Birth control/protection: None   Lifestyle     Physical activity     Days per week: None     Minutes per session: None     Stress: None   Relationships     Social connections     Talks on phone: None     Gets together: None     Attends Scientologist service: None     Active member of club or organization: None     Attends meetings of clubs or organizations: None     Relationship status: None     Intimate partner violence     Fear of current or ex partner: None     Emotionally abused: None     Physically abused: None     Forced sexual activity: None   Other Topics Concern     Parent/sibling w/ CABG, MI or angioplasty before 65F 55M? Not Asked   Social History Narrative    How much exercise per week? none    How much calcium per day? diet       How much caffeine per day? none    How much vitamin D per day? Diet/supplement    Do you/your family wear seatbelts?  Yes    Do you/your family use safety helmets? No    Do you/your family use sunscreen? No    Do you/your family keep firearms in the home? No    Do you/your family have a smoke detector(s)? Yes        Do you feel safe in your home? Yes    Has anyone ever touched you in an unwanted manner? No     Explain     Cande Persaud, [facility]    08/21/2015        Review Maya Zamorano [facility]    10/07/2016         Physical Exam  Temp: 98.3  F (36.8  C) Temp src: Oral BP: (!) 141/89 Pulse: 87   Resp: 20 SpO2: 100 %      Wt Readings from Last 4 Encounters:   04/06/20 100.7 kg (222 lb)   04/01/20 101.4 kg (223 lb 8 oz)   03/17/20 102.6 kg (226 lb 3.2 oz)   03/03/20  101.5 kg (223 lb 12.8 oz)       General:  No acute distress  HEENT: hearing intact to conversational volume  Lungs: unlabored respiration, no cough  Mental status:  alert, oriented x3, communicating clearly  Psych:  calm, even mood    Laboratory  Recent Labs   Lab Test 04/07/20  0850 03/03/20  1455 02/04/20  1245 01/23/20  1826 01/14/20  1559 09/19/19  1405   NA  --   --   --  137  --  137   POTASSIUM  --  3.5 3.0* 2.8*  --  3.5   CHLORIDE  --   --   --  105  --  104   CO2  --   --   --  22  --  25   ANIONGAP  --   --   --  10  --  8   GLC  --   --   --  156* 88 105*   BUN  --   --   --  2*  --  3*   CR 0.30*  --   --  0.26*  --  0.42*   KEYLA  --   --   --  8.6  --  9.1     CBC RESULTS:   Recent Labs   Lab Test 04/07/20  0850   WBC 15.2*   RBC 4.01   HGB 11.4*   HCT 33.9*   MCV 85   MCH 28.4   MCHC 33.6   RDW 14.4          Liver Function Studies -   Recent Labs   Lab Test 04/07/20  0850 01/23/20  1826   PROTTOTAL  --  6.7*   ALBUMIN  --  2.6*   BILITOTAL  --  0.1*   ALKPHOS  --  57   AST 13 17   ALT 12 17       Active Medications  Current Facility-Administered Medications   Medication     acetaminophen (TYLENOL) tablet 650 mg     [START ON 4/9/2020] bisacodyl (DULCOLAX) Suppository 10 mg     carboprost (HEMABATE) injection 250 mcg     glucose gel 15-30 g    Or     dextrose 50 % injection 25-50 mL    Or     glucagon injection 1 mg     hydrocortisone 2.5 % cream     ibuprofen (ADVIL/MOTRIN) tablet 800 mg     insulin aspart (NovoLOG) injection (RAPID ACTING)     insulin aspart (NovoLOG) injection (RAPID ACTING)     lactated ringers BOLUS 1,000 mL     lanolin ointment     methylergonovine (METHERGINE) injection 200 mcg     naloxone (NARCAN) injection 0.1-0.4 mg     No MMR Needed - Assessment: Patient does not need MMR vaccine     NO Rho (D) immune globulin (RhoGam) needed - mother Rh POSITIVE     No Tdap Needed - Assessment: Patient does not need Tdap vaccine     oxytocin (PITOCIN) 30 units in 500 mL 0.9% NaCl  infusion     oxytocin (PITOCIN) 30 units in 500 mL 0.9% NaCl infusion     oxytocin (PITOCIN) injection 10 Units     senna-docusate (SENOKOT-S/PERICOLACE) 8.6-50 MG per tablet 1 tablet    Or     senna-docusate (SENOKOT-S/PERICOLACE) 8.6-50 MG per tablet 2 tablet     [START ON 4/9/2020] sodium phosphate (FLEET ENEMA) 1 enema     tranexamic acid (CYKLOKAPRON) bolus 1 g vial attach to NaCl 50 or 100 mL bag ADULT     Facility-Administered Medications Ordered in Other Encounters   Medication     bupivacaine (MARCAINE) 0.125 % injection (diluted from stock concentration by MD or CRNA)     fentaNYL (PF) (SUBLIMAZE) injection     fentaNYL (SUBLIMAZE) 2 mcg/mL, bupivacaine (MARCAINE) 0.125% in NaCl 0.9% EPIDURAL infusion     lidocaine-EPINEPHrine 1.5 %-[id number] injection     No current outpatient medications on file.       Current Diet  Orders Placed This Encounter      Regular Diet Adult      NPO per Anesthesia Guidelines for Procedure/Surgery Except for: Meds        Assessment  Brit Romano is a 40-year-old admitted at 37 weeks 4 days gestation on 4/6/20 for induction of labor in the setting of poorly controlled gestational diabetes and suspected fetal macrosomia, now s/p vaginal delivery this morning. Acutely, she is at risk for hypoglycemia and chronically, at risk for type 2 diabetes.      Plan  - close monitoring for hypoglycemia next 10 hours, frequency q2h until 2200. May decrease to qAC, HS frequency sooner if two values >/=140  - PRN dextrose fluids in case of glucose <80  - continue aspart medium resistance correction qAC, HS  - will advise on BG monitoring at home following review of BG tomorrow  - plan for 6 wk post-partum visit w/ PCP at [facility]    Melanie Evan APRN -[id number]    Diabetes Management Team job code: [id number]  Due to coronavirus precautions, a phone visit instead of an in-person visit took place.  15 minutes telephone with patient, 50 minutes care coordination/floor time.

## 2020-04-07 NOTE — ANESTHESIA PREPROCEDURE EVALUATION
Anesthesia Pre-Procedure Evaluation    Patient: Brit Romano   MRN:     [id number] Gender:   female   Age:    40 year old :      1980        Preoperative Diagnosis: * No pre-op diagnosis entered *   * No procedures listed *     LABS:  CBC:   Lab Results   Component Value Date    WBC 9.5 2020    WBC 6.6 2020    HGB 10.9 (L) 2020    HGB 10.3 (L) 2020    HCT 32.7 (L) 2020    HCT 34.1 (L) 2020     2020     (L) 2020     BMP:   Lab Results   Component Value Date     2020     2019    POTASSIUM 3.5 2020    POTASSIUM 3.0 (L) 2020    CHLORIDE 105 2020    CHLORIDE 104 2019    CO2 22 2020    CO2 25 2019    BUN 2 (L) 2020    BUN 3 (L) 2019    CR 0.26 (L) 2020    CR 0.42 (L) 2019     (H) 2020    GLC 88 2020     COAGS:   Lab Results   Component Value Date    PTT 26 2018    INR 1.05 2018    FIBR 468 (H) 2018     POC:   Lab Results   Component Value Date     (H) 2020    HCG Positive (A) 2019     OTHER:   Lab Results   Component Value Date    A1C 6.1 (H) 2020    KEYLA 8.6 2020    ALBUMIN 2.6 (L) 2020    PROTTOTAL 6.7 (L) 2020    ALT 17 2020    AST 17 2020    ALKPHOS 57 2020    BILITOTAL 0.1 (L) 2020    LIPASE 108 2017    TSH 1.78 2019    CRP <2.9 2019    SED 22 (H) 2019        Preop Vitals    BP Readings from Last 3 Encounters:   20 124/75   20 108/80   20 123/84    Pulse Readings from Last 3 Encounters:   20 86   20 104   20 86      Resp Readings from Last 3 Encounters:   20 20   20 16   10/02/19 18    SpO2 Readings from Last 3 Encounters:   20 100%   20 99%   20 98%      Temp Readings from Last 1 Encounters:   20 36.6  C (97.8  F) (Oral)    Ht Readings from Last 1 Encounters:   20  "1.651 m (5' 5\")      Wt Readings from Last 1 Encounters:   04/06/20 100.7 kg (222 lb)    Estimated body mass index is 36.94 kg/m  as calculated from the following:    Height as of this encounter: 1.651 m (5' 5\").    Weight as of this encounter: 100.7 kg (222 lb).     LDA:  Peripheral IV 04/06/20 Right;Posterior Hand (Active)   Site Assessment WDL 04/07/20 0130   Line Status Saline locked 04/07/20 0130   Phlebitis Scale 0-->no symptoms 04/07/20 0130   Infiltration Scale 0 04/07/20 0130   Infiltration Site Treatment Method  None 04/06/20 2200   Number of days: 1       Peripheral IV 04/06/20 Left;Posterior Hand (Active)   Site Assessment WDL 04/07/20 0130   Line Status Saline locked 04/07/20 0130   Phlebitis Scale 0-->no symptoms 04/07/20 0130   Infiltration Scale 0 04/07/20 0130   Infiltration Site Treatment Method  None 04/07/20 0000   Number of days: 1       Intrathecal/Epidural Catheter 04/07/20 (Active)   Number of days: 0        Past Medical History:   Diagnosis Date     Fatty liver      Fibromyalgia      History of gestational diabetes      Seasonal allergies       Past Surgical History:   Procedure Laterality Date     NO HISTORY OF SURGERY        No Known Allergies     Anesthesia Evaluation       history and physical reviewed .      No history of anesthetic complications          ROS/MED HX    ENT/Pulmonary:  - neg pulmonary ROS     Neurologic:  - neg neurologic ROS     Cardiovascular:  - neg cardiovascular ROS       METS/Exercise Tolerance:     Hematologic:         Musculoskeletal:         GI/Hepatic: Comment: H/o non alcoholic fatty liver        Renal/Genitourinary:         Endo:         Psychiatric:         Infectious Disease:         Malignancy:         Other:                     JZG F[facility] AN PHYSICAL EXAM    J[facility] AN PLAN NO PONV RULE      (+) gestational diabetes    IOL for GDM, suspected macrosomia       Taylor Syed MD  "

## 2020-04-07 NOTE — PROGRESS NOTES
[facility] PROGRESS NOTE:   2020 1:37 AM         SUBJECTIVE:   Patient complains of mild contractions .    Contractions:  q 2-3 minutes  Leakage of fluid:  No  Vaginal bleeding:  No  Pain controlled:  Yes           OBJECTIVE:     Vitals:    200 20 0015   BP:   134/82 135/88   Pulse:       Resp:    18   Temp:   98.4  F (36.9  C) 97.7  F (36.5  C)   TempSrc:   Oral Oral   SpO2: 100% 100% 100%    Weight:       Height:             NST:  Fetal Heart Rate Tracin bpm baseline, mod variability, + accels, no decels  Category 1    Tocometer: q 2-3 minutes    Gen: alert, oriented, no distress  Abdomen:  Gravid, nontender  Cervix:   Dilation: 4   Effacement: 80%   Station:-2   Consistency: soft   Position: Posterior    Membranes: AROM 0135, clear fluid         LABS:     Recent Results (from the past 12 hour(s))   Glucose by meter    Collection Time: 20  2:05 PM   Result Value Ref Range    Glucose 102 (H) 70 - 99 mg/dL   Glucose by meter    Collection Time: 20  4:58 PM   Result Value Ref Range    Glucose 126 (H) 70 - 99 mg/dL   Glucose by meter    Collection Time: 20  7:49 PM   Result Value Ref Range    Glucose 87 70 - 99 mg/dL   Glucose by meter    Collection Time: 20 10:09 PM   Result Value Ref Range    Glucose 138 (H) 70 - 99 mg/dL   Glucose by meter    Collection Time: 20 12:12 AM   Result Value Ref Range    Glucose 103 (H) 70 - 99 mg/dL              ASSESSMENT / PLAN:   40 year old  at 37w5d admitted for IOL for poorly controlled A2 GDM. EFW 4319g.     GDM: BG within range. Continue BG checks per protocol. Plan insulin gtt if indicated. Shoulder dystocia precautions.  Labor: s/p miso, garcia, AROM.   Fetal well being: Category 1 tracing.   GBS: positive, adequate PCN  Pain: expectant  Anticipate .    Diann Toussaint MD

## 2020-04-07 NOTE — PROGRESS NOTES
Labor Progress Note    S: Getting more uncomfortable, agreeable to cervical check    O:   Vitals:    20 1833 20 1842 20 1900 20 2043   BP: 129/74      Pulse:       Resp: 20      Temp: 98.6  F (37  C)      TempSrc: Oral      SpO2: 99% 99% 99% 100%   Weight:       Height:       Gen: appears comfortable  SVE: /3, Mendoza catheter inserted into uterus, filled with 60cc NS. Patient tolerated procedure well  FHT: 165 baseline, moderate variability, pos accels, no decels  TOCO: 3-4 in 10 minutes    Labs  Glucose: 87 most recently    A/P: Ms. Brit Romano is a 40 year old , at 37w4d by 18w0d US who presents for induction of labor for poorly controlled GDMA2.    #Induction of labor  -Cervix unfavorable on admit, ripening with PV misoprostol (s/p 2 doses), celso too frequently for another dose. Mendoza placed with 60cc in uterine balloon  -GBS positive, adequate on PCN.    -Anticipate      #Suspected fetal macrosomia  -Projected EFW 4319. Reviewed guidelines for primary  section recommended in diabetic mothers with EFW >4500g.   -S/p discussion of risks of shoulder dystocia including permanent nerve injury and brain damage     #Poorly controlled GDMA2  -PTA regimen: Levemir 61Uqhs, Novolog   - Plan for preprandial and 2 hr postprandial blood sugar checks while eating in early labor.  - Medium sliding scale ordered, will continue mealtime insulin  - Once patient in labor will transition to Q4H check in early labor and in active labor Q1H checks. Insulin gtt will be ordered if indicated  - Carb consistent diet ordered     #PNC/FWB  -Prenatal labs reviewed: Rh pos, Rubella immune,  GBS pos  -Genetic screening: QUAD screen T21 risk 1:115, Level II wnl  -Placenta anterior, EFW 9.5# as above  -Category II, secondary to fetal tachycardia. Overall reassuring with moderate variability, accels. No signs of infection. Overall is improving, BL now 165. Continue to monitor  closely    Sasha rOta MD  Ob/Gyn PGY-2  April 6, 2020 9:44 PM

## 2020-04-07 NOTE — PROGRESS NOTES
Labor progress    S: Patient comfortable with epidural, not really feeling her contractions at all.    O:  Patient Vitals for the past 6 hrs:   BP Temp Temp src Pulse SpO2   20 0900 (!) 145/77 -- -- -- 99 %   20 0830 135/67 -- -- -- 100 %   20 0800 (!) 140/76 98.3  F (36.8  C) Oral -- 100 %   20 0642 117/69 -- -- -- 100 %   20 0637 114/66 -- -- -- 100 %   20 0632 113/64 -- -- -- 100 %   20 0628 117/65 -- -- -- 100 %   20 0622 112/66 -- -- -- 100 %   20 0618 113/63 -- -- -- 100 %   20 0614 118/62 -- -- -- 100 %   20 0608 134/74 -- -- -- 100 %   20 0603 136/77 -- -- -- 100 %   20 0558 134/74 97.9  F (36.6  C) Oral -- 100 %   20 0554 128/73 -- -- -- --   20 0552 129/78 -- -- -- 100 %   20 0546 128/73 -- -- -- 100 %   20 0544 125/71 -- -- -- --   20 0542 134/76 -- -- -- 100 %   20 0540 125/73 -- -- -- --   20 0538 136/73 -- -- -- --   20 0536 137/77 -- -- -- 100 %   20 0451 130/76 97.9  F (36.6  C) Oral 87 --     Gen: Awake alert, no acute distress  Cervix: 10/0/+1    FHT: 135 bpm, moderate variability, + accels, no decels  Contractions 3 q10    Blood glucose:  Recent Labs   Lab 20  0848 20  0751 20  0624 20  0443 20  0226 20  0144   * 125* 127* 112* 90 98       A/P: Ms. Brit Romano is a 40 year old , at 37w5d by 18w0d US who presents for induction of labor for poorly controlled GDMA2. Patient now meets criteria for gestational HTN.     #Induction of labor  -Complete, start pushing now, shoulder dystocia precautions  -GBS positive, adequate on PCN.    -Anticipate     #Fetal Wellbeing  - Cat I, reactive  - EFW = 9.5 lbs, see below  - GBS Positive- adequate on PCN    #GHTN  - Meets criteria now, will obtain HELLP labs and UPC.     #Suspected fetal macrosomia  -Projected EFW 4319. Reviewed guidelines for primary  section  recommended in diabetic mothers with EFW >4500g.   -S/p discussion of risks of shoulder dystocia including permanent nerve injury and brain damage     #Poorly controlled GDMA2  - PTA regimen: Levemir 61Uqhs, Novolog 14/16/12  - Will start insulin gtt now given 2 blood glucose > 110, and active labor     #PNC  -Prenatal labs reviewed: Rh pos, Rubella immune,  GBS pos  -Genetic screening: QUAD screen T21 risk 1:115, Level II wnl    Ivis Mir MD  OB/GYN PGY-2  04/07/20 9:55 AM

## 2020-04-07 NOTE — PROGRESS NOTES
Labor Progress Note    S: Getting more uncomfortable, agreeable to cervical check    O:   Vitals:    20 2153 20 0015 20 0135 20 0233   BP: 134/82 135/88 (!) 141/69 (!) 144/79   Pulse:       Resp:     Temp: 98.4  F (36.9  C) 97.7  F (36.5  C) 97.9  F (36.6  C) 98.3  F (36.8  C)   TempSrc: Oral Oral Oral Oral   SpO2: 100%      Weight:       Height:       Gen: appears uncomfortable contractions  SVE: 5/80/-2    FHT: 145 baseline, moderate variability, pos accels, no decels  TOCO: 4 in 10 minutes    Labs  Glucose: 90 most recently    A/P: Ms. Brit Romano is a 40 year old , at 37w5d by 18w0d US who presents for induction of labor for poorly controlled GDMA2.    #Induction of labor  -Cervix unfavorable on admit, s/p ripening with PV misoprostol and garcia. Now s/p AROM. Making slow cervical change, fetus remains high. Pitocin prn  -GBS positive, adequate on PCN.    -Anticipate      #Suspected fetal macrosomia  -Projected EFW 4319. Reviewed guidelines for primary  section recommended in diabetic mothers with EFW >4500g.   -S/p discussion of risks of shoulder dystocia including permanent nerve injury and brain damage     #Poorly controlled GDMA2  -PTA regimen: Levemir 61Uqhs, Novolog   -Q4H check in early labor and in active labor Q1H checks. Insulin gtt will be ordered if indicated  - Carb consistent diet ordered     #PNC/FWB  -Prenatal labs reviewed: Rh pos, Rubella immune,  GBS pos  -Genetic screening: QUAD screen T21 risk 1:115, Level II wnl  -Placenta anterior, EFW 9.5# as above  -Category I, reactive.     Sasha Orta MD  Ob/Gyn PGY-2  2020 3:40 AM

## 2020-04-07 NOTE — PROGRESS NOTES
Labor progress    S: Patient comfortable with epidural, not really feeling her contractions at all.    O:  Patient Vitals for the past 6 hrs:   BP Temp Temp src Pulse SpO2   20 0900 (!) 145/77 -- -- -- 99 %   20 0830 135/67 -- -- -- 100 %   20 0800 (!) 140/76 98.3  F (36.8  C) Oral -- 100 %   20 0642 117/69 -- -- -- 100 %   20 0637 114/66 -- -- -- 100 %   20 0632 113/64 -- -- -- 100 %   20 0628 117/65 -- -- -- 100 %   20 0622 112/66 -- -- -- 100 %   20 0618 113/63 -- -- -- 100 %   20 0614 118/62 -- -- -- 100 %   2008 134/74 -- -- -- 100 %   20 0603 136/77 -- -- -- 100 %   20 0558 134/74 97.9  F (36.6  C) Oral -- 100 %   20 0554 128/73 -- -- -- --   20 0552 129/78 -- -- -- 100 %   20 0546 128/73 -- -- -- 100 %   20 0544 125/71 -- -- -- --   20 0542 134/76 -- -- -- 100 %   20 0540 125/73 -- -- -- --   20 0538 136/73 -- -- -- --   20 0536 137/77 -- -- -- 100 %   20 0451 130/76 97.9  F (36.6  C) Oral 87 --   20 0338 124/75 97.8  F (36.6  C) Oral -- --     Gen: Awake alert, no acute distress  Cervix: 6-7/-2    FHT: 140 bpm, moderate variability, + accels, no decels  Contractions 3 q10    Blood glucose:  Recent Labs   Lab 20  0751 20  0624 20  0443 20  0226 20  0144 20  0012   * 127* 112* 90 98 103*       A/P: Ms. Brit Romano is a 40 year old , at 37w5d by 18w0d US who presents for induction of labor for poorly controlled GDMA2. Patient now meets criteria for gestational HTN.     #Induction of labor  -Cervix unfavorable on admit, s/p ripening with PV misoprostol and garcai. Now s/p AROM. Making slow cervical change, fetus remains high. Plan to start Pitocin now.  -GBS positive, adequate on PCN.    -Anticipate     #Fetal Wellbeing  - Cat I, reactive  - EFW = 9.5 lbs, see below  - GBS Positive- adequate on PCN    #GHTN  - Meets  criteria now, will obtain HELLP labs and UPC.     #Suspected fetal macrosomia  -Projected EFW 4319. Reviewed guidelines for primary  section recommended in diabetic mothers with EFW >4500g.   -S/p discussion of risks of shoulder dystocia including permanent nerve injury and brain damage     #Poorly controlled GDMA2  - PTA regimen: Levemir 61Uqhs, Novolog   - Will start insulin gtt now given 2 blood glucose > 110, and active labor     #PNC  -Prenatal labs reviewed: Rh pos, Rubella immune,  GBS pos  -Genetic screening: QUAD screen T21 risk 1:115, Level II wnl    Ivis Mir MD  OB/GYN PGY-2  20 8:32 AM

## 2020-04-07 NOTE — ANESTHESIA PROCEDURE NOTES
Epidural Procedure Note  Staff -   Anesthesiologist:  Chrystal Koehler MD  Resident/Fellow: Taylor Clarke MD    Performed By: anesthesiologist and with residents  Procedure performed by resident/CRNA in presence of a teaching physician.          Location: OB and floor   Pre-procedure checklist:   patient identified, IV checked, site marked, risks and benefits discussed, informed consent, monitors and equipment checked, pre-op evaluation, at physician/surgeon's request and post-op pain management      Correct Patient: Yes      Correct Position: Yes      Correct Site: Yes      Correct Procedure: Yes      Correct Laterality:  Yes    Site Marked:  Yes  Procedure:     Procedure:  Epidural catheter    ASA:  3    Diagnosis:  Labor pain    Position:  Sitting    Sterile Prep: chloraprep      Insertion site:  L3-4    Local skin infiltration:  1% lidocaine    amount (mL):  3    Approach:  Midline    Needle gauge (G):  17    Needle Length (in):  3.5    Block Needle Type:  Touhy    Injection Technique:  LORT saline    VANITA at (cm):  6    Attempts:  1    Redirects:  0    Catheter gauge (G):  19    Catheter threaded easily: Yes      Threaded to cm at skin:  11    Threaded in epidural space (cm):  5    Paresthesias:  No    Aspiration negative for Heme or CSF: Yes      Test dose (mL):  3     Local anesthetic:  Lidocaine 1.5% w/ 1:200,000 epinephrine    Test dose time:  05:37    Test dose negative for signs of intravascular, subdural or intrathecal injection: Yes

## 2020-04-07 NOTE — PLAN OF CARE
Data: Brit Romano transferred to 7-145 via wheelchair at 1300. Baby transferred via patient's arms.  Action: Receiving unit notified of transfer: Yes. Patient and family notified of room change. Report given to Marcos at 1200. Belongings sent to receiving unit. Accompanied by Registered Nurse. Oriented patient to surroundings. Call light within reach. ID bands double-checked with receiving RN.  Response: Patient tolerated transfer and is stable.

## 2020-04-07 NOTE — PROGRESS NOTES
Brief progress note    Patient uncomfortable, requesting cervical check. 6/80/-2. Reviewed that station remains high and worried that baby hasn't come down lower in the pelvis, especially given grandmultiparity. Reviewed that this baby may be too large to delivery vaginally. She wishes to proceed with epidural. Continue current plan. Steven regularly, FHT category I, reactive    Sasha Orta MD PGY2

## 2020-04-07 NOTE — PROGRESS NOTES
[facility] PROGRESS NOTE:   2020 6:31 AM         SUBJECTIVE:   Patient states it is not time to push yet, starting to feel comfortable from epidural         OBJECTIVE:     Vitals:    20 0552 20 0554 20 0558 20 0603   BP: 129/78 128/73 134/74 136/77   Pulse:       Resp:       Temp:   97.9  F (36.6  C)    TempSrc:   Oral    SpO2: 100%  100% 100%   Weight:       Height:             NST:  Fetal Heart Rate Tracin bpm baseline, mod variability, + accels, no decels  Category 1    Tocometer: q 2-3 minutes    Gen: alert, oriented, no distress  Abdomen:  Gravid, nontender  Cervix: deferred           LABS:     Recent Results (from the past 12 hour(s))   Glucose by meter    Collection Time: 20  7:49 PM   Result Value Ref Range    Glucose 87 70 - 99 mg/dL   Glucose by meter    Collection Time: 20 10:09 PM   Result Value Ref Range    Glucose 138 (H) 70 - 99 mg/dL   Glucose by meter    Collection Time: 20 12:12 AM   Result Value Ref Range    Glucose 103 (H) 70 - 99 mg/dL   Glucose by meter    Collection Time: 20  1:44 AM   Result Value Ref Range    Glucose 98 70 - 99 mg/dL   Glucose by meter    Collection Time: 20  2:26 AM   Result Value Ref Range    Glucose 90 70 - 99 mg/dL   Glucose by meter    Collection Time: 20  4:43 AM   Result Value Ref Range    Glucose 112 (H) 70 - 99 mg/dL   Glucose by meter    Collection Time: 20  6:24 AM   Result Value Ref Range    Glucose 127 (H) 70 - 99 mg/dL              ASSESSMENT / PLAN:   40 year old  at 37w5d admitted for poorly controlled A2 GDM.    Labor: progressing slowly. S/p miso, garcia and AROM. Contractions regular. Plan to recheck later this morning.  A2 GDM: BG checks per protocol. First elevated one this morning, will start insulin gtt.   Fetal well being: Category 1 tracing.   GBS: positive, adequate PCN  Pain: epidural  Anticipate . Shoulder dystocia precautions for suspected  diabetic fetopathy. Hemorrhage meds in room for grand multiparity    Diann Toussaint MD

## 2020-04-07 NOTE — L&D DELIVERY NOTE
Brit Romano is a 40 year old  at 37w5d by 18w0d US who was admitted for induction of labor on 2020 for poorly controlled GDMA2. Pregnancy was additionally notable for suspected macrosomia, AMA, and unstable lie (cephalic on admission). Prior to delivery, patient met criteria for gestational HTN, HELLP labs were obtained and were wnl. UPC was not able to be collected prior to delivery. As for her labor course, upon admission her cervix was C/L/H. She received vaginal misoprostol and a intracervical garcia catheter for cervical ripening. Amniotomy perfromed at 0130. She made slow cervical change, Pitocin was started at 0845. She received an epidural with good pain relief. She was complete at 0952 and began pushing immediately. At 1007 a vigorous male infant was delivered in the KEARA position with apgars of 9 and 9. Weight 3600 g. Delayed cord clamping was done for >60 seconds. The placenta delivered spontaneously, intact with a 3 vessel cord. Pitocin was given for active management of third stage. A small second degree laceration was present and was repaired in the standard fashion with 3-0 Vicryl. QBL of 365 mL. Sponge and sharp counts were correct. Dr. Acuna was present for delivery.     Ivis Mir MD  OB/GYN PGY-2  20 1:30 PM

## 2020-04-07 NOTE — PROGRESS NOTES
Labor Progress Note    S: Getting more uncomfortable, agreeable to cervical check    O:   Vitals:    20 1900 20 2043 20 2140 20 2153   BP:    134/82   Pulse:       Resp:       Temp:    98.4  F (36.9  C)   TempSrc:    Oral   SpO2: 99% 100% 100% 100%   Weight:       Height:       Gen: appears uncomfortable contractions  SVE: 4/80/-3    FHT: 165 baseline, moderate variability, pos accels, no decels  TOCO: 3-4 in 10 minutes    Labs  Glucose: 138 most recently    A/P: Ms. Brit Romano is a 40 year old , at 37w4d by 18w0d US who presents for induction of labor for poorly controlled GDMA2.    #Induction of labor  -Cervix unfavorable on admit, ripening with PV misoprostol and garcia. Will start pitocin and AROM when able  -GBS positive, adequate on PCN.    -Anticipate      #Suspected fetal macrosomia  -Projected EFW 4319. Reviewed guidelines for primary  section recommended in diabetic mothers with EFW >4500g.   -S/p discussion of risks of shoulder dystocia including permanent nerve injury and brain damage     #Poorly controlled GDMA2  -PTA regimen: Levemir 61Uqhs, Novolog   - Plan for preprandial and 2 hr postprandial blood sugar checks while eating in early labor.  - Medium sliding scale ordered, will continue mealtime insulin  - Once patient in labor will transition to Q4H check in early labor and in active labor Q1H checks. Insulin gtt will be ordered if indicated  - Carb consistent diet ordered     #PNC/FWB  -Prenatal labs reviewed: Rh pos, Rubella immune,  GBS pos  -Genetic screening: QUAD screen T21 risk 1:115, Level II wnl  -Placenta anterior, EFW 9.5# as above  -Category II, secondary to fetal tachycardia. Overall reassuring with moderate variability, accels. No signs of infection. Continue to monitor closely    Sasha Orta MD  Ob/Gyn PGY-2  2020 10:55 PM

## 2020-04-07 NOTE — PLAN OF CARE
Pt continues IOL for GDM.  Pt resting comfortably with an epidural.  EFM category I (see flowsheets).  Steven q 2-4 min.  Multiple doses PCN given for GBS.  Recent blood glucose 127, requiring insulin infusion to start.  Second IV infiltrated and needed to be restarted by anesthesia/ultrasound as pt is a difficult stick.  Day shift RN made aware of above stated.  Pt cousin remains supportive at bedside. Pt denies needs at this time.  Will continue to monitor.

## 2020-04-07 NOTE — PLAN OF CARE
Pt states is uncomfortable during ctx's. Fetal tachycardia noted with moderate variability and Dr Toussaint and Dr Arnold notified. Intrauterine resuscitation techniques performed were  1,000 LR bolus and repositioning. FHR response was still tachycardic with moderate variability accels no decels; pt afebrile normal HR. Continued close observation. Patient informed of increased FHR and interventions. Dr arnold at bedside, crevix 1cm 50% -3, garcia bulb was placed with 60ml balloon. Pt tolerated well. Pt alone in room; planning to call cousin when in active labor.

## 2020-04-08 LAB
GLUCOSE BLDC GLUCOMTR-MCNC: 111 MG/DL (ref 70–99)
GLUCOSE BLDC GLUCOMTR-MCNC: 112 MG/DL (ref 70–99)
GLUCOSE BLDC GLUCOMTR-MCNC: 87 MG/DL (ref 70–99)
HGB BLD-MCNC: 10.1 G/DL (ref 11.7–15.7)

## 2020-04-08 PROCEDURE — 85018 HEMOGLOBIN: CPT | Performed by: STUDENT IN AN ORGANIZED HEALTH CARE EDUCATION/TRAINING PROGRAM

## 2020-04-08 PROCEDURE — 25000132 ZZH RX MED GY IP 250 OP 250 PS 637: Performed by: STUDENT IN AN ORGANIZED HEALTH CARE EDUCATION/TRAINING PROGRAM

## 2020-04-08 PROCEDURE — 36415 COLL VENOUS BLD VENIPUNCTURE: CPT | Performed by: STUDENT IN AN ORGANIZED HEALTH CARE EDUCATION/TRAINING PROGRAM

## 2020-04-08 PROCEDURE — 12000001 ZZH R&B MED SURG/OB UMMC

## 2020-04-08 PROCEDURE — 00000146 ZZHCL STATISTIC GLUCOSE BY METER IP

## 2020-04-08 RX ORDER — ACETAMINOPHEN 325 MG/1
650 TABLET ORAL EVERY 6 HOURS PRN
Qty: 100 TABLET | Refills: 0 | Status: SHIPPED | OUTPATIENT
Start: 2020-04-08

## 2020-04-08 RX ORDER — AMOXICILLIN 250 MG
1 CAPSULE ORAL DAILY
Qty: 100 TABLET | Refills: 0 | Status: SHIPPED | OUTPATIENT
Start: 2020-04-08 | End: 2020-12-15

## 2020-04-08 RX ORDER — CETIRIZINE HYDROCHLORIDE 5 MG/1
5 TABLET ORAL DAILY
Status: DISCONTINUED | OUTPATIENT
Start: 2020-04-08 | End: 2020-04-09 | Stop reason: HOSPADM

## 2020-04-08 RX ORDER — IBUPROFEN 600 MG/1
600 TABLET, FILM COATED ORAL EVERY 6 HOURS PRN
Qty: 60 TABLET | Refills: 0 | Status: SHIPPED | OUTPATIENT
Start: 2020-04-08 | End: 2023-11-24

## 2020-04-08 RX ADMIN — SENNOSIDES AND DOCUSATE SODIUM 2 TABLET: 8.6; 5 TABLET ORAL at 12:12

## 2020-04-08 RX ADMIN — ACETAMINOPHEN 650 MG: 325 TABLET ORAL at 18:49

## 2020-04-08 RX ADMIN — SENNOSIDES AND DOCUSATE SODIUM 2 TABLET: 8.6; 5 TABLET ORAL at 21:03

## 2020-04-08 RX ADMIN — IBUPROFEN 800 MG: 800 TABLET ORAL at 12:12

## 2020-04-08 RX ADMIN — IBUPROFEN 800 MG: 800 TABLET ORAL at 05:57

## 2020-04-08 RX ADMIN — ACETAMINOPHEN 650 MG: 325 TABLET ORAL at 05:56

## 2020-04-08 RX ADMIN — ACETAMINOPHEN 650 MG: 325 TABLET ORAL at 22:25

## 2020-04-08 RX ADMIN — OMEPRAZOLE 20 MG: 20 CAPSULE, DELAYED RELEASE ORAL at 15:42

## 2020-04-08 RX ADMIN — IBUPROFEN 800 MG: 800 TABLET ORAL at 18:49

## 2020-04-08 RX ADMIN — ACETAMINOPHEN 650 MG: 325 TABLET ORAL at 01:57

## 2020-04-08 RX ADMIN — ACETAMINOPHEN 650 MG: 325 TABLET ORAL at 12:12

## 2020-04-08 RX ADMIN — CETIRIZINE HYDROCHLORIDE 5 MG: 5 TABLET ORAL at 15:42

## 2020-04-08 NOTE — PLAN OF CARE
Blood glucose ranged from , taken every 2 hours.  No insulin needed this evening. Ate large supper, see flow sheet for carbohydrate count. Patient slept most of evening, but did feed baby formula when asked to do so.  Declined to breastfeed, due to fatigue. States uterine cramping and perineal discomfort are adequately controlled with hot and cold packs, and tylenol and ibuprofen.

## 2020-04-08 NOTE — PLAN OF CARE
Vss, postpartum assessment WDL. Patient has not needed any Novolog to maintain BG today. She did forget to call before she had dinner for a BG check. Patient is taking tylenol and ibuprofen for pain control. Patient has breast fed her infant once this shift. Infant serum bili at 24 hours was 7.0 high intermediate, another SB was ordered for 2200. Continue with plan of care.

## 2020-04-08 NOTE — PROGRESS NOTES
[facility]   Post-partum Note    Name:  Brit Romano  MRN: [id number]    S: Patient doing well this morning.  Endorses cramping, adequately controlled with ibuprofen. Tolerating regular diet without nausea or vomiting.  Ambulating without dizziness.  Spontaneously voiding. Lochia lessning.  Breast and bottle feeding.  Plans IUD for postpartum contraception.     O:   Patient Vitals for the past 24 hrs:   BP Temp Temp src Pulse Heart Rate Resp SpO2   04/08/20 0200 116/83 98.3  F (36.8  C) Oral 85 -- 18 --   04/07/20 1744 103/60 98.6  F (37  C) Oral 80 -- 20 --   04/07/20 1330 126/71 98.4  F (36.9  C) Oral -- 85 18 100 %   04/07/20 1245 127/70 -- -- -- -- -- --   04/07/20 1215 132/78 -- -- -- -- -- --   04/07/20 1200 131/73 -- -- -- -- -- 100 %   04/07/20 1145 (!) 158/75 -- -- -- -- -- 100 %   04/07/20 1130 (!) 145/89 -- -- -- -- -- 100 %   04/07/20 1100 (!) 141/78 -- -- -- -- -- 100 %   04/07/20 1045 131/69 -- -- -- -- -- 100 %   04/07/20 1030 139/76 98.3  F (36.8  C) Oral -- -- -- 100 %   04/07/20 1015 139/76 -- -- -- -- -- 100 %   04/07/20 1000 -- -- -- -- -- -- 99 %   04/07/20 0930 (!) 141/89 -- -- -- -- -- 100 %   04/07/20 0900 (!) 145/77 -- -- -- -- -- 99 %   04/07/20 0830 135/67 -- -- -- -- -- 100 %   04/07/20 0800 (!) 140/76 98.3  F (36.8  C) Oral -- -- -- 100 %     Gen:  Resting comfortably, NAD  CV:  RRR, well perfused  Pulm:  Unlabored breathing on room air  Abd:  Soft, mildly-distended, appropriately tender. Fundus at umbilicus, firm and non-tender.  Ext:  non-tender, 1+ edema bilaterally    I/O last 3 completed shifts:  In: 50 [P.O.:50]  Out: 765 [Urine:400; Blood:365]    Hgb:   Hemoglobin   Date Value Ref Range Status   04/08/2020 10.1 (L) 11.7 - 15.7 g/dL Final   Results for BRIT ROMANO (MRN [id number]) as of 4/8/2020 07:54   Ref. Range 4/7/2020 19:15 4/7/2020 21:40 4/7/2020 23:33 4/8/2020 04:01   Glucose Latest Ref Range: 70 - 99 mg/dL 96 175 (H) 136 (H) 112 (H)        Assessment/Plan:  40 year old  on PPD #1 s/p uncomplicated  after undergoing IOL for GDMA2. Doing well in the early postpartum period.    #Routine postpartum cares  -PNC:   Rh pos. Rubella immune. No intervention indicated.  -Pain: Well-controlled with ibuprofen and tylenol  -Hgb: 11.4>>10.1. Lochia appropriate, VS wnl  -GI Tolerating regular diet. Prn stool softener, anti-emetics  - Voiding spontaneously   -Feed: Bottle and breast  -BC: Mirena IUD at 6w PP visit    #Gestational hypertension  -Diagnosed around the time of delivery  -HELLP labs wnl, asymptomatic  -BP normal range this AM    #GDMA2  -PTA regimen: Levemir 61Uqhs, Novolog   -Continue qAC, HC checks  -Endocrinology consulted, appreciate recommendations  -Plan for follow up with PCP in 6 weeks    Dispo: DC likely today    Sasha Orta MD  Ob/Gyn PGY-2  2020 7:51 AM    Physician Attestation   I, Princess Cantu MD, personally examined and evaluated this patient.  I discussed the patient with the resident/fellow and care team, and agree with the assessment and plan of care as documented in the note of 20.      I personally reviewed vital signs, medications, labs and exam.    Key findings: Doing well.   Blood sugars have been improved and understands the plan from endocrinology. Has supplies she needs at home.   Planning discharge tomorrow. Baby has to stay due to blood sugars.   Princess Cantu MD  Date of Service (when I saw the patient): 20

## 2020-04-08 NOTE — DISCHARGE INSTRUCTIONS
Postpartum Vaginal Delivery Instructions    Activity       Ask family and friends for help when you need it.    Do not place anything in your vagina for 6 weeks.    You are not restricted on other activities, but take it easy for a few weeks to allow your body to recover from delivery.  You are able to do any activities you feel up to that point.    No driving until you have stopped taking your pain medications (usually two weeks after delivery).     Call your health care provider if you have any of these symptoms:       Increased pain, swelling, redness, or fluid around your stiches from an episiotomy or perineal tear.    A fever above 100.4 F (38 C) with or without chills when placing a thermometer under your tongue.    You soak a sanitary pad with blood within 1 hour, or you see blood clots larger than a golf ball.    Bleeding that lasts more than 6 weeks.    Vaginal discharge that smells bad.    Severe pain, cramping or tenderness in your lower belly area.    A need to urinate more frequently (use the toilet more often), more urgently (use the toilet very quickly), or it burns when you urinate.    Nausea and vomiting.    Redness, swelling or pain around a vein in your leg.    Problems breastfeeding or a red or painful area on your breast.    Chest pain and cough or are gasping for air.    Problems coping with sadness, anxiety, or depression.  If you have any concerns about hurting yourself or the baby, call your provider immediately.     You have questions or concerns after you return home.     Keep your hands clean:  Always wash your hands before touching your perineal area and stitches.  This helps reduce your risk of infection.  If your hands aren't dirty, you may use an alcohol hand-rub to clean your hands. Keep your nails clean and short.    Pre-Diabetes  - check glucose daily at home, report to primary care provider if values reach 140 or greater for three days  - get regular physical activity, starting  with 5 minutes at a time with progression to goal 30 minutes, 5 days per week  - reduce portions of breads, pasta, rice, potato, fruits, sweets  - plan for 6 wk post-partum visit w/ PCP at [facility] (check A1C, counseling on continued intensive lifestyle interventions and/or metformin to prevent type 2 diabetes).     Increasing your activity and decreasing carbohydrate intake can lead to weight loss and help prevent development of type 2  Diabetes.

## 2020-04-08 NOTE — PROGRESS NOTES
Diabetes Consult Daily  Progress Note          Assessment/Plan:   Brit Romano is a 40-year-old admitted at 37 weeks 4 days gestation on 4/6/20 for induction of labor in the setting of poorly controlled gestational diabetes and suspected fetal macrosomia, now s/p vaginal delivery 4/7. Acutely, she was at risk for hypoglycemia with usual dose detemir on board and chronically, at risk for type 2 diabetes.    Given greater weight gain this pregnancy, it is less certain that Brit will have reasonably controlled glucose without medication post-partum.  Additionally, not breastfeeding yet.      Hospital Plan  - BG qAC, HS  -aspart medium correction qAC, HS  - carbohydrate intake recording  - change to high consistent carb diet    Discharge Plan if leaving today  - check glucose daily at home, report to PCP if values reach 140 or greater for three days  - regular physical activity, starting with 5 minutes at a time with progression to goal 30 minutes, 5 days per week  - reduce portions of breads, pasta, rice, potato, fruits, sweets  - plan for 6 wk post-partum visit w/ PCP at [facility] (check A1C (interpret with consideration for Hgb level),  and continued intensive lifestyle interventions and/or metformin to prevent T2 diabetes).            Plan discussed with patient, bedside RN           Interval History:     The last 24 hours progress and nursing notes reviewed.  Says less carbohydrates eaten here than home.  Had 170 gram carb meal last evening.  Explained consistent carb diet for hospital and she's receptive to having carb limit since it will help glucose control.  Breastfeeding- says baby not latching.  Brit was not aware that breastfeeding utilizes enough calories that it has an impact like exercise on blood glucose.   Ozzy had low glucose this morning and will remain hospitalized another day.  Brit may be discharged, but board until Ozzy leaves.    Recalls counseling from  "last endo visit.  Activity still challenging given busy with caretaking.   We reviewed her diabetes history and risk for progression to type 2, including impact of the greater weight gain this last pregnancy.  She is planning on an IUD and no more children, but reviewed rec for preconception BG eval if desires subsequent pregnancy.    Brit repeated back the glucose monitoring and notification plan.  She states understanding the impact increased activity and diet modification can have, but does not offer specific changes she will make.            GDM is associated with an increased lifetime maternal risk for diabetes estimated at 50-70% after 15-25 years.  Interpregnancy or postpartum weight gain is associated with increased risk of adverse pregnancy outcomes in subsequent pregnancies (106) and earlier progression to type 2 diabetes.          PTA Regimen:      Recent Labs   Lab 04/08/20  0401 04/07/20  2333 04/07/20  2140 04/07/20  1915 04/07/20  1743 04/07/20  1525   * 136* 175* 96 134* 161*               Review of Systems:   See interval hx          Medications:     Orders Placed This Encounter      Regular Diet Adult      NPO per Anesthesia Guidelines for Procedure/Surgery Except for: Meds    Supplements:   Physical Exam:  Gen: Alert in NAD   HEENT: hearing intact to conversational volume  Resp: Unlabored, no cough observed  Neuro:oriented x3, communicating clearly  /83   Pulse 85   Temp 98.3  F (36.8  C) (Oral)   Resp 18   Ht 1.651 m (5' 5\")   Wt 100.7 kg (222 lb)   LMP 07/21/2019 (Approximate)   SpO2 100%   Breastfeeding Unknown   BMI 36.94 kg/m             Data:     Lab Results   Component Value Date    A1C 6.1 03/03/2020    A1C 6.2 02/04/2020    A1C 5.9 01/14/2020    A1C 5.0 10/29/2019    A1C 5.6 09/19/2019              CBC RESULTS:   Recent Labs   Lab Test 04/08/20  0656 04/07/20  0850   WBC  --  15.2*   RBC  --  4.01   HGB 10.1* 11.4*   HCT  --  33.9*   MCV  --  85   MCH  --  28.4 "   MCHC  --  33.6   RDW  --  14.4   PLT  --  179     Recent Labs   Lab Test 04/07/20  0850 03/03/20  1455 02/04/20  1245 01/23/20  1826 01/14/20  1559 09/19/19  1405   NA  --   --   --  137  --  137   POTASSIUM  --  3.5 3.0* 2.8*  --  3.5   CHLORIDE  --   --   --  105  --  104   CO2  --   --   --  22  --  25   ANIONGAP  --   --   --  10  --  8   GLC  --   --   --  156* 88 105*   BUN  --   --   --  2*  --  3*   CR 0.30*  --   --  0.26*  --  0.42*   KEYLA  --   --   --  8.6  --  9.1     Liver Function Studies -   Recent Labs   Lab Test 04/07/20  0850 01/23/20  1826   PROTTOTAL  --  6.7*   ALBUMIN  --  2.6*   BILITOTAL  --  0.1*   ALKPHOS  --  57   AST 13 17   ALT 12 17     Lab Results   Component Value Date    INR 1.05 07/27/2018     Due to coronavirus precautions, a phone visit instead of an in-person visit took place.    15 minutes telephone with patient, 20 minutes care coordination/floor time.      Melanie Evan APRN [id number]  Diabetes Management job code 2005

## 2020-04-08 NOTE — PLAN OF CARE
Data: VSS, postpartum assessments WNL. She is voiding without difficulty, up ad aishwarya, passing gas, eating and drinking normally. Perineum appears to be healing well, lochia WNL, no s/s infection. She waits for nurse to direct her and infant's cares. Indicates an intention to breastfeed infant, but has not attempt to latch baby and has only given formula on night shift. Taking ibuprofen and tylenol for pain and using the following non-pharmacologic methods: ice packs for back, heat pack for stomach, jamaal bottle. Reports pain hasn't been entirely managed.   Action: Education provided on plan of care  Response: Pt is agreeable with her plan of care. Positive attachment behaviors observed with infant. Support person present. Anticipate discharge per care plan.

## 2020-04-08 NOTE — PROGRESS NOTES
[facility]   Post-partum Note    Name:  Brit Romano  MRN: [id number]    S:  Patient doing well this morning. Has mild cramping but pain is overall well controlled. Tolerating regular diet without nausea or vomiting.  Ambulating without dizziness.  Spontaneously voiding. Lochia lessning.  Breast and bottle feeding.  Plans IUD for postpartum contraception.     O:   Patient Vitals for the past 24 hrs:   BP Temp Temp src Heart Rate Resp   20 0115 129/74 98.2  F (36.8  C) Oral 91 18   20 2100 119/69 98.3  F (36.8  C) Oral 89 18   20 1707 127/75 98.6  F (37  C) Oral 90 18   20 1130 126/73 98.7  F (37.1  C) Oral 75 20     Gen:  Resting comfortably, NAD  CV:  RRR, well perfused  Pulm:  Unlabored breathing on room air  Abd:  Soft, mildly-distended, appropriately tender.Fundus difficult to palpate secondary to habitus.   Ext:  non-tender, 1+ edema bilaterally    No intake/output data recorded.    Hgb:   Hemoglobin   Date Value Ref Range Status   2020 10.1 (L) 11.7 - 15.7 g/dL Final   Results for BRIT ROMANO (MRN [id number]) as of 2020 07:54   Ref. Range 2020 19:15 2020 21:40 2020 23:33 2020 04:01   Glucose Latest Ref Range: 70 - 99 mg/dL 96 175 (H) 136 (H) 112 (H)       Assessment/Plan:  40 year old  on PPD #2 s/p uncomplicated  after undergoing IOL for GDMA2. Doing well in the early postpartum period. Patient declines Tdap.     #Routine postpartum cares  -PNC:   Rh pos. Rubella immune. No intervention indicated.  -Pain: Well-controlled with ibuprofen and tylenol  -Hgb: 11.4>>10.1. Lochia appropriate, VS wnl  -GI Tolerating regular diet. Prn stool softener, anti-emetics  - Voiding spontaneously   -Feed: Bottle and breast  -BC: Mirena IUD at 6w PP visit    #Gestational hypertension  -Diagnosed around the time of delivery  -HELLP labs wnl, asymptomatic  -BP normotensive since delivery    #GDMA2  -PTA regimen: Levemir 61Uqhs,  Novolog   -Continue qAC, HC checks, MMSI  -Endocrinology consulted, appreciate recommendations   On discharge:    - check glucose daily at home, report to PCP if values reach 140 or greater for three days   - regular physical activity, starting with 5 minutes at a time with progression to goal 30 minutes, 5 days per week   - reduce portions of breads, pasta, rice, potato, fruits, sweets   - plan for 6 wk post-partum visit w/ PCP at [facility] (check A1C (interpret with consideration for Hgb level),  and continued intensive lifestyle interventions and/or metformin to prevent T2 diabetes).      Stable for discharge to home today     Alicia Myhre, DO  Obstetrics and Gynecology, PGY-3  2020 , 6:58 AM       Physician Attestation   I, Diann Toussaint MD, personally examined and evaluated this patient.  I discussed the patient with the resident/fellow and care team, and agree with the assessment and plan of care as documented in the note of 20.      I personally reviewed vital signs, medications, labs and exam.    Key findings: 40 year old  on PPD 2 s/p  after IOL for A2 GDM. Doing well, meeting discharge goals. Reviewed discharge instructions including activity restrictions, pelvic rest and follow up plan. Reviewed signs of excessive bleeding, infection, postpartum pre-eclampsia, mastitis, DVT and postpartum depression - instructed patient to call if concerned about any of these or other concerns. Patient to follow up in 6 weeks for routine postpartum visit, planning Mirena IUD for contraception. All questions answered.    Diann Toussaint MD  Date of Service (when I saw the patient): 20

## 2020-04-08 NOTE — DISCHARGE SUMMARY
VAGINAL DELIVERY DISCHARGE SUMMARY    Admit date: 2020  Discharge date: 2020     Admit Dx:   - 40 year old  at 37w4d  -GDMA2, Levemir 61Uqhs, Novolog   -Suspected fetal macrosomia, projected 4319g  -Grandmultiparity  -AMA  -Vitamin B12, D deficiencies  -History of IUFD  -GBS positive    Discharge Dx:  - Same as above, s/p     Procedures:  - Spontaneous vaginal delivery  - Epidural analgesia    Admit HPI:  Brit Romano is a 40 year old now  who was admitted at 37w4d for induction of labor for poorly controlled GDMA2. . Please see her admit H&P for full details of her PMH, PSH, Meds, Allergies and exam on admit.    Hospital course:  Pregnancy was additionally notable for suspected macrosomia, AMA, and unstable lie (cephalic on admission). Prior to delivery, patient met criteria for gestational HTN, HELLP labs were obtained and were wnl. UPC was not able to be collected prior to delivery. As for her labor course, upon admission her cervix was C/L/H. She received vaginal misoprostol and a intracervical garcia catheter for cervical ripening. Amniotomy perfromed at 0130. She made slow cervical change, Pitocin was started at 0845. She received an epidural with good pain relief. She was complete at 0952 and began pushing immediately. At 1007 a vigorous male infant was delivered in the KEARA position with apgars of 9 and 9. Weight 3600 g. Delayed cord clamping was done for >60 seconds. The placenta delivered spontaneously, intact with a 3 vessel cord. Pitocin was given for active management of third stage. A small second degree laceration was present and was repaired in the standard fashion with 3-0 Vicryl. QBL of 365 mL. Please see her Delivery Summary for full details regarding her delivery.    Her postpartum course was uncomplicated. Endocrinology was consulted for management of GDMA2. FBG was 112. Recommendations per endocrinology on discharge were:  - check glucose daily at home, report  to PCP if values reach 140 or greater for three days  - regular physical activity, starting with 5 minutes at a time with progression to goal 30 minutes, 5 days per week  - reduce portions of breads, pasta, rice, potato, fruits, sweets  - plan for 6 wk post-partum visit w/ PCP at [facility] (check A1C (interpret with consideration for Hgb level),  and continued intensive lifestyle interventions and/or metformin to prevent T2 diabetes).      On PPD#2, she was meeting all of her postpartum goals and deemed stable for discharge. She was voiding without difficulty, tolerating a regular diet without nausea and vomiting, her pain was well controlled on oral pain medicines and her lochia was appropriate. Her hemoglobin prior to delivery was 11.4 and after delivery was 10.1. Her Rh status was positive, and Rhogam was not indicated.     Discharge Medications:  Current Discharge Medication List      START taking these medications    Details   ibuprofen (ADVIL/MOTRIN) 600 MG tablet Take 1 tablet (600 mg) by mouth every 6 hours as needed for moderate pain Start after delivery  Qty: 60 tablet, Refills: 0    Associated Diagnoses:  (normal spontaneous vaginal delivery)         CONTINUE these medications which have CHANGED    Details   acetaminophen (TYLENOL) 325 MG tablet Take 2 tablets (650 mg) by mouth every 6 hours as needed for mild pain Start after Delivery.  Qty: 100 tablet, Refills: 0    Associated Diagnoses:  (normal spontaneous vaginal delivery)      blood glucose (CONTOUR NEXT TEST) test strip Use to test blood sugar once daily.  Qty: 100 each, Refills: prn    Associated Diagnoses: Abnormal maternal glucose tolerance, antepartum      senna-docusate (SENOKOT-S/PERICOLACE) 8.6-50 MG tablet Take 1 tablet by mouth daily Start after delivery.  Qty: 100 tablet, Refills: 0    Associated Diagnoses:  (normal spontaneous vaginal delivery)         CONTINUE these medications which have NOT CHANGED    Details  "  acetone urine (KETOSTIX) test strip 1 strip as needed (as directed) Use one strip daily for 1 week, then one strip weekly.  Qty: 50 each, Refills: 3    Comments: Please dispense foil-wrapped keto-stix  Associated Diagnoses: Abnormal maternal glucose tolerance, antepartum      cetirizine (ZYRTEC) 10 MG tablet Take 1 tablet (10 mg) by mouth daily  Qty: 30 tablet, Refills: 11    Associated Diagnoses: Chronic seasonal allergic rhinitis due to pollen      Cholecalciferol (VITAMIN D) 125 MCG (5000 UT) CAPS Take 1 capsule (5,000 Units) by mouth daily  Qty: 100 capsule, Refills: 3    Associated Diagnoses: Vitamin D deficiency      cyanocobalamin (VITAMIN B-12) 1000 MCG tablet Take 1 tablet (1,000 mcg) by mouth daily  Qty: 100 tablet, Refills: 3    Associated Diagnoses: Vitamin B12 deficiency (non anemic)      Insulin Pen Needle (PEN NEEDLES 3/16\") 31G X 5 MM MISC 1 each daily  Qty: 100 each, Refills: 3    Associated Diagnoses: Insulin controlled gestational diabetes mellitus (GDM) during pregnancy, antepartum      omeprazole (PRILOSEC) 40 MG DR capsule TAKE 1 CAPSULE(40 MG) BY MOUTH DAILY  Qty: 30 capsule, Refills: 1    Associated Diagnoses: Abdominal pain, generalized      Prenatal Vit-Fe Fumarate-FA (PRENATAL VITAMIN PLUS LOW IRON) 27-1 MG TABS Take 1 Dose by mouth daily  Qty: 90 tablet, Refills: 3    Comments: May be changed to equivalent with iron  Associated Diagnoses: Pregnancy examination or test, positive result         STOP taking these medications       blood glucose monitoring (ROSHNI MICROLET) lancets Comments:   Reason for Stopping:         ferrous sulfate (FEROSUL) 325 (65 Fe) MG tablet Comments:   Reason for Stopping:         insulin aspart (NOVOLOG FLEXPEN) 100 UNIT/ML pen Comments:   Reason for Stopping:         insulin detemir (LEVEMIR PEN) 100 UNIT/ML pen Comments:   Reason for Stopping:             Discharge/Disposition:  Brit Romano was discharged to home in stable condition with the following " instructions/medications:  1) Call for temperature > 100.4, bright red vaginal bleeding >1 pad an hour x 2 hours, foul smelling vaginal discharge, pain not controlled by usual oral pain meds, persistent nausea and vomiting not controlled on medications  2) She desires mirena IUD for contraception at six week postpartum visit.  3) For feeding she decided to breast and bottle feed.  4) She was instructed to follow-up with her primary OB in 6 weeks for a routine postpartum visit with 2 hour GTT and with PCP for management of likely pre-diabetes.    Alicia Myhre, DO  Obstetrics and Gynecology, PGY-3  April 9, 2020 , 8:00 AM       Physician Attestation   I, Diann Toussaint, saw and evaluated this patient prior to discharge.  I discussed the patient with the resident/fellow and agree with plan of care as documented in the note.      I personally reviewed vital signs, medications, labs and exam.    I personally spent 15 minutes on discharge activities.    Diann Toussaint MD  Date of Service (when I saw the patient): 04/09/20

## 2020-04-08 NOTE — LACTATION NOTE
This note was copied from a baby's chart.  Reason for Consult: Formula supplementation    Met briefly with Brit. This is her 10th baby. At this time she is choosing to breastfeeding and also bottle feed formula. She declines questions/concerns at this time. She understands staff is available to assist with breastfeeding as needed.    Infant has been bottling 20-25ml formula every 2 hours and had been having blood sugars below 45. Formula switched to 22cal Neosure per NICU recommendation.

## 2020-04-09 VITALS
TEMPERATURE: 98.4 F | HEIGHT: 65 IN | OXYGEN SATURATION: 100 % | DIASTOLIC BLOOD PRESSURE: 75 MMHG | SYSTOLIC BLOOD PRESSURE: 129 MMHG | HEART RATE: 85 BPM | WEIGHT: 222 LBS | RESPIRATION RATE: 18 BRPM | BODY MASS INDEX: 36.99 KG/M2

## 2020-04-09 LAB
GLUCOSE BLDC GLUCOMTR-MCNC: 115 MG/DL (ref 70–99)
GLUCOSE BLDC GLUCOMTR-MCNC: 90 MG/DL (ref 70–99)

## 2020-04-09 PROCEDURE — 25000132 ZZH RX MED GY IP 250 OP 250 PS 637: Performed by: STUDENT IN AN ORGANIZED HEALTH CARE EDUCATION/TRAINING PROGRAM

## 2020-04-09 PROCEDURE — 00000146 ZZHCL STATISTIC GLUCOSE BY METER IP

## 2020-04-09 RX ADMIN — OMEPRAZOLE 20 MG: 20 CAPSULE, DELAYED RELEASE ORAL at 07:24

## 2020-04-09 RX ADMIN — SENNOSIDES AND DOCUSATE SODIUM 2 TABLET: 8.6; 5 TABLET ORAL at 07:25

## 2020-04-09 RX ADMIN — IBUPROFEN 800 MG: 800 TABLET ORAL at 01:18

## 2020-04-09 RX ADMIN — ACETAMINOPHEN 650 MG: 325 TABLET ORAL at 05:30

## 2020-04-09 RX ADMIN — IBUPROFEN 800 MG: 800 TABLET ORAL at 07:25

## 2020-04-09 NOTE — PLAN OF CARE
Data: Vital signs within normal limits. Postpartum checks within normal limits - see flow record. HS BG was 111. Patient eating and drinking normally. Patient able to empty bladder independently and is up ambulating. No apparent signs of infection.  Laceration  healing well. Patient performing self cares and is able to care for infant.  Action: Patient medicated during the shift for pain and cramping. See MAR. Patient reassessed within 1 hour after each medication and pain was improved - patient stated she was comfortable. Patient education done about  screens, self-care, bleeding, and treatment plan. See flow record.  Response: Positive attachment behaviors observed with infant. Support person present.   Plan: Will continue plan of care.

## 2020-04-09 NOTE — PLAN OF CARE
Data: Vital signs within normal limits. Postpartum checks within normal limits - see flow record. Patient eating and drinking normally. Patient able to empty bladder independently and is up ambulating. Pt had BM yesterday. No apparent signs of infection.  Patient performing self cares and is able to care for infant. Formula fed baby overnight, encouraged breastfeeding and nurse offered to help but mother declined overnight.   0200 .   Action: Patient medicated during the shift for pain and cramping. Epidural spot on back is painful but manageable. See MAR. Patient reassessed within 1 hour after each medication and pain was improved - patient stated she was comfortable.   Response: Positive attachment behaviors observed with infant. Pt's sister is support person.  Plan: Anticipate discharge on 4/9.

## 2020-04-09 NOTE — PLAN OF CARE
Vss, postpartum assessment WDL. Taking tylenol and ibuprofen for pain control. Breast feeding infant independently and supplementing with formula. Discharge and home med instructions discussed with patient, all questions answered. Patient knows to make a 6 week follow up appt with her provider. Patient will be discharged home this afternoon.

## 2020-04-13 ENCOUNTER — TELEPHONE (OUTPATIENT)
Dept: PEDIATRICS | Facility: CLINIC | Age: 40
End: 2020-04-13

## 2020-04-13 DIAGNOSIS — O92.70 DISORDER OF LACTATION: ICD-10-CM

## 2020-04-13 RX ORDER — BREAST PUMP
EACH MISCELLANEOUS
Qty: 1 EACH | Refills: 0 | Status: SHIPPED | OUTPATIENT
Start: 2020-04-13 | End: 2020-12-15

## 2020-04-13 NOTE — TELEPHONE ENCOUNTER
Current pump is 2 years old.  Order for new pump sent to  [facility].  They will deliver to home.  Ida Evans RN

## 2020-07-17 ENCOUNTER — OFFICE VISIT (OUTPATIENT)
Dept: FAMILY MEDICINE | Facility: CLINIC | Age: 40
End: 2020-07-17
Payer: COMMERCIAL

## 2020-07-17 VITALS
TEMPERATURE: 96.4 F | BODY MASS INDEX: 35.49 KG/M2 | OXYGEN SATURATION: 100 % | WEIGHT: 213 LBS | DIASTOLIC BLOOD PRESSURE: 60 MMHG | HEIGHT: 65 IN | RESPIRATION RATE: 16 BRPM | SYSTOLIC BLOOD PRESSURE: 126 MMHG | HEART RATE: 75 BPM

## 2020-07-17 DIAGNOSIS — B37.31 VAGINAL CANDIDIASIS: ICD-10-CM

## 2020-07-17 DIAGNOSIS — N30.00 ACUTE CYSTITIS WITHOUT HEMATURIA: Primary | ICD-10-CM

## 2020-07-17 LAB
ALBUMIN UR-MCNC: NEGATIVE MG/DL
APPEARANCE UR: CLEAR
BACTERIA #/AREA URNS HPF: ABNORMAL /HPF
BILIRUB UR QL STRIP: NEGATIVE
COLOR UR AUTO: YELLOW
GLUCOSE UR STRIP-MCNC: NEGATIVE MG/DL
HGB UR QL STRIP: NEGATIVE
KETONES UR STRIP-MCNC: NEGATIVE MG/DL
LEUKOCYTE ESTERASE UR QL STRIP: ABNORMAL
NITRATE UR QL: NEGATIVE
PH UR STRIP: 6 PH (ref 5–7)
RBC #/AREA URNS AUTO: ABNORMAL /HPF
SOURCE: ABNORMAL
SP GR UR STRIP: 1.02 (ref 1–1.03)
SPECIMEN SOURCE: ABNORMAL
UROBILINOGEN UR STRIP-ACNC: 0.2 EU/DL (ref 0.2–1)
WBC #/AREA URNS AUTO: ABNORMAL /HPF
WET PREP SPEC: ABNORMAL

## 2020-07-17 PROCEDURE — 99213 OFFICE O/P EST LOW 20 MIN: CPT | Performed by: PHYSICIAN ASSISTANT

## 2020-07-17 PROCEDURE — 81001 URINALYSIS AUTO W/SCOPE: CPT | Performed by: PHYSICIAN ASSISTANT

## 2020-07-17 PROCEDURE — 87086 URINE CULTURE/COLONY COUNT: CPT | Performed by: PHYSICIAN ASSISTANT

## 2020-07-17 PROCEDURE — 87210 SMEAR WET MOUNT SALINE/INK: CPT | Performed by: PHYSICIAN ASSISTANT

## 2020-07-17 RX ORDER — SULFAMETHOXAZOLE/TRIMETHOPRIM 800-160 MG
1 TABLET ORAL 2 TIMES DAILY
Qty: 6 TABLET | Refills: 0 | Status: SHIPPED | OUTPATIENT
Start: 2020-07-17 | End: 2020-07-20

## 2020-07-17 RX ORDER — MICONAZOLE NITRATE 2 %
1 CREAM WITH APPLICATOR VAGINAL AT BEDTIME
Qty: 45 G | Refills: 0 | Status: SHIPPED | OUTPATIENT
Start: 2020-07-17 | End: 2020-12-15

## 2020-07-17 ASSESSMENT — MIFFLIN-ST. JEOR: SCORE: 1632.04

## 2020-07-17 NOTE — PROGRESS NOTES
"Subjective     Brit Romano is a 40 year old female who presents to clinic today for the following health issues:    HPI       URINARY TRACT SYMPTOMS      Duration: 3 months    Description  dysuria, frequency, urgency, back pain and vaginal discharge    Intensity:  moderate    Accompanying signs and symptoms:  Fever/chills: YES- chills  Flank pain YES  Nausea and vomiting: no   Vaginal symptoms: discharge, odor and itching  Abdominal/Pelvic Pain: YES    History  History of frequent UTI's: YES  History of kidney stones: no   Sexually Active: YES  Possibility of pregnancy: No    Precipitating or alleviating factors: None    Therapies tried and outcome: ibuprofen and Tylenol   Outcome: not effective    MR reviewed.     Review of Systems   Constitutional, HEENT, cardiovascular, pulmonary, gi and gu systems are negative, except as otherwise noted.      Objective    /60   Pulse 75   Temp 96.4  F (35.8  C) (Temporal)   Resp 16   Ht 1.651 m (5' 5\")   Wt 96.6 kg (213 lb)   SpO2 100%   BMI 35.45 kg/m    Body mass index is 35.45 kg/m .  Physical Exam   GENERAL: healthy, alert and no distress  ABDOMEN: soft, nontender, no hepatosplenomegaly, no masses and bowel sounds normal  BACK: no CVA tenderness, no paralumbar tenderness    Diagnostic Test Results:  Results for orders placed or performed in visit on 07/17/20 (from the past 24 hour(s))   Wet prep    Specimen: Vagina   Result Value Ref Range    Specimen Description Vagina     Wet Prep No Trichomonas seen     Wet Prep Moderate  Yeast seen   (A)     Wet Prep Few  WBC'S seen       Wet Prep No clue cells seen    *UA reflex to Microscopic and Culture ([place] [facility] (except [place] and [place])    Specimen: Midstream Urine   Result Value Ref Range    Color Urine Yellow     Appearance Urine Clear     Glucose Urine Negative NEG^Negative mg/dL    Bilirubin Urine Negative NEG^Negative    Ketones Urine Negative NEG^Negative mg/dL    Specific Gravity Urine " "1.020 1.003 - 1.035    Blood Urine Negative NEG^Negative    pH Urine 6.0 5.0 - 7.0 pH    Protein Albumin Urine Negative NEG^Negative mg/dL    Urobilinogen Urine 0.2 0.2 - 1.0 EU/dL    Nitrite Urine Negative NEG^Negative    Leukocyte Esterase Urine Moderate (A) NEG^Negative    Source Midstream Urine    Urine Microscopic   Result Value Ref Range    WBC Urine 25-50 (A) OTO5^0 - 5 /HPF    RBC Urine O - 2 OTO2^O - 2 /HPF    Bacteria Urine Few (A) NEG^Negative /HPF           Assessment & Plan     1. Acute cystitis without hematuria  - *UA reflex to Microscopic and Culture ([place] and [facility] (except [place] and [place])  - Urine Microscopic  - sulfamethoxazole-trimethoprim (BACTRIM DS) 800-160 MG tablet; Take 1 tablet by mouth 2 times daily for 3 days  Dispense: 6 tablet; Refill: 0  - Urine Culture Aerobic Bacterial    2. Vaginal candidiasis  - Wet prep  - miconazole (MICATIN) 2 % cream; Place 1 applicator vaginally At Bedtime  Dispense: 45 g; Refill: 0     BMI:   Estimated body mass index is 35.45 kg/m  as calculated from the following:    Height as of this encounter: 1.651 m (5' 5\").    Weight as of this encounter: 96.6 kg (213 lb).   Weight management plan: Patient was referred to their PCP to discuss a diet and exercise plan.        Return if symptoms worsen or fail to improve.    Geo Noyola PA-C  [facility]  "

## 2020-07-17 NOTE — PATIENT INSTRUCTIONS
Patient Education     Vaginal Infection: Yeast (Candidiasis)  Yeast infection occurs when yeast in the vagina increase and attacks the vaginal tissues. Yeast is a type of fungus. These infections are often caused by a type of yeast called Candida albicans. Other species of yeast can also cause infections. Factors that may make infection more likely include recent antibiotic use, douching, or increased sex. Yeast infections are more common in women who have diabetes, or are obese or pregnant, or have a weak immune system.  Symptoms of yeast infection    Clumpy or thin, white discharge, which may look like cottage cheese    No odor or minimal odor    Severe vaginal itching or burning    Burning with urination    Swelling, redness of vulva    Pain during sex    Treating yeast infection  Yeast infection is treated with a vaginal antifungal cream. In some cases, antifungal pills are prescribed instead. During treatment:    Finish all of your medicine, even if your symptoms go away.    Apply the cream before going to bed. Lie flat after applying so that it doesn't drip out.    Do not douche or use tampons.    Don't rely on a diaphragm or condoms, since the cream may weaken them.    Avoid intercourse if advised by your healthcare provider.  Should I treat a yeast infection myself?  Discuss with your healthcare provider whether you should use over-the-counter medicines to treat a yeast infection. Self-treatment may depend on whether:    You've had a yeast infection in the past.    You're at risk for STDs.  Call your healthcare provider if symptoms do not go away or come back after treatment.  Date Last Reviewed: 3/1/2017    0360-6668 [employer]. [address] [place] [address], [place] [address]. All rights reserved. This information is not intended as a substitute for professional medical care. Always follow your healthcare professional's instructions.

## 2020-07-18 LAB
BACTERIA SPEC CULT: NORMAL
Lab: NORMAL
SPECIMEN SOURCE: NORMAL

## 2020-08-31 DIAGNOSIS — J30.1 CHRONIC SEASONAL ALLERGIC RHINITIS DUE TO POLLEN: Primary | ICD-10-CM

## 2020-08-31 DIAGNOSIS — J30.89 ALLERGIC RHINITIS DUE TO DUST MITE: ICD-10-CM

## 2020-08-31 NOTE — TELEPHONE ENCOUNTER
"Requested Prescriptions   Pending Prescriptions Disp Refills     fluticasone (FLONASE) 50 MCG/ACT nasal spray  1     Sig: Spray 1 spray into both nostrils daily       Nasal Allergy Protocol Failed - 8/31/2020 11:02 AM        Failed - Recent (12 mo) or future (30 days) visit within the authorizing provider's specialty     Patient has had an office visit with the authorizing provider or a provider within the authorizing providers department within the previous 12 mos or has a future within next 30 days. See \"Patient Info\" tab in inbasket, or \"Choose Columns\" in Meds & Orders section of the refill encounter.              Failed - Medication is active on med list        Passed - Patient is age 12 or older           Routing refill request to provider for review/approval because:  Drug not active on patient's medication list  LOV: 8/21/19      Jewell Crockett RN    "

## 2020-09-01 RX ORDER — FLUTICASONE PROPIONATE 50 MCG
2 SPRAY, SUSPENSION (ML) NASAL DAILY
Qty: 16 G | Refills: 0 | Status: SHIPPED | OUTPATIENT
Start: 2020-09-01 | End: 2020-10-12

## 2020-09-01 NOTE — TELEPHONE ENCOUNTER
Called patient and notified of 1 month supply and need for follow up prior to additional refills. Patient states she will call back to schedule.  Rochelle Martin MA

## 2020-09-01 NOTE — TELEPHONE ENCOUNTER
Rx filled for 30 day supply. Patient is due for follow-up visit and will need to be seen for additional refills - virtual visit OK.

## 2020-09-28 DIAGNOSIS — Z32.01 PREGNANCY EXAMINATION OR TEST, POSITIVE RESULT: ICD-10-CM

## 2020-09-29 DIAGNOSIS — J30.1 CHRONIC SEASONAL ALLERGIC RHINITIS DUE TO POLLEN: ICD-10-CM

## 2020-09-29 DIAGNOSIS — J30.89 ALLERGIC RHINITIS DUE TO DUST MITE: ICD-10-CM

## 2020-09-29 RX ORDER — PNV NO.95/FERROUS FUM/FOLIC AC 28MG-0.8MG
TABLET ORAL
Qty: 90 TABLET | Refills: 3 | Status: SHIPPED | OUTPATIENT
Start: 2020-09-29 | End: 2023-11-24

## 2020-09-30 RX ORDER — FLUTICASONE PROPIONATE 50 MCG
2 SPRAY, SUSPENSION (ML) NASAL DAILY
Qty: 16 G | Refills: 0 | OUTPATIENT
Start: 2020-09-30

## 2020-09-30 NOTE — TELEPHONE ENCOUNTER
"Requested Prescriptions   Pending Prescriptions Disp Refills     fluticasone (FLONASE) 50 MCG/ACT nasal spray 16 g 0     Sig: Spray 2 sprays into both nostrils daily Needs appointment for additional refills       Nasal Allergy Protocol Failed - 9/29/2020  3:37 PM        Failed - Recent (12 mo) or future (30 days) visit within the authorizing provider's specialty     Patient has had an office visit with the authorizing provider or a provider within the authorizing providers department within the previous 12 mos or has a future within next 30 days. See \"Patient Info\" tab in inbasket, or \"Choose Columns\" in Meds & Orders section of the refill encounter.              Passed - Patient is age 12 or older        Passed - Medication is active on med list           Routing refill request to provider for review/approval because:  Patient needs to be seen because it has been more than 1 year since last office visit.    Patient was notified of need for follow-up in August and did not schedule.       Jewell Crockett RN      "

## 2020-10-01 NOTE — TELEPHONE ENCOUNTER
Rx denied. Zenia refill sent 8/31/20. Patient will need to be seen for additional refills - virtual visit is OK.

## 2020-10-08 DIAGNOSIS — J30.1 CHRONIC SEASONAL ALLERGIC RHINITIS DUE TO POLLEN: ICD-10-CM

## 2020-10-08 RX ORDER — CETIRIZINE HYDROCHLORIDE 10 MG/1
10 TABLET ORAL DAILY
Qty: 30 TABLET | Refills: 0 | Status: SHIPPED | OUTPATIENT
Start: 2020-10-08 | End: 2020-10-12

## 2020-10-08 NOTE — TELEPHONE ENCOUNTER
Pending Prescriptions:                       Disp   Refills    cetirizine (ZYRTEC) 10 MG tablet          30 tab*11           Sig: Take 1 tablet (10 mg) by mouth daily    Routing refill request to provider for review/approval because:  Patient needs to be seen because it has been more than 1 year since last office visit.    Jaky Montelongo RN

## 2020-10-08 NOTE — TELEPHONE ENCOUNTER
Rx filled x 1. Patient has not been seen in over 1 year and will need a follow-up visit for additional medication refills. Virtual visit is fine.

## 2020-10-12 ENCOUNTER — VIRTUAL VISIT (OUTPATIENT)
Dept: ALLERGY | Facility: CLINIC | Age: 40
End: 2020-10-12
Payer: COMMERCIAL

## 2020-10-12 DIAGNOSIS — J30.1 SEASONAL ALLERGIC RHINITIS DUE TO POLLEN: ICD-10-CM

## 2020-10-12 DIAGNOSIS — H10.13 ALLERGIC CONJUNCTIVITIS OF BOTH EYES: ICD-10-CM

## 2020-10-12 DIAGNOSIS — J30.89 ALLERGIC RHINITIS DUE TO DUST MITE: Primary | ICD-10-CM

## 2020-10-12 PROCEDURE — 99213 OFFICE O/P EST LOW 20 MIN: CPT | Mod: 95 | Performed by: ALLERGY & IMMUNOLOGY

## 2020-10-12 RX ORDER — CETIRIZINE HYDROCHLORIDE 10 MG/1
10 TABLET ORAL DAILY
Qty: 30 TABLET | Refills: 11 | Status: SHIPPED | OUTPATIENT
Start: 2020-10-12 | End: 2020-12-02

## 2020-10-12 RX ORDER — FLUTICASONE PROPIONATE 50 MCG
2 SPRAY, SUSPENSION (ML) NASAL DAILY
Qty: 16 G | Refills: 11 | Status: SHIPPED | OUTPATIENT
Start: 2020-10-12 | End: 2023-11-24

## 2020-10-12 NOTE — PATIENT INSTRUCTIONS
If you have any questions regarding your allergies, asthma, or what we discussed during your visit today please call the allergy clinic or contact us via [employer].    [facility] Allergy RN Line: [phone]  [facility] Scheduling Line: [phone]  [facility] Scheduling Line: [phone]      Continue to take the fluticasone nasal spray and the cetirizine every day        Patient Education   Allergy Shots (Immunotherapy)  What You Need to Know  What are allergy shots?  Allergy shots are used to treat allergic reactions. They are given in the upper arm.  These shots will slowly build your tolerance to the things you are allergic to (such as pollen, mold and animal dander). They reduce the body's allergic response.  What are the benefits of getting allergy shots?  Allergy shots may:    Relieve symptoms long after treatment has ended    Allow you to take less allergy medicine, or stop taking it altogether    Prevent new allergies in the future    Keep children's allergies from getting worse and turning into asthma    Improve eczema caused by allergies.  The average patient sees a large improvement in his or her symptoms (up to 80%).  Who should have allergy shots?  Allergy shots are helpful when allergies cause:    Asthma    Itchy, watery eyes (allergic conjunctivitis)    Runny nose, sneezing, sore throat and other symptoms (allergic rhinitis)    Severe reaction to insect stings.  For children, it is best to wait until age 5 before starting allergy shots.  How will I feel during and after treatment?  You will have shots every 3 to 14 days for 4 to 8 months. We will increase the dose each time until we reach a level that works for you. After that, you will have the shots less often.     It may take another year for symptoms to improve. Many people improve much sooner.    You will likely have shots for another 3 to 5 years.  Allergy shots can reduce your symptoms a little or lot. Some people find that  their allergies go away entirely.   Most people have long-lasting relief after treatment ends. You should see your doctor every year to find out if you need more shots.  What are the risks?  With each allergy shot, there is the risk of allergic reaction. Some reactions are mild. Others can be life threatening and must be treated at once.   Common reactions  It is common to have a mild reaction, such as redness, swelling, pain and itching in the area of the shot. This can occur right away or up to several hours after the shot. Sometimes the reaction moves into the upper arm. Call your doctor if:    The reaction area is more than 2 inches wide.    You still have the reaction the day after the shot.  Severe reactions  The reactions below are rare, but serious. If not treated, they can lead to very low blood pressure and even death. If you have any of these, get help at once.     Hives include a rash, swelling and itching on more than one part of the body. They may occur within minutes to hours after a shot.    Swelling of any part of the body. For example, you may have swelling of the ears, tongue, lips, throat, intestines, hands or feet. Swelling may affect more than one body part. These reactions could occur within minutes to hours after the shot.    Trouble breathing. You may develop sneezing, runny nose, stuffy nose, cough or asthma symptoms. These reactions can occur within minutes to hours after the shot.    Anaphylactic shock is sudden, severe and may include symptoms such as hives, trouble breathing, stomach pain, feeling faint or dizzy and low blood pressure. It may cause a loss of consciousness and could lead to death. This reaction usually occurs within minutes of the shot but can happen a few hours later. It is very rare.  You might have a severe reaction after the first shot, or it may occur after a series of shots. If you have a reaction, we will treat you right away. In the future, we will change the  dose of your allergy shots.  What happens after each shot?  You should wait in the doctor's office for 30 minutes after each shot. If you have a reaction, we may ask you to stay longer. If you cannot wait the 30 minutes, you should not have your allergy shot.  If you have a severe reaction after leaving the doctor's office, use your epinephrine auto-injector (Epi-pen) and go to the nearest emergency room. If treated promptly, severe reactions are rarely life threatening. We will never give an allergy shot unless medical help is nearby in case of emergency.   What else do I need to know?  If you start taking any new medicines  It is not safe to have these shots while taking certain medicines. If any doctor prescribes new medicine for you, please let us know. This includes:    Beta blockers, often used for heart disease    Blood pressure medicine    Medicine for migraine headaches    Glaucoma medicine.  A note for women  If you get pregnant, tell the office staff at once.   Your doctor will decide how often you should receive shots during pregnancy. We will not increase your dose while you are pregnant.  If you will have shots at another clinic  If you will have your allergy shots at another clinic, you must provide the name and address of the doctor who will give the shots. We will ask you to fill out a form.  If you have any questions or concerns, please speak to your doctor or a member of our staff.   For informational purposes only. Not to replace the advice of your health care provider.   Copyright   2009 [employer]. All rights reserved. [employer] 295648 - REV 07/17.

## 2020-10-12 NOTE — PROGRESS NOTES
"Brit Romano is a 40 year old female who is being evaluated via a billable video visit.      The patient has been notified of following:     \"This video visit will be conducted via a call between you and your physician/provider. We have found that certain health care needs can be provided without the need for an in-person physical exam.  This service lets us provide the care you need with a video conversation.  If a prescription is necessary we can send it directly to your pharmacy.  If lab work is needed we can place an order for that and you can then stop by our lab to have the test done at a later time.    Video visits are billed at different rates depending on your insurance coverage.  Please reach out to your insurance provider with any questions.    If during the course of the call the physician/provider feels a video visit is not appropriate, you will not be charged for this service.\"    Patient has given verbal consent for Video visit? Yes  How would you like to obtain your AVS? Mail a copy  If you are dropped from the video visit, the video invite should be resent to: Text to cell phone: [phone]  Will anyone else be joining your video visit? No        Video-Visit Details    Type of service:  Video Visit    Video Start Time: 12:10 PM  Video End Time: 12:19 PM    Originating Location (pt. Location): Home    Distant Location (provider location):  [facility]     Platform used for Video Visit: Neida      Brit Romano was seen in the Allergy Clinic at [facility].      Brit Romano is a 40 year old Bahamian female who is seen today for follow-up of allergic rhinoconjunctivitis. She reports that she feels her entire body itches if she misses one day of the cetirizine. If she takes the cetirizine daily she does not have these symptoms. When she takes the cetirizine and fluticasone daily she does not have any rhinoconjunctivitis symptoms and feels her symptoms are " well controlled. However, if she misses the medications her symptoms will return and she also develops a headache. Brit has not had any side effects from these medications. She is interested in discussing other treatment options for her symptoms.      Past Medical History:   Diagnosis Date     Fatty liver      Fibromyalgia      History of gestational diabetes      Seasonal allergies      Family History   Problem Relation Age of Onset     Hypertension Mother      Diabetes Mother      Kidney Disease Mother         dialysis     Glaucoma Mother      Diabetes Son      Macular Degeneration No family hx of      Social History     Tobacco Use     Smoking status: Never Smoker     Smokeless tobacco: Never Used   Substance Use Topics     Alcohol use: No     Alcohol/week: 0.0 standard drinks     Drug use: No     Social History     Social History Narrative    How much exercise per week? none    How much calcium per day? diet       How much caffeine per day? none    How much vitamin D per day? Diet/supplement    Do you/your family wear seatbelts?  Yes    Do you/your family use safety helmets? No    Do you/your family use sunscreen? No    Do you/your family keep firearms in the home? No    Do you/your family have a smoke detector(s)? Yes        Do you feel safe in your home? Yes    Has anyone ever touched you in an unwanted manner? No     Explain     Cande Persaud, [facility]    08/21/2015        Review Maya Zamorano [facility]    10/07/2016       Past medical, family, and social history were reviewed.    REVIEW OF SYSTEMS:  General: negative for weight gain. negative for weight loss. negative for changes in sleep.   Eyes: positive  for itching. negative for redness. negative for tearing/watering. negative for vision changes  Ears: negative for fullness. negative for hearing loss. negative for dizziness.   Nose: positive  for snoring.positive  for changes in smell. positive  for drainage.   Throat: positive  for hoarseness. negative for sore  throat. negative for trouble swallowing.   Lungs: negative for cough. negative for shortness of breath.negative for wheezing. negative for sputum production.   Cardiovascular: negative for chest pain. negative for swelling of ankles. negative for fast or irregular heartbeat.   Gastrointestinal: negative for nausea. positive  for heartburn. positive  for acid reflux.   Musculoskeletal: positive  for joint pain. negative for joint stiffness. negative for joint swelling.   Neurologic: negative for seizures. negative for fainting. negative for weakness.   Psychiatric: negative for changes in mood. negative for anxiety.   Endocrine: negative for cold intolerance. negative for heat intolerance. negative for tremors.   Hematologic: negative for easy bruising. negative for easy bleeding.  Integumentary: negative for rash. negative for scaling. negative for nail changes.       Current Outpatient Medications:      acetaminophen (TYLENOL) 325 MG tablet, Take 2 tablets (650 mg) by mouth every 6 hours as needed for mild pain Start after Delivery., Disp: 100 tablet, Rfl: 0     acetone urine (KETOSTIX) test strip, 1 strip as needed (as directed) Use one strip daily for 1 week, then one strip weekly. (Patient not taking: Reported on 7/17/2020), Disp: 50 each, Rfl: 3     blood glucose (CONTOUR NEXT TEST) test strip, Use to test blood sugar once daily. (Patient not taking: Reported on 7/17/2020), Disp: 100 each, Rfl: prn     cetirizine (ZYRTEC) 10 MG tablet, Take 1 tablet (10 mg) by mouth daily Needs appointment for additional refills., Disp: 30 tablet, Rfl: 0     cyanocobalamin (VITAMIN B-12) 1000 MCG tablet, Take 1 tablet (1,000 mcg) by mouth daily (Patient not taking: Reported on 7/17/2020), Disp: 100 tablet, Rfl: 3     fluticasone (FLONASE) 50 MCG/ACT nasal spray, Spray 2 sprays into both nostrils daily Needs appointment for additional refills, Disp: 16 g, Rfl: 0     ibuprofen (ADVIL/MOTRIN) 600 MG tablet, Take 1 tablet (600 mg)  "by mouth every 6 hours as needed for moderate pain Start after delivery, Disp: 60 tablet, Rfl: 0     Insulin Pen Needle (PEN NEEDLES 3/16\") 31G X 5 MM MISC, 1 each daily (Patient not taking: Reported on 7/17/2020), Disp: 100 each, Rfl: 3     miconazole (MICATIN) 2 % cream, Place 1 applicator vaginally At Bedtime, Disp: 45 g, Rfl: 0     Misc. Devices (BREAST PUMP) MISC, Double electric breast pump.  Use as directed (Patient not taking: Reported on 7/17/2020), Disp: 1 each, Rfl: 0     omeprazole (PRILOSEC) 40 MG DR capsule, TAKE 1 CAPSULE(40 MG) BY MOUTH DAILY, Disp: 30 capsule, Rfl: 1     Prenatal Vit-Fe Fumarate-FA (PRENATAL VITAMINS) 28-0.8 MG TABS, TAKE 1 TABLET BY MOUTH EVERY DAY, Disp: 90 tablet, Rfl: 3     senna-docusate (SENOKOT-S/PERICOLACE) 8.6-50 MG tablet, Take 1 tablet by mouth daily Start after delivery. (Patient not taking: Reported on 7/17/2020), Disp: 100 tablet, Rfl: 0  No current facility-administered medications for this visit.     Facility-Administered Medications Ordered in Other Visits:      bupivacaine (MARCAINE) 0.125 % injection (diluted from stock concentration by MD or CRNA), , EPIDURAL, PRN, Taylor Clarke MD, 8 mL at 04/07/20 0541     fentaNYL (PF) (SUBLIMAZE) injection, , EPIDURAL, PRN, Taylor Clakre MD, 16 mcg at 04/07/20 0541     fentaNYL (SUBLIMAZE) 2 mcg/mL, bupivacaine (MARCAINE) 0.125% in NaCl 0.9% EPIDURAL infusion, , EPIDURAL, Continuous PRN, Taylor Clarke MD, Last Rate: 10 mL/hr at 04/07/20 0542, 10 mL/hr at 04/07/20 0542     lidocaine-EPINEPHrine 1.5 %-1:[id number] injection, , EPIDURAL, PRN, Taylor Clarke MD, 3 mL at 04/07/20 0537  No Known Allergies    EXAM:   There were no vitals taken for this visit.  GENERAL APPEARANCE: alert, cooperative and not in distress  SKIN: no rashes, no lesions  HEAD: atraumatic, normocephalic  EYES: lids and lashes normal, conjunctivae and sclerae clear, EOM full and intact  ENT: normal external ears and " nose, no nasal drainage  NECK: no asymmetry, masses, or scars  LUNGS: unlabored respirations, no accessory muscle use, speaking comfortably in full sentences, no cough or audible wheezing  MUSCULOSKELETAL: no musculoskeletal defects are noted  NEURO: oriented for age x3  PSYCH: does not appear depressed or anxious      WORKUP:  None    ASSESSMENT/PLAN:  Brit Romano is a 40 year old female seen for follow-up of allergic rhinoconjunctivitis.    1. Allergic rhinitis due to dust mite - Well controlled with daily antihistamine and nasal steroid. She does experience recurrence of symptoms as well as itching if she misses a dose of cetirizine. We discussed that itching is a possible side effect of weaning from antihistamines and can persist for several days or a weeks after discontinuing the medication. She was advised that short-term use of diphenhydramine or hydroxyzine may be helpful at bedtime to help through this period of adjustment however she is not interested in weaning the medications at this time. Brit was also counseled regarding long-term management of allergic rhinoconjuncivitis with allergen immunotherapy treatment. She wishes to think about this treatment before proceeding as she is planning to travel for a few months and does not wish to start until returning home.    - continue daily antihistamine and nasal steroid  - consider allergen immunotherapy for long-term management of symptoms  - cetirizine (ZYRTEC) 10 MG tablet; Take 1 tablet (10 mg) by mouth daily  Dispense: 30 tablet; Refill: 11  - fluticasone (FLONASE) 50 MCG/ACT nasal spray; Spray 2 sprays into both nostrils daily  Dispense: 16 g; Refill: 11    2. Seasonal allergic rhinitis due to pollen    - cetirizine (ZYRTEC) 10 MG tablet; Take 1 tablet (10 mg) by mouth daily  Dispense: 30 tablet; Refill: 11  - fluticasone (FLONASE) 50 MCG/ACT nasal spray; Spray 2 sprays into both nostrils daily  Dispense: 16 g; Refill: 11    3. Allergic conjunctivitis  of both eyes    - cetirizine (ZYRTEC) 10 MG tablet; Take 1 tablet (10 mg) by mouth daily  Dispense: 30 tablet; Refill: 11      Follow-up in 1 year, sooner if needed      Thank you for allowing me to participate in the care of Brit Romano.      Mo Natarajan MD, FAAAAI  Allergy/Immunology  [facility]      Chart documentation done in part with Dragon Voice Recognition Software. Although reviewed after completion, some word and grammatical errors may remain.

## 2020-10-12 NOTE — LETTER
"    10/12/2020         RE: Brit Romano  [address] [address]        Dear Colleague,    Thank you for referring your patient, Brit Romano, to the [facility]. Please see a copy of my visit note below.    Brit Romano is a 40 year old female who is being evaluated via a billable video visit.      The patient has been notified of following:     \"This video visit will be conducted via a call between you and your physician/provider. We have found that certain health care needs can be provided without the need for an in-person physical exam.  This service lets us provide the care you need with a video conversation.  If a prescription is necessary we can send it directly to your pharmacy.  If lab work is needed we can place an order for that and you can then stop by our lab to have the test done at a later time.    Video visits are billed at different rates depending on your insurance coverage.  Please reach out to your insurance provider with any questions.    If during the course of the call the physician/provider feels a video visit is not appropriate, you will not be charged for this service.\"    Patient has given verbal consent for Video visit? Yes  How would you like to obtain your AVS? Mail a copy  If you are dropped from the video visit, the video invite should be resent to: Text to cell phone: [phone]  Will anyone else be joining your video visit? No        Video-Visit Details    Type of service:  Video Visit    Video Start Time: 12:10 PM  Video End Time: 12:19 PM    Originating Location (pt. Location): Home    Distant Location (provider location):  [facility]     Platform used for Video Visit: Neida      Brit Romano was seen in the Allergy Clinic at [facility].      Brit Romano is a 40 year old Polish female who is seen today for follow-up of allergic rhinoconjunctivitis. She reports that she feels her entire " body itches if she misses one day of the cetirizine. If she takes the cetirizine daily she does not have these symptoms. When she takes the cetirizine and fluticasone daily she does not have any rhinoconjunctivitis symptoms and feels her symptoms are well controlled. However, if she misses the medications her symptoms will return and she also develops a headache. Brit has not had any side effects from these medications. She is interested in discussing other treatment options for her symptoms.      Past Medical History:   Diagnosis Date     Fatty liver      Fibromyalgia      History of gestational diabetes      Seasonal allergies      Family History   Problem Relation Age of Onset     Hypertension Mother      Diabetes Mother      Kidney Disease Mother         dialysis     Glaucoma Mother      Diabetes Son      Macular Degeneration No family hx of      Social History     Tobacco Use     Smoking status: Never Smoker     Smokeless tobacco: Never Used   Substance Use Topics     Alcohol use: No     Alcohol/week: 0.0 standard drinks     Drug use: No     Social History     Social History Narrative    How much exercise per week? none    How much calcium per day? diet       How much caffeine per day? none    How much vitamin D per day? Diet/supplement    Do you/your family wear seatbelts?  Yes    Do you/your family use safety helmets? No    Do you/your family use sunscreen? No    Do you/your family keep firearms in the home? No    Do you/your family have a smoke detector(s)? Yes        Do you feel safe in your home? Yes    Has anyone ever touched you in an unwanted manner? No     Explain     Cande Persaud, [facility]    08/21/2015        Review Maya Zamorano [facility]    10/07/2016       Past medical, family, and social history were reviewed.    REVIEW OF SYSTEMS:  General: negative for weight gain. negative for weight loss. negative for changes in sleep.   Eyes: positive  for itching. negative for redness. negative for tearing/watering.  negative for vision changes  Ears: negative for fullness. negative for hearing loss. negative for dizziness.   Nose: positive  for snoring.positive  for changes in smell. positive  for drainage.   Throat: positive  for hoarseness. negative for sore throat. negative for trouble swallowing.   Lungs: negative for cough. negative for shortness of breath.negative for wheezing. negative for sputum production.   Cardiovascular: negative for chest pain. negative for swelling of ankles. negative for fast or irregular heartbeat.   Gastrointestinal: negative for nausea. positive  for heartburn. positive  for acid reflux.   Musculoskeletal: positive  for joint pain. negative for joint stiffness. negative for joint swelling.   Neurologic: negative for seizures. negative for fainting. negative for weakness.   Psychiatric: negative for changes in mood. negative for anxiety.   Endocrine: negative for cold intolerance. negative for heat intolerance. negative for tremors.   Hematologic: negative for easy bruising. negative for easy bleeding.  Integumentary: negative for rash. negative for scaling. negative for nail changes.       Current Outpatient Medications:      acetaminophen (TYLENOL) 325 MG tablet, Take 2 tablets (650 mg) by mouth every 6 hours as needed for mild pain Start after Delivery., Disp: 100 tablet, Rfl: 0     acetone urine (KETOSTIX) test strip, 1 strip as needed (as directed) Use one strip daily for 1 week, then one strip weekly. (Patient not taking: Reported on 7/17/2020), Disp: 50 each, Rfl: 3     blood glucose (CONTOUR NEXT TEST) test strip, Use to test blood sugar once daily. (Patient not taking: Reported on 7/17/2020), Disp: 100 each, Rfl: prn     cetirizine (ZYRTEC) 10 MG tablet, Take 1 tablet (10 mg) by mouth daily Needs appointment for additional refills., Disp: 30 tablet, Rfl: 0     cyanocobalamin (VITAMIN B-12) 1000 MCG tablet, Take 1 tablet (1,000 mcg) by mouth daily (Patient not taking: Reported on  "7/17/2020), Disp: 100 tablet, Rfl: 3     fluticasone (FLONASE) 50 MCG/ACT nasal spray, Spray 2 sprays into both nostrils daily Needs appointment for additional refills, Disp: 16 g, Rfl: 0     ibuprofen (ADVIL/MOTRIN) 600 MG tablet, Take 1 tablet (600 mg) by mouth every 6 hours as needed for moderate pain Start after delivery, Disp: 60 tablet, Rfl: 0     Insulin Pen Needle (PEN NEEDLES 3/16\") 31G X 5 MM MISC, 1 each daily (Patient not taking: Reported on 7/17/2020), Disp: 100 each, Rfl: 3     miconazole (MICATIN) 2 % cream, Place 1 applicator vaginally At Bedtime, Disp: 45 g, Rfl: 0     Misc. Devices (BREAST PUMP) MISC, Double electric breast pump.  Use as directed (Patient not taking: Reported on 7/17/2020), Disp: 1 each, Rfl: 0     omeprazole (PRILOSEC) 40 MG DR capsule, TAKE 1 CAPSULE(40 MG) BY MOUTH DAILY, Disp: 30 capsule, Rfl: 1     Prenatal Vit-Fe Fumarate-FA (PRENATAL VITAMINS) 28-0.8 MG TABS, TAKE 1 TABLET BY MOUTH EVERY DAY, Disp: 90 tablet, Rfl: 3     senna-docusate (SENOKOT-S/PERICOLACE) 8.6-50 MG tablet, Take 1 tablet by mouth daily Start after delivery. (Patient not taking: Reported on 7/17/2020), Disp: 100 tablet, Rfl: 0  No current facility-administered medications for this visit.     Facility-Administered Medications Ordered in Other Visits:      bupivacaine (MARCAINE) 0.125 % injection (diluted from stock concentration by MD or CRNA), , EPIDURAL, PRN, Taylor Clarke MD, 8 mL at 04/07/20 0541     fentaNYL (PF) (SUBLIMAZE) injection, , EPIDURAL, PRN, Taylor Clarke MD, 16 mcg at 04/07/20 0541     fentaNYL (SUBLIMAZE) 2 mcg/mL, bupivacaine (MARCAINE) 0.125% in NaCl 0.9% EPIDURAL infusion, , EPIDURAL, Continuous PRN, Taylor Clarke MD, Last Rate: 10 mL/hr at 04/07/20 0542, 10 mL/hr at 04/07/20 0542     lidocaine-EPINEPHrine 1.5 %-1:[id number] injection, , EPIDURAL, PRN, Taylor Clarke MD, 3 mL at 04/07/20 0537  No Known Allergies    EXAM:   There were no vitals taken " for this visit.  GENERAL APPEARANCE: alert, cooperative and not in distress  SKIN: no rashes, no lesions  HEAD: atraumatic, normocephalic  EYES: lids and lashes normal, conjunctivae and sclerae clear, EOM full and intact  ENT: normal external ears and nose, no nasal drainage  NECK: no asymmetry, masses, or scars  LUNGS: unlabored respirations, no accessory muscle use, speaking comfortably in full sentences, no cough or audible wheezing  MUSCULOSKELETAL: no musculoskeletal defects are noted  NEURO: oriented for age x3  PSYCH: does not appear depressed or anxious      WORKUP:  None    ASSESSMENT/PLAN:  Brit Romano is a 40 year old female seen for follow-up of allergic rhinoconjunctivitis.    1. Allergic rhinitis due to dust mite - Well controlled with daily antihistamine and nasal steroid. She does experience recurrence of symptoms as well as itching if she misses a dose of cetirizine. We discussed that itching is a possible side effect of weaning from antihistamines and can persist for several days or a weeks after discontinuing the medication. She was advised that short-term use of diphenhydramine or hydroxyzine may be helpful at bedtime to help through this period of adjustment however she is not interested in weaning the medications at this time. Brit was also counseled regarding long-term management of allergic rhinoconjuncivitis with allergen immunotherapy treatment. She wishes to think about this treatment before proceeding as she is planning to travel for a few months and does not wish to start until returning home.    - continue daily antihistamine and nasal steroid  - consider allergen immunotherapy for long-term management of symptoms  - cetirizine (ZYRTEC) 10 MG tablet; Take 1 tablet (10 mg) by mouth daily  Dispense: 30 tablet; Refill: 11  - fluticasone (FLONASE) 50 MCG/ACT nasal spray; Spray 2 sprays into both nostrils daily  Dispense: 16 g; Refill: 11    2. Seasonal allergic rhinitis due to  pollen    - cetirizine (ZYRTEC) 10 MG tablet; Take 1 tablet (10 mg) by mouth daily  Dispense: 30 tablet; Refill: 11  - fluticasone (FLONASE) 50 MCG/ACT nasal spray; Spray 2 sprays into both nostrils daily  Dispense: 16 g; Refill: 11    3. Allergic conjunctivitis of both eyes    - cetirizine (ZYRTEC) 10 MG tablet; Take 1 tablet (10 mg) by mouth daily  Dispense: 30 tablet; Refill: 11      Follow-up in 1 year, sooner if needed      Thank you for allowing me to participate in the care of Brit Romano.      Mo Natarajan MD, [facility]  Allergy/Immunology  [facility]      Chart documentation done in part with Dragon Voice Recognition Software. Although reviewed after completion, some word and grammatical errors may remain.        Again, thank you for allowing me to participate in the care of your patient.        Sincerely,        Mo Natarajan MD

## 2020-12-01 DIAGNOSIS — H10.13 ALLERGIC CONJUNCTIVITIS OF BOTH EYES: ICD-10-CM

## 2020-12-01 DIAGNOSIS — J30.89 ALLERGIC RHINITIS DUE TO DUST MITE: ICD-10-CM

## 2020-12-01 DIAGNOSIS — J30.1 SEASONAL ALLERGIC RHINITIS DUE TO POLLEN: ICD-10-CM

## 2020-12-01 NOTE — TELEPHONE ENCOUNTER
Requested Prescriptions   Pending Prescriptions Disp Refills     cetirizine (ZYRTEC) 10 MG tablet 30 tablet 11     Sig: Take 1 tablet (10 mg) by mouth daily       There is no refill protocol information for this order

## 2020-12-02 RX ORDER — CETIRIZINE HYDROCHLORIDE 10 MG/1
10 TABLET ORAL DAILY
Qty: 30 TABLET | Refills: 11 | Status: SHIPPED | OUTPATIENT
Start: 2020-12-02 | End: 2023-11-24

## 2020-12-02 NOTE — TELEPHONE ENCOUNTER
"Requested Prescriptions   Pending Prescriptions Disp Refills     cetirizine (ZYRTEC) 10 MG tablet 30 tablet 11     Sig: Take 1 tablet (10 mg) by mouth daily       Antihistamines Protocol Passed - 12/1/2020  2:26 PM        Passed - Patient is 3-64 years of age     Apply weight-based dosing for peds patients age 3 - 12 years of age.    Forward request to provider for patients under the age of 3 or over the age of 64.          Passed - Recent (12 mo) or future (30 days) visit within the authorizing provider's specialty     Patient has had an office visit with the authorizing provider or a provider within the authorizing providers department within the previous 12 mos or has a future within next 30 days. See \"Patient Info\" tab in inbasket, or \"Choose Columns\" in Meds & Orders section of the refill encounter.              Passed - Medication is active on med list           Medication filled per [facility] protocol.     Prescription re-sent to pharmacy.    Jewell Crockett RN  "

## 2020-12-15 ENCOUNTER — OFFICE VISIT (OUTPATIENT)
Dept: FAMILY MEDICINE | Facility: CLINIC | Age: 40
End: 2020-12-15
Payer: COMMERCIAL

## 2020-12-15 VITALS
WEIGHT: 218 LBS | OXYGEN SATURATION: 100 % | SYSTOLIC BLOOD PRESSURE: 151 MMHG | TEMPERATURE: 97.8 F | BODY MASS INDEX: 36.28 KG/M2 | HEART RATE: 72 BPM | DIASTOLIC BLOOD PRESSURE: 91 MMHG

## 2020-12-15 DIAGNOSIS — R10.9 GASTRIC PAIN: Primary | ICD-10-CM

## 2020-12-15 DIAGNOSIS — Z86.32 HISTORY OF GESTATIONAL DIABETES: ICD-10-CM

## 2020-12-15 LAB — HBA1C MFR BLD: 5.6 % (ref 0–5.6)

## 2020-12-15 PROCEDURE — 83036 HEMOGLOBIN GLYCOSYLATED A1C: CPT | Performed by: PHYSICIAN ASSISTANT

## 2020-12-15 PROCEDURE — 99213 OFFICE O/P EST LOW 20 MIN: CPT | Performed by: PHYSICIAN ASSISTANT

## 2020-12-15 PROCEDURE — 36415 COLL VENOUS BLD VENIPUNCTURE: CPT | Performed by: PHYSICIAN ASSISTANT

## 2020-12-15 ASSESSMENT — PAIN SCALES - GENERAL: PAINLEVEL: EXTREME PAIN (8)

## 2020-12-15 NOTE — PROGRESS NOTES
Subjective     Brit Romano is a 40 year old female who presents to clinic today for the following health issues:    HPI         ED/UC Followup:    Facility:  [facility]  Date of visit: 12/13/20  Reason for visit: Epigastric pain  Current Status: Continues to have the pain, diarrhea as well.     Did not fill prescription for pepcid yet. ER recommended H Pylori testing. Has been having diarrhea for about 2 weeks. Still trying to eat a regular diet and stay hydrated.  Positive for covid on 11/20/2020. 10 kids - last born 10 months ago. Had gestational diabetes.  History of H Pylori.    History of endoscopy many years ago.       Review of Systems   Constitutional, HEENT, cardiovascular, pulmonary, gi and gu systems are negative, except as otherwise noted.      Objective    BP (!) 151/91 (BP Location: Right arm, Patient Position: Chair, Cuff Size: Adult Large)   Pulse 72   Temp 97.8  F (36.6  C) (Oral)   Wt 98.9 kg (218 lb)   SpO2 100%   BMI 36.28 kg/m    Body mass index is 36.28 kg/m .  Physical Exam   GENERAL: healthy, alert and no distress  NECK: no adenopathy, no asymmetry, masses, or scars and thyroid normal to palpation  RESP: lungs clear to auscultation - no rales, rhonchi or wheezes  CV: regular rate and rhythm, normal S1 S2, no S3 or S4, no murmur, click or rub, no peripheral edema and peripheral pulses strong  ABDOMEN: soft,mild epigastric tenderness, no hepatosplenomegaly, no masses and bowel sounds normal  MS: no gross musculoskeletal defects noted, no edema    Results for orders placed or performed in visit on 12/15/20 (from the past 24 hour(s))   Hemoglobin A1c   Result Value Ref Range    Hemoglobin A1C 5.6 0 - 5.6 %           Assessment & Plan     Gastric pain  Has had a number of years of issues with multiple visits for this problem. Discussed seeing a specialist and possible repeat endoscopy. She agrees and has no further questions.   - Helicobacter pylori Antigen Stool; Future  - GASTROENTEROLOGY  ADULT REF CONSULT ONLY; Future    History of gestational diabetes  Will check HgbA1c  - Hemoglobin A1c        Return if symptoms worsen or fail to improve.    Zora Ortega PA-C  [facility]

## 2020-12-15 NOTE — LETTER
December 16, 2020      Brit Romano  [address] [address]        Dear ,    We are writing to inform you of your test results.    Normal Results      Resulted Orders   Hemoglobin A1c   Result Value Ref Range    Hemoglobin A1C 5.6 0 - 5.6 %      Comment:      Normal <5.7% Prediabetes 5.7-6.4%  Diabetes 6.5% or higher - adopted from ADA   consensus guidelines.         If you have any questions or concerns, please call the clinic at the number listed above.       Sincerely,      Zora Ortega PA-C/vivas

## 2020-12-15 NOTE — PROGRESS NOTES
"Subjective     Brit Romano is a 40 year old female who presents to clinic today for the following health issues:    HPI         Abdominal/Flank Pain  Onset/Duration: ***  Description:   Character: {.:340764}  Location: {.:732823}  Radiation: {.:323847::\"None\"}  Intensity: {.:856394}  Progression of Symptoms:  {.:943366}  Accompanying Signs & Symptoms:  Fever/Chills: {.:540116::\"no\"}  Gas/Bloating: {.:048951::\"no\"}  Nausea: {.:493637::\"no\"}  Vomitting: {.:430294::\"no\"}  Diarrhea: {.:234731::\"no\"}  Constipation: {.:674229::\"no\"}  Dysuria or Hematuria: {.:467074::\"no\"}  History:   Trauma: {.:549500::\"no\"}  Previous similar pain: {.:331454::\"no\"}  Previous tests done: { .:083281}  Precipitating factors:   Does the pain change with:     Food: {.:650851::\"no\"}    Bowel Movement: {.:220672::\"no\"}    Urination: {.:022116::\"no\"}   Other factors:  {.:946541::\"no\"}  Therapies tried and outcome: {NONEORCHOOSE:272777::\"None\"}  No LMP recorded.    Enriqueta Miranda MA    {additonal problems for provider to add (Optional):763606}    Review of Systems   {ROS COMP (Optional):156069}      Objective    There were no vitals taken for this visit.  There is no height or weight on file to calculate BMI.  Physical Exam   {Exam List (Optional):818060}    {Diagnostic Test Results (Optional):436979}        {PROVIDER CHARTING PREFERENCE:013251}    "

## 2020-12-15 NOTE — PATIENT INSTRUCTIONS
Try Gas X for gas pains  Fill famotidine prescription  Return H Pylori testing  See gastroenterology

## 2020-12-16 PROCEDURE — 87338 HPYLORI STOOL AG IA: CPT | Performed by: PHYSICIAN ASSISTANT

## 2020-12-17 DIAGNOSIS — R10.9 GASTRIC PAIN: ICD-10-CM

## 2020-12-18 LAB — H PYLORI AG STL QL IA: POSITIVE

## 2020-12-21 DIAGNOSIS — A04.8 H. PYLORI INFECTION: Primary | ICD-10-CM

## 2020-12-21 NOTE — TELEPHONE ENCOUNTER
Please call patient. Her H Pylori test did come back positive. She has had this before.    She should start triple therapy for 2 weeks. Clarithromycin, Omeprazole and Amoxicillin. (Can continue omeprazole for 2 more weeks if having symptoms) In 4 weeks, she should stop omeprazole.   Then in 6 weeks, repeat H Pylori test (after no PPI for 2 weeks) to check for eradication.    Please send me which pharmacy she'd prefer - multiple on file for her.    Zora Ortega PA-C

## 2020-12-22 RX ORDER — CLARITHROMYCIN 500 MG
500 TABLET ORAL 2 TIMES DAILY
Qty: 28 TABLET | Refills: 0 | Status: SHIPPED | OUTPATIENT
Start: 2020-12-22 | End: 2021-01-05

## 2020-12-22 RX ORDER — OMEPRAZOLE 40 MG/1
CAPSULE, DELAYED RELEASE ORAL
Qty: 30 CAPSULE | Refills: 1 | Status: SHIPPED | OUTPATIENT
Start: 2020-12-22 | End: 2021-03-14

## 2020-12-22 RX ORDER — AMOXICILLIN 500 MG/1
1000 CAPSULE ORAL 2 TIMES DAILY
Qty: 56 CAPSULE | Refills: 0 | Status: SHIPPED | OUTPATIENT
Start: 2020-12-22 | End: 2021-01-05

## 2021-03-12 DIAGNOSIS — A04.8 H. PYLORI INFECTION: ICD-10-CM

## 2021-03-14 RX ORDER — OMEPRAZOLE 40 MG/1
CAPSULE, DELAYED RELEASE ORAL
Qty: 30 CAPSULE | Refills: 0 | Status: SHIPPED | OUTPATIENT
Start: 2021-03-14 | End: 2023-11-24

## 2021-03-26 ENCOUNTER — NURSE TRIAGE (OUTPATIENT)
Dept: NURSING | Facility: CLINIC | Age: 41
End: 2021-03-26

## 2021-03-26 ENCOUNTER — VIRTUAL VISIT (OUTPATIENT)
Dept: URGENT CARE | Facility: CLINIC | Age: 41
End: 2021-03-26
Payer: COMMERCIAL

## 2021-03-26 DIAGNOSIS — Z11.52 ENCOUNTER FOR SCREENING FOR COVID-19: Primary | ICD-10-CM

## 2021-03-26 PROCEDURE — 99212 OFFICE O/P EST SF 10 MIN: CPT | Mod: 95 | Performed by: NURSE PRACTITIONER

## 2021-03-26 NOTE — PATIENT INSTRUCTIONS
"After Your COVID-19 (Coronavirus) Test  You have been tested for COVID-19 (coronavirus).   If you'll have surgery in the next few days, we'll let you know ahead of time if you have the virus. Please call your surgeon's office with any questions.  For all other patients: Results are usually available in [employer] within 2 to 3 days.   If you do not have a [employer] account, you'll get a letter in the mail in about 7 to 10 days.   [employer] is often the fastest way to get test results. Please sign up if you do not already have a [employer] account. See the handout Getting COVID-19 Test Results in [employer] for help.  What if my test result is positive?  If your test is positive and you have not viewed your result in [employer], you'll get a phone call with your result. (A positive test means that you have the virus.)     Follow the tips under \"How do I self-isolate?\" below for 10 days (20 days if you have a weak immune system).    You don't need to be retested for COVID-19 before going back to school or work. As long as you're fever-free and feeling better, you can go back to school, work and other activities after waiting the 10 or 20 days.  What if I have questions after I get my results?  If you have questions about your results, please visit our testing website at [email or web address].   After 7 to 10 days, if you have not gotten your results:     Call [phone] ([phone]) and ask to speak with our COVID-19 results team.    If you're being treated at an infusion center: Call your infusion center directly.  What are the symptoms of COVID-19?  Cough, fever and trouble breathing are the most common signs of COVID-19.  Other symptoms can include new headaches, new muscle or body aches, new and unexplained fatigue (feeling very tired), chills, sore throat, congestion (stuffy or runny nose), diarrhea (loose poop), loss of taste or smell, belly pain, and nausea or vomiting (feeling sick to your " "stomach or throwing up).  You may already have symptoms of COVID-19, or they may show up later.  What should I do if I have symptoms?  If you're having surgery: Call your surgeon's office.  For all other patients: Stay home and away from others (self-isolate) until ...    You've had no fever--and no medicine that reduces fever--for 1 full day (24 hours), AND    Other symptoms have gotten better. For example, your cough or breathing has improved, AND    At least 10 days have passed since your symptoms first started.  How do I self-isolate?  1. Stay in your own room, even for meals. Use your own bathroom if you can.  2. Stay away from others in your home. No hugging, kissing or shaking hands. No visitors.  3. Don't go to work, school or anywhere else.  4. Clean \"high touch\" surfaces often (doorknobs, counters, handles). Use household cleaning spray or wipes. You'll find a full list of  on the EPA website: www.epa.gov/pesticide-registration/list-n-disinfectants-use-against-sars-cov-2.  5. Cover your mouth and nose with a mask or other face covering to avoid spreading germs.  6. Wash your hands and face often. Use soap and water.  7. Caregivers in these groups are at risk for severe illness due to COVID-19:  1. People 65 years and older  2. People who live in a nursing home or long-term care facility  3. People with chronic disease (lung, heart, cancer, diabetes, kidney, liver, immunologic)  4. People who have a weakened immune system, including those who:    Are in cancer treatment    Take medicine that weakens the immune system, such as corticosteroids    Had a bone marrow or organ transplant    Have an immune deficiency    Have poorly controlled HIV or AIDS    Are obese (body mass index of 40 or higher)    Smoke regularly  8. Caregivers should wear gloves while washing dishes, handling laundry and cleaning bedrooms and bathrooms.  9. Use caution when washing and drying laundry: Don't shake dirty laundry and " use the warmest water setting that you can.  10. For more tips on managing your health at home, go to www.cdc.gov/coronavirus/2019-ncov/downloads/10Things.pdf.  How can I take care of myself at home?  1. Get lots of rest. Drink extra fluids (unless a doctor has told you not to).  2. Take Tylenol (acetaminophen) for fever or pain. If you have liver or kidney problems, ask your family doctor if it's OK to take Tylenol.   Adults can take either:  ? 650 mg (two 325 mg pills) every 4 to 6 hours, or   ? 1,000 mg (two 500 mg pills) every 8 hours as needed.  ? Note: Don't take more than 3,000 mg in one day. Acetaminophen is found in many medicines (both prescribed and over-the-counter medicines). Read all labels to be sure you don't take too much.   For children, check the Tylenol bottle for the right dose. The dose is based on the child's age or weight.  3. If you have other health problems (like cancer, heart failure, an organ transplant or severe kidney disease): Call your specialty clinic if you don't feel better in the next 2 days.  4. Know when to call 911. Emergency warning signs include:  ? Trouble breathing or shortness of breath  ? Chest pain or pressure that doesn't go away  ? Feeling confused like you haven't felt before, or not being able to wake up  ? Bluish-colored lips or face  5. If your doctor prescribed a blood thinner medicine: Follow their instructions.  Where can I get more information?  1. [facility] - About COVID-19:   [email or web address]  2. CDC - If You're Sick: cdc.gov/coronavirus/2019-ncov/about/steps-when-sick.html  3. [facility] - Ending Home Isolation: www.cdc.gov/coronavirus/2019-ncov/hcp/disposition-in-home-patients.html  4. CDC - Caring for Someone: www.cdc.gov/coronavirus/2019-ncov/if-you-are-sick/care-for-someone.html  5. [facility] - Interim Guidance for Hospital Discharge to Home: www.health.[facility].[place].us/diseases/coronavirus/hcp/hospdischarge.pdf  6. [facility] clinical  trials (COVID-19 research studies): clinicalaffairs.[facility]-clinical-trials  7. Below are the COVID-19 hotlines at the [place] Department of Health ([facility]). Interpreters are available.  ? For health questions: Call 783-698-9221 or 1-101.527.9170 (7 a.m. to 7 p.m.)  ? For questions about schools and childcare: Call 146-764-6798 or 1-962.949.8947 (7 a.m. to 7 p.m.)    For informational purposes only. Not to replace the advice of your health care provider. Clinically reviewed by Infection Prevention and the [facility] COVID-19 Clinical Team. Copyright   2020 [facility]. All rights reserved. [employer]ART[employer] 953646 - Rev 11/11/20.

## 2021-03-26 NOTE — TELEPHONE ENCOUNTER
Caller requesting COVID 19 testing for herself and family members due to upcoming travel on 3/30/21.    Transferred to Central Scheduling.      Diann Salomon RN  [facility]    COVID 19 Nurse Triage Plan/Patient Instructions    Please be aware that novel coronavirus (COVID-19) may be circulating in the community. If you develop symptoms such as fever, cough, or SOB or if you have concerns about the presence of another infection including coronavirus (COVID-19), please contact your health care provider or visit [email or web address].     Disposition/Instructions    Virtual Visit with provider recommended. Reference Visit Selection Guide.    Thank you for taking steps to prevent the spread of this virus.  o Limit your contact with others.  o Wear a simple mask to cover your cough.  o Wash your hands well and often.    Resources    [facility]: About COVID-19: [email or web address]    CDC: What to Do If You're Sick: www.cdc.gov/coronavirus/2019-ncov/about/steps-when-sick.html    CDC: Ending Home Isolation: www.cdc.gov/coronavirus/2019-ncov/hcp/disposition-in-home-patients.html     CDC: Caring for Someone: www.cdc.gov/coronavirus/2019-ncov/if-you-are-sick/care-for-someone.html     [facility]: Interim Guidance for Hospital Discharge to Home: www.health.[facility].[place]/diseases/coronavirus/hcp/hospdischarge.pdf    [facility] clinical trials (COVID-19 research studies): clinicalaffairs.[facility]-clinical-trials     Below are the COVID-19 hotlines at the [facility] ([facility]). Interpreters are available.   o For health questions: Call 673-788-4039 or 1-297.749.8440 (7 a.m. to 7 p.m.)  o For questions about schools and childcare: Call 487-984-4065 or [phone] (7 a.m. to 7 p.m.)                     Reason for Disposition    Requesting regular office appointment    Additional Information    Negative: [1] Caller is not with the adult (patient) AND [2] reporting urgent  symptoms    Negative: Lab result questions    Negative: Medication questions    Negative: Caller can't be reached by phone    Negative: Caller has already spoken to PCP or another triager    Negative: RN needs further essential information from caller in order to complete triage    Protocols used: INFORMATION ONLY CALL-A-AH

## 2021-03-26 NOTE — PROGRESS NOTES
"  Brit Romano is a 41 year old female who is being evaluated via a billable telephone visit.      The patient has been notified of following:     \"This telephone visit will be conducted via a call between you and your physician/provider. We have found that certain health care needs can be provided without the need for a physical exam.  This service lets us provide the care you need with a short phone conversation.  If a prescription is necessary we can send it directly to your pharmacy.  If lab work is needed we can place an order for that and you can then stop by our lab to have the test done at a later time.    If during the course of the call the physician/provider feels a telephone visit is not appropriate, you will not be charged for this service.\"     Patient has given verbal consent for Telephone visit?  Yes       ASSESSMENT:       ICD-10-CM    1. Encounter for screening for COVID-19  Z11.52 Asymptomatic COVID-19 Virus (Coronavirus) by PCR         Worrisome symptoms discussed with instructions to go to the ED.  Patient verbalized understanding and agreed with this plan.  Chief Complaint:    Chief Complaint   Patient presents with     Covid 19 Testing       HPI: Brit Romano is an 41 year old female who calls with concerns that include needing to be tested for COVID 19 to  travel to [place].     ROS:      A 10 point ROS is negative except as expressed in HPI.    Past Medical History  Past Medical History:   Diagnosis Date     Fatty liver      Fibromyalgia      History of gestational diabetes      Seasonal allergies          Allergies:  No Known Allergies     Current Meds:    Current Outpatient Medications:      acetaminophen (TYLENOL) 325 MG tablet, Take 2 tablets (650 mg) by mouth every 6 hours as needed for mild pain Start after Delivery., Disp: 100 tablet, Rfl: 0     acetone urine (KETOSTIX) test strip, 1 strip as needed (as directed) Use one strip daily for 1 week, then one strip weekly. (Patient not " taking: Reported on 10/12/2020), Disp: 50 each, Rfl: 3     cetirizine (ZYRTEC) 10 MG tablet, Take 1 tablet (10 mg) by mouth daily, Disp: 30 tablet, Rfl: 11     cyanocobalamin (VITAMIN B-12) 1000 MCG tablet, Take 1 tablet (1,000 mcg) by mouth daily, Disp: 100 tablet, Rfl: 3     fluticasone (FLONASE) 50 MCG/ACT nasal spray, Spray 2 sprays into both nostrils daily, Disp: 16 g, Rfl: 11     ibuprofen (ADVIL/MOTRIN) 600 MG tablet, Take 1 tablet (600 mg) by mouth every 6 hours as needed for moderate pain Start after delivery, Disp: 60 tablet, Rfl: 0     omeprazole (PRILOSEC) 40 MG DR capsule, TAKE 1 CAPSULE(40 MG) BY MOUTH DAILY, Disp: 30 capsule, Rfl: 0     Prenatal Vit-Fe Fumarate-FA (PRENATAL VITAMINS) 28-0.8 MG TABS, TAKE 1 TABLET BY MOUTH EVERY DAY, Disp: 90 tablet, Rfl: 3  No current facility-administered medications for this visit.     Facility-Administered Medications Ordered in Other Visits:      bupivacaine (MARCAINE) 0.125 % injection (diluted from stock concentration by MD or CRNA), , EPIDURAL, PRN, Taylor Clarke MD, 8 mL at 04/07/20 0541     fentaNYL (PF) (SUBLIMAZE) injection, , EPIDURAL, PRN, Taylor Clarke MD, 16 mcg at 04/07/20 0541     fentaNYL (SUBLIMAZE) 2 mcg/mL, bupivacaine (MARCAINE) 0.125% in NaCl 0.9% EPIDURAL infusion, , EPIDURAL, Continuous PRN, Taylor Clarke MD, Last Rate: 10 mL/hr at 04/07/20 0542, 10 mL/hr at 04/07/20 0542     lidocaine-EPINEPHrine 1.5 %-1:980730 injection, , EPIDURAL, PRN, Taylor Clarke MD, 3 mL at 04/07/20 0537     PHYSICAL EXAM:     Patient is alert and oriented. Able to speak in full sentences. No wheezing or cough heard during telephone conversation. Mood is appropriate.     Pam Donald CNP  3/26/2021, 5:14 PM      Phone call duration:  11 minutes

## 2021-03-27 DIAGNOSIS — Z11.52 ENCOUNTER FOR SCREENING FOR COVID-19: ICD-10-CM

## 2021-03-27 DIAGNOSIS — Z20.822 SUSPECTED 2019 NOVEL CORONAVIRUS INFECTION: Primary | ICD-10-CM

## 2021-03-27 LAB
SARS-COV-2 RNA RESP QL NAA+PROBE: NORMAL
SPECIMEN SOURCE: NORMAL

## 2021-03-27 PROCEDURE — U0003 INFECTIOUS AGENT DETECTION BY NUCLEIC ACID (DNA OR RNA); SEVERE ACUTE RESPIRATORY SYNDROME CORONAVIRUS 2 (SARS-COV-2) (CORONAVIRUS DISEASE [COVID-19]), AMPLIFIED PROBE TECHNIQUE, MAKING USE OF HIGH THROUGHPUT TECHNOLOGIES AS DESCRIBED BY CMS-2020-01-R: HCPCS | Performed by: NURSE PRACTITIONER

## 2021-03-27 PROCEDURE — U0005 INFEC AGEN DETEC AMPLI PROBE: HCPCS | Performed by: NURSE PRACTITIONER

## 2021-03-28 LAB
LABORATORY COMMENT REPORT: NORMAL
SARS-COV-2 RNA RESP QL NAA+PROBE: NEGATIVE
SPECIMEN SOURCE: NORMAL

## 2021-04-19 NOTE — PROGRESS NOTES
Diabetes Self-Management Education & Support    Gestational Diabetes Self-Management Education & Support    SUBJECTIVE/OBJECTIVE:  Presents for education related to gestational diabetes.    Accompanied by: Self  Diabetes management related comments/concerns: None today    Cultural Influences/Ethnic Background:  [place]    Estimated Date of Delivery: 2020    1 hour OGTT  Lab Results   Component Value Date    GLU1 165 (H) 2018       3 hour OGTT    Fasting  Lab Results   Component Value Date    GLF 98 (H) 2020       1 hour  Lab Results   Component Value Date    GL1 209 (H) 2020       2 hour  Lab Results   Component Value Date    GL2 154 2020       3 hour  Lab Results   Component Value Date    GL3 140 (H) 2020       Lifestyle and Health Behaviors:  Pre-pregnancy weight (lbs): 211  Exercise:: Currently not exercising  Barrier to exercise: None    Sleep schedule  Wake up 6 am   Bedtime 1-1:30 am   Meals include: Breakfast, Lunch, Dinner(11 am breakfast - anjera + boiled or scrambled eggs OR tomato sauce OR oil with tea; 1:30-2pm - lunch - rice or spaghetti + beef OR sandwich; 9-10 pm dinner - bread OR sandwich OR anjera [place] red beans + barley OR Fuul (bean dish) with chapati)  Beverages: Coffee, Water, Juice  Cultural/Adventist diet restrictions?: No  Biggest challenges to healthy eating: None    Pre- vitamin?: Yes  Supplements?: Yes  Experiencing nausea?: Yes    Healthy Coping:  Emotional response to diabetes: Ready to learn, Concern for health and well-being  Informal Support system:: Children, Family  Stage of change: PREPARATION (Decided to change - considering how)    Current Management:  Taking medications for gestational diabetes?: No  Difficulty affording diabetes management supplies?: No    ASSESSMENT:  Patient is familiar with GDM management due to previous pregnancy with GDM. She reports she is comfortable with checking glucose and was able to do an accurate return  Virtual/Telephone Consent    Selwynne Dianne verbally consents to a Virtual/Telephone Check-In service on 4-19-21  Patient understands and accepts financial responsibility for any deductible, co-insurance and/or co-pays associated with this service.     61 demonstration with ContourNext One meter today. Patient reports she remembers how to give insulin and thinks she needs it prescribed again in this pregnancy. Will have patient gather BG data for next 24 hours and call in with BG for review tomorrow morning.     INTERVENTION:  Patient was instructed on Contour Next One meter and was able to provide an accurate return demonstration. Patient's blood glucose reading today was 112 mg/dL fasting.    Educational topics covered today:  GDM diagnosis, pathophysiology, Risks and Complications of GDM, Means of controlling GDM, Using a Blood Glucose Monitor, Blood Glucose Goals, Logging and Interpreting Glucose Results, Ketone Testing, When to Call a Diabetes Educator or OB Provider, Healthy Eating During Pregnancy, Counting Carbohydrates, Meal Planning for GDM, and Physical Activity    Educational materials provided today:   Brant Understanding Gestational Diabetes  GDM Log Book  Sharps Disposal  Contour Next One meter kit    Pt verbalized understanding of concepts discussed and recommendations provided today.     PLAN:  Check glucose 4 times daily, before breakfast and 1 hour after each meal.     Check Ketones daily for one week, if negative, reduce testing to once a week.     Physical activity recommended: as much as tolerated/able each day.    Meal plan: 2-3 carbs at breakfast, 3-4 carbs at lunch, 3-4 carbs at supper, 1-2 carbs at 3 snacks a day.  Follow consistent CHO meal plan, eat CHO and protein/fat at all meals/snacks.    Call diabetes educator tomorrow after fasting result obtained. Follow-up visit scheduled 2/13 at 9:30 am.    Chrystal Lemus, MPH, RD, LD, CDE   Time Spent: 60 minutes  Encounter Type: Individual    Any diabetes medication dose changes were made via the CDE Protocol and Collaborative Practice Agreement with the patient's OB/GYN provider. A copy of this encounter was shared with the provider.

## 2021-06-04 NOTE — PROGRESS NOTES
"Please see \"Imaging\" tab under \"Chart Review\" for details of today's visit.    Gene Logan        "

## 2021-06-06 NOTE — PROGRESS NOTES
"Please see \"Imaging\" tab under Chart Review for full report.  This ultrasound was performed in the [facility], and may be located under Care Everywhere.    Nicole Alcantara MD  Maternal Fetal Medicine    "

## 2021-06-07 NOTE — PROGRESS NOTES
"Please see \"Imaging\" tab under Chart Review for full report.  This ultrasound was performed in the [facility], and may be located under Care Everywhere.    Nicole Alcantara MD  Maternal Fetal Medicine    "

## 2021-06-07 NOTE — PROGRESS NOTES
"Please see \"Imaging\" tab under Chart Review for full report.  This ultrasound was performed in the [facility], and may be located under Care Everywhere.    Nicole Alcantara MD  Maternal Fetal Medicine    "

## 2022-02-17 PROBLEM — O44.42 LOW-LYING PLACENTA IN SECOND TRIMESTER: Status: RESOLVED | Noted: 2018-02-16 | Resolved: 2019-04-11

## 2023-11-24 ENCOUNTER — OFFICE VISIT (OUTPATIENT)
Dept: FAMILY MEDICINE | Facility: CLINIC | Age: 43
End: 2023-11-24
Payer: COMMERCIAL

## 2023-11-24 VITALS
OXYGEN SATURATION: 100 % | DIASTOLIC BLOOD PRESSURE: 89 MMHG | BODY MASS INDEX: 34.11 KG/M2 | HEART RATE: 82 BPM | WEIGHT: 205 LBS | SYSTOLIC BLOOD PRESSURE: 120 MMHG | RESPIRATION RATE: 24 BRPM

## 2023-11-24 DIAGNOSIS — Z11.59 NEED FOR HEPATITIS C SCREENING TEST: ICD-10-CM

## 2023-11-24 DIAGNOSIS — E55.9 VITAMIN D DEFICIENCY: ICD-10-CM

## 2023-11-24 DIAGNOSIS — J30.1 SEASONAL ALLERGIC RHINITIS DUE TO POLLEN: ICD-10-CM

## 2023-11-24 DIAGNOSIS — R73.09 ELEVATED GLUCOSE: ICD-10-CM

## 2023-11-24 DIAGNOSIS — Z11.3 ROUTINE SCREENING FOR STI (SEXUALLY TRANSMITTED INFECTION): ICD-10-CM

## 2023-11-24 DIAGNOSIS — R53.83 OTHER FATIGUE: ICD-10-CM

## 2023-11-24 DIAGNOSIS — D50.0 IRON DEFICIENCY ANEMIA DUE TO CHRONIC BLOOD LOSS: ICD-10-CM

## 2023-11-24 DIAGNOSIS — K29.51 CHRONIC GASTRITIS WITH BLEEDING, UNSPECIFIED GASTRITIS TYPE: Primary | ICD-10-CM

## 2023-11-24 DIAGNOSIS — J30.89 ALLERGIC RHINITIS DUE TO DUST MITE: ICD-10-CM

## 2023-11-24 DIAGNOSIS — H10.13 ALLERGIC CONJUNCTIVITIS OF BOTH EYES: ICD-10-CM

## 2023-11-24 DIAGNOSIS — R10.11 ABDOMINAL PAIN, RIGHT UPPER QUADRANT: ICD-10-CM

## 2023-11-24 PROBLEM — O09.529 SUPERVISION OF HIGH-RISK PREGNANCY OF ELDERLY MULTIGRAVIDA: Status: RESOLVED | Noted: 2019-11-01 | Resolved: 2023-11-24

## 2023-11-24 PROBLEM — A04.8 H. PYLORI INFECTION: Status: ACTIVE | Noted: 2023-11-24

## 2023-11-24 PROBLEM — Z34.90 PREGNANCY: Status: RESOLVED | Noted: 2020-04-06 | Resolved: 2023-11-24

## 2023-11-24 LAB
CHOLEST SERPL-MCNC: 231 MG/DL
FERRITIN SERPL-MCNC: 9 NG/ML (ref 6–175)
FOLATE SERPL-MCNC: 8 NG/ML (ref 4.6–34.8)
HBA1C MFR BLD: 5.9 % (ref 0–5.6)
HCV AB SERPL QL IA: NONREACTIVE
HDLC SERPL-MCNC: 52 MG/DL
HIV 1+2 AB+HIV1 P24 AG SERPL QL IA: NONREACTIVE
IRON BINDING CAPACITY (ROCHE): 458 UG/DL (ref 240–430)
IRON SATN MFR SERPL: 5 % (ref 15–46)
IRON SERPL-MCNC: 21 UG/DL (ref 37–145)
LDLC SERPL CALC-MCNC: 150 MG/DL
NONHDLC SERPL-MCNC: 179 MG/DL
T PALLIDUM AB SER QL: NONREACTIVE
TRIGL SERPL-MCNC: 147 MG/DL
TSH SERPL DL<=0.005 MIU/L-ACNC: 3.07 UIU/ML (ref 0.3–4.2)
VIT B12 SERPL-MCNC: 277 PG/ML (ref 232–1245)
VIT D+METAB SERPL-MCNC: 24 NG/ML (ref 20–50)

## 2023-11-24 PROCEDURE — 83036 HEMOGLOBIN GLYCOSYLATED A1C: CPT | Performed by: STUDENT IN AN ORGANIZED HEALTH CARE EDUCATION/TRAINING PROGRAM

## 2023-11-24 PROCEDURE — 82306 VITAMIN D 25 HYDROXY: CPT | Performed by: STUDENT IN AN ORGANIZED HEALTH CARE EDUCATION/TRAINING PROGRAM

## 2023-11-24 PROCEDURE — 80061 LIPID PANEL: CPT | Performed by: STUDENT IN AN ORGANIZED HEALTH CARE EDUCATION/TRAINING PROGRAM

## 2023-11-24 PROCEDURE — 82728 ASSAY OF FERRITIN: CPT | Performed by: STUDENT IN AN ORGANIZED HEALTH CARE EDUCATION/TRAINING PROGRAM

## 2023-11-24 PROCEDURE — 99214 OFFICE O/P EST MOD 30 MIN: CPT | Mod: GC | Performed by: STUDENT IN AN ORGANIZED HEALTH CARE EDUCATION/TRAINING PROGRAM

## 2023-11-24 PROCEDURE — 86780 TREPONEMA PALLIDUM: CPT | Performed by: STUDENT IN AN ORGANIZED HEALTH CARE EDUCATION/TRAINING PROGRAM

## 2023-11-24 PROCEDURE — 82746 ASSAY OF FOLIC ACID SERUM: CPT | Performed by: STUDENT IN AN ORGANIZED HEALTH CARE EDUCATION/TRAINING PROGRAM

## 2023-11-24 PROCEDURE — 87389 HIV-1 AG W/HIV-1&-2 AB AG IA: CPT | Performed by: STUDENT IN AN ORGANIZED HEALTH CARE EDUCATION/TRAINING PROGRAM

## 2023-11-24 PROCEDURE — 86803 HEPATITIS C AB TEST: CPT | Performed by: STUDENT IN AN ORGANIZED HEALTH CARE EDUCATION/TRAINING PROGRAM

## 2023-11-24 PROCEDURE — 84443 ASSAY THYROID STIM HORMONE: CPT | Performed by: STUDENT IN AN ORGANIZED HEALTH CARE EDUCATION/TRAINING PROGRAM

## 2023-11-24 PROCEDURE — 82607 VITAMIN B-12: CPT | Performed by: STUDENT IN AN ORGANIZED HEALTH CARE EDUCATION/TRAINING PROGRAM

## 2023-11-24 PROCEDURE — 83550 IRON BINDING TEST: CPT | Performed by: STUDENT IN AN ORGANIZED HEALTH CARE EDUCATION/TRAINING PROGRAM

## 2023-11-24 PROCEDURE — 83540 ASSAY OF IRON: CPT | Performed by: STUDENT IN AN ORGANIZED HEALTH CARE EDUCATION/TRAINING PROGRAM

## 2023-11-24 PROCEDURE — 36415 COLL VENOUS BLD VENIPUNCTURE: CPT | Performed by: STUDENT IN AN ORGANIZED HEALTH CARE EDUCATION/TRAINING PROGRAM

## 2023-11-24 RX ORDER — FERROUS SULFATE 325(65) MG
325 TABLET ORAL EVERY OTHER DAY
Qty: 90 TABLET | Refills: 3 | Status: ON HOLD | OUTPATIENT
Start: 2023-11-24 | End: 2023-11-29

## 2023-11-24 RX ORDER — POLYETHYLENE GLYCOL 3350 17 G/17G
17 POWDER, FOR SOLUTION ORAL DAILY PRN
Qty: 510 G | Refills: 3 | Status: SHIPPED | OUTPATIENT
Start: 2023-11-24

## 2023-11-24 RX ORDER — ONDANSETRON 4 MG/1
4 TABLET, ORALLY DISINTEGRATING ORAL ONCE
Status: ON HOLD | COMMUNITY
Start: 2023-11-15 | End: 2023-11-26

## 2023-11-24 RX ORDER — CETIRIZINE HYDROCHLORIDE 10 MG/1
10 TABLET ORAL DAILY
Qty: 30 TABLET | Refills: 11 | Status: SHIPPED | OUTPATIENT
Start: 2023-11-24 | End: 2023-12-13

## 2023-11-24 RX ORDER — FLUTICASONE PROPIONATE 50 MCG
2 SPRAY, SUSPENSION (ML) NASAL DAILY
Qty: 16 G | Refills: 11 | Status: SHIPPED | OUTPATIENT
Start: 2023-11-24 | End: 2023-12-13

## 2023-11-24 RX ORDER — OMEPRAZOLE 40 MG/1
CAPSULE, DELAYED RELEASE ORAL
Qty: 30 CAPSULE | Refills: 0 | Status: SHIPPED | OUTPATIENT
Start: 2023-11-24 | End: 2023-12-13

## 2023-11-24 RX ORDER — CALCIUM CARBONATE 500 MG/1
1 TABLET, CHEWABLE ORAL 3 TIMES DAILY PRN
Qty: 90 TABLET | Refills: 3 | Status: SHIPPED | OUTPATIENT
Start: 2023-11-24

## 2023-11-24 NOTE — PATIENT INSTRUCTIONS
Start iron pill every other day.  Take miralax daily as needed if you get constipated.    Start tums as needed for abdominal pain or reflux.    Repeat ultrasound.    Upper and lower endoscopies because of pain and black stools.    Blood tests today.    Requesting results via mail

## 2023-11-24 NOTE — PROGRESS NOTES
"  Assessment & Plan     Brit was seen today for consult.    Diagnoses and all orders for this visit:    RUQ pain  Chronic gastritis with bleeding, unspecified gastritis type  Chronic RUQ pain that was intermittent for years, now constant over past 2 months with ED workup 11/15 showing \"contracted\" gallbladder, without stones or inflammation. Normal lipase. Differential includes biliary colic, gastritis, gastric ulcer. On omeprazole chronically. Had upper GI and lower GI in 2016 and 2017. Will order repeat since she is also having black stools and anemia. Start tums prn.   -     omeprazole (PRILOSEC) 40 MG DR capsule; TAKE 1 CAPSULE(40 MG) BY MOUTH DAILY  -     calcium carbonate (TUMS) 500 MG chewable tablet; Take 1 tablet (500 mg) by mouth 3 times daily as needed for heartburn  -     US Abdomen Limited; Future  -     Adult GI  Referral - Procedure Only; Future    Iron deficiency anemia due to chronic blood loss  History of iron infusions and IM vitamin B12 in [facility]. Endorses black stools, not on iron supplement. Start iron supplement - upper and lower endoscopies ordered in addition to lab workup for anemia.   -     ferrous sulfate (FEROSUL) 325 (65 Fe) MG tablet; Take 1 tablet (325 mg) by mouth every other day  -     Ferritin; Future  -     Iron and iron binding capacity; Future  -     polyethylene glycol (MIRALAX) 17 GM/Dose powder; Take 17 g by mouth daily as needed for constipation  -     Vitamin B12; Future  -     Folate; Future  -     Ferritin  -     Iron and iron binding capacity  -     Vitamin B12  -     Folate    Other fatigue  -     TSH with free T4 reflex; Future    Allergic rhinitis due to dust mite  Seasonal allergic rhinitis due to pollen  Allergic conjunctivitis of both eyes  -     cetirizine (ZYRTEC) 10 MG tablet; Take 1 tablet (10 mg) by mouth daily  -     fluticasone (FLONASE) 50 MCG/ACT nasal spray; Spray 2 sprays into both nostrils daily    Vitamin D deficiency  -     Vitamin D " deficiency screening; Future    Elevated glucose  -     Hemoglobin A1c; Future  -     Lipid panel reflex to direct LDL Non-fasting; Future    Routine screening for STI (sexually transmitted infection)  Asymptomatic  -     HIV Antigen Antibody Combo; Future  -     Treponema Abs w Reflex to RPR and Titer; Future    Need for hepatitis C screening test  -     Hepatitis C Screen Reflex to HCV RNA Quant and Genotype; Future        Return in about 4 weeks (around 12/22/2023) for Follow up with me.     Cheyenne Loving MD  [facility] DOMENICO Perea is a 43 year old who is new to \[facility]\"" with PMH of fibromyalgia, h/o GDM, urinary incontinence who presents for the following health issues     Patient presents with:  Consult: Pt has some pain in her abdomen area hat goes into her back on the right side that has gotten worse in the past two months.    SH:  - was living in [place]  - grew up in [place], moved here at age 15. 10 kids here    PMH:  - anemia  - Gestational diabetes  - gastritis - on meds for 15 years  - seasonal allergies  - history of H pylori x3, s/p treatment  - history of gastric ulcer (12/2016)      Anemia:  - got IV iron x3  - hemoglobin 8.7 on 11/15  - history of colonoscopy 2017    Abdominal Pain  - chronic comes and goes  - epigastric/RUQ wraps to back  - sharp pain  - nausea  - went to ED last week, saw abnormal written on results, but no one explained  - USN showed contracted gallbladder without stones or cholecystits. Showed hepatic steatosis. Rx for zofran, but she didn't pick this up  - started 5 years ago, intermittent, but a lot worse over the past 2 months  - constant now  - worse with eating  - sometimes diarrhea  - black stools for years    Requesting for checking labs    Night sweats/chills for a few months    Chart Review:  - last seen 12/2020    Review of Systems   Constitutional, HEENT, cardiovascular, pulmonary, GI, and  systems are negative, except  as otherwise noted.      Objective    /89   Pulse 82   Resp 24   Wt 93 kg (205 lb)   SpO2 100%   BMI 34.11 kg/m    Body mass index is 34.11 kg/m .    Physical Exam   Physical Exam  Constitutional:       Appearance: Normal appearance.   HENT:      Head: Normocephalic.      Nose: No congestion or rhinorrhea.   Eyes:      Conjunctiva/sclera: Conjunctivae normal.   Cardiovascular:      Rate and Rhythm: Normal rate and regular rhythm.      Heart sounds: Normal heart sounds.   Pulmonary:      Effort: Pulmonary effort is normal.      Breath sounds: Normal breath sounds.   Abdominal:      General: Abdomen is flat.      Palpations: Abdomen is soft. There is no mass.      Tenderness: There is abdominal tenderness (epigastric, and RUQ. negative mcnair's sign). There is no right CVA tenderness, left CVA tenderness or rebound.   Musculoskeletal:      Right lower leg: No edema.      Left lower leg: No edema.   Skin:     General: Skin is warm and dry.   Neurological:      Mental Status: She is alert.   Psychiatric:         Mood and Affect: Mood normal.         Behavior: Behavior normal.         Thought Content: Thought content normal.         Judgment: Judgment normal.          ----- Service Performed and Documented by Resident  ------

## 2023-11-25 ENCOUNTER — APPOINTMENT (OUTPATIENT)
Dept: GENERAL RADIOLOGY | Facility: CLINIC | Age: 43
End: 2023-11-25
Attending: EMERGENCY MEDICINE
Payer: COMMERCIAL

## 2023-11-25 ENCOUNTER — APPOINTMENT (OUTPATIENT)
Dept: ULTRASOUND IMAGING | Facility: CLINIC | Age: 43
End: 2023-11-25
Attending: EMERGENCY MEDICINE
Payer: COMMERCIAL

## 2023-11-25 ENCOUNTER — HOSPITAL ENCOUNTER (INPATIENT)
Facility: CLINIC | Age: 43
LOS: 4 days | Discharge: HOME | End: 2023-11-29
Attending: EMERGENCY MEDICINE | Admitting: STUDENT IN AN ORGANIZED HEALTH CARE EDUCATION/TRAINING PROGRAM
Payer: COMMERCIAL

## 2023-11-25 DIAGNOSIS — E53.8 VITAMIN B12 DEFICIENCY (NON ANEMIC): Primary | ICD-10-CM

## 2023-11-25 DIAGNOSIS — K92.2 UPPER GI BLEED: ICD-10-CM

## 2023-11-25 DIAGNOSIS — D50.0 IRON DEFICIENCY ANEMIA DUE TO CHRONIC BLOOD LOSS: ICD-10-CM

## 2023-11-25 DIAGNOSIS — D62 ANEMIA DUE TO BLOOD LOSS, ACUTE: ICD-10-CM

## 2023-11-25 PROBLEM — Z34.90 ENCOUNTER FOR INDUCTION OF LABOR: Status: RESOLVED | Noted: 2020-04-06 | Resolved: 2023-11-25

## 2023-11-25 PROBLEM — R73.03 PREDIABETES: Status: ACTIVE | Noted: 2023-11-25

## 2023-11-25 LAB
ALBUMIN SERPL BCG-MCNC: 4.3 G/DL (ref 3.5–5.2)
ALBUMIN UR-MCNC: NEGATIVE MG/DL
ALP SERPL-CCNC: 66 U/L (ref 40–150)
ALT SERPL W P-5'-P-CCNC: 14 U/L (ref 0–50)
ANION GAP SERPL CALCULATED.3IONS-SCNC: 12 MMOL/L (ref 7–15)
APPEARANCE UR: CLEAR
AST SERPL W P-5'-P-CCNC: 17 U/L (ref 0–45)
BASOPHILS # BLD AUTO: ABNORMAL 10*3/UL
BASOPHILS # BLD MANUAL: 0 10E3/UL (ref 0–0.2)
BASOPHILS NFR BLD AUTO: ABNORMAL %
BASOPHILS NFR BLD MANUAL: 0 %
BILIRUB SERPL-MCNC: <0.2 MG/DL
BILIRUB UR QL STRIP: NEGATIVE
BUN SERPL-MCNC: 4.4 MG/DL (ref 6–20)
CALCIUM SERPL-MCNC: 9 MG/DL (ref 8.6–10)
CHLORIDE SERPL-SCNC: 104 MMOL/L (ref 98–107)
COLOR UR AUTO: ABNORMAL
CREAT SERPL-MCNC: 0.6 MG/DL (ref 0.51–0.95)
DEPRECATED HCO3 PLAS-SCNC: 23 MMOL/L (ref 22–29)
EGFRCR SERPLBLD CKD-EPI 2021: >90 ML/MIN/1.73M2
EOSINOPHIL # BLD AUTO: ABNORMAL 10*3/UL
EOSINOPHIL # BLD MANUAL: 0.2 10E3/UL (ref 0–0.7)
EOSINOPHIL NFR BLD AUTO: ABNORMAL %
EOSINOPHIL NFR BLD MANUAL: 3 %
ERYTHROCYTE [DISTWIDTH] IN BLOOD BY AUTOMATED COUNT: 22.2 % (ref 10–15)
GLUCOSE SERPL-MCNC: 144 MG/DL (ref 70–99)
GLUCOSE UR STRIP-MCNC: NEGATIVE MG/DL
HCT VFR BLD AUTO: 28.6 % (ref 35–47)
HGB BLD-MCNC: 8 G/DL (ref 11.7–15.7)
HGB UR QL STRIP: NEGATIVE
IMM GRANULOCYTES # BLD: ABNORMAL 10*3/UL
IMM GRANULOCYTES NFR BLD: ABNORMAL %
KETONES UR STRIP-MCNC: NEGATIVE MG/DL
LEUKOCYTE ESTERASE UR QL STRIP: NEGATIVE
LIPASE SERPL-CCNC: 22 U/L (ref 13–60)
LYMPHOCYTES # BLD AUTO: ABNORMAL 10*3/UL
LYMPHOCYTES # BLD MANUAL: 2.6 10E3/UL (ref 0.8–5.3)
LYMPHOCYTES NFR BLD AUTO: ABNORMAL %
LYMPHOCYTES NFR BLD MANUAL: 43 %
MCH RBC QN AUTO: 18.3 PG (ref 26.5–33)
MCHC RBC AUTO-ENTMCNC: 28 G/DL (ref 31.5–36.5)
MCV RBC AUTO: 65 FL (ref 78–100)
MONOCYTES # BLD AUTO: ABNORMAL 10*3/UL
MONOCYTES # BLD MANUAL: 0.3 10E3/UL (ref 0–1.3)
MONOCYTES NFR BLD AUTO: ABNORMAL %
MONOCYTES NFR BLD MANUAL: 5 %
MUCOUS THREADS #/AREA URNS LPF: PRESENT /LPF
NEUTROPHILS # BLD AUTO: ABNORMAL 10*3/UL
NEUTROPHILS # BLD MANUAL: 3 10E3/UL (ref 1.6–8.3)
NEUTROPHILS NFR BLD AUTO: ABNORMAL %
NEUTROPHILS NFR BLD MANUAL: 49 %
NITRATE UR QL: NEGATIVE
NRBC # BLD AUTO: 0 10E3/UL
NRBC BLD AUTO-RTO: 0 /100
PH UR STRIP: 5.5 [PH] (ref 5–7)
PLAT MORPH BLD: NORMAL
PLATELET # BLD AUTO: 317 10E3/UL (ref 150–450)
POTASSIUM SERPL-SCNC: 3.6 MMOL/L (ref 3.4–5.3)
PROT SERPL-MCNC: 7.4 G/DL (ref 6.4–8.3)
RBC # BLD AUTO: 4.37 10E6/UL (ref 3.8–5.2)
RBC MORPH BLD: NORMAL
RBC URINE: 0 /HPF
SODIUM SERPL-SCNC: 139 MMOL/L (ref 135–145)
SP GR UR STRIP: 1.01 (ref 1–1.03)
SQUAMOUS EPITHELIAL: 2 /HPF
TROPONIN T SERPL HS-MCNC: <6 NG/L
UROBILINOGEN UR STRIP-MCNC: NORMAL MG/DL
WBC # BLD AUTO: 6.1 10E3/UL (ref 4–11)
WBC URINE: 2 /HPF

## 2023-11-25 PROCEDURE — 250N000011 HC RX IP 250 OP 636: Mod: JZ | Performed by: EMERGENCY MEDICINE

## 2023-11-25 PROCEDURE — 71046 X-RAY EXAM CHEST 2 VIEWS: CPT

## 2023-11-25 PROCEDURE — 120N000001 HC R&B MED SURG/OB

## 2023-11-25 PROCEDURE — 80053 COMPREHEN METABOLIC PANEL: CPT | Performed by: EMERGENCY MEDICINE

## 2023-11-25 PROCEDURE — 96375 TX/PRO/DX INJ NEW DRUG ADDON: CPT

## 2023-11-25 PROCEDURE — 83690 ASSAY OF LIPASE: CPT | Performed by: EMERGENCY MEDICINE

## 2023-11-25 PROCEDURE — 36415 COLL VENOUS BLD VENIPUNCTURE: CPT | Performed by: EMERGENCY MEDICINE

## 2023-11-25 PROCEDURE — C9113 INJ PANTOPRAZOLE SODIUM, VIA: HCPCS | Mod: JZ | Performed by: EMERGENCY MEDICINE

## 2023-11-25 PROCEDURE — 99285 EMERGENCY DEPT VISIT HI MDM: CPT | Mod: 25

## 2023-11-25 PROCEDURE — 96374 THER/PROPH/DIAG INJ IV PUSH: CPT | Mod: 59

## 2023-11-25 PROCEDURE — 84484 ASSAY OF TROPONIN QUANT: CPT | Performed by: EMERGENCY MEDICINE

## 2023-11-25 PROCEDURE — 99222 1ST HOSP IP/OBS MODERATE 55: CPT | Performed by: STUDENT IN AN ORGANIZED HEALTH CARE EDUCATION/TRAINING PROGRAM

## 2023-11-25 PROCEDURE — 85027 COMPLETE CBC AUTOMATED: CPT | Performed by: EMERGENCY MEDICINE

## 2023-11-25 PROCEDURE — 76705 ECHO EXAM OF ABDOMEN: CPT

## 2023-11-25 PROCEDURE — 85007 BL SMEAR W/DIFF WBC COUNT: CPT | Performed by: EMERGENCY MEDICINE

## 2023-11-25 PROCEDURE — 81001 URINALYSIS AUTO W/SCOPE: CPT | Performed by: EMERGENCY MEDICINE

## 2023-11-25 RX ORDER — MORPHINE SULFATE 2 MG/ML
2 INJECTION, SOLUTION INTRAMUSCULAR; INTRAVENOUS ONCE
Status: COMPLETED | OUTPATIENT
Start: 2023-11-25 | End: 2023-11-25

## 2023-11-25 RX ADMIN — MORPHINE SULFATE 2 MG: 2 INJECTION, SOLUTION INTRAMUSCULAR; INTRAVENOUS at 22:21

## 2023-11-25 RX ADMIN — PANTOPRAZOLE SODIUM 40 MG: 40 INJECTION, POWDER, FOR SOLUTION INTRAVENOUS at 22:21

## 2023-11-25 ASSESSMENT — ACTIVITIES OF DAILY LIVING (ADL)
ADLS_ACUITY_SCORE: 35
ADLS_ACUITY_SCORE: 35
ADLS_ACUITY_SCORE: 33

## 2023-11-26 LAB
ALBUMIN SERPL BCG-MCNC: 4.4 G/DL (ref 3.5–5.2)
ALP SERPL-CCNC: 64 U/L (ref 40–150)
ALT SERPL W P-5'-P-CCNC: 14 U/L (ref 0–50)
ANION GAP SERPL CALCULATED.3IONS-SCNC: 10 MMOL/L (ref 7–15)
AST SERPL W P-5'-P-CCNC: 16 U/L (ref 0–45)
BILIRUB SERPL-MCNC: 0.2 MG/DL
BUN SERPL-MCNC: 5 MG/DL (ref 6–20)
CALCIUM SERPL-MCNC: 9.1 MG/DL (ref 8.6–10)
CHLORIDE SERPL-SCNC: 105 MMOL/L (ref 98–107)
CREAT SERPL-MCNC: 0.5 MG/DL (ref 0.51–0.95)
DEPRECATED HCO3 PLAS-SCNC: 26 MMOL/L (ref 22–29)
EGFRCR SERPLBLD CKD-EPI 2021: >90 ML/MIN/1.73M2
ERYTHROCYTE [DISTWIDTH] IN BLOOD BY AUTOMATED COUNT: 22.2 % (ref 10–15)
FERRITIN SERPL-MCNC: 5 NG/ML (ref 6–175)
GLUCOSE BLDC GLUCOMTR-MCNC: 164 MG/DL (ref 70–99)
GLUCOSE SERPL-MCNC: 113 MG/DL (ref 70–99)
HCT VFR BLD AUTO: 30 % (ref 35–47)
HGB BLD-MCNC: 8.6 G/DL (ref 11.7–15.7)
MCH RBC QN AUTO: 18.7 PG (ref 26.5–33)
MCHC RBC AUTO-ENTMCNC: 28.7 G/DL (ref 31.5–36.5)
MCV RBC AUTO: 65 FL (ref 78–100)
PLATELET # BLD AUTO: 367 10E3/UL (ref 150–450)
POTASSIUM SERPL-SCNC: 3.8 MMOL/L (ref 3.4–5.3)
PROT SERPL-MCNC: 7.6 G/DL (ref 6.4–8.3)
RBC # BLD AUTO: 4.6 10E6/UL (ref 3.8–5.2)
SODIUM SERPL-SCNC: 141 MMOL/L (ref 135–145)
UPPER GI ENDOSCOPY: NORMAL
WBC # BLD AUTO: 7.4 10E3/UL (ref 4–11)

## 2023-11-26 PROCEDURE — 82728 ASSAY OF FERRITIN: CPT | Performed by: INTERNAL MEDICINE

## 2023-11-26 PROCEDURE — 250N000011 HC RX IP 250 OP 636: Mod: JZ | Performed by: INTERNAL MEDICINE

## 2023-11-26 PROCEDURE — 85027 COMPLETE CBC AUTOMATED: CPT | Performed by: STUDENT IN AN ORGANIZED HEALTH CARE EDUCATION/TRAINING PROGRAM

## 2023-11-26 PROCEDURE — 99232 SBSQ HOSP IP/OBS MODERATE 35: CPT | Performed by: INTERNAL MEDICINE

## 2023-11-26 PROCEDURE — 258N000003 HC RX IP 258 OP 636: Performed by: STUDENT IN AN ORGANIZED HEALTH CARE EDUCATION/TRAINING PROGRAM

## 2023-11-26 PROCEDURE — 999N000099 HC STATISTIC MODERATE SEDATION < 10 MIN: Performed by: INTERNAL MEDICINE

## 2023-11-26 PROCEDURE — 43239 EGD BIOPSY SINGLE/MULTIPLE: CPT | Performed by: INTERNAL MEDICINE

## 2023-11-26 PROCEDURE — 0DB68ZX EXCISION OF STOMACH, VIA NATURAL OR ARTIFICIAL OPENING ENDOSCOPIC, DIAGNOSTIC: ICD-10-PCS | Performed by: INTERNAL MEDICINE

## 2023-11-26 PROCEDURE — 250N000013 HC RX MED GY IP 250 OP 250 PS 637: Performed by: INTERNAL MEDICINE

## 2023-11-26 PROCEDURE — 36415 COLL VENOUS BLD VENIPUNCTURE: CPT | Performed by: INTERNAL MEDICINE

## 2023-11-26 PROCEDURE — 36415 COLL VENOUS BLD VENIPUNCTURE: CPT | Performed by: STUDENT IN AN ORGANIZED HEALTH CARE EDUCATION/TRAINING PROGRAM

## 2023-11-26 PROCEDURE — 250N000011 HC RX IP 250 OP 636: Mod: JZ | Performed by: STUDENT IN AN ORGANIZED HEALTH CARE EDUCATION/TRAINING PROGRAM

## 2023-11-26 PROCEDURE — 0DB98ZX EXCISION OF DUODENUM, VIA NATURAL OR ARTIFICIAL OPENING ENDOSCOPIC, DIAGNOSTIC: ICD-10-PCS | Performed by: INTERNAL MEDICINE

## 2023-11-26 PROCEDURE — C9113 INJ PANTOPRAZOLE SODIUM, VIA: HCPCS | Mod: JZ | Performed by: STUDENT IN AN ORGANIZED HEALTH CARE EDUCATION/TRAINING PROGRAM

## 2023-11-26 PROCEDURE — 88305 TISSUE EXAM BY PATHOLOGIST: CPT | Mod: 26 | Performed by: PATHOLOGY

## 2023-11-26 PROCEDURE — 80053 COMPREHEN METABOLIC PANEL: CPT | Performed by: STUDENT IN AN ORGANIZED HEALTH CARE EDUCATION/TRAINING PROGRAM

## 2023-11-26 PROCEDURE — 88305 TISSUE EXAM BY PATHOLOGIST: CPT | Mod: TC | Performed by: INTERNAL MEDICINE

## 2023-11-26 PROCEDURE — 120N000001 HC R&B MED SURG/OB

## 2023-11-26 PROCEDURE — 250N000009 HC RX 250: Performed by: INTERNAL MEDICINE

## 2023-11-26 RX ORDER — FLUTICASONE PROPIONATE 50 MCG
2 SPRAY, SUSPENSION (ML) NASAL DAILY
Status: DISCONTINUED | OUTPATIENT
Start: 2023-11-26 | End: 2023-11-26

## 2023-11-26 RX ORDER — NALOXONE HYDROCHLORIDE 0.4 MG/ML
0.4 INJECTION, SOLUTION INTRAMUSCULAR; INTRAVENOUS; SUBCUTANEOUS
Status: DISCONTINUED | OUTPATIENT
Start: 2023-11-26 | End: 2023-11-29 | Stop reason: HOSPADM

## 2023-11-26 RX ORDER — AMOXICILLIN 250 MG
1 CAPSULE ORAL 2 TIMES DAILY PRN
Status: DISCONTINUED | OUTPATIENT
Start: 2023-11-25 | End: 2023-11-29 | Stop reason: HOSPADM

## 2023-11-26 RX ORDER — ONDANSETRON 2 MG/ML
4 INJECTION INTRAMUSCULAR; INTRAVENOUS EVERY 6 HOURS PRN
Status: DISCONTINUED | OUTPATIENT
Start: 2023-11-26 | End: 2023-11-29 | Stop reason: HOSPADM

## 2023-11-26 RX ORDER — FLUMAZENIL 0.1 MG/ML
0.2 INJECTION, SOLUTION INTRAVENOUS
Status: DISCONTINUED | OUTPATIENT
Start: 2023-11-26 | End: 2023-11-26 | Stop reason: HOSPADM

## 2023-11-26 RX ORDER — HYDROMORPHONE HCL IN WATER/PF 6 MG/30 ML
0.2 PATIENT CONTROLLED ANALGESIA SYRINGE INTRAVENOUS EVERY 4 HOURS PRN
Status: DISCONTINUED | OUTPATIENT
Start: 2023-11-26 | End: 2023-11-29 | Stop reason: HOSPADM

## 2023-11-26 RX ORDER — NALOXONE HYDROCHLORIDE 0.4 MG/ML
0.4 INJECTION, SOLUTION INTRAMUSCULAR; INTRAVENOUS; SUBCUTANEOUS
Status: DISCONTINUED | OUTPATIENT
Start: 2023-11-26 | End: 2023-11-26 | Stop reason: HOSPADM

## 2023-11-26 RX ORDER — NALOXONE HYDROCHLORIDE 0.4 MG/ML
0.2 INJECTION, SOLUTION INTRAMUSCULAR; INTRAVENOUS; SUBCUTANEOUS
Status: DISCONTINUED | OUTPATIENT
Start: 2023-11-26 | End: 2023-11-29 | Stop reason: HOSPADM

## 2023-11-26 RX ORDER — CALCIUM CARBONATE 500 MG/1
1000 TABLET, CHEWABLE ORAL 4 TIMES DAILY PRN
Status: DISCONTINUED | OUTPATIENT
Start: 2023-11-25 | End: 2023-11-29 | Stop reason: HOSPADM

## 2023-11-26 RX ORDER — NALOXONE HYDROCHLORIDE 0.4 MG/ML
0.2 INJECTION, SOLUTION INTRAMUSCULAR; INTRAVENOUS; SUBCUTANEOUS
Status: DISCONTINUED | OUTPATIENT
Start: 2023-11-26 | End: 2023-11-26 | Stop reason: HOSPADM

## 2023-11-26 RX ORDER — FENTANYL CITRATE 50 UG/ML
50-100 INJECTION, SOLUTION INTRAMUSCULAR; INTRAVENOUS EVERY 5 MIN PRN
Status: DISCONTINUED | OUTPATIENT
Start: 2023-11-26 | End: 2023-11-26 | Stop reason: HOSPADM

## 2023-11-26 RX ORDER — EPINEPHRINE 1 MG/ML
0.1 INJECTION, SOLUTION, CONCENTRATE INTRAVENOUS
Status: DISCONTINUED | OUTPATIENT
Start: 2023-11-26 | End: 2023-11-26 | Stop reason: HOSPADM

## 2023-11-26 RX ORDER — PANTOPRAZOLE SODIUM 40 MG/1
40 TABLET, DELAYED RELEASE ORAL
Status: DISCONTINUED | OUTPATIENT
Start: 2023-11-27 | End: 2023-11-29 | Stop reason: HOSPADM

## 2023-11-26 RX ORDER — LIDOCAINE 40 MG/G
CREAM TOPICAL
Status: DISCONTINUED | OUTPATIENT
Start: 2023-11-25 | End: 2023-11-29 | Stop reason: HOSPADM

## 2023-11-26 RX ORDER — ATROPINE SULFATE 0.1 MG/ML
1 INJECTION INTRAVENOUS
Status: DISCONTINUED | OUTPATIENT
Start: 2023-11-26 | End: 2023-11-26 | Stop reason: HOSPADM

## 2023-11-26 RX ORDER — CETIRIZINE HYDROCHLORIDE 10 MG/1
10 TABLET ORAL EVERY EVENING
Status: DISCONTINUED | OUTPATIENT
Start: 2023-11-26 | End: 2023-11-29 | Stop reason: HOSPADM

## 2023-11-26 RX ORDER — DIPHENHYDRAMINE HYDROCHLORIDE 50 MG/ML
25-50 INJECTION INTRAMUSCULAR; INTRAVENOUS
Status: DISCONTINUED | OUTPATIENT
Start: 2023-11-26 | End: 2023-11-26 | Stop reason: HOSPADM

## 2023-11-26 RX ORDER — FLUMAZENIL 0.1 MG/ML
0.2 INJECTION, SOLUTION INTRAVENOUS
Status: ACTIVE | OUTPATIENT
Start: 2023-11-26 | End: 2023-11-26

## 2023-11-26 RX ORDER — OXYCODONE HYDROCHLORIDE 5 MG/1
5 TABLET ORAL EVERY 4 HOURS PRN
Status: DISCONTINUED | OUTPATIENT
Start: 2023-11-26 | End: 2023-11-29 | Stop reason: HOSPADM

## 2023-11-26 RX ORDER — CALCIUM CARBONATE 500 MG/1
500 TABLET, CHEWABLE ORAL 3 TIMES DAILY PRN
Status: DISCONTINUED | OUTPATIENT
Start: 2023-11-26 | End: 2023-11-26 | Stop reason: ALTCHOICE

## 2023-11-26 RX ORDER — LIDOCAINE 40 MG/G
CREAM TOPICAL
Status: DISCONTINUED | OUTPATIENT
Start: 2023-11-26 | End: 2023-11-26 | Stop reason: HOSPADM

## 2023-11-26 RX ORDER — SIMETHICONE 40MG/0.6ML
133 SUSPENSION, DROPS(FINAL DOSAGE FORM)(ML) ORAL
Status: DISCONTINUED | OUTPATIENT
Start: 2023-11-26 | End: 2023-11-26 | Stop reason: HOSPADM

## 2023-11-26 RX ORDER — AMOXICILLIN 250 MG
2 CAPSULE ORAL 2 TIMES DAILY PRN
Status: DISCONTINUED | OUTPATIENT
Start: 2023-11-25 | End: 2023-11-29 | Stop reason: HOSPADM

## 2023-11-26 RX ORDER — SODIUM CHLORIDE, SODIUM LACTATE, POTASSIUM CHLORIDE, CALCIUM CHLORIDE 600; 310; 30; 20 MG/100ML; MG/100ML; MG/100ML; MG/100ML
INJECTION, SOLUTION INTRAVENOUS CONTINUOUS
Status: DISCONTINUED | OUTPATIENT
Start: 2023-11-26 | End: 2023-11-26

## 2023-11-26 RX ORDER — POLYETHYLENE GLYCOL 3350 17 G/17G
17 POWDER, FOR SOLUTION ORAL DAILY PRN
Status: DISCONTINUED | OUTPATIENT
Start: 2023-11-26 | End: 2023-11-29 | Stop reason: HOSPADM

## 2023-11-26 RX ADMIN — PANTOPRAZOLE SODIUM 40 MG: 40 INJECTION, POWDER, FOR SOLUTION INTRAVENOUS at 09:28

## 2023-11-26 RX ADMIN — HYDROMORPHONE HYDROCHLORIDE 0.2 MG: 0.2 INJECTION, SOLUTION INTRAMUSCULAR; INTRAVENOUS; SUBCUTANEOUS at 16:10

## 2023-11-26 RX ADMIN — OXYCODONE HYDROCHLORIDE 5 MG: 5 TABLET ORAL at 22:13

## 2023-11-26 RX ADMIN — TOPICAL ANESTHETIC 0.5 ML: 200 SPRAY DENTAL; PERIODONTAL at 10:23

## 2023-11-26 RX ADMIN — FENTANYL CITRATE 100 MCG: 50 INJECTION INTRAMUSCULAR; INTRAVENOUS at 10:21

## 2023-11-26 RX ADMIN — MIDAZOLAM HYDROCHLORIDE 2 MG: 1 INJECTION, SOLUTION INTRAMUSCULAR; INTRAVENOUS at 10:21

## 2023-11-26 RX ADMIN — CETIRIZINE HYDROCHLORIDE 10 MG: 10 TABLET, FILM COATED ORAL at 21:10

## 2023-11-26 RX ADMIN — SODIUM CHLORIDE, POTASSIUM CHLORIDE, SODIUM LACTATE AND CALCIUM CHLORIDE: 600; 310; 30; 20 INJECTION, SOLUTION INTRAVENOUS at 01:33

## 2023-11-26 ASSESSMENT — ACTIVITIES OF DAILY LIVING (ADL)
ADLS_ACUITY_SCORE: 22
ADLS_ACUITY_SCORE: 35

## 2023-11-26 NOTE — UTILIZATION REVIEW
Admission Status; Secondary Review Determination       Under the authority of the Utilization Management Committee, the utilization review process indicated a secondary review on the above patient. The review outcome is based on review of the medical records, discussions with staff, and applying clinical experience noted on the date of the review.     (x) Inpatient Status Appropriate - This patient's medical care is consistent with medical management for inpatient care and reasonable inpatient medical practice.     RATIONALE FOR DETERMINATION     43 year old female with a past medical history significant for fibromyalgia, GDM, urinary incontinence, chronic anemia, gastritis, previous history of H. Pylori who presented to the ED with right upper quadrant abdominal pain. GI was consulted and underwent an EGD today which was normal. US with no gallstones. She is receiving BID PPI. HIDA scan is being done tomorrow, and thus she requires > 2 midnights of hospital care and thus meeting inpatient criteria per CMS guidelines.  Patient requires inpatient admission versus short stay observation or outpatient treatment for the following reasons:      The expected length of stay at the time of admission was more than 2 nights because of the severity of illness, intensity of service provided, and risk for adverse outcome. Inpatient admission is appropriate.         This document was produced using voice recognition software       The information on this document is developed by the utilization review team in order for the business office to ensure compliance. This only denotes the appropriateness of proper admission status and does not reflect the quality of care rendered.   The definitions of Inpatient Status and Observation Status used in making the determination above are those provided in the CMS Coverage Manual, Chapter 1 and Chapter 6, section 70.4.   Sincerely,   Rivera Davis DO  Physician Advisor  [facility].

## 2023-11-26 NOTE — CONSULTS
Gastroenterology Consultation    Patient: Brit Romano   1980  Date of Consult:  11/26/2023  Admission Date/Time: 11/25/2023  6:42 PM  Primary Care Provider:  Cheyenne Loving was asked to see this patient in consultation by Dr. Hammer for evaluation of abdominal pain.    HPI:  Brit Romano is a 43 year old female who was admitted for abdominal pain.  She describes longstanding intermittent pain in the epigastric area, which radiates to her right upper quadrant and back.  She states that over the past 3 weeks this has become constant and severe.  It is worse with eating a meal, but is also exacerbated by movement and deep inspiration.  She notes a history of acid reflux, for which she takes omeprazole.  She has had nausea but no vomiting.  She denies any dysphagia.    She has both diarrhea and constipation at times.  She does state that her stools are frequently hard to pass, and she often experiences incomplete evacuation.  She denies any blood in her stools, but she does feel that her stools are dark.  She has had subjective fevers over the past 3 weeks.    She denies any NSAID use.  Had an EGD in 2016 and colonoscopy in 2017 that were unremarkable.    PAST MEDICAL HISTORY:  Past Medical History:   Diagnosis Date    Fatty liver     Fibromyalgia     History of gestational diabetes     Seasonal allergies        PAST SURGICAL HISTORY:  Past Surgical History:   Procedure Laterality Date    NO HISTORY OF SURGERY         MEDICATIONS:   :   Prior to Admission Medications   Prescriptions Last Dose Informant Patient Reported? Taking?   acetaminophen (TYLENOL) 325 MG tablet   No No   Sig: Take 2 tablets (650 mg) by mouth every 6 hours as needed for mild pain Start after Delivery.   Patient not taking: Reported on 11/24/2023   calcium carbonate (TUMS) 500 MG chewable tablet   No No   Sig: Take 1 tablet (500 mg) by mouth 3 times daily as needed for heartburn   cetirizine (ZYRTEC) 10 MG tablet   No No   Sig: Take  1 tablet (10 mg) by mouth daily   ferrous sulfate (FEROSUL) 325 (65 Fe) MG tablet   No No   Sig: Take 1 tablet (325 mg) by mouth every other day   fluticasone (FLONASE) 50 MCG/ACT nasal spray   No No   Sig: Spray 2 sprays into both nostrils daily   omeprazole (PRILOSEC) 40 MG DR capsule   No No   Sig: TAKE 1 CAPSULE(40 MG) BY MOUTH DAILY   ondansetron (ZOFRAN ODT) 4 MG ODT tab   Yes No   Sig: Take 4 mg by mouth once   polyethylene glycol (MIRALAX) 17 GM/Dose powder   No No   Sig: Take 17 g by mouth daily as needed for constipation      Facility-Administered Medications: None       ALLERGIES:   :   Allergies   Allergen Reactions    Sodium Citrate Diarrhea       SOCIAL HISTORY:  Social History     Tobacco Use    Smoking status: Never    Smokeless tobacco: Never   Substance Use Topics    Alcohol use: No     Alcohol/week: 0.0 standard drinks of alcohol    Drug use: No       FAMILY HISTORY:  No first degree relative with colon cancer, gastric cancer, crohn's disease, ulcerative colitis, or liver disease.     EXAM:  General Appearance: Alert, oriented x3, no acute distress.  BP (P) 137/78 (BP Location: Left arm)   Pulse (P) 72   Temp (P) 98.3  F (36.8  C) (Oral)   Resp (P) 16   Wt 91.2 kg (201 lb)   SpO2 100%   BMI 33.45 kg/m    Eye: sclera anicteric.  ENT: oropharynx clear, no thrush.  CV: RRR.  Resp: CTA bilaterally.  GI: Soft, NABS, mild diffuse tenderness without rebound.  Musculoskeletal: no joint swelling, erythema, or warmth.  Neuro: no asterixis.      Labs and imaging reviewed    Recent Labs   Lab Test 11/26/23  0621 11/25/23 2029 04/08/20  0656 04/07/20  0850 08/01/18  1936 07/27/18  1439   WBC 7.4 6.1  --  15.2*   < > 9.7   HGB 8.6* 8.0* 10.1* 11.4*   < > 11.0*   MCV 65* 65*  --  85   < > 80    317  --  179   < > 179   INR  --   --   --   --   --  1.05    < > = values in this interval not displayed.     Recent Labs   Lab Test 11/26/23  0621 11/25/23  2029 03/03/20  1455 02/04/20  1245  01/23/20  1826   POTASSIUM 3.8 3.6 3.5   < > 2.8*   CHLORIDE 105 104  --   --  105   CO2 26 23  --   --  22   BUN 5.0* 4.4*  --   --  2*   ANIONGAP 10 12  --   --  10    < > = values in this interval not displayed.     Recent Labs   Lab Test 11/26/23  0621 11/25/23  2137 11/25/23  2029 07/17/20  1200 04/07/20  0850 03/17/20  1425 03/03/20  1502 01/23/20  1826 01/12/18  1425 08/30/17  1522   ALBUMIN 4.4  --  4.3  --   --   --   --  2.6*   < > 3.2*   BILITOTAL 0.2  --  <0.2  --   --   --   --  0.1*   < > 0.3   ALT 14  --  14  --  12  --   --  17   < > 17   AST 16  --  17  --  13  --   --  17   < > 11   PROTEIN  --  Negative  --  Negative  --  Negative   < >  --    < >  --    LIPASE  --   --  22  --   --   --   --   --   --  108    < > = values in this interval not displayed.       ASSESSMENT AND PLAN: This a pleasant 43-year-old with chronic abdominal pain.  She has a chronic microcytic anemia, with her hemoglobin at baseline.  Differential diagnosis includes peptic ulcer disease, gastritis, esophagitis, and celiac disease.  Malignancy is less likely.  Gallbladder dyskinesia is also possible.  Her symptoms may ultimately be related to constipation.    I discussed with her performing an upper GI endoscopy, and she wishes to proceed.  We can perform this today.  If this is unremarkable, I would consider a HIDA scan.  She may also benefit from daily fiber therapy.    60 minutes of total time was spent providing patient care, including patient evaluation, reviewing documentation/test results, and .          Fernie Billy MD  Thank you for the opportunity to participate in the care of this patient.   Please feel free to call with any questions or concerns.  [phone].       CC: [facility]rest[facility]ienne Rita

## 2023-11-26 NOTE — ED TRIAGE NOTES
Patient states she has had 2 weeks of ongoing abdominal pain. Patient states that she has been evaluated for this pain. Patient states she saw her primary provider who ordered an ultrasound but patient doesn't want to wait for that appointment.      Triage Assessment (Adult)       Row Name 11/25/23 9270          Triage Assessment    Airway WDL WDL        Respiratory WDL    Respiratory WDL WDL        Skin Circulation/Temperature WDL    Skin Circulation/Temperature WDL WDL        Cardiac WDL    Cardiac WDL WDL        Peripheral/Neurovascular WDL    Peripheral Neurovascular WDL WDL        Cognitive/Neuro/Behavioral WDL    Cognitive/Neuro/Behavioral WDL WDL

## 2023-11-26 NOTE — H&P
[facility]    History and Physical - Hospitalist Service       Date of Admission:  11/25/2023    Assessment & Plan      Brit Romano is a 43 year old female with a past medical history significant for fibromyalgia, GDM, urinary incontinence, chronic anemia, gastritis, previous history of H. Pylori who presented to the ED with right upper quadrant abdominal pain and admitted to the inpatient for further evaluation of her symptoms and anemia.      Right upper quadrant abdominal pain  History of gastritis  Melena  Tenesmus  History of H. pylori status post treatment   Per patient she has a long history of gastritis and has been on omeprazole for about 15 years.  Her right upper quadrant abdominal pain is not new however during the past 2 months it is started to get worse and occur more frequently now.  Related with food like beans or fatty food.  She recently had an abdominal ultrasound at an outside ER which showed contracted gallbladder.  Abdominal ultrasound here is without any acute pathology.  She reports melena however on iron supplement as well.  Upper and lower GI in 2016 and 17 was within normal limit.  She also reports feeling of incomplete evacuation after the bowel movement.  No rectal pain no bright red blood per rectum.       -Start IV pantoprazole 40 mg twice daily  -N.p.o.  -IV LR at 125 mL/h  -GI consult  -Occult blood test      Iron deficiency anemia secondary to chronic blood loss  Microcytic anemia  Iron 21, saturation index 5, binding capacity at 458 consistent with iron deficiency.   Hemoglobin on admission was 8 lower from 8.71-week ago.  Last hemoglobin in 2020 was at 10.  Chart review patient appears chronically anemic at least since 2014.     -GI consult  -Hold off home iron supplement for now (clarity of clinical picture given melena)  -Consider IV Venofer if patient is agreeable          Diet:  snacks now, NPO midnight   DVT Prophylaxis: Low Risk/Ambulatory with no  VTE prophylaxis indicated  Mendoza Catheter: Not present  Lines: None     Cardiac Monitoring: None  Code Status:  Full code    Clinically Significant Risk Factors Present on Admission                                  Disposition Plan      Expected Discharge Date: 11/27/2023                  Emily Javier MD  Hospitalist Service  [facility]  Securely message with [employer] (more info)  Text page via [facility] Paging/Directory     ______________________________________________________________________    Chief Complaint   Right upper quadrant abdominal pain    History is obtained from the patient    History of Present Illness   Brit Romano is a 43 year old female with a past medical history significant for fibromyalgia, GDM, urinary incontinence, chronic anemia, gastritis, previous history of H. Pylori who presented to the ED with right upper quadrant abdominal pain and admitted to the inpatient for expedited evaluation of her symptoms and anemia.     Patient said that she has had this right upper quadrant abdominal pain for 2 years.  Initially it used to happen monthly or so.  For the past 2 months she noticed that now pain is occurring more frequently and happens almost every other day.  She thinks that it is related to food like fatty food, red beans make her pain come on.  She reported that when she has pain it is sharp and usually goes back up to her right shoulder.  She also reported melena.  She also reported that even after having a bowel movement she does not feel fully empty and feels like she needs to go again but unable to do so.  She denied any rectal pain.  She reported weight loss of about 20 pounds or so in the past 6 months.  She said that people have been asking her what she is doing and she is losing weight quickly.  She also reported having chills and night sweats.     She saw her primary care provider for the evaluation of above-mentioned symptoms.  Ultrasound and referral to the GI  was placed.  Due to her ongoing and worsening symptoms she could not wait and decided to come to the ER for further evaluation.  On presentation to the ER she is afebrile, pulse 74, blood pressure 145/90, saturating well on room air.  Hemoglobin on presentation was low at 8 down from prior 8.7 on 11/15.  Baseline hemoglobin appears to be around 10.  MCV 64.  Normal metabolic panel and LFTs.  Abdominal ultrasound was done in the ED which showed no acute pathology.  Case was discussed with GI by ED doc who recommended admission for expedited evaluation of her symptoms.      Past Medical History    Past Medical History:   Diagnosis Date    Fatty liver     Fibromyalgia     History of gestational diabetes     Seasonal allergies        Past Surgical History   Past Surgical History:   Procedure Laterality Date    NO HISTORY OF SURGERY         Prior to Admission Medications   Prior to Admission Medications   Prescriptions Last Dose Informant Patient Reported? Taking?   acetaminophen (TYLENOL) 325 MG tablet   No No   Sig: Take 2 tablets (650 mg) by mouth every 6 hours as needed for mild pain Start after Delivery.   Patient not taking: Reported on 11/24/2023   calcium carbonate (TUMS) 500 MG chewable tablet   No No   Sig: Take 1 tablet (500 mg) by mouth 3 times daily as needed for heartburn   cetirizine (ZYRTEC) 10 MG tablet   No No   Sig: Take 1 tablet (10 mg) by mouth daily   ferrous sulfate (FEROSUL) 325 (65 Fe) MG tablet   No No   Sig: Take 1 tablet (325 mg) by mouth every other day   fluticasone (FLONASE) 50 MCG/ACT nasal spray   No No   Sig: Spray 2 sprays into both nostrils daily   omeprazole (PRILOSEC) 40 MG DR capsule   No No   Sig: TAKE 1 CAPSULE(40 MG) BY MOUTH DAILY   ondansetron (ZOFRAN ODT) 4 MG ODT tab   Yes No   Sig: Take 4 mg by mouth once   polyethylene glycol (MIRALAX) 17 GM/Dose powder   No No   Sig: Take 17 g by mouth daily as needed for constipation      Facility-Administered Medications: None         Review of Systems    The 10 point Review of Systems is negative other than noted in the HPI or here.      Physical Exam   Vital Signs: Temp: (P) 98.1  F (36.7  C) Temp src: (P) Oral BP: (!) 145/90 Pulse: 74   Resp: (P) 18 SpO2: 100 % O2 Device: None (Room air)    Weight: 0 lbs 0 oz    Constitutional: awake, alert, cooperative, no apparent distress, and appears stated age  Eyes: Anicteric sclera  ENT: normocepalic, without obvious abnormality  Respiratory: No increased work of breathing, good air exchange, clear to auscultation bilaterally, no crackles or wheezing  Cardiovascular: Normal rate, regular rhythm, no murmur  GI: Soft, nontender, nondistended, bowel sounds present  Skin: Several tan color macules with a scaly borders scattered on the anterior chest  Musculoskeletal: no lower extremity pitting edema present  Neurologic: Awake, alert, oriented to name, place and time.    Neuropsychiatric: Appears worried, speaking softly    Medical Decision Making       60 MINUTES SPENT BY ME on the date of service doing chart review, history, exam, documentation & further activities per the note.      Data   ------------------------- PAST 24 HR DATA REVIEWED -----------------------------------------------

## 2023-11-26 NOTE — PROGRESS NOTES
[facility]  Hospitalist Progress Note  Cortez Hammer MD 11/26/2023    Reason for Stay (Diagnosis): RUQ abd pain         Assessment and Plan:      Summary of Stay: Brit Romano is a 43 year old female with a past medical history significant for fibromyalgia, GDM, urinary incontinence, chronic anemia, gastritis, previous history of H. Pylori who presented to the ED with right upper quadrant abdominal pain and admitted to the inpatient for further evaluation of her symptoms.     The patient had a right upper quadrant ultrasound showing no gallstones.  GI was consulted.  EGD performed that was normal.  GI recommends HIDA scan to rule out gallbladder dyskinesia as a possible source of her symptoms    Plans today:  - HIDA scan ordered.  NPO after midnight for scan tomorrow  -GI consult note and EGD results reviewed.  Appreciate GI input  -Change IV PPI to oral PPI  - check ferritin level        Right upper quadrant abdominal pain  History of gastritis  Report of dark stools  History of H. pylori status post treatment   - long history of gastritis and has been on omeprazole for about 15 years.    - RUQ abdominal pain is not new however during the past 2 months it is started to get worse and occur more frequently now  -Symptoms are worse with eating  - Abdominal ultrasound this admission without any acute pathology.    -GI consult appreciated.    - EGD normal  -HIDA scan recommended by GI to rule out gallbladder dyskinesia        Iron deficiency anemia secondary to chronic blood loss  Microcytic anemia  -No evidence of active GI blood loss since admission  -EGD results normal as noted above  - Hemoglobin on admission was 8   - Hemoglobin in 2020 was 10.    - Chart review patient appears chronically anemic at least since 2014.    -Patient reports that her outpatient providers are arranging a colonoscopy           Diet:  snacks now, NPO midnight   DVT Prophylaxis: Low Risk/Ambulatory with no VTE  prophylaxis indicated  Mendoza Catheter: Not present  Lines: None     Cardiac Monitoring: None  Code Status:  Full code  DISPO:  after work up completed (HIDA scan) and tolerating diet           Interval History (Subjective):      Patient reports right upper quadrant pain that radiates to her back.  Worse with eating.                  Physical Exam:      Last Vital Signs:  BP (!) 147/82 (BP Location: Left arm, Patient Position: Supine, Cuff Size: Adult Regular)   Pulse 78   Temp 98.1  F (36.7  C) (Oral)   Resp 16   Wt 91.2 kg (201 lb)   SpO2 100%   BMI 33.45 kg/m      No intake or output data in the 24 hours ending 11/26/23 1218    Constitutional: Awake, alert, cooperative, no apparent distress     Respiratory: Clear to auscultation bilaterally, no crackles or wheezing   Cardiovascular: Regular rate and rhythm, normal S1 and S2, and no murmur noted   Abdomen: Normal bowel sounds, soft, non-distended, minimal tenderness to palpation of the right upper quadrant   Skin: No rashes, no cyanosis, dry to touch   Neuro: Alert and oriented x3, no weakness, numbness, memory loss   Extremities: No edema, normal range of motion   Other(s):        All other systems: Negative          Medications:      All current medications were reviewed with changes reflected in problem list.         Data:      All new lab and imaging data was reviewed.   Labs:  Recent Labs   Lab 11/26/23 0621 11/25/23 2029   WBC 7.4 6.1   HGB 8.6* 8.0*   HCT 30.0* 28.6*   MCV 65* 65*    317      Imaging:   Recent Results (from the past 24 hour(s))   Abdomen US, limited (RUQ only)    Narrative    EXAM: US ABDOMEN LIMITED  LOCATION: [facility]  DATE: 11/25/2023    INDICATION: RUQ pain  COMPARISON: 11/15/2023  TECHNIQUE: Limited abdominal ultrasound.    FINDINGS:    GALLBLADDER: Normal. No gallstones, wall thickening, or pericholecystic fluid. Negative sonographic Claros's sign.    BILE DUCTS: No biliary dilatation. The common  duct measures 3.6 mm.    LIVER: Moderate increased parenchymal echogenicity most typical for fatty infiltration. No focal mass.    RIGHT KIDNEY: No hydronephrosis.    PANCREAS: The visualized portions are normal.    No ascites.      Impression    IMPRESSION:  1.  Moderate fatty infiltration of the liver.    2.  The right upper quadrant is otherwise normal.       Chest XR,  PA & LAT    Narrative    EXAM: XR CHEST 2 VIEWS  LOCATION: [facility]  DATE: 11/25/2023    INDICATION: chest pain  COMPARISON: 01/23/2020      Impression    IMPRESSION: Negative chest.

## 2023-11-26 NOTE — ED PROVIDER NOTES
History     Chief Complaint:  Abdominal Pain       HPI   Brit Romano is a 43 year old female who presents with abdominal pain. The patient states that she has been having intermittent abdominal pain for the past 2 months that has become constant within the last 2 weeks. She states the abdominal pain radiates into her right shoulder sometimes along with some intermittent vomiting, nausea, diarrhea. She also endorses blood in her stool/black stools for the past few months, worse in the last few weeks. She is not taking Laxatives and her last dose of Tylenol was around 1400 today. She denies any hematuria or leg swelling.     Independent Historian:   None - Patient Only    Review of External Notes:   I reviewed the patients note from yesterday where she was seen by her primary care provider and had an ultrasound scheduled.      Medications:    Prilosec     Past Medical History:    Fatty liver  Fibromyalgia   GERD    Past Surgical History:    None      Physical Exam   Patient Vitals for the past 24 hrs:   BP Temp Temp src Pulse Resp SpO2   11/25/23 2347 (!) 149/75 97.8  F (36.6  C) Oral 86 18 --   11/25/23 2220 (!) 145/90 -- -- 74 -- 100 %   11/25/23 1812 (!) (P) 161/89 (P) 98.1  F (36.7  C) (P) Oral (P) 90 (P) 18 (P) 99 %        Physical Exam  General: Resting on the bed.  Head: No obvious trauma to head.  Ears, Nose, Throat:  External ears normal.  Nose normal.  No pharyngeal erythema, swelling or exudate.  Midline uvula. Moist mucus membranes.  Eyes:  Conjunctivae clear.   Neck: Normal range of motion.  Neck supple.   CV: Regular rate and rhythm.  No murmurs.      Respiratory: Effort normal and breath sounds normal.  No wheezing or crackles.   Gastrointestinal: Soft.  No distension. There is no tenderness.  There is no rigidity, no rebound and no guarding.   Musculoskeletal: Normal range of motion.  Non tender extremities to palpations.    Neuro: Alert. Moving all extremities appropriately.  Normal speech.     Skin: Skin is warm and dry.  No rash noted.   Psych: Normal mood and affect. Behavior is normal.     Emergency Department Course     Imaging:  Chest XR,  PA & LAT   Final Result   IMPRESSION: Negative chest.      Abdomen US, limited (RUQ only)   Final Result   IMPRESSION:   1.  Moderate fatty infiltration of the liver.      2.  The right upper quadrant is otherwise normal.               Laboratory:  Labs Ordered and Resulted from Time of ED Arrival to Time of ED Departure   COMPREHENSIVE METABOLIC PANEL - Abnormal       Result Value    Sodium 139      Potassium 3.6      Carbon Dioxide (CO2) 23      Anion Gap 12      Urea Nitrogen 4.4 (*)     Creatinine 0.60      GFR Estimate >90      Calcium 9.0      Chloride 104      Glucose 144 (*)     Alkaline Phosphatase 66      AST 17      ALT 14      Protein Total 7.4      Albumin 4.3      Bilirubin Total <0.2     ROUTINE UA WITH MICROSCOPIC REFLEX TO CULTURE - Abnormal    Color Urine Light Yellow      Appearance Urine Clear      Glucose Urine Negative      Bilirubin Urine Negative      Ketones Urine Negative      Specific Gravity Urine 1.014      Blood Urine Negative      pH Urine 5.5      Protein Albumin Urine Negative      Urobilinogen Urine Normal      Nitrite Urine Negative      Leukocyte Esterase Urine Negative      Mucus Urine Present (*)     RBC Urine 0      WBC Urine 2      Squamous Epithelials Urine 2 (*)    CBC WITH PLATELETS AND DIFFERENTIAL - Abnormal    WBC Count 6.1      RBC Count 4.37      Hemoglobin 8.0 (*)     Hematocrit 28.6 (*)     MCV 65 (*)     MCH 18.3 (*)     MCHC 28.0 (*)     RDW 22.2 (*)     Platelet Count 317      % Neutrophils        % Lymphocytes        % Monocytes        % Eosinophils        % Basophils        % Immature Granulocytes        NRBCs per 100 WBC 0      Absolute Neutrophils        Absolute Lymphocytes        Absolute Monocytes        Absolute Eosinophils        Absolute Basophils        Absolute Immature Granulocytes        Absolute  NRBCs 0.0     LIPASE - Normal    Lipase 22     TROPONIN T, HIGH SENSITIVITY - Normal    Troponin T, High Sensitivity <6     DIFFERENTIAL    % Neutrophils 49      % Lymphocytes 43      % Monocytes 5      % Eosinophils 3      % Basophils 0      Absolute Neutrophils 3.0      Absolute Lymphocytes 2.6      Absolute Monocytes 0.3      Absolute Eosinophils 0.2      Absolute Basophils 0.0      RBC Morphology Confirmed RBC Indices      Platelet Assessment        Value: Automated Count Confirmed. Platelet morphology is normal.   OCCULT BLOOD STOOL      Emergency Department Course & Assessments:       Interventions:  Medications   lidocaine 1 % 0.1-1 mL (has no administration in time range)   lidocaine (LMX4) cream (has no administration in time range)   sodium chloride (PF) 0.9% PF flush 3 mL (has no administration in time range)   sodium chloride (PF) 0.9% PF flush 3 mL (has no administration in time range)   senna-docusate (SENOKOT-S/PERICOLACE) 8.6-50 MG per tablet 1 tablet (has no administration in time range)     Or   senna-docusate (SENOKOT-S/PERICOLACE) 8.6-50 MG per tablet 2 tablet (has no administration in time range)   calcium carbonate (TUMS) chewable tablet 1,000 mg (has no administration in time range)   pantoprazole (PROTONIX) IV push injection 40 mg (has no administration in time range)   lactated ringers infusion (has no administration in time range)   pantoprazole (PROTONIX) IV push injection 40 mg (40 mg Intravenous $Given 11/25/23 2221)   morphine (PF) injection 2 mg (2 mg Intravenous $Given 11/25/23 2221)      Assessments:  1847 I consulted with the patient and obtained history as shown above  2212 I rechecked the patient and explained exam results     Independent Interpretation (X-rays, CTs, rhythm strip):  Chest x-ray negative for consolidation, pleural effusion, pneumothorax    Consultations/Discussion of Management or Tests:  2205 I consulted with Dr. Fernández from GI about the patient and plan of  care  2235 I consulted with Dr. Javier about the patient and plan of care    Social Determinants of Health affecting care:   None    Disposition:  The patient was admitted to the hospital under the care of Dr. Javier.     Impression & Plan    CMS Diagnoses: None    Medical Decision Making:  Patient presents with upper abdominal discomfort for several weeks.  She also endorses black stool.  Right upper quadrant is obtained and is negative for cholecystitis and cholelithiasis.  Chest x-ray is unremarkable.  She is significantly anemic, with a hemoglobin of 8.0.  Her last hemoglobin check was 10 days ago, and was 8.7.  She is given 40 mg IV Protonix.  I discussed the patient with GI, and they state that they should be able to scope the patient if she is admitted.  I discussed the patient with the hospitalist, who agrees to admit the patient to their service.  She remains in stable condition and is transferred to the floor.      Diagnosis:    ICD-10-CM    1. Upper GI bleed  K92.2       2. Anemia due to blood loss, acute  D62            Discharge Medications:  Current Discharge Medication List         Scribe Disclosure:  I, Rashid Bautista, am serving as a scribe at 6:56 PM on 11/25/2023 to document services personally performed by Washington Porter MD based on my observations and the provider's statements to me.     11/25/2023   Washington Porter MD Peery, Stephen, MD  11/26/23 0043

## 2023-11-26 NOTE — PLAN OF CARE
[facility]    ED Boarding Nurse Handoff Addendum Report:    Date/time: 11/26/2023, 12:24 AM    Activity Level: independent    Fall Risk: No    Active Infusions: None    Current Meds Due: HS meds    Current care needs: Monitor pain    Oxygen requirements (liters/min and/or FiO2): NA    Respiratory status: Room air    Vital signs (within last 30 minutes):    Vitals:    11/25/23 1812 11/25/23 2220 11/25/23 2347   BP: (!) (P) 161/89 (!) 145/90 (!) 149/75   BP Location:   Right arm   Pulse: (P) 90 74 86   Resp: (P) 18  18   Temp: (P) 98.1  F (36.7  C)  97.8  F (36.6  C)   TempSrc: (P) Oral  Oral   SpO2: (P) 99% 100%        Focused assessment within last 30 minutes:    MD paged to clarify GI consult to be seen by Dr. Lucero or [facility], waiting for response    ED Boarding Nurse name: Cassia Murray RN

## 2023-11-26 NOTE — PLAN OF CARE
VSS on RA. A&O x4. Ind. Denies pain at the encounter time, but did come in with RUQ abd pain. Will keep monitor. Denies cp, sob, n/v. Report having black stool earlier, but not during the shift. LR infusing at 125ml/hr. NPO for possible upper and lower endoscopies on 11/26.    Plan: pain management, possible upper and lower endoscopies on 11/26      Goal Outcome Evaluation:      Plan of Care Reviewed With: patient    Overall Patient Progress: no changeOverall Patient Progress: no change

## 2023-11-26 NOTE — PROGRESS NOTES
A&O X4 VSS except the systolic BP mildly elevated .Hours after the following the upper GI endoscopy and procedure the patient  reported  having epigastric pain, 7/10 Sharp, constant, and radiating to the back . The pain resolved after administration of PRN HYDROmorphone (DILAUDID) 0.2 mg IV .She  nausea or GI discomfort after pain med.  The provider reccommended to follow up at out patient clinic for her BP.   Reported lightheadedness but no SOB or chest pain . Gave history of dark and formed stool in the past . No MB during the shift .   Stool specimen collection cup provided   Lab : 8.6   HIDA (hepatobiliary iminodiacetic acid) scan: pending

## 2023-11-26 NOTE — PHARMACY-ADMISSION MEDICATION HISTORY
Pharmacist Admission Medication History    Admission medication history is complete. The information provided in this note is only as accurate as the sources available at the time of the update.    Information Source(s): Patient and CareEverywhere/SureScripts via in-person    Pertinent Information: none    Changes made to PTA medication list:  Added: None  Deleted: None  Changed: None    Medication Affordability: no issues       Allergies reviewed with patient and updates made in EHR: yes    Medication History Completed By: Kermit Severson, [facility] 11/26/2023 10:19 AM    Prior to Admission medications    Medication Sig Last Dose Taking? Auth Provider Long Term End Date   acetaminophen (TYLENOL) 325 MG tablet Take 2 tablets (650 mg) by mouth every 6 hours as needed for mild pain Start after Delivery.  Patient not taking: Reported on 11/24/2023   Sasha Orta MD     calcium carbonate (TUMS) 500 MG chewable tablet Take 1 tablet (500 mg) by mouth 3 times daily as needed for heartburn   Cheyenne Loving MD     cetirizine (ZYRTEC) 10 MG tablet Take 1 tablet (10 mg) by mouth daily   Cheyenne Loving MD     ferrous sulfate (FEROSUL) 325 (65 Fe) MG tablet Take 1 tablet (325 mg) by mouth every other day   Cheyenne Loving MD No    fluticasone (FLONASE) 50 MCG/ACT nasal spray Spray 2 sprays into both nostrils daily   Cheyenne Loving MD     omeprazole (PRILOSEC) 40 MG DR capsule TAKE 1 CAPSULE(40 MG) BY MOUTH DAILY   Cheyenne Loving MD     ondansetron (ZOFRAN ODT) 4 MG ODT tab Take 4 mg by mouth once   Reported, Patient     polyethylene glycol (MIRALAX) 17 GM/Dose powder Take 17 g by mouth daily as needed for constipation   Cheyenne Loving MD

## 2023-11-26 NOTE — ED NOTES
"[facility]  ED Nurse Handoff Report    ED Chief complaint: Abdominal Pain  . ED Diagnosis:   Final diagnoses:   Upper GI bleed   Anemia due to blood loss, acute       Allergies:   Allergies   Allergen Reactions    Sodium Citrate Diarrhea       Code Status: Full Code    Activity level - Baseline/Home:  independent.  Activity Level - Current:   independent.   Lift room needed: No.   Bariatric: No   Needed: No   Isolation: No.   Infection: Not Applicable.     Respiratory status: Room air    Vital Signs (within 30 minutes):   Vitals:    11/25/23 1812 11/25/23 2220 11/25/23 2347   BP: (!) (P) 161/89 (!) 145/90 (!) 149/75   BP Location:   Right arm   Pulse: (P) 90 74 86   Resp: (P) 18  18   Temp: (P) 98.1  F (36.7  C)  97.8  F (36.6  C)   TempSrc: (P) Oral  Oral   SpO2: (P) 99% 100%        Cardiac Rhythm:  ,      Pain level:    Patient confused: No.   Patient Falls Risk: nonskid shoes/slippers when out of bed and patient and family education.   Elimination Status: Has voided     Patient Report - Initial Complaint: 43 year old female who presents with abdominal pain. The patient states that she has been having intermittent abdominal pain for the past 2 months that has become constant within the last 2 weeks. She states the abdominal pain radiates into her right shoulder sometimes along with some intermittent vomiting, nausea, diarrhea. She also endorses blood in her stool/black stools for the past few months, worse in the last few weeks. She is not taking Laxatives and her last dose of Tylenol was around 1400 today. She denies any hematuria or leg swelling.  .   Focused Assessment: Abd pain relieved by morphine. Black stools for \"a few months\". Denies nausea or fever.      Abnormal Results:   Labs Ordered and Resulted from Time of ED Arrival to Time of ED Departure   COMPREHENSIVE METABOLIC PANEL - Abnormal       Result Value    Sodium 139      Potassium 3.6      Carbon Dioxide (CO2) 23   "    Anion Gap 12      Urea Nitrogen 4.4 (*)     Creatinine 0.60      GFR Estimate >90      Calcium 9.0      Chloride 104      Glucose 144 (*)     Alkaline Phosphatase 66      AST 17      ALT 14      Protein Total 7.4      Albumin 4.3      Bilirubin Total <0.2     ROUTINE UA WITH MICROSCOPIC REFLEX TO CULTURE - Abnormal    Color Urine Light Yellow      Appearance Urine Clear      Glucose Urine Negative      Bilirubin Urine Negative      Ketones Urine Negative      Specific Gravity Urine 1.014      Blood Urine Negative      pH Urine 5.5      Protein Albumin Urine Negative      Urobilinogen Urine Normal      Nitrite Urine Negative      Leukocyte Esterase Urine Negative      Mucus Urine Present (*)     RBC Urine 0      WBC Urine 2      Squamous Epithelials Urine 2 (*)    CBC WITH PLATELETS AND DIFFERENTIAL - Abnormal    WBC Count 6.1      RBC Count 4.37      Hemoglobin 8.0 (*)     Hematocrit 28.6 (*)     MCV 65 (*)     MCH 18.3 (*)     MCHC 28.0 (*)     RDW 22.2 (*)     Platelet Count 317      % Neutrophils        % Lymphocytes        % Monocytes        % Eosinophils        % Basophils        % Immature Granulocytes        NRBCs per 100 WBC 0      Absolute Neutrophils        Absolute Lymphocytes        Absolute Monocytes        Absolute Eosinophils        Absolute Basophils        Absolute Immature Granulocytes        Absolute NRBCs 0.0     LIPASE - Normal    Lipase 22     TROPONIN T, HIGH SENSITIVITY - Normal    Troponin T, High Sensitivity <6     DIFFERENTIAL    % Neutrophils 49      % Lymphocytes 43      % Monocytes 5      % Eosinophils 3      % Basophils 0      Absolute Neutrophils 3.0      Absolute Lymphocytes 2.6      Absolute Monocytes 0.3      Absolute Eosinophils 0.2      Absolute Basophils 0.0      RBC Morphology Confirmed RBC Indices      Platelet Assessment        Value: Automated Count Confirmed. Platelet morphology is normal.        Chest XR,  PA & LAT   Final Result   IMPRESSION: Negative chest.       Abdomen US, limited (RUQ only)   Final Result   IMPRESSION:   1.  Moderate fatty infiltration of the liver.      2.  The right upper quadrant is otherwise normal.                Treatments provided: See mar, notes and flowsheets  Family Comments:  at bedside  OBS brochure/video discussed/provided to patient:  Yes  ED Medications:   Medications   lidocaine 1 % 0.1-1 mL (has no administration in time range)   lidocaine (LMX4) cream (has no administration in time range)   sodium chloride (PF) 0.9% PF flush 3 mL (has no administration in time range)   sodium chloride (PF) 0.9% PF flush 3 mL (has no administration in time range)   senna-docusate (SENOKOT-S/PERICOLACE) 8.6-50 MG per tablet 1 tablet (has no administration in time range)     Or   senna-docusate (SENOKOT-S/PERICOLACE) 8.6-50 MG per tablet 2 tablet (has no administration in time range)   calcium carbonate (TUMS) chewable tablet 1,000 mg (has no administration in time range)   pantoprazole (PROTONIX) IV push injection 40 mg (has no administration in time range)   pantoprazole (PROTONIX) IV push injection 40 mg (40 mg Intravenous $Given 11/25/23 2221)   morphine (PF) injection 2 mg (2 mg Intravenous $Given 11/25/23 2221)       Drips infusing:  No  For the majority of the shift this patient was Green.   Interventions performed were n/a.    Sepsis treatment initiated: No    Cares/treatment/interventions/medications to be completed following ED care: GI consult, pain control    ED Nurse Name: Mariela Pearl RN  12:04 AM    RECEIVING UNIT ED HANDOFF REVIEW    Above ED Nurse Handoff Report was reviewed: Yes  Reviewed by: Johnathan Harris RN on November 26, 2023 at 12:10 AM

## 2023-11-26 NOTE — PRE-PROCEDURE
INPATIENT PRE-PROCEDURE NOTE    CHIEF COMPLAINT / REASON FOR PROCEDURE: abdominal pain    Admission History and Physical Reviewed, including past medical history, social history family history, ROS, and interval progress notes: on chart-<30 days old; updated within 7 days.    PRE-SEDATION ASSESSMENT:  Lung Exam: normal  Heart Exam: normal  Airway Exam: Normal  Previous reaction to anesthesia/sedation: NO  Sedation plan based on assessment:Moderate (conscious) sedation  ASA Classification: 2 - Mild systemic disease       IMPRESSION: abdominalpain    PLAN: EGD    Fernie Billy MD

## 2023-11-27 ENCOUNTER — TELEPHONE (OUTPATIENT)
Dept: FAMILY MEDICINE | Facility: CLINIC | Age: 43
End: 2023-11-27

## 2023-11-27 ENCOUNTER — APPOINTMENT (OUTPATIENT)
Dept: NUCLEAR MEDICINE | Facility: CLINIC | Age: 43
End: 2023-11-27
Attending: INTERNAL MEDICINE
Payer: COMMERCIAL

## 2023-11-27 LAB
ATRIAL RATE - MUSE: 75 BPM
DIASTOLIC BLOOD PRESSURE - MUSE: NORMAL MMHG
GLUCOSE BLDC GLUCOMTR-MCNC: 104 MG/DL (ref 70–99)
GLUCOSE BLDC GLUCOMTR-MCNC: 108 MG/DL (ref 70–99)
GLUCOSE BLDC GLUCOMTR-MCNC: 115 MG/DL (ref 70–99)
GLUCOSE BLDC GLUCOMTR-MCNC: 173 MG/DL (ref 70–99)
HGB BLD-MCNC: 8.7 G/DL (ref 11.7–15.7)
INTERPRETATION ECG - MUSE: NORMAL
P AXIS - MUSE: 47 DEGREES
PR INTERVAL - MUSE: 158 MS
QRS DURATION - MUSE: 86 MS
QT - MUSE: 386 MS
QTC - MUSE: 431 MS
R AXIS - MUSE: 57 DEGREES
SYSTOLIC BLOOD PRESSURE - MUSE: NORMAL MMHG
T AXIS - MUSE: 47 DEGREES
VENTRICULAR RATE- MUSE: 75 BPM

## 2023-11-27 PROCEDURE — 120N000001 HC R&B MED SURG/OB

## 2023-11-27 PROCEDURE — 36415 COLL VENOUS BLD VENIPUNCTURE: CPT | Performed by: INTERNAL MEDICINE

## 2023-11-27 PROCEDURE — A9537 TC99M MEBROFENIN: HCPCS | Performed by: STUDENT IN AN ORGANIZED HEALTH CARE EDUCATION/TRAINING PROGRAM

## 2023-11-27 PROCEDURE — 85018 HEMOGLOBIN: CPT | Performed by: INTERNAL MEDICINE

## 2023-11-27 PROCEDURE — 83655 ASSAY OF LEAD: CPT | Performed by: INTERNAL MEDICINE

## 2023-11-27 PROCEDURE — 78227 HEPATOBIL SYST IMAGE W/DRUG: CPT

## 2023-11-27 PROCEDURE — 250N000011 HC RX IP 250 OP 636: Mod: JZ | Performed by: INTERNAL MEDICINE

## 2023-11-27 PROCEDURE — 343N000001 HC RX 343: Performed by: STUDENT IN AN ORGANIZED HEALTH CARE EDUCATION/TRAINING PROGRAM

## 2023-11-27 PROCEDURE — 250N000013 HC RX MED GY IP 250 OP 250 PS 637: Performed by: INTERNAL MEDICINE

## 2023-11-27 PROCEDURE — 250N000011 HC RX IP 250 OP 636: Performed by: STUDENT IN AN ORGANIZED HEALTH CARE EDUCATION/TRAINING PROGRAM

## 2023-11-27 PROCEDURE — 99232 SBSQ HOSP IP/OBS MODERATE 35: CPT | Performed by: INTERNAL MEDICINE

## 2023-11-27 RX ORDER — KIT FOR THE PREPARATION OF TECHNETIUM TC 99M MEBROFENIN 45 MG/10ML
6 INJECTION, POWDER, LYOPHILIZED, FOR SOLUTION INTRAVENOUS ONCE
Status: COMPLETED | OUTPATIENT
Start: 2023-11-27 | End: 2023-11-27

## 2023-11-27 RX ORDER — UREA 10 %
500 LOTION (ML) TOPICAL DAILY
Status: DISCONTINUED | OUTPATIENT
Start: 2023-11-27 | End: 2023-11-29 | Stop reason: HOSPADM

## 2023-11-27 RX ADMIN — HYDROMORPHONE HYDROCHLORIDE 0.2 MG: 0.2 INJECTION, SOLUTION INTRAMUSCULAR; INTRAVENOUS; SUBCUTANEOUS at 09:53

## 2023-11-27 RX ADMIN — SINCALIDE 1.8 MCG: 5 INJECTION, POWDER, LYOPHILIZED, FOR SOLUTION INTRAVENOUS at 15:42

## 2023-11-27 RX ADMIN — CETIRIZINE HYDROCHLORIDE 10 MG: 10 TABLET, FILM COATED ORAL at 20:25

## 2023-11-27 RX ADMIN — CYANOCOBALAMIN TAB 500 MCG 500 MCG: 500 TAB at 09:52

## 2023-11-27 RX ADMIN — MEBROFENIN 6 MILLICURIE: 45 INJECTION, POWDER, LYOPHILIZED, FOR SOLUTION INTRAVENOUS at 14:28

## 2023-11-27 RX ADMIN — ONDANSETRON 4 MG: 2 INJECTION INTRAMUSCULAR; INTRAVENOUS at 00:05

## 2023-11-27 RX ADMIN — PANTOPRAZOLE SODIUM 40 MG: 40 TABLET, DELAYED RELEASE ORAL at 06:35

## 2023-11-27 RX ADMIN — ACETAMINOPHINE, CAFFEINE 2 TABLET: 500; 65 TABLET, FILM COATED ORAL at 17:50

## 2023-11-27 RX ADMIN — OXYCODONE HYDROCHLORIDE 5 MG: 5 TABLET ORAL at 23:12

## 2023-11-27 ASSESSMENT — ACTIVITIES OF DAILY LIVING (ADL)
ADLS_ACUITY_SCORE: 22

## 2023-11-27 NOTE — PROGRESS NOTES
[facility]  [facility]  Gastroenterology Progress Note    Interval History:    Reports headache this morning. Abdominal pain has improved. No nausea or vomiting. Awaiting HIDA later this afternoon.    Physical Exam:    /69 (BP Location: Left arm)   Pulse 73   Temp 98.1  F (36.7  C) (Oral)   Resp 14   Wt 91.2 kg (201 lb)   SpO2 99%   BMI 33.45 kg/m    Temp (24hrs), Av.1  F (36.7  C), Min:97.9  F (36.6  C), Max:98.1  F (36.7  C)    Patient Vitals for the past 72 hrs:   Weight   23 0043 91.2 kg (201 lb)     No intake or output data in the 24 hours ending 23 1023    Constitutional: No acute distress.  Cardiovascular: RRR.  Respiratory: Nonlabored.  Abdomen: Soft, nondistended, nontender.  Skin: No jaundice.    Additional Comments:  ROS, FH, SH: See initial GI consult for details.    Laboratory Data:  Recent Labs   Lab Test 23  0642 23  0621 23  0656 20  0850 18  1936 18  1439   WBC  --  7.4 6.1  --  15.2*   < > 9.7   HGB 8.7* 8.6* 8.0*   < > 11.4*   < > 11.0*   MCV  --  65* 65*  --  85   < > 80   PLT  --  367 317  --  179   < > 179   INR  --   --   --   --   --   --  1.05    < > = values in this interval not displayed.     Recent Labs   Lab Test 23  0621 23  0850 20  1455 20  1245 20  1826    139  --   --   --  137   POTASSIUM 3.8 3.6  --  3.5   < > 2.8*   CHLORIDE 105 104  --   --   --  105   CO2  23  --   --   --  22   BUN 5.0* 4.4*  --   --   --  2*   CR 0.50* 0.60 0.30*  --   --  0.26*   ANIONGAP 10 12  --   --   --  10   KEYLA 9.1 9.0  --   --   --  8.6    < > = values in this interval not displayed.     Recent Labs   Lab Test 23  0621 23  2137 239 20  1200 20  0850 20  1425 20  1502 20  1826 18  1425 17  1522   ALBUMIN 4.4  --  4.3  --   --   --   --  2.6*   < > 3.2*   BILITOTAL 0.2  --  <0.2   --   --   --   --  0.1*   < > 0.3   ALT 14  --  14  --  12  --   --  17   < > 17   AST 16  --  17  --  13  --   --  17   < > 11   ALKPHOS 64  --  66  --   --   --   --  57   < > 62   PROTEIN  --  Negative  --  Negative  --  Negative   < >  --    < >  --    LIPASE  --   --  22  --   --   --   --   --   --  108    < > = values in this interval not displayed.       Imaging / Endoscopy:    RUQ US 11/25/23   - Moderate fatty infiltration of the liver.   - The right upper quadrant is otherwise normal.    EGD 11/26/23 (Link)   - Normal esophagus.    - Z-line, 40 cm from the incisors.    - Normal stomach. Biopsied.    - Normal examined duodenum. Biopsied.     Assessment & Plan:  Brit Romano 43-year-old female with PMH including fibromyalgia, chronic anemia, previous H.pylori who was admitted 11/25/23 with RUQ pain.     1) RUQ pain: Patient has chronic epigastric pain but reports worsening RUQ pain leading to admission. She is on PPI for GERD. She reports alternating constipation and diarrhea, but no recent bowel changes. RUQ US was unremarkable (no gallstones or biliary dilation). EGD 11/25 was normal (gastric biopsies pending). HIDA scan has been ordered. Pain is better today.    PLAN:   - HIDA scan today.   - Diet as tolerated following HIDA.   - Follow up biopsy results (r/o recurrent H.pylori).   - Pantoprazole 40 mg daily.   - Consider adding daily fiber supplement to help with bowel irregularity.    Discussed with Dr. Esparza.    Total time: 15 minutes of total time was spent providing patient care, including patient evaluation, reviewing documentation/test results, and .    Princess White PA-C  [facility] ([facility])

## 2023-11-27 NOTE — PLAN OF CARE
Goal Outcome Evaluation:      Plan of Care Reviewed With: patient    Overall Patient Progress: improvingOverall Patient Progress: improving         Temp: 97.9  F (36.6  C) Temp src: Oral BP: 136/85 Pulse: 78   Resp: 16 SpO2: 99 % O2 Device: None (Room air)       A&Ox4. Was NPO from 0000 for HIDA scan, completed from [id number]. BG taken for NPO status - WNL. VS stable upon arrival back to floor. Diet orders placed from hospitalist for Regular diet. Patient ate meal and tolerated well. Reports ABD pain upon return from scan and headache, MD ordered Excedrin. PRN Excedrin given at 1750.

## 2023-11-27 NOTE — PROGRESS NOTES
[facility]  Hospitalist Progress Note  Will Sigala MD 11/27/2023    Reason for Stay (Diagnosis): RUQ abd pain         Assessment and Plan:      Summary of Stay: Brit Romano is a 43 year old female with a past medical history significant for fibromyalgia, GDM, urinary incontinence, chronic anemia, gastritis, previous history of H. Pylori who presented to the ED with right upper quadrant abdominal pain and admitted to the inpatient for further evaluation of her symptoms.     The patient had a right upper quadrant ultrasound showing no gallstones.  GI was consulted.  EGD performed that was normal.  GI recommends HIDA scan to rule out gallbladder dyskinesia as a possible source of her symptoms    Plans today:  - HIDA scan ordered.  NPO after midnight for scan tomorrow  -GI consult note and EGD results reviewed.  Appreciate GI input  -Change IV PPI to oral PPI  - check ferritin level        DX:  1.  RUQ pain, RUQ US negative for pathology.  HIDA completed today.  Results pending.  2.  Iron deficiency anemia due to suspected chronic blood loss anemia. Hx gastritis and H pylori +status for which she was treated in?  2016?  EGD completed on 11/26 showed no source of bleeding or pain. Also significantly low Vitamin B12 level.     PLAN:   Venofer 200 mg daily for 5 days.  This will be started in the hospital and we will try to arrange for this to be completed as an outpatient.  Vitamin B12 SL tabs.   Check lead level.         Diet:  snacks now, NPO midnight   DVT Prophylaxis: Low Risk/Ambulatory with no VTE prophylaxis indicated  Mendoza Catheter: Not present  Lines: None     Cardiac Monitoring: None  Code Status:  Full code  DISPO:  after work up completed (HIDA scan) and tolerating diet           Interval History (Subjective):      Chart reviewed, pt interviewed.   Stable night per nursing notes.     Pt complains of abdominal discomfort after the HIDA.  Also now with headache after not having eaten all  day .                  Physical Exam:      Last Vital Signs:  BP (!) 163/94 (BP Location: Left arm)   Pulse 82   Temp 98.1  F (36.7  C) (Oral)   Resp 16   Wt 91.2 kg (201 lb)   SpO2 100%   BMI 33.45 kg/m      No intake or output data in the 24 hours ending 11/26/23 1218    Constitutional: Awake, alert, cooperative, no apparent distress     Respiratory: Clear to auscultation bilaterally, no crackles or wheezing   Cardiovascular: Regular rate and rhythm, normal S1 and S2, and no murmur noted   Abdomen: Normal bowel sounds, soft, non-distended, minimal tenderness to palpation of the right upper quadrant   Skin: No rashes, no cyanosis, dry to touch   Neuro: Alert and oriented x3, no weakness, numbness, memory loss   Extremities: No edema, normal range of motion   Other(s):        All other systems: Negative          Medications:      All current medications were reviewed with changes reflected in problem list.         Data:      All new lab and imaging data was reviewed.   Results for orders placed or performed during the hospital encounter of 11/25/23 (from the past 24 hour(s))   Glucose by meter   Result Value Ref Range    GLUCOSE BY METER POCT 173 (H) 70 - 99 mg/dL   Hemoglobin   Result Value Ref Range    Hemoglobin 8.7 (L) 11.7 - 15.7 g/dL   Glucose by meter   Result Value Ref Range    GLUCOSE BY METER POCT 104 (H) 70 - 99 mg/dL   Glucose by meter   Result Value Ref Range    GLUCOSE BY METER POCT 108 (H) 70 - 99 mg/dL   NM Hepatobiliary Scan with GB EF and/or Pharm    Narrative    NUCLEAR MEDICINE HEPATOBILIARY SCAN WITH GALLBLADDER EJECTION FRACTION  11/27/2023 4:11 PM     HISTORY: RUQ pain.  eval for GB dyskinesia. Abdominal pain.    COMPARISON: Ultrasound 11/25/2023.    TECHNIQUE: The patient received Tc-99m mebrofenin intravenously.  Cholecystokinin injected to evaluate the gallbladder ejection  fraction.    MEDICATIONS AND/OR DOSE: 6.0mci TC Mebrofenin and 1.8mcg CCK IV    FINDINGS: There is prompt  clearance of the radionuclide from the blood  pool into the liver. Promptly there is clear visualization of the  intrahepatic ducts as well as the upper common bile duct. Within the  normal time frame there is visualization of the gallbladder, distal  common bile duct, and the small bowel.     The gallbladder ejection fraction is  82% , which is within normal  limits.      Impression    IMPRESSION: No evidence of cholecystitis or biliary dyskinesia.    ELENA TEJADA MD         SYSTEM ID:  [id number]   Glucose by meter   Result Value Ref Range    GLUCOSE BY METER POCT 115 (H) 70 - 99 mg/dL

## 2023-11-27 NOTE — TELEPHONE ENCOUNTER
----- Message from Cheyenne Loving MD sent at 11/25/2023  8:53 AM CST -----  Hi Team,    Please call patient to inform her of the following:   - labs show iron deficiency anemia  - you have prediabetes, which means you are at risk for developing diabetes, and we should talk more about nutrition at your next visit  - cholesterol is mildly elevated  - the remainder of labs were normal.     We will plan to talk discuss the results more at her next visit.    Thank you!  Cheyenne Loving MD

## 2023-11-27 NOTE — PLAN OF CARE
"Goal Outcome Evaluation:      Plan of Care Reviewed With: patient    Overall Patient Progress: improvingOverall Patient Progress: improving    Outcome Evaluation: .    VSS on RA. A/O. Independent. NPO since 0000. PRN oxy given for headache, PRN Zofran given for nausea. Awaiting discharge plan.     Problem: Adult Inpatient Plan of Care  Goal: Plan of Care Review  Description: The Plan of Care Review/Shift note should be completed every shift.  The Outcome Evaluation is a brief statement about your assessment that the patient is improving, declining, or no change.  This information will be displayed automatically on your shift  note.  Outcome: Progressing  Flowsheets (Taken 11/27/2023 0052)  Outcome Evaluation: .  Plan of Care Reviewed With: patient  Overall Patient Progress: improving  Goal: Patient-Specific Goal (Individualized)  Description: You can add care plan individualizations to a care plan. Examples of Individualization might be:  \"Parent requests to be called daily at 9am for status\", \"I have a hard time hearing out of my right ear\", or \"Do not touch me to wake me up as it startles  me\".  Outcome: Progressing  Goal: Absence of Hospital-Acquired Illness or Injury  Outcome: Progressing  Intervention: Identify and Manage Fall Risk  Recent Flowsheet Documentation  Taken 11/26/2023 2100 by Juan Alberto Zayas, RN  Safety Promotion/Fall Prevention:   clutter free environment maintained   increased rounding and observation   increase visualization of patient   lighting adjusted   mobility aid in reach   nonskid shoes/slippers when out of bed   patient and family education   room near nurse's station   room organization consistent   safety round/check completed   treat reversible contributory factors   treat underlying cause  Intervention: Prevent Skin Injury  Recent Flowsheet Documentation  Taken 11/26/2023 2100 by Juan Alberto Zayas, RN  Body Position: position changed independently  Intervention: Prevent Infection  Recent " Flowsheet Documentation  Taken 11/26/2023 2100 by Juan Alberto Zayas RN  Infection Prevention:   single patient room provided   rest/sleep promoted   personal protective equipment utilized   hand hygiene promoted  Goal: Optimal Comfort and Wellbeing  Outcome: Progressing  Goal: Readiness for Transition of Care  Outcome: Progressing     Problem: Pain Acute  Goal: Optimal Pain Control and Function  Outcome: Progressing  Intervention: Prevent or Manage Pain  Recent Flowsheet Documentation  Taken 11/26/2023 2100 by Juan Alberto Zayas RN  Medication Review/Management: medications reviewed  Intervention: Optimize Psychosocial Wellbeing  Recent Flowsheet Documentation  Taken 11/26/2023 2100 by Juan Alberto Zayas RN  Supportive Measures: active listening utilized     Problem: Gastrointestinal Bleeding  Goal: Optimal Coping with Acute Illness  Outcome: Progressing  Intervention: Optimize Psychosocial Response  Recent Flowsheet Documentation  Taken 11/26/2023 2100 by Juan Alberto Zayas RN  Supportive Measures: active listening utilized  Goal: Hemostasis  Outcome: Progressing  Intervention: Manage Gastrointestinal Bleeding  Recent Flowsheet Documentation  Taken 11/26/2023 2100 by Juan Alberto Zayas RN  Environmental Support: calm environment promoted

## 2023-11-28 ENCOUNTER — APPOINTMENT (OUTPATIENT)
Dept: CT IMAGING | Facility: CLINIC | Age: 43
End: 2023-11-28
Attending: INTERNAL MEDICINE
Payer: COMMERCIAL

## 2023-11-28 LAB
ANION GAP SERPL CALCULATED.3IONS-SCNC: 10 MMOL/L (ref 7–15)
BUN SERPL-MCNC: 7.2 MG/DL (ref 6–20)
CALCIUM SERPL-MCNC: 9 MG/DL (ref 8.6–10)
CHLORIDE SERPL-SCNC: 104 MMOL/L (ref 98–107)
CREAT SERPL-MCNC: 0.54 MG/DL (ref 0.51–0.95)
CRP SERPL-MCNC: 3.5 MG/L
D DIMER PPP FEU-MCNC: 0.34 UG/ML FEU (ref 0–0.5)
DEPRECATED HCO3 PLAS-SCNC: 27 MMOL/L (ref 22–29)
EGFRCR SERPLBLD CKD-EPI 2021: >90 ML/MIN/1.73M2
ERYTHROCYTE [DISTWIDTH] IN BLOOD BY AUTOMATED COUNT: 22.1 % (ref 10–15)
GLUCOSE SERPL-MCNC: 112 MG/DL (ref 70–99)
HCT VFR BLD AUTO: 30.5 % (ref 35–47)
HGB BLD-MCNC: 8.6 G/DL (ref 11.7–15.7)
MCH RBC QN AUTO: 18.6 PG (ref 26.5–33)
MCHC RBC AUTO-ENTMCNC: 28.2 G/DL (ref 31.5–36.5)
MCV RBC AUTO: 66 FL (ref 78–100)
PATH REPORT.COMMENTS IMP SPEC: NORMAL
PATH REPORT.COMMENTS IMP SPEC: NORMAL
PATH REPORT.FINAL DX SPEC: NORMAL
PATH REPORT.GROSS SPEC: NORMAL
PATH REPORT.MICROSCOPIC SPEC OTHER STN: NORMAL
PATH REPORT.RELEVANT HX SPEC: NORMAL
PHOTO IMAGE: NORMAL
PLATELET # BLD AUTO: 359 10E3/UL (ref 150–450)
POTASSIUM SERPL-SCNC: 3.8 MMOL/L (ref 3.4–5.3)
RBC # BLD AUTO: 4.63 10E6/UL (ref 3.8–5.2)
SODIUM SERPL-SCNC: 141 MMOL/L (ref 135–145)
WBC # BLD AUTO: 5.9 10E3/UL (ref 4–11)

## 2023-11-28 PROCEDURE — 36415 COLL VENOUS BLD VENIPUNCTURE: CPT | Performed by: INTERNAL MEDICINE

## 2023-11-28 PROCEDURE — 250N000013 HC RX MED GY IP 250 OP 250 PS 637: Performed by: INTERNAL MEDICINE

## 2023-11-28 PROCEDURE — 85027 COMPLETE CBC AUTOMATED: CPT | Performed by: INTERNAL MEDICINE

## 2023-11-28 PROCEDURE — 99233 SBSQ HOSP IP/OBS HIGH 50: CPT | Performed by: INTERNAL MEDICINE

## 2023-11-28 PROCEDURE — 86140 C-REACTIVE PROTEIN: CPT | Performed by: INTERNAL MEDICINE

## 2023-11-28 PROCEDURE — 74177 CT ABD & PELVIS W/CONTRAST: CPT

## 2023-11-28 PROCEDURE — 250N000011 HC RX IP 250 OP 636: Performed by: STUDENT IN AN ORGANIZED HEALTH CARE EDUCATION/TRAINING PROGRAM

## 2023-11-28 PROCEDURE — 85379 FIBRIN DEGRADATION QUANT: CPT | Performed by: INTERNAL MEDICINE

## 2023-11-28 PROCEDURE — 250N000009 HC RX 250: Performed by: STUDENT IN AN ORGANIZED HEALTH CARE EDUCATION/TRAINING PROGRAM

## 2023-11-28 PROCEDURE — 80048 BASIC METABOLIC PNL TOTAL CA: CPT | Performed by: INTERNAL MEDICINE

## 2023-11-28 PROCEDURE — 250N000011 HC RX IP 250 OP 636: Mod: JZ | Performed by: INTERNAL MEDICINE

## 2023-11-28 PROCEDURE — 120N000001 HC R&B MED SURG/OB

## 2023-11-28 PROCEDURE — 258N000003 HC RX IP 258 OP 636: Performed by: INTERNAL MEDICINE

## 2023-11-28 PROCEDURE — 250N000011 HC RX IP 250 OP 636: Performed by: INTERNAL MEDICINE

## 2023-11-28 RX ORDER — IOPAMIDOL 755 MG/ML
120 INJECTION, SOLUTION INTRAVASCULAR ONCE
Status: COMPLETED | OUTPATIENT
Start: 2023-11-28 | End: 2023-11-28

## 2023-11-28 RX ORDER — DIPHENHYDRAMINE HYDROCHLORIDE 50 MG/ML
50 INJECTION INTRAMUSCULAR; INTRAVENOUS
Status: DISCONTINUED | OUTPATIENT
Start: 2023-11-28 | End: 2023-11-29 | Stop reason: HOSPADM

## 2023-11-28 RX ORDER — METHYLPREDNISOLONE SODIUM SUCCINATE 125 MG/2ML
125 INJECTION, POWDER, LYOPHILIZED, FOR SOLUTION INTRAMUSCULAR; INTRAVENOUS
Status: DISCONTINUED | OUTPATIENT
Start: 2023-11-28 | End: 2023-11-29 | Stop reason: HOSPADM

## 2023-11-28 RX ADMIN — ONDANSETRON 4 MG: 2 INJECTION INTRAMUSCULAR; INTRAVENOUS at 20:45

## 2023-11-28 RX ADMIN — IRON SUCROSE 200 MG: 20 INJECTION, SOLUTION INTRAVENOUS at 08:58

## 2023-11-28 RX ADMIN — SODIUM CHLORIDE 65 ML: 9 INJECTION, SOLUTION INTRAVENOUS at 15:35

## 2023-11-28 RX ADMIN — CYANOCOBALAMIN TAB 500 MCG 500 MCG: 500 TAB at 08:53

## 2023-11-28 RX ADMIN — CETIRIZINE HYDROCHLORIDE 10 MG: 10 TABLET, FILM COATED ORAL at 20:45

## 2023-11-28 RX ADMIN — HYDROMORPHONE HYDROCHLORIDE 0.2 MG: 0.2 INJECTION, SOLUTION INTRAMUSCULAR; INTRAVENOUS; SUBCUTANEOUS at 01:17

## 2023-11-28 RX ADMIN — IOPAMIDOL 100 ML: 755 INJECTION, SOLUTION INTRAVENOUS at 15:23

## 2023-11-28 RX ADMIN — HYDROMORPHONE HYDROCHLORIDE 0.2 MG: 0.2 INJECTION, SOLUTION INTRAMUSCULAR; INTRAVENOUS; SUBCUTANEOUS at 14:13

## 2023-11-28 RX ADMIN — PANTOPRAZOLE SODIUM 40 MG: 40 TABLET, DELAYED RELEASE ORAL at 07:00

## 2023-11-28 ASSESSMENT — ACTIVITIES OF DAILY LIVING (ADL)
ADLS_ACUITY_SCORE: 22

## 2023-11-28 NOTE — PROGRESS NOTES
[facility]  Hospitalist Progress Note  Will Sigala MD 11/28/2023    Reason for Stay (Diagnosis): RUQ abd pain         Assessment and Plan:      Summary of Stay: Brit Romano is a 43 year old female with a past medical history significant for fibromyalgia, GDM, urinary incontinence, chronic anemia, gastritis, previous history of H. Pylori who presented to the ED with right upper quadrant abdominal pain and admitted to the inpatient for further evaluation of her symptoms.     The patient had a right upper quadrant ultrasound showing no gallstones.  GI was consulted.  EGD performed that was normal.  GI recommends HIDA scan to rule out gallbladder dyskinesia as a possible source of her symptoms    Today, Ms. Romano elaborated her symptoms and prior evaluation: She states that she has long had iron deficiency anemia and has had her iron replaced multiple times.  She showed me a picture of some very dark appearing stools.  And she also includes that she had iron replacement IV while she was in [place] visiting family.    At this time, the patient also includes that she has abdominal pain that seems to radiate up into her chest.  She describes discomfort that occurs after she has a bowel movement also.  She denies bright red blood per rectum and states that she has not had her menstrual period for several months.    Plans today:  - CT Chest/abdomen/pelvis with contrast.  I discussed with the patient and her daughter who is present at the bedside today, that if we do not find demonstrable pathology on the CT scan, while it may be reasonable to follow-up with a colonoscopy at some point, this can be done as an outpatient.  Otherwise, though, it appears highly likely that this is IBS and that in the absence of additional findings, she mostly will be offered symptomatic management rather than cure.  - I introduced the idea that her symptoms could be related to IBS, which ws previously indicated also when  "she had colonoscopy in 2017.        DX:  1.  RUQ pain, RUQ US negative for pathology.  HIDA completed today.  Results pending.  2.  Iron deficiency anemia due to suspected chronic blood loss anemia. Hx gastritis and H pylori +status for which she was treated in?  2016?  EGD completed on 11/26 showed no source of bleeding or pain. Also significantly low Vitamin B12 level.    3.  Suspect IBS.    PLAN:   Venofer 200 mg daily for 5 days.  This will be started in the hospital and we will try to arrange for this to be completed as an outpatient.  Vitamin B12 SL tabs.   Lead level pending. Check a D-dimer, lead level is pending at this time, check CRP  CT Chest, abd, pelvis.         Diet:  snacks now, NPO midnight   DVT Prophylaxis: Low Risk/Ambulatory with no VTE prophylaxis indicated  Mendoza Catheter: Not present  Lines: None     Cardiac Monitoring: None  Code Status:  Full code  DISPO:  after work up completed (HIDA scan) and tolerating diet           Interval History (Subjective):      Chart reviewed, pt interviewed.   Stable night per nursing notes.  I also reviewed the [facility] follow-up note today.  I will ask them to follow-up with colonoscopy evaluation as an outpatient.                  Physical Exam:      Last Vital Signs:  BP (!) 149/93 (BP Location: Left arm)   Pulse 77   Temp 97.7  F (36.5  C) (Oral)   Resp 18   Ht 1.651 m (5' 5\")   Wt 91.2 kg (201 lb)   SpO2 100%   BMI 33.45 kg/m      No intake or output data in the 24 hours ending 11/26/23 1218    Constitutional: Awake, alert, cooperative, no apparent distress     Respiratory: Clear to auscultation bilaterally, no crackles or wheezing   Cardiovascular: Regular rate and rhythm, normal S1 and S2, and no murmur noted   Abdomen: Normal bowel sounds, soft, non-distended, minimal tenderness to palpation of the right upper quadrant   Skin: No rashes, no cyanosis, dry to touch   Neuro: Alert and oriented x3, no weakness, numbness, memory " loss   Extremities: No edema, normal range of motion   Other(s):        All other systems: Negative          Medications:      All current medications were reviewed with changes reflected in problem list.         Data:      All new lab and imaging data was reviewed.   Results for orders placed or performed during the hospital encounter of 11/25/23 (from the past 24 hour(s))   CBC with platelets   Result Value Ref Range    WBC Count 5.9 4.0 - 11.0 10e3/uL    RBC Count 4.63 3.80 - 5.20 10e6/uL    Hemoglobin 8.6 (L) 11.7 - 15.7 g/dL    Hematocrit 30.5 (L) 35.0 - 47.0 %    MCV 66 (L) 78 - 100 fL    MCH 18.6 (L) 26.5 - 33.0 pg    MCHC 28.2 (L) 31.5 - 36.5 g/dL    RDW 22.1 (H) 10.0 - 15.0 %    Platelet Count 359 150 - 450 10e3/uL   Basic metabolic panel   Result Value Ref Range    Sodium 141 135 - 145 mmol/L    Potassium 3.8 3.4 - 5.3 mmol/L    Chloride 104 98 - 107 mmol/L    Carbon Dioxide (CO2) 27 22 - 29 mmol/L    Anion Gap 10 7 - 15 mmol/L    Urea Nitrogen 7.2 6.0 - 20.0 mg/dL    Creatinine 0.54 0.51 - 0.95 mg/dL    GFR Estimate >90 >60 mL/min/1.73m2    Calcium 9.0 8.6 - 10.0 mg/dL    Glucose 112 (H) 70 - 99 mg/dL   CRP inflammation   Result Value Ref Range    CRP Inflammation 3.50 <5.00 mg/L   D dimer quantitative   Result Value Ref Range    D-Dimer Quantitative 0.34 0.00 - 0.50 ug/mL FEU    Narrative    This D-dimer assay is intended for use in conjunction with a clinical pretest probability assessment model to exclude pulmonary embolism (PE) and deep venous thrombosis (DVT) in outpatients suspected of PE or DVT. The cut-off value is 0.50 ug/mL FEU.   CT Chest/Abdomen/Pelvis w Contrast    Narrative    CT CHEST/ABDOMEN/PELVIS W CONTRAST 11/28/2023 3:45 PM    CLINICAL HISTORY: persistent RUQ abdominal and right-sided chest pain.  Negative evaluation for GB disease and EGD was negative.  Recurrent  anemia    TECHNIQUE: CT scan of the chest, abdomen, and pelvis was performed  following injection of IV contrast.  Multiplanar reformats were  obtained. Dose reduction techniques were used.   CONTRAST: 100mL [id number]-[id number]    COMPARISON: 11/27/2023 HIDA scan, chest x-ray 11/25/2023 and right  upper quadrant ultrasound 11/25/2023. CT of the abdomen and pelvis on  9/15/2017    FINDINGS:   LUNGS AND PLEURA: The lungs are clear. No pleural effusion.    MEDIASTINUM/AXILLAE: No lymphadenopathy. No filling defects in the  central pulmonary arteries. No thoracic aortic aneurysm. No coronary  artery calcifications. No pericardial effusion.    HEPATOBILIARY: Hepatic steatosis. Tiny lesion in the peripheral right  lobe of the liver measuring 2 mm with fat density, likely a benign  lipoma. No suspicious lesions in the liver. No calcified gallstones or  biliary ductal dilatation.    PANCREAS: Normal.    SPLEEN: Normal.    ADRENAL GLANDS: Normal.    KIDNEYS/BLADDER: Normal.    BOWEL: No small bowel or colonic obstruction or inflammatory changes.  Normal appendix.    PELVIC ORGANS: A 1.6 cm right adnexal lesion containing small foci of  fat may be a dermoid in the right ovary (series 4, image 220).    ADDITIONAL FINDINGS: No lymphadenopathy in the abdomen and pelvis.  Major intra-abdominal vasculature is patent. No free fluid or fluid  collections. No free air.    MUSCULOSKELETAL: Unremarkable.      Impression    IMPRESSION:  1.  No acute findings in the chest, abdomen and pelvis.  2.  Hepatic steatosis.  3.  A small right adnexal lesion containing fat may be a dermoid in  the right ovary. Nonemergent pelvic ultrasound can be considered for  further evaluation.    CAROLYN ALMEIDA MD         SYSTEM ID:  [id number]

## 2023-11-28 NOTE — PLAN OF CARE
"Goal Outcome Evaluation:  A&Ox4. VSS.   Complains of abdominal pain PRN med given.   Regular diet. Pt tolerated drinking fluids and taking med overnight.   PIV saline lock. 1 large BM overnight. Pt refused bed alarm to be on. Family at bedside.  Vital signs:  Temp: 98.3  F (36.8  C) Temp src: Oral BP: 125/74 Pulse: 65   Resp: 18 SpO2: 99 % O2 Device: None (Room air) Oxygen Delivery: 2 LPM Height: 165.1 cm (5' 5\") Weight: 91.2 kg (201 lb)  Estimated body mass index is 33.45 kg/m  as calculated from the following:    Height as of this encounter: 1.651 m (5' 5\").    Weight as of this encounter: 91.2 kg (201 lb).        "

## 2023-11-28 NOTE — PROGRESS NOTES
"GASTROENTEROLOGY PROGRESS NOTE        SUBJECTIVE: Improvement in abdominal pain, eating regular diet.  Bowel movement yesterday.  No fever and no chills.     OBJECTIVE:    BP (!) 140/90 (BP Location: Left arm, Patient Position: Semi-Diop's, Cuff Size: Adult Regular)   Pulse 74   Temp 97.6  F (36.4  C) (Oral)   Resp 16   Ht 1.651 m (5' 5\")   Wt 91.2 kg (201 lb)   SpO2 100%   BMI 33.45 kg/m    Temp (24hrs), Av  F (36.7  C), Min:97.5  F (36.4  C), Max:98.4  F (36.9  C)    Patient Vitals for the past 72 hrs:   Weight   23 0043 91.2 kg (201 lb)       Intake/Output Summary (Last 24 hours) at 2023 1101  Last data filed at 2023 1753  Gross per 24 hour   Intake 480 ml   Output --   Net 480 ml        PHYSICAL EXAM     Constitutional: NAD    Abdomen: soft, NTTP         Additional Comments:  ROS, FH, SH: See initial GI consult for details.     I have reviewed the patient's new clinical lab results:     Recent Labs   Lab Test 23  0709 23  0642 23  0623  1936 18  1439   WBC 5.9  --  7.4 6.1   < > 9.7   HGB 8.6* 8.7* 8.6* 8.0*   < > 11.0*   MCV 66*  --  65* 65*   < > 80     --  367 317   < > 179   INR  --   --   --   --   --  1.05    < > = values in this interval not displayed.     Recent Labs   Lab Test 23  0709 23  0621 23   POTASSIUM 3.8 3.8 3.6   CHLORIDE 104 105 104   CO2    BUN 7.2 5.0* 4.4*   ANIONGAP 10 10 12     Recent Labs   Lab Test 23  0621 23  2137 23  1200 20  0850 20  1425 20  1502 20  1826 18  1425 17  1522   ALBUMIN 4.4  --  4.3  --   --   --   --  2.6*   < > 3.2*   BILITOTAL 0.2  --  <0.2  --   --   --   --  0.1*   < > 0.3   ALT 14  --  14  --  12  --   --  17   < > 17   AST 16  --  17  --  13  --   --  17   < > 11   PROTEIN  --  Negative  --  Negative  --  Negative   < >  --    < >  --    LIPASE  --   --  22  --   --   --   --   " --   --  108    < > = values in this interval not displayed.     ENDOSCOPY / IMAGING    RUQ US 11/25/23   - Moderate fatty infiltration of the liver.   - The right upper quadrant is otherwise normal.     EGD 11/26/23 (Link)   - Normal esophagus.    - Z-line, 40 cm from the incisors.    - Normal stomach. Biopsied.    - Normal examined duodenum. Biopsied.     ASSESSMENT/ PLAN    Brit Romano 43-year-old female with PMH including fibromyalgia, chronic anemia, previous H.pylori who was admitted 11/25/23 with RUQ pain.      1) RUQ pain: Patient has chronic epigastric pain but reports worsening RUQ pain leading to admission. She is on PPI for GERD. She reports alternating constipation and diarrhea. RUQ US was unremarkable (no gallstones or biliary dilation). EGD 11/25 was normal (gastric biopsies pending). HIDA scan is normal. Abdominal pain improving and able to eat. Suspect constipation may be contributing to pain at the hepatic flexure.      PLAN:   - Advance diet as tolerated   - Follow up biopsy results (r/o recurrent H.pylori).   - Pantoprazole 40 mg daily.   - Continue Miralax 1 capful daily, can increase as needed to 2 capfuls daily as needed.       Total time: 35 minutes of total time was spent providing patient care, including patient evaluation, reviewing documentation/test results, and .     Discussed with Dr. Isaias Bell PA-C  [facility] ( [facility])

## 2023-11-28 NOTE — PROGRESS NOTES
SPIRITUAL HEALTH SERVICES Progress Note  [facility]  3 Med/Surg    Referral Source: LOS    Summary: I met with Brit and introduced myself and oriented her to [facility]. Brit had been admitted since the weekend and shared that she expects to be going home possibly soon. She had a copy of the Qur'an besides her which she shared she has been reading. I offered a copy of prayers for Jehovah's witness patients for her use while and after she's discharged.    Plan: No follow up planned at this time. [facility] remains available for support as needed/requested.     Lexie Hoskins  Associate    Pager [phone]    * [facility] remains available 24/7 for emergent requests/referrals, either by having the switchboard page the on-call  or by entering an ASAP/STAT consult in Epic (this will also page the on-call ). Routine Epic consults receive an initial response within 24 hours.*

## 2023-11-28 NOTE — PROGRESS NOTES
CLINICAL NUTRITION SERVICES - ASSESSMENT NOTE     Nutrition Prescription      Malnutrition Status:    % Intake: No decreased intake noted  % Weight Loss: Unable to assess  Subcutaneous Fat Loss: None observed  Muscle Loss: Upper leg (quadricep):  mild, patellar region: mild to moderate  Fluid Accumulation/Edema: None noted  Malnutrition Diagnosis: Unable to determine due to insufficient information regarding weight      Future/Additional Recommendations:  Monitor intake, weight, need for supplement      REASON FOR ASSESSMENT  Brit Romano is a/an 43 year old female assessed by the dietitian for MST score of 3: positive  for weight loss of 24-33 lbs and negative  for poor oral intake related to decreased appetite    Pt admitted d/t right upper quadrant abdominal pain. PMH significant for fibromyalgia, GDM, urinary incontinence, chronic anemia, gastritis, previous history of H. Pylori      NUTRITION HISTORY  - Information obtained from patient and family. Patient follows a regular diet. She enjoys cooking and mostly eats food she prepares. She eats a variety of foods including red meat, grains, fruits, and vegetables. She reports she has started to struggle to tolerate red beans, pineapple, green beans, and a few other foods and gets both burning and pain when she eats them. She and her family member note she's lost weight recently though her intake is the same. Her PCP ran a number of labs and found that she was anemic, low in B12, and vitamin D. She has prescriptions ready at the pharmacy but hasn't picked them up d/t admission.   - Allergies: NKFA    CURRENT NUTRITION ORDERS  Diet: Regular  Intake/Tolerance: only 1 meal documented so far (patient has been NPO for EGD and HIDA scan), ate 100%, she was eating lunch at time of visit and reports good appetite.    LABS  Vitamin B12   Date Value Ref Range Status   11/24/2023 277 232 - 1,245 pg/mL Final   01/14/2020 127 (L) 193 - 986 pg/mL Final     MCV   Date Value Ref  "Range Status   11/28/2023 66 (L) 78 - 100 fL Final   04/07/2020 85 78 - 100 fl Final     MEDICATIONS  - vit B12  - Venofer  - pantoprazole    ANTHROPOMETRICS  Height: 165.1 cm (5' 5\")  Most Recent Weight: 91.2 kg (201 lb)    IBW: 56.8 kg  Body mass index is 33.45 kg/m .  BMI: Obesity Grade I BMI 30-34.9  Weight History:   Wt Readings from Last 20 Encounters:   11/26/23 91.2 kg (201 lb)   11/24/23 93 kg (205 lb)   12/15/20 98.9 kg (218 lb)   07/17/20 96.6 kg (213 lb)   04/06/20 100.7 kg (222 lb)   04/01/20 101.4 kg (223 lb 8 oz)   03/17/20 102.6 kg (226 lb 3.2 oz)   03/03/20 101.5 kg (223 lb 12.8 oz)   02/27/20 100.6 kg (221 lb 12.8 oz)   02/06/20 99.8 kg (220 lb)   02/04/20 99.7 kg (219 lb 12.8 oz)   01/23/20 96.6 kg (213 lb)   01/23/20 96.4 kg (212 lb 9.6 oz)   01/14/20 98.6 kg (217 lb 6.4 oz)   11/12/19 96 kg (211 lb 9.6 oz)   11/01/19 95.6 kg (210 lb 12.8 oz)   11/01/19 96.2 kg (212 lb)   10/29/19 96.3 kg (212 lb 3.2 oz)   10/02/19 93.9 kg (207 lb)   09/19/19 93.9 kg (207 lb)     Per Care Everywhere:  11/15/23 81.6 kg (180 lb)- ED visit, assume weight was not taken    Difficult to assess weight loss as patient doesn't know what she usually weighs but both she and her family note the weight loss is visible.    ASSESSED NUTRITION NEEDS PER APPROVED PRACTICE GUIDELINES  Dosing Weight: 91.2 kg  Estimated Energy Needs: 1825+ kcals/day (20+ kcals/kg)  Justification: Maintenance  Estimated Protein Needs:  grams protein/day (1-1.2 grams of pro/kg)  Justification: Repletion (no adjustment for BMI)  Estimated Fluid Needs: 2280+ mL/day (25+ mL/kg)   Justification: Maintenance    PHYSICAL FINDINGS  See malnutrition section below.    MALNUTRITION  % Intake: No decreased intake noted  % Weight Loss: Unable to assess  Subcutaneous Fat Loss: None observed  Muscle Loss: Upper leg (quadricep):  mild, patellar region: mild to moderate  Fluid Accumulation/Edema: None noted  Malnutrition Diagnosis: Unable to determine due to " insufficient information regarding weight    NUTRITION DIAGNOSIS  Predicted inadequate intake related to abdominal discomfort     INTERVENTIONS  Implementation  Nutrition Education: Introduced self and role, discussed PO PTA and since admission, discussed supplements available. Encouraged adequate intake of food and fluids.      Goals  Patient to consume % of nutritionally adequate meal trays TID, or the equivalent with supplements/snacks.     Monitoring/Evaluation  Progress toward goals will be monitored and evaluated per protocol.  Clarice Simpson RD, LD, [facility]  Pager - 3rd floor/ICU: [phone]  Pager - All other floors: [phone]  Pager - Weekend/holiday: [phone]  Office: [phone]

## 2023-11-29 VITALS
HEIGHT: 65 IN | BODY MASS INDEX: 33.49 KG/M2 | WEIGHT: 201 LBS | DIASTOLIC BLOOD PRESSURE: 84 MMHG | SYSTOLIC BLOOD PRESSURE: 132 MMHG | TEMPERATURE: 98.8 F | RESPIRATION RATE: 16 BRPM | OXYGEN SATURATION: 98 % | HEART RATE: 80 BPM

## 2023-11-29 PROBLEM — K58.2 IRRITABLE BOWEL SYNDROME WITH BOTH CONSTIPATION AND DIARRHEA: Status: ACTIVE | Noted: 2023-11-29

## 2023-11-29 PROBLEM — D50.0 IRON DEFICIENCY ANEMIA DUE TO CHRONIC BLOOD LOSS: Status: ACTIVE | Noted: 2023-11-29

## 2023-11-29 LAB — LEAD BLDV-MCNC: <2 UG/DL

## 2023-11-29 PROCEDURE — 250N000013 HC RX MED GY IP 250 OP 250 PS 637: Performed by: STUDENT IN AN ORGANIZED HEALTH CARE EDUCATION/TRAINING PROGRAM

## 2023-11-29 PROCEDURE — 258N000003 HC RX IP 258 OP 636: Performed by: INTERNAL MEDICINE

## 2023-11-29 PROCEDURE — 250N000013 HC RX MED GY IP 250 OP 250 PS 637: Performed by: INTERNAL MEDICINE

## 2023-11-29 PROCEDURE — 250N000011 HC RX IP 250 OP 636: Performed by: INTERNAL MEDICINE

## 2023-11-29 PROCEDURE — 99239 HOSP IP/OBS DSCHRG MGMT >30: CPT | Performed by: INTERNAL MEDICINE

## 2023-11-29 RX ORDER — DIPHENHYDRAMINE HYDROCHLORIDE 50 MG/ML
50 INJECTION INTRAMUSCULAR; INTRAVENOUS
Status: CANCELLED
Start: 2023-11-29

## 2023-11-29 RX ORDER — HEPARIN SODIUM,PORCINE 10 UNIT/ML
5-20 VIAL (ML) INTRAVENOUS DAILY PRN
Status: CANCELLED | OUTPATIENT
Start: 2023-11-29

## 2023-11-29 RX ORDER — MEPERIDINE HYDROCHLORIDE 25 MG/ML
25 INJECTION INTRAMUSCULAR; INTRAVENOUS; SUBCUTANEOUS EVERY 30 MIN PRN
Status: CANCELLED | OUTPATIENT
Start: 2023-11-29

## 2023-11-29 RX ORDER — HEPARIN SODIUM (PORCINE) LOCK FLUSH IV SOLN 100 UNIT/ML 100 UNIT/ML
5 SOLUTION INTRAVENOUS
Status: CANCELLED | OUTPATIENT
Start: 2023-11-29

## 2023-11-29 RX ORDER — ALBUTEROL SULFATE 90 UG/1
1-2 AEROSOL, METERED RESPIRATORY (INHALATION)
Status: CANCELLED
Start: 2023-11-29

## 2023-11-29 RX ORDER — EPINEPHRINE 1 MG/ML
0.3 INJECTION, SOLUTION, CONCENTRATE INTRAVENOUS EVERY 5 MIN PRN
Status: CANCELLED | OUTPATIENT
Start: 2023-11-29

## 2023-11-29 RX ORDER — ALBUTEROL SULFATE 0.83 MG/ML
2.5 SOLUTION RESPIRATORY (INHALATION)
Status: CANCELLED | OUTPATIENT
Start: 2023-11-29

## 2023-11-29 RX ORDER — METHYLPREDNISOLONE SODIUM SUCCINATE 125 MG/2ML
125 INJECTION, POWDER, LYOPHILIZED, FOR SOLUTION INTRAMUSCULAR; INTRAVENOUS
Status: CANCELLED
Start: 2023-11-29

## 2023-11-29 RX ADMIN — IRON SUCROSE 200 MG: 20 INJECTION, SOLUTION INTRAVENOUS at 09:59

## 2023-11-29 RX ADMIN — CYANOCOBALAMIN TAB 500 MCG 500 MCG: 500 TAB at 09:50

## 2023-11-29 RX ADMIN — SENNOSIDES AND DOCUSATE SODIUM 2 TABLET: 8.6; 5 TABLET ORAL at 09:58

## 2023-11-29 RX ADMIN — PANTOPRAZOLE SODIUM 40 MG: 40 TABLET, DELAYED RELEASE ORAL at 09:50

## 2023-11-29 ASSESSMENT — ACTIVITIES OF DAILY LIVING (ADL)
ADLS_ACUITY_SCORE: 22

## 2023-11-29 NOTE — PROGRESS NOTES
"GASTROENTEROLOGY PROGRESS NOTE        SUBJECTIVE: Continues to describe right-sided abdominal pain.  She is able to eat and drink without nausea or vomiting.  No bowel movement today but 1 yesterday.     OBJECTIVE:    /79 (BP Location: Right arm)   Pulse 83   Temp 98.9  F (37.2  C) (Oral)   Resp 18   Ht 1.651 m (5' 5\")   Wt 91.2 kg (201 lb)   SpO2 100%   BMI 33.45 kg/m    Temp (24hrs), Av  F (36.7  C), Min:97  F (36.1  C), Max:98.9  F (37.2  C)      Intake/Output Summary (Last 24 hours) at 2023 1136  Last data filed at 2023 1000  Gross per 24 hour   Intake 500 ml   Output --   Net 500 ml        PHYSICAL EXAM     Constitutional: No distress, appears younger than natural age    Abdomen: Soft, nontender, no rebound or guarding         Additional Comments:  ROS, FH, SH: See initial GI consult for details.     I have reviewed the patient's new clinical lab results:     Recent Labs   Lab Test 23  0709 23  0642 23  0623  1936 18  1439   WBC 5.9  --  7.4 6.1   < > 9.7   HGB 8.6* 8.7* 8.6* 8.0*   < > 11.0*   MCV 66*  --  65* 65*   < > 80     --  367 317   < > 179   INR  --   --   --   --   --  1.05    < > = values in this interval not displayed.     Recent Labs   Lab Test 23  0709 23  0623   POTASSIUM 3.8 3.8 3.6   CHLORIDE 104 105 104   CO2    BUN 7.2 5.0* 4.4*   ANIONGAP 10 10 12     Recent Labs   Lab Test 23  0621 23  2137 23  1200 20  0850 20  1425 20  1502 20  1826 18  1425 17  1522   ALBUMIN 4.4  --  4.3  --   --   --   --  2.6*   < > 3.2*   BILITOTAL 0.2  --  <0.2  --   --   --   --  0.1*   < > 0.3   ALT 14  --  14  --  12  --   --  17   < > 17   AST 16  --  17  --  13  --   --  17   < > 11   PROTEIN  --  Negative  --  Negative  --  Negative   < >  --    < >  --    LIPASE  --   --  22  --   --   --   --   --   --  108    < > = values " in this interval not displayed.       ASSESSMENT/ PLAN  Brit Romano 43-year-old female with PMH including fibromyalgia, chronic anemia, previous H.pylori who was admitted 11/25/23 with RUQ pain.      1. RUQ pain: Patient has chronic epigastric pain but reports worsening RUQ pain leading to admission. She is on PPI for GERD. She reports alternating constipation and diarrhea. RUQ US was unremarkable (no gallstones or biliary dilation). EGD 11/25 was normal (gastric biopsies pending). HIDA scan is normal. CT abd/pelvis unremarkable. Abdominal pain improving and able to eat. Suspect constipation may be contributing to pain at the hepatic flexure vs IBS-C.      -- tolerating diet  -- continue bowel regimen.   -- Gastric biopsies negative for H.pylori. Normal duodenal biopsies.   -- Pantoprazole 40 mg daily.  -- [facility] to contact patient to arrange colonoscopy given anemia.      2. Iron deficiency anemia: started on oral iron replacement as inpatient. Plan as above, HgB 8.6, MVC 66. No overt GI bleeding.       Total time: 35 minutes of total time was spent providing patient care, including patient evaluation, reviewing documentation/test results, and .     Discussed with Dr. Isaias Bell, PA-C  [facility] ( [facility])

## 2023-11-29 NOTE — DISCHARGE SUMMARY
[facility] Discharge Summary    Brit Romano MRN# [id number]   Age: 43 year old YOB: 1980     Date of Admission:  11/25/2023  Date of Discharge::  11/29/2023  Admitting Physician:  Emily Javier MD  Discharge Physician:  Will Sigala MD          Admission Diagnoses:   Upper GI bleed [K92.2]  Anemia due to blood loss, acute [D62]          Discharge Diagnosis:     Principal Problem:    Anemia due to blood loss, acute  Active Problems:    Vitamin B12 deficiency (non anemic)    Upper GI bleed    Iron deficiency anemia due to chronic blood loss    Irritable bowel syndrome with both constipation and diarrhea           Procedures:   EGD  CT Chest, abdomen and pelvis with contrast.  RUQ US  HIDA          Medications Prior to Admission:     Medications Prior to Admission   Medication Sig Dispense Refill Last Dose    acetaminophen (TYLENOL) 325 MG tablet Take 2 tablets (650 mg) by mouth every 6 hours as needed for mild pain Start after Delivery. 100 tablet 0 11/25/2023    calcium carbonate (TUMS) 500 MG chewable tablet Take 1 tablet (500 mg) by mouth 3 times daily as needed for heartburn 90 tablet 3 not started    cetirizine (ZYRTEC) 10 MG tablet Take 1 tablet (10 mg) by mouth daily 30 tablet 11 11/25/2023 at pm    ferrous sulfate (FEROSUL) 325 (65 Fe) MG tablet Take 1 tablet (325 mg) by mouth every other day 90 tablet 3 not started    fluticasone (FLONASE) 50 MCG/ACT nasal spray Spray 2 sprays into both nostrils daily 16 g 11 not started    omeprazole (PRILOSEC) 40 MG DR capsule TAKE 1 CAPSULE(40 MG) BY MOUTH DAILY 30 capsule 0 11/25/2023 at am    polyethylene glycol (MIRALAX) 17 GM/Dose powder Take 17 g by mouth daily as needed for constipation 510 g 3 not started             Discharge Medications:     Discharge Medication List as of 11/29/2023  2:42 PM        START taking these medications    Details   cyanocobalamin (CYANOCOBALAMIN) 500 MCG tablet Take 1 tablet (500 mcg) by mouth daily,  Disp-100 tablet, R-3, E-Prescribe           CONTINUE these medications which have NOT CHANGED    Details   acetaminophen (TYLENOL) 325 MG tablet Take 2 tablets (650 mg) by mouth every 6 hours as needed for mild pain Start after Delivery., Disp-100 tablet,R-0, E-Prescribe      calcium carbonate (TUMS) 500 MG chewable tablet Take 1 tablet (500 mg) by mouth 3 times daily as needed for heartburn, Disp-90 tablet, R-3, E-Prescribe      cetirizine (ZYRTEC) 10 MG tablet Take 1 tablet (10 mg) by mouth daily, Disp-30 tablet, R-11, E-Prescribe      fluticasone (FLONASE) 50 MCG/ACT nasal spray Spray 2 sprays into both nostrils daily, Disp-16 g, R-11, E-Prescribe      omeprazole (PRILOSEC) 40 MG DR capsule TAKE 1 CAPSULE(40 MG) BY MOUTH DAILY, Disp-30 capsule, R-0, E-Prescribe      polyethylene glycol (MIRALAX) 17 GM/Dose powder Take 17 g by mouth daily as needed for constipation, Disp-510 g, R-3, E-Prescribe           STOP taking these medications       ferrous sulfate (FEROSUL) 325 (65 Fe) MG tablet Comments:   Reason for Stopping:                     Consultations:   Consultation during this admission received from gastroenterology          Hospital Course:   Brit Romano is a 43 year old female with a past medical history significant for fibromyalgia, GDM, urinary incontinence, chronic anemia, gastritis, previous history of H. Pylori who presented to the ED with right upper quadrant abdominal pain.  She was admitted to inpatient for further evaluation of her abdominal pain and anemia.      Right upper quadrant ultrasound showed no gallstones and no evidence of cholecystitis.  GI was consulted.  EGD was normal.  GI recommended HIDA scan to rule out gallbladder dyskinesia as a possible source of her symptoms.  That exam was also negative.     When I undertook Ms. Romano's care, I further clarified her symptoms and prior evaluation: She stated that she has long had iron deficiency anemia and has had her iron replaced multiple  "times.  She showed me a picture of some very dark appearing stools, but did not describe true melena.  She also included that she had iron replacement IV while she was in [place] visiting family.         The patient included that she her abdominal pain had recently progressed with some radiation up into the right side of her chest.  She described discomfort that occurs after she has a bowel movement also but denied bright red blood per rectum and stated that she has not had her menstrual period for several months.    I did start her on Venofer infusions, of which she had two doses. She will complete the iron infusions as an outpatient. Her labs also indicated low vitamin b12 level (though wtill within the normal range).  I recommended replacement on discharge.      A CT scan of chest/abdomen/pelvis with contrast did not show any pathology to explain her symptoms. We discussed that this was very reassuring.  [facility] also indicated that they would follow up with her for colonoscopy in the next several months.     /78   Pulse 84   Temp 98.8  F (37.1  C) (Oral)   Resp 16   Ht 1.651 m (5' 5\")   Wt 91.2 kg (201 lb)   SpO2 100%   BMI 33.45 kg/m    Ms. Romano is alert and coherent in NAD.   HEENT: no facial muscular asymmetry. EOMI  Chest: CTA without increased WOB  COR: RRR without murmur  Abd: soft, NTND without mass  Extrem: no lesions, no edema          Discharge Instructions and Follow-Up:     Discharge diet: Regular   Discharge activity: Activity as tolerated   Discharge follow-up: Pt needs 3 more Venofer doses (200 mg IV) to finish full load of Iron replacement.   Establish a primary doctor here in [place].  Follow up with Gastroenterology as needed. I believe you need a colonoscopy.           Discharge Disposition:     Discharged to home      Attestation:  I have reviewed today's vital signs, notes, medications, labs and imaging.    Will Sigala MD     "

## 2023-11-29 NOTE — PLAN OF CARE
DISCHARGE                   11/29/2023 1454  ----------------------------------------------------------------------------  Discharged to: Home  Via: private transportation  Accompanied by: Family  Discharge Instructions: diet, activity, medications, follow up appointments, when to call the MD, aftercare instructions.  Prescriptions: To be filled by  [facility] pharmacy; medication list reviewed & sent with pt  Follow Up Appointments: arranged; information given  Belongings: All sent with pt  IV: d/c'd  Telemetry: d/c'd  Pt exhibits understanding of above discharge instructions; all questions answered.    Discharge Paperwork: Signed, copied, and sent home with patient.

## 2023-11-29 NOTE — PLAN OF CARE
"Goal Outcome Evaluation:  A&Ox4. VSS.  No complains of pain overnight.   Pt says that she was feeling little nauseous in the evening got PRN med felt better afterward. No BM overnight. Family at bedside.   Vital signs:  Temp: 97  F (36.1  C) Temp src: Axillary BP: 118/75 Pulse: 86   Resp: 18 SpO2: 100 % O2 Device: None (Room air) Oxygen Delivery: 2 LPM Height: 165.1 cm (5' 5\") Weight: 91.2 kg (201 lb)  Estimated body mass index is 33.45 kg/m  as calculated from the following:    Height as of this encounter: 1.651 m (5' 5\").    Weight as of this encounter: 91.2 kg (201 lb).        "

## 2023-11-29 NOTE — CONSULTS
Care Management Initial Consult    General Information  Assessment completed with: Patient,    Type of CM/SW Visit: Initial Assessment    Primary Care Provider verified and updated as needed: Yes   Readmission within the last 30 days: no previous admission in last 30 days      Reason for Consult: discharge planning  Advance Care Planning:            Communication Assessment  Patient's communication style: spoken language (English or Bilingual)    Hearing Difficulty or Deaf: no   Wear Glasses or Blind: no    Cognitive  Cognitive/Neuro/Behavioral: WDL  Level of Consciousness: alert  Arousal Level: opens eyes spontaneously  Orientation: oriented x 4  Mood/Behavior: calm, cooperative  Best Language: 0 - No aphasia  Speech: clear, spontaneous, logical    Living Environment:   People in home: child(oziel), dependent, spouse     Current living Arrangements: house      Able to return to prior arrangements: yes       Family/Social Support:  Care provided by: self  Provides care for: no one  Marital Status:   , Children          Description of Support System: Supportive, Involved    Support Assessment: Adequate family and caregiver support, Adequate social supports    Current Resources:   Patient receiving home care services: No     Community Resources:    Equipment currently used at home: none    Lifestyle & Psychosocial Needs:  Social Determinants of Health     Food Insecurity: Low Risk  (11/24/2023)    Food Insecurity     Within the past 12 months, did you worry that your food would run out before you got money to buy more?: No     Within the past 12 months, did the food you bought just not last and you didn t have money to get more?: No   Depression: Not at risk (11/24/2023)    PHQ-2     PHQ-2 Score: 0   Housing Stability: Low Risk  (11/24/2023)    Housing Stability     Do you have housing? : Yes     Are you worried about losing your housing?: No   Tobacco Use: Low Risk  (11/27/2023)    Patient History      Smoking Tobacco Use: Never     Smokeless Tobacco Use: Never     Passive Exposure: Not on file   Financial Resource Strain: Low Risk  (11/24/2023)    Financial Resource Strain     Within the past 12 months, have you or your family members you live with been unable to get utilities (heat, electricity) when it was really needed?: No   Alcohol Use: Not on file   Transportation Needs: Low Risk  (11/24/2023)    Transportation Needs     Within the past 12 months, has lack of transportation kept you from medical appointments, getting your medicines, non-medical meetings or appointments, work, or from getting things that you need?: No   Physical Activity: Not on file   Interpersonal Safety: Low Risk  (11/24/2023)    Interpersonal Safety     Do you feel physically and emotionally safe where you currently live?: Yes     Within the past 12 months, have you been hit, slapped, kicked or otherwise physically hurt by someone?: No     Within the past 12 months, have you been humiliated or emotionally abused in other ways by your partner or ex-partner?: No   Stress: Not on file   Social Connections: Not on file   Care Management Discharge Note    Discharge Date: 11/29/2023       Discharge Disposition: Home    Discharge Services:  outpatient infusion    Discharge DME: None    Discharge Transportation: family or friend will provide    Handoff Referral Completed: Yes       Additional Information:  Care management consult for discharge planning, schedule infusion appts and primary appt here in [place].  Patient does have an appt on 12/13 with Dr Conway in [place]. Scheduled her 3 Venofer infusion appointments in [place] on 12/1, 12/12 and 12/14.   Updated patient. Appointments will be on discharge paperwork.   Patient discharging home today.    Purvi Tyson RN  Care Coordinator  [facility]

## 2023-11-29 NOTE — PLAN OF CARE
Goal Outcome Evaluation:      Plan of Care Reviewed With: patient    Overall Patient Progress: improvingOverall Patient Progress: improving         Temp: 97.7  F (36.5  C) Temp src: Oral BP: (!) 149/93 Pulse: 77   Resp: 18 SpO2: 100 % O2 Device: None (Room air)       A&O x4. Patient went down for CT scan earlier. Eating regular diet and tolerated well. Denied pain most of shift  and then asked for pain medication at 1400, refused non-pharmalogical interventions PO Oxy when offered and would only take IV Dilaudid. Iron infusion given this AM.

## 2023-11-30 ENCOUNTER — PATIENT OUTREACH (OUTPATIENT)
Dept: CARE COORDINATION | Facility: CLINIC | Age: 43
End: 2023-11-30
Payer: COMMERCIAL

## 2023-12-01 ENCOUNTER — INFUSION THERAPY VISIT (OUTPATIENT)
Dept: INFUSION THERAPY | Facility: CLINIC | Age: 43
End: 2023-12-01
Attending: INTERNAL MEDICINE
Payer: COMMERCIAL

## 2023-12-01 VITALS
HEART RATE: 96 BPM | SYSTOLIC BLOOD PRESSURE: 128 MMHG | DIASTOLIC BLOOD PRESSURE: 88 MMHG | TEMPERATURE: 97.5 F | OXYGEN SATURATION: 100 % | RESPIRATION RATE: 16 BRPM

## 2023-12-01 DIAGNOSIS — D50.0 IRON DEFICIENCY ANEMIA DUE TO CHRONIC BLOOD LOSS: Primary | ICD-10-CM

## 2023-12-01 PROCEDURE — 96374 THER/PROPH/DIAG INJ IV PUSH: CPT

## 2023-12-01 PROCEDURE — 250N000011 HC RX IP 250 OP 636: Performed by: INTERNAL MEDICINE

## 2023-12-01 PROCEDURE — 258N000003 HC RX IP 258 OP 636: Performed by: INTERNAL MEDICINE

## 2023-12-01 RX ORDER — METHYLPREDNISOLONE SODIUM SUCCINATE 125 MG/2ML
125 INJECTION, POWDER, LYOPHILIZED, FOR SOLUTION INTRAMUSCULAR; INTRAVENOUS
Status: CANCELLED
Start: 2023-12-03

## 2023-12-01 RX ORDER — MEPERIDINE HYDROCHLORIDE 25 MG/ML
25 INJECTION INTRAMUSCULAR; INTRAVENOUS; SUBCUTANEOUS EVERY 30 MIN PRN
Status: CANCELLED | OUTPATIENT
Start: 2023-12-03

## 2023-12-01 RX ORDER — ALBUTEROL SULFATE 0.83 MG/ML
2.5 SOLUTION RESPIRATORY (INHALATION)
Status: CANCELLED | OUTPATIENT
Start: 2023-12-03

## 2023-12-01 RX ORDER — ALBUTEROL SULFATE 90 UG/1
1-2 AEROSOL, METERED RESPIRATORY (INHALATION)
Status: CANCELLED
Start: 2023-12-03

## 2023-12-01 RX ORDER — DIPHENHYDRAMINE HYDROCHLORIDE 50 MG/ML
50 INJECTION INTRAMUSCULAR; INTRAVENOUS
Status: CANCELLED
Start: 2023-12-03

## 2023-12-01 RX ORDER — HEPARIN SODIUM (PORCINE) LOCK FLUSH IV SOLN 100 UNIT/ML 100 UNIT/ML
5 SOLUTION INTRAVENOUS
Status: CANCELLED | OUTPATIENT
Start: 2023-12-03

## 2023-12-01 RX ORDER — EPINEPHRINE 1 MG/ML
0.3 INJECTION, SOLUTION INTRAMUSCULAR; SUBCUTANEOUS EVERY 5 MIN PRN
Status: CANCELLED | OUTPATIENT
Start: 2023-12-03

## 2023-12-01 RX ORDER — HEPARIN SODIUM,PORCINE 10 UNIT/ML
5-20 VIAL (ML) INTRAVENOUS DAILY PRN
Status: CANCELLED | OUTPATIENT
Start: 2023-12-03

## 2023-12-01 RX ADMIN — SODIUM CHLORIDE 250 ML: 9 INJECTION, SOLUTION INTRAVENOUS at 14:00

## 2023-12-01 RX ADMIN — IRON SUCROSE 200 MG: 20 INJECTION, SOLUTION INTRAVENOUS at 14:00

## 2023-12-01 ASSESSMENT — PAIN SCALES - GENERAL: PAINLEVEL: SEVERE PAIN (6)

## 2023-12-01 NOTE — PROGRESS NOTES
Infusion Nursing Note:  Brit Romano presents today for venofer dose #1 of 5.    Patient seen by provider today: No   present during visit today: Not Applicable.    Note: Patient states she has had IV iron in the past and tolerated it well.      Intravenous Access:  Peripheral IV placed.    Treatment Conditions:  Iron studies reviewed.      Post Infusion Assessment:  Patient tolerated infusion without incident.  Patient observed for 15 minutes post venofer per protocol.  Blood return noted pre and post infusion.  Site patent and intact, free from redness, edema or discomfort.  No evidence of extravasations.  Access discontinued per protocol.       Discharge Plan:   Discharge instructions reviewed with: Patient.  Patient and/or family verbalized understanding of discharge instructions and all questions answered.  AVS to patient via [employer].  Patient will return 12/12 for next appointment.   Patient discharged in stable condition accompanied by: self.  Departure Mode: Ambulatory.      Diann Quigley RN

## 2023-12-01 NOTE — PROGRESS NOTES
Connected Care Resource Center Contact  [facility]/Voicemail     Clinical Data: Post-Discharge Outreach     Outreach attempted x 2.  No answer.      Plan:  [facility] will do no further outreaches at this time.       Chapis White MA  [facility] Care [facility]    *Connected Care Resource Team does NOT follow patient ongoing. Referrals are identified based on internal discharge reports and the outreach is to ensure patient has an understanding of their discharge instructions.

## 2023-12-12 ENCOUNTER — INFUSION THERAPY VISIT (OUTPATIENT)
Dept: INFUSION THERAPY | Facility: CLINIC | Age: 43
End: 2023-12-12
Attending: INTERNAL MEDICINE
Payer: COMMERCIAL

## 2023-12-12 VITALS
HEART RATE: 69 BPM | SYSTOLIC BLOOD PRESSURE: 138 MMHG | RESPIRATION RATE: 18 BRPM | DIASTOLIC BLOOD PRESSURE: 81 MMHG | TEMPERATURE: 98.3 F | OXYGEN SATURATION: 100 %

## 2023-12-12 DIAGNOSIS — D50.0 IRON DEFICIENCY ANEMIA DUE TO CHRONIC BLOOD LOSS: Primary | ICD-10-CM

## 2023-12-12 PROCEDURE — 96374 THER/PROPH/DIAG INJ IV PUSH: CPT

## 2023-12-12 PROCEDURE — 250N000011 HC RX IP 250 OP 636: Performed by: INTERNAL MEDICINE

## 2023-12-12 PROCEDURE — 258N000003 HC RX IP 258 OP 636: Performed by: INTERNAL MEDICINE

## 2023-12-12 RX ORDER — HEPARIN SODIUM (PORCINE) LOCK FLUSH IV SOLN 100 UNIT/ML 100 UNIT/ML
5 SOLUTION INTRAVENOUS
Status: CANCELLED | OUTPATIENT
Start: 2023-12-13

## 2023-12-12 RX ORDER — DIPHENHYDRAMINE HYDROCHLORIDE 50 MG/ML
50 INJECTION INTRAMUSCULAR; INTRAVENOUS
Status: CANCELLED
Start: 2023-12-13

## 2023-12-12 RX ORDER — HEPARIN SODIUM,PORCINE 10 UNIT/ML
5-20 VIAL (ML) INTRAVENOUS DAILY PRN
Status: CANCELLED | OUTPATIENT
Start: 2023-12-13

## 2023-12-12 RX ORDER — ALBUTEROL SULFATE 0.83 MG/ML
2.5 SOLUTION RESPIRATORY (INHALATION)
Status: CANCELLED | OUTPATIENT
Start: 2023-12-13

## 2023-12-12 RX ORDER — EPINEPHRINE 1 MG/ML
0.3 INJECTION, SOLUTION INTRAMUSCULAR; SUBCUTANEOUS EVERY 5 MIN PRN
Status: CANCELLED | OUTPATIENT
Start: 2023-12-13

## 2023-12-12 RX ORDER — METHYLPREDNISOLONE SODIUM SUCCINATE 125 MG/2ML
125 INJECTION, POWDER, LYOPHILIZED, FOR SOLUTION INTRAMUSCULAR; INTRAVENOUS
Status: CANCELLED
Start: 2023-12-13

## 2023-12-12 RX ORDER — MEPERIDINE HYDROCHLORIDE 25 MG/ML
25 INJECTION INTRAMUSCULAR; INTRAVENOUS; SUBCUTANEOUS EVERY 30 MIN PRN
Status: CANCELLED | OUTPATIENT
Start: 2023-12-13

## 2023-12-12 RX ORDER — ALBUTEROL SULFATE 90 UG/1
1-2 AEROSOL, METERED RESPIRATORY (INHALATION)
Status: CANCELLED
Start: 2023-12-13

## 2023-12-12 RX ADMIN — SODIUM CHLORIDE 250 ML: 9 INJECTION, SOLUTION INTRAVENOUS at 09:42

## 2023-12-12 RX ADMIN — IRON SUCROSE 200 MG: 20 INJECTION, SOLUTION INTRAVENOUS at 09:42

## 2023-12-12 NOTE — PROGRESS NOTES
Infusion Nursing Note:  Brit Romano presents today for Venofer #2 of 5.    Patient seen by provider today: No   present during visit today: Not Applicable.    Note: Brit reports she is feeling well today.  She denies any side effects from her previous infusion.  She reports that after her last infusion on 12/1, her left AC was swollen, warm, and tender near her PIV site, starting the day after last infusion. She stated there were no issues on day of her infusion.  She reports this lasted for ~5 days, then resolved completely.  Left arm assessed and no abnormalities noted.  Brit states she did not notify a provider of this.  Advised her to notify provider if this happens in the future or if she develops any other new/concerning symptoms.  She verbalized understanding.    Intravenous Access:  Peripheral IV placed.    Treatment Conditions:   11/26/23 13:42 11/27/23 06:42   Ferritin 5 (L)    Hemoglobin  8.7 (L)     Post Infusion Assessment:  Patient tolerated infusion without incident.  Patient observed for 15 minutes post Venofer per protocol.  Blood return noted pre and post infusion.  Site patent and intact, free from redness, edema or discomfort.  No evidence of extravasations.  Access discontinued per protocol.     Discharge Plan:   Discharge instructions reviewed with: Patient.  Patient and/or family verbalized understanding of discharge instructions and all questions answered.  AVS to patient via [employer].  Patient will return 12/14 for next appointment.  (Instructed patient to schedule last 2 infusions.)  Patient discharged in stable condition accompanied by: self.  Departure Mode: Ambulatory.      Waqas Kim RN

## 2023-12-13 ENCOUNTER — OFFICE VISIT (OUTPATIENT)
Dept: INTERNAL MEDICINE | Facility: CLINIC | Age: 43
End: 2023-12-13
Payer: COMMERCIAL

## 2023-12-13 ENCOUNTER — TELEPHONE (OUTPATIENT)
Dept: GASTROENTEROLOGY | Facility: CLINIC | Age: 43
End: 2023-12-13

## 2023-12-13 VITALS
BODY MASS INDEX: 33.04 KG/M2 | TEMPERATURE: 97.8 F | RESPIRATION RATE: 18 BRPM | OXYGEN SATURATION: 99 % | SYSTOLIC BLOOD PRESSURE: 132 MMHG | DIASTOLIC BLOOD PRESSURE: 88 MMHG | WEIGHT: 198.3 LBS | HEIGHT: 65 IN | HEART RATE: 106 BPM

## 2023-12-13 DIAGNOSIS — E78.5 HYPERLIPIDEMIA LDL GOAL <130: ICD-10-CM

## 2023-12-13 DIAGNOSIS — K64.4 EXTERNAL HEMORRHOIDS: ICD-10-CM

## 2023-12-13 DIAGNOSIS — Z12.31 ENCOUNTER FOR SCREENING MAMMOGRAM FOR BREAST CANCER: ICD-10-CM

## 2023-12-13 DIAGNOSIS — K21.9 GASTROESOPHAGEAL REFLUX DISEASE WITHOUT ESOPHAGITIS: ICD-10-CM

## 2023-12-13 DIAGNOSIS — Z00.00 ROUTINE GENERAL MEDICAL EXAMINATION AT A HEALTH CARE FACILITY: Primary | ICD-10-CM

## 2023-12-13 DIAGNOSIS — J30.89 ALLERGIC RHINITIS DUE TO DUST MITE: ICD-10-CM

## 2023-12-13 DIAGNOSIS — H10.13 ALLERGIC CONJUNCTIVITIS OF BOTH EYES: ICD-10-CM

## 2023-12-13 DIAGNOSIS — D50.8 OTHER IRON DEFICIENCY ANEMIA: ICD-10-CM

## 2023-12-13 DIAGNOSIS — J30.1 SEASONAL ALLERGIC RHINITIS DUE TO POLLEN: ICD-10-CM

## 2023-12-13 PROCEDURE — 99396 PREV VISIT EST AGE 40-64: CPT | Performed by: INTERNAL MEDICINE

## 2023-12-13 PROCEDURE — 99214 OFFICE O/P EST MOD 30 MIN: CPT | Mod: 25 | Performed by: INTERNAL MEDICINE

## 2023-12-13 RX ORDER — HYDROCORTISONE 25 MG/G
CREAM TOPICAL 2 TIMES DAILY PRN
Qty: 30 G | Refills: 1 | Status: SHIPPED | OUTPATIENT
Start: 2023-12-13

## 2023-12-13 RX ORDER — CETIRIZINE HYDROCHLORIDE 10 MG/1
10 TABLET ORAL DAILY
Qty: 30 TABLET | Refills: 11 | Status: SHIPPED | OUTPATIENT
Start: 2023-12-13

## 2023-12-13 RX ORDER — FLUTICASONE PROPIONATE 50 MCG
2 SPRAY, SUSPENSION (ML) NASAL DAILY
Qty: 16 G | Refills: 11 | Status: SHIPPED | OUTPATIENT
Start: 2023-12-13

## 2023-12-13 RX ORDER — OMEPRAZOLE 40 MG/1
CAPSULE, DELAYED RELEASE ORAL
Qty: 90 CAPSULE | Refills: 3 | Status: SHIPPED | OUTPATIENT
Start: 2023-12-13

## 2023-12-13 ASSESSMENT — ENCOUNTER SYMPTOMS
DIARRHEA: 1
MYALGIAS: 1
PALPITATIONS: 1
JOINT SWELLING: 0
DYSURIA: 1
DIZZINESS: 1
CONSTIPATION: 1
HEMATOCHEZIA: 1
COUGH: 0
SHORTNESS OF BREATH: 1
CHILLS: 1
PARESTHESIAS: 0
EYE PAIN: 1
ARTHRALGIAS: 1
ABDOMINAL PAIN: 1
BREAST MASS: 0
NERVOUS/ANXIOUS: 0
FEVER: 1
NAUSEA: 1
HEMATURIA: 0
FREQUENCY: 1
HEADACHES: 0
SORE THROAT: 0
HEARTBURN: 1
WEAKNESS: 1

## 2023-12-13 ASSESSMENT — PAIN SCALES - GENERAL: PAINLEVEL: NO PAIN (0)

## 2023-12-13 NOTE — PROGRESS NOTES
SUBJECTIVE:   Brit is a 43 year old, presenting for the following:  Physical        2023    10:46 AM   Additional Questions   Roomed by Lioneli   Accompanied by Son       Healthy Habits:     Getting at least 3 servings of Calcium per day:  Yes    Bi-annual eye exam:  Yes    Dental care twice a year:  Yes    Sleep apnea or symptoms of sleep apnea:  Excessive snoring    Diet:  Low fat/cholesterol    Frequency of exercise:  None    Medication side effects:  None    Additional concerns today:  Yes      Today's PHQ-2 Score:       2023    10:46 AM   PHQ-2 (  [employer])   Q1: Little interest or pleasure in doing things 1   Q2: Feeling down, depressed or hopeless 0   PHQ-2 Score 1   Q1: Little interest or pleasure in doing things Several days   Q2: Feeling down, depressed or hopeless Not at all   PHQ-2 Score 1         Have you ever done Advance Care Planning? (For example, a Health Directive, POLST, or a discussion with a medical provider or your loved ones about your wishes): No, advance care planning information given to patient to review.  Patient declined advance care planning discussion at this time.    Social History     Tobacco Use    Smoking status: Never    Smokeless tobacco: Never   Substance Use Topics    Alcohol use: No     Alcohol/week: 0.0 standard drinks of alcohol             2023    10:45 AM   Alcohol Use   Prescreen: >3 drinks/day or >7 drinks/week? Not Applicable     Reviewed orders with patient.  Reviewed health maintenance and updated orders accordingly - Yes  Labs reviewed in EPIC    Breast Cancer Screenin/13/2023    10:46 AM   Breast CA Risk Assessment (FHS-7)   Do you have a family history of breast, colon, or ovarian cancer? No / Unknown         Mammogram Screening - Offered annual screening and updated Health Maintenance for mutual plan based on risk factor consideration  Pertinent mammograms are reviewed under the imaging tab.    History of abnormal Pap smear: NO -  "age 30-65 PAP every 5 years with negative HPV co-testing recommended      Latest Ref Rng & Units 4/9/2019     4:53 PM 4/9/2019     4:48 PM 10/24/2014    12:00 AM   PAP / HPV   PAP (Historical)  NIL   NIL    HPV 16 DNA NEG^Negative  Negative     HPV 18 DNA NEG^Negative  Negative     Other HR HPV NEG^Negative  Negative       Reviewed and updated as needed this visit by clinical staff   Tobacco  Allergies  Meds              Reviewed and updated as needed this visit by Provider                     Review of Systems   Constitutional:  Positive for chills and fever.   HENT:  Positive for congestion. Negative for ear pain, hearing loss and sore throat.    Eyes:  Positive for pain and visual disturbance.   Respiratory:  Positive for shortness of breath. Negative for cough.    Cardiovascular:  Positive for chest pain and palpitations. Negative for peripheral edema.   Gastrointestinal:  Positive for abdominal pain, constipation, diarrhea, heartburn, hematochezia and nausea.   Breasts:  Negative for tenderness, breast mass and discharge.   Genitourinary:  Positive for dysuria and frequency. Negative for genital sores, hematuria, pelvic pain, urgency, vaginal bleeding and vaginal discharge.   Musculoskeletal:  Positive for arthralgias and myalgias. Negative for joint swelling.   Skin:  Negative for rash.   Neurological:  Positive for dizziness and weakness. Negative for headaches and paresthesias.   Psychiatric/Behavioral:  Negative for mood changes. The patient is not nervous/anxious.           OBJECTIVE:   /88   Pulse 106   Temp 97.8  F (36.6  C) (Tympanic)   Resp 18   Ht 1.651 m (5' 5\")   Wt 89.9 kg (198 lb 4.8 oz)   LMP 09/28/2023 (Exact Date)   SpO2 99%   Breastfeeding No   BMI 33.00 kg/m    Physical Exam  GENERAL: healthy, alert and no distress  EYES: Eyes grossly normal to inspection, PERRL and conjunctivae and sclerae normal  HENT: ear canals and TM's normal, nose and mouth without ulcers or " lesions  RESP: lungs clear to auscultation - no rales, rhonchi or wheezes  BREAST: normal without masses, tenderness or nipple discharge and no palpable axillary masses or adenopathy  CV: regular rate and rhythm, normal S1 S2  ABDOMEN: soft, nontender, no hepatosplenomegaly, no masses  MS: no gross musculoskeletal defects noted, no edema  SKIN: no suspicious lesions or rashes  NEURO: Normal strength and tone, mentation intact and speech normal  PSYCH: mentation appears normal, affect normal/bright        ASSESSMENT/PLAN:   (Z00.00) Routine general medical examination at a health care facility  (primary encounter diagnosis)  Comment: Patient recently completed lab work which was reviewed.  Declined vaccinations in office today.  Up-to-date with Pap smear.  Repeat Pap smear in 2024.  Mammogram order placed today.      (K21.9) Gastroesophageal reflux disease without esophagitis  Comment: Overall, symptoms have been stable since patient started taking omeprazole at 40 mg daily.  Most recent EGD from 1 month ago reviewed.  Surgical pathology results were also reviewed.  Overall results were reassuring.  Plan: omeprazole (PRILOSEC) 40 MG DR capsule            (J30.89) Allergic rhinitis due to dust mite  Comment: Clinically stable.  Continue use of Flonase and cetirizine medication.  Plan: fluticasone (FLONASE) 50 MCG/ACT nasal spray,         cetirizine (ZYRTEC) 10 MG tablet            (J30.1) Seasonal allergic rhinitis due to pollen  Plan: fluticasone (FLONASE) 50 MCG/ACT nasal spray,         cetirizine (ZYRTEC) 10 MG tablet            (H10.13) Allergic conjunctivitis of both eyes  Plan: cetirizine (ZYRTEC) 10 MG tablet            (D50.8) Other iron deficiency anemia  Comment: Currently receiving iron infusions.      (E78.5) Hyperlipidemia LDL goal <130  Comment: Elevated LDL noted on recent blood work.  Discussed with the patient on working harder on dietary restrictions as well as exercising.      (Z12.31) Encounter for  screening mammogram for breast cancer  Plan: *MA Screening Digital Bilateral            (K64.4) External hemorrhoids  Comment: Rectal burning sensation with itching.  Plan: hydrocortisone, Perianal, (HYDROCORTISONE) 2.5         % cream            Patient has been advised of split billing requirements and indicates understanding: Yes    COUNSELING:       Regular exercise       Healthy diet/nutrition       Vision screening       Hearing screening        She reports that she has never smoked. She has never used smokeless tobacco.          Jeannette Conway MD  [facility]

## 2023-12-13 NOTE — TELEPHONE ENCOUNTER
"Endoscopy Scheduling Screen    Have you had a positive Covid test in the last 14 days?  No    Are you active on MyChart?   No    What insurance is in the chart?  Other:  [facility]    Ordering/Referring Provider: TERESA REYES   (If ordering provider performs procedure, schedule with ordering provider unless otherwise instructed. )    BMI: Estimated body mass index is 33 kg/m  as calculated from the following:    Height as of an earlier encounter on 12/13/23: 1.651 m (5' 5\").    Weight as of an earlier encounter on 12/13/23: 89.9 kg (198 lb 4.8 oz).     Sedation Ordered  moderate sedation.   If patient BMI > 50 do not schedule in [facility].    If patient BMI > 45 do not schedule at [facility].    Are you taking methadone or Suboxone?  No    Are you taking any prescription medications for pain 3 or more times per week?   NO - No RN review required.    Do you have a history of malignant hyperthermia or adverse reaction to anesthesia?  No    (Females) Are you currently pregnant?   No     Have you been diagnosed or told you have pulmonary hypertension?   No    Do you have an LVAD?  No    Have you been told you have moderate to severe sleep apnea?  No    Have you been told you have COPD, asthma, or any other lung disease?  No    Do you have any heart conditions?  No     Have you ever had an organ transplant?   No    Have you ever had or are you awaiting a heart or lung transplant?   No    Have you had a stroke or transient ischemic attack (TIA aka \"mini stroke\" in the last 6 months?   No    Have you been diagnosed with or been told you have cirrhosis of the liver?   No    Are you currently on dialysis?   No    Do you need assistance transferring?   No    BMI: Estimated body mass index is 33 kg/m  as calculated from the following:    Height as of an earlier encounter on 12/13/23: 1.651 m (5' 5\").    Weight as of an earlier encounter on 12/13/23: 89.9 kg (198 lb 4.8 oz).     Is patients BMI > 40 and scheduling location [facility]?  No    Do " you take an injectable medication for weight loss or diabetes (excluding insulin)?  No    Do you take the medication Naltrexone?  No    Do you take blood thinners?  No       Prep   Are you currently on dialysis or do you have chronic kidney disease?  No    Do you have a diagnosis of diabetes?  No    Do you have a diagnosis of cystic fibrosis (CF)?  No    On a regular basis do you go 3 -5 days between bowel movements?  No    BMI > 40?  No    Preferred Pharmacy:    [id number] - [place] [address] AT [facility] [address]  [facility] [address]  Phone: [phone] Fax: [phone]      Final Scheduling Details   Colonoscopy prep sent?  Standard MiraLAX    Procedure scheduled  Colonoscopy    Surgeon:  ROXI     Date of procedure:  3/20/24     Pre-OP / PAC:   No - Not required for this site.    Location  [facility] - Patient preference.    Sedation   Moderate Sedation - Per order.      Patient Reminders:   You will receive a call from a Nurse to review instructions and health history.  This assessment must be completed prior to your procedure.  Failure to complete the Nurse assessment may result in the procedure being cancelled.      On the day of your procedure, please designate an adult(s) who can drive you home stay with you for the next 24 hours. The medicines used in the exam will make you sleepy. You will not be able to drive.      You cannot take public transportation, ride share services, or non-medical taxi service without a responsible caregiver.  Medical transport services are allowed with the requirement that a responsible caregiver will receive you at your destination.  We require that drivers and caregivers are confirmed prior to your procedure.

## 2023-12-14 ENCOUNTER — INFUSION THERAPY VISIT (OUTPATIENT)
Dept: INFUSION THERAPY | Facility: CLINIC | Age: 43
End: 2023-12-14
Attending: INTERNAL MEDICINE
Payer: COMMERCIAL

## 2023-12-14 VITALS
TEMPERATURE: 97.7 F | DIASTOLIC BLOOD PRESSURE: 82 MMHG | OXYGEN SATURATION: 100 % | SYSTOLIC BLOOD PRESSURE: 121 MMHG | RESPIRATION RATE: 16 BRPM | HEART RATE: 87 BPM

## 2023-12-14 DIAGNOSIS — D50.0 IRON DEFICIENCY ANEMIA DUE TO CHRONIC BLOOD LOSS: Primary | ICD-10-CM

## 2023-12-14 PROCEDURE — 250N000011 HC RX IP 250 OP 636: Performed by: INTERNAL MEDICINE

## 2023-12-14 PROCEDURE — 258N000003 HC RX IP 258 OP 636: Performed by: INTERNAL MEDICINE

## 2023-12-14 PROCEDURE — 96374 THER/PROPH/DIAG INJ IV PUSH: CPT

## 2023-12-14 RX ORDER — ALBUTEROL SULFATE 0.83 MG/ML
2.5 SOLUTION RESPIRATORY (INHALATION)
Status: CANCELLED | OUTPATIENT
Start: 2023-12-16

## 2023-12-14 RX ORDER — HEPARIN SODIUM (PORCINE) LOCK FLUSH IV SOLN 100 UNIT/ML 100 UNIT/ML
5 SOLUTION INTRAVENOUS
Status: CANCELLED | OUTPATIENT
Start: 2023-12-16

## 2023-12-14 RX ORDER — HEPARIN SODIUM,PORCINE 10 UNIT/ML
5-20 VIAL (ML) INTRAVENOUS DAILY PRN
Status: CANCELLED | OUTPATIENT
Start: 2023-12-16

## 2023-12-14 RX ORDER — METHYLPREDNISOLONE SODIUM SUCCINATE 125 MG/2ML
125 INJECTION, POWDER, LYOPHILIZED, FOR SOLUTION INTRAMUSCULAR; INTRAVENOUS
Status: CANCELLED
Start: 2023-12-16

## 2023-12-14 RX ORDER — ALBUTEROL SULFATE 90 UG/1
1-2 AEROSOL, METERED RESPIRATORY (INHALATION)
Status: CANCELLED
Start: 2023-12-16

## 2023-12-14 RX ORDER — DIPHENHYDRAMINE HYDROCHLORIDE 50 MG/ML
50 INJECTION INTRAMUSCULAR; INTRAVENOUS
Status: CANCELLED
Start: 2023-12-16

## 2023-12-14 RX ORDER — MEPERIDINE HYDROCHLORIDE 25 MG/ML
25 INJECTION INTRAMUSCULAR; INTRAVENOUS; SUBCUTANEOUS EVERY 30 MIN PRN
Status: CANCELLED | OUTPATIENT
Start: 2023-12-16

## 2023-12-14 RX ORDER — EPINEPHRINE 1 MG/ML
0.3 INJECTION, SOLUTION INTRAMUSCULAR; SUBCUTANEOUS EVERY 5 MIN PRN
Status: CANCELLED | OUTPATIENT
Start: 2023-12-16

## 2023-12-14 RX ADMIN — IRON SUCROSE 200 MG: 20 INJECTION, SOLUTION INTRAVENOUS at 10:40

## 2023-12-14 RX ADMIN — SODIUM CHLORIDE 250 ML: 9 INJECTION, SOLUTION INTRAVENOUS at 10:38

## 2023-12-14 NOTE — PROGRESS NOTES
Infusion Nursing Note:  Brit Romano presents today for Venofer, 3 of 5.    Patient seen by provider today: No   present during visit today: Not Applicable.    Note: N/A.      Intravenous Access:  Peripheral IV placed.    Treatment Conditions:  Not Applicable.      Post Infusion Assessment:  Patient tolerated infusion without incident.  Patient observed for 15 minutes post Venofer per protocol.  Blood return noted pre and post infusion.  Site patent and intact, free from redness, edema or discomfort.  No evidence of extravasations.  Access discontinued per protocol.       Discharge Plan:   AVS to patient via MYCHART.  Patient will return 12/20/23 for next appointment.   Patient discharged in stable condition accompanied by: son.  Departure Mode: Ambulatory.      Jessica Washington RN

## 2023-12-20 ENCOUNTER — INFUSION THERAPY VISIT (OUTPATIENT)
Dept: INFUSION THERAPY | Facility: CLINIC | Age: 43
End: 2023-12-20
Attending: INTERNAL MEDICINE
Payer: COMMERCIAL

## 2023-12-20 VITALS
RESPIRATION RATE: 16 BRPM | TEMPERATURE: 98.1 F | OXYGEN SATURATION: 100 % | SYSTOLIC BLOOD PRESSURE: 129 MMHG | HEART RATE: 85 BPM | DIASTOLIC BLOOD PRESSURE: 86 MMHG

## 2023-12-20 DIAGNOSIS — D50.0 IRON DEFICIENCY ANEMIA DUE TO CHRONIC BLOOD LOSS: Primary | ICD-10-CM

## 2023-12-20 PROCEDURE — 250N000011 HC RX IP 250 OP 636: Performed by: INTERNAL MEDICINE

## 2023-12-20 PROCEDURE — 96374 THER/PROPH/DIAG INJ IV PUSH: CPT

## 2023-12-20 PROCEDURE — 258N000003 HC RX IP 258 OP 636: Performed by: INTERNAL MEDICINE

## 2023-12-20 RX ORDER — EPINEPHRINE 1 MG/ML
0.3 INJECTION, SOLUTION INTRAMUSCULAR; SUBCUTANEOUS EVERY 5 MIN PRN
Status: CANCELLED | OUTPATIENT
Start: 2023-12-22

## 2023-12-20 RX ORDER — METHYLPREDNISOLONE SODIUM SUCCINATE 125 MG/2ML
125 INJECTION, POWDER, LYOPHILIZED, FOR SOLUTION INTRAMUSCULAR; INTRAVENOUS
Status: CANCELLED
Start: 2023-12-22

## 2023-12-20 RX ORDER — DIPHENHYDRAMINE HYDROCHLORIDE 50 MG/ML
50 INJECTION INTRAMUSCULAR; INTRAVENOUS
Status: CANCELLED
Start: 2023-12-22

## 2023-12-20 RX ORDER — HEPARIN SODIUM,PORCINE 10 UNIT/ML
5-20 VIAL (ML) INTRAVENOUS DAILY PRN
Status: CANCELLED | OUTPATIENT
Start: 2023-12-22

## 2023-12-20 RX ORDER — MEPERIDINE HYDROCHLORIDE 25 MG/ML
25 INJECTION INTRAMUSCULAR; INTRAVENOUS; SUBCUTANEOUS EVERY 30 MIN PRN
Status: CANCELLED | OUTPATIENT
Start: 2023-12-22

## 2023-12-20 RX ORDER — ALBUTEROL SULFATE 90 UG/1
1-2 AEROSOL, METERED RESPIRATORY (INHALATION)
Status: CANCELLED
Start: 2023-12-22

## 2023-12-20 RX ORDER — ALBUTEROL SULFATE 0.83 MG/ML
2.5 SOLUTION RESPIRATORY (INHALATION)
Status: CANCELLED | OUTPATIENT
Start: 2023-12-22

## 2023-12-20 RX ORDER — HEPARIN SODIUM (PORCINE) LOCK FLUSH IV SOLN 100 UNIT/ML 100 UNIT/ML
5 SOLUTION INTRAVENOUS
Status: CANCELLED | OUTPATIENT
Start: 2023-12-22

## 2023-12-20 RX ADMIN — IRON SUCROSE 200 MG: 20 INJECTION, SOLUTION INTRAVENOUS at 13:00

## 2023-12-20 RX ADMIN — SODIUM CHLORIDE 250 ML: 9 INJECTION, SOLUTION INTRAVENOUS at 13:01

## 2023-12-20 NOTE — PROGRESS NOTES
Infusion Nursing Note:  Brit Romano presents today for Venofer #4/5.    Patient seen by provider today: No   present during visit today: Not Applicable.    Note: Patient reports feeling much improved with the iron infusions; her joint and muscle pain is resolved and she states that she is no longer fatigued ongoing.  Patient observed for 15 minutes post infusion per protocol.    Intravenous Access:  No Intravenous access/labs at this visit.  Peripheral IV placed.    Treatment Conditions:  Not Applicable.      Post Infusion Assessment:  Patient tolerated infusion without incident.  Blood return noted pre and post infusion.  Site patent and intact, free from redness, edema or discomfort.  No evidence of extravasations.  Access discontinued per protocol.       Discharge Plan:   AVS to patient via [employer].  Patient will return 12/22 #5/5 Venofer for next appointment.   Patient discharged in stable condition accompanied by: self.  Departure Mode: Ambulatory.      Cori Porter RN

## 2023-12-22 ENCOUNTER — INFUSION THERAPY VISIT (OUTPATIENT)
Dept: INFUSION THERAPY | Facility: CLINIC | Age: 43
End: 2023-12-22
Attending: INTERNAL MEDICINE
Payer: COMMERCIAL

## 2023-12-22 VITALS
SYSTOLIC BLOOD PRESSURE: 134 MMHG | TEMPERATURE: 97.8 F | HEART RATE: 89 BPM | RESPIRATION RATE: 16 BRPM | DIASTOLIC BLOOD PRESSURE: 85 MMHG | OXYGEN SATURATION: 99 %

## 2023-12-22 DIAGNOSIS — D50.0 IRON DEFICIENCY ANEMIA DUE TO CHRONIC BLOOD LOSS: Primary | ICD-10-CM

## 2023-12-22 PROCEDURE — 96374 THER/PROPH/DIAG INJ IV PUSH: CPT

## 2023-12-22 PROCEDURE — 250N000011 HC RX IP 250 OP 636: Performed by: INTERNAL MEDICINE

## 2023-12-22 PROCEDURE — 258N000003 HC RX IP 258 OP 636: Performed by: INTERNAL MEDICINE

## 2023-12-22 RX ORDER — MEPERIDINE HYDROCHLORIDE 25 MG/ML
25 INJECTION INTRAMUSCULAR; INTRAVENOUS; SUBCUTANEOUS EVERY 30 MIN PRN
OUTPATIENT
Start: 2023-12-22

## 2023-12-22 RX ORDER — METHYLPREDNISOLONE SODIUM SUCCINATE 125 MG/2ML
125 INJECTION, POWDER, LYOPHILIZED, FOR SOLUTION INTRAMUSCULAR; INTRAVENOUS
Start: 2023-12-22

## 2023-12-22 RX ORDER — DIPHENHYDRAMINE HYDROCHLORIDE 50 MG/ML
50 INJECTION INTRAMUSCULAR; INTRAVENOUS
Start: 2023-12-22

## 2023-12-22 RX ORDER — HEPARIN SODIUM,PORCINE 10 UNIT/ML
5-20 VIAL (ML) INTRAVENOUS DAILY PRN
OUTPATIENT
Start: 2023-12-22

## 2023-12-22 RX ORDER — ALBUTEROL SULFATE 0.83 MG/ML
2.5 SOLUTION RESPIRATORY (INHALATION)
OUTPATIENT
Start: 2023-12-22

## 2023-12-22 RX ORDER — EPINEPHRINE 1 MG/ML
0.3 INJECTION, SOLUTION INTRAMUSCULAR; SUBCUTANEOUS EVERY 5 MIN PRN
OUTPATIENT
Start: 2023-12-22

## 2023-12-22 RX ORDER — ALBUTEROL SULFATE 90 UG/1
1-2 AEROSOL, METERED RESPIRATORY (INHALATION)
Start: 2023-12-22

## 2023-12-22 RX ORDER — HEPARIN SODIUM (PORCINE) LOCK FLUSH IV SOLN 100 UNIT/ML 100 UNIT/ML
5 SOLUTION INTRAVENOUS
OUTPATIENT
Start: 2023-12-22

## 2023-12-22 RX ADMIN — IRON SUCROSE 200 MG: 20 INJECTION, SOLUTION INTRAVENOUS at 12:54

## 2023-12-22 RX ADMIN — SODIUM CHLORIDE 250 ML: 9 INJECTION, SOLUTION INTRAVENOUS at 12:51

## 2023-12-22 NOTE — PROGRESS NOTES
Infusion Nursing Note:  Brit Romano presents today for Venofer #5 of 5.    Patient seen by provider today: No   present during visit today: Not Applicable.    Note: N/A.    Intravenous Access:  Peripheral IV placed.    Treatment Conditions:  Not Applicable.    Post Infusion Assessment:  Patient tolerated infusion without incident.  Blood return noted pre and post infusion.  Site patent and intact, free from redness, edema or discomfort.  No evidence of extravasations.  Access discontinued per protocol.     Discharge Plan:   Discharge instructions reviewed with: Patient.  Patient and/or family verbalized understanding of discharge instructions and all questions answered.  AVS to patient via [employer].  No further infusions needed at this time.  Patient will follow-up with ordering provider.   Patient discharged in stable condition accompanied by: self.  Departure Mode: Ambulatory.      Waqas Kim RN

## 2024-03-08 ENCOUNTER — TELEPHONE (OUTPATIENT)
Dept: GASTROENTEROLOGY | Facility: CLINIC | Age: 44
End: 2024-03-08
Payer: COMMERCIAL

## 2024-03-08 DIAGNOSIS — D64.9 ANEMIA: Primary | ICD-10-CM

## 2024-03-08 RX ORDER — ONDANSETRON 4 MG/1
TABLET, FILM COATED ORAL
Qty: 3 TABLET | Refills: 0 | Status: SHIPPED | OUTPATIENT
Start: 2024-03-08

## 2024-03-08 RX ORDER — BISACODYL 5 MG/1
TABLET, DELAYED RELEASE ORAL
Qty: 4 TABLET | Refills: 0 | Status: SHIPPED | OUTPATIENT
Start: 2024-03-08

## 2024-03-08 NOTE — TELEPHONE ENCOUNTER
Attempted to contact patient in order to complete pre assessment questions. Pre assessment completed below.     No answer. Left message to return call to [phone] option 4. Missed communication message sent via letter.        Lenore Gage RN  Endoscopy Procedure Pre Assessment RN

## 2024-03-08 NOTE — TELEPHONE ENCOUNTER
Pre visit planning completed.      Procedure details:    Patient scheduled for Colonoscopy/Upper endoscopy (EGD) on 3.20.2024.     Arrival time: 0715. Procedure time 0800    Pre op exam needed? N/A    Facility location: [facility]; [address] [place] [address]    Sedation type: Conscious sedation - pt tolerated 11.26.2023 EGD with CS    Indication for procedure: history of gastric ulcer, black stools, anemia, RUQ pain       Chart review:     Electronic implanted devices? No    Recent diagnosis of diverticulitis within the last 6 weeks? No    Diabetic? No    Diabetic medication HOLDING recommendations: (if applicable)  Oral diabetic medications: N/A  Diabetic injectables: N/A  Insulin: N/A      Medication review:    Anticoagulants? No    NSAIDS? No NSAID medications per patient's medication list.  RN will verify with pre-assessment call.    Other medication HOLDING recommendations:  No need to hold PPI.      Prep for procedure:     Bowel prep recommendation:  Verify bowel habits.  IBS with constipation and diarrhea noted on prob list.  Miralax PRN for constipation.         Leslie Weston RN  Endoscopy Procedure Pre Assessment RN  [phone] option 4

## 2024-03-08 NOTE — TELEPHONE ENCOUNTER
Pre assessment completed for upcoming procedure.    Patient states she had EGD done in November and does not want to repeat.  The patient requests to have EGD cancelled.    Message sent to schedulers to cancel EGD.    The patient reports she has to take a laxative or miralax to have bowel movement every day.  Writer ordered Extended prep with Zofran for patient.  Prep instructions sent via mail     Procedure details:    Arrival time and facility location reviewed.    Pre op exam needed? N/A    Designated  policy reviewed. Instructed to have someone stay 6 hours post procedure.     COVID policy reviewed.      Medication review:    Medications reviewed. Please see supporting documentation below. Holding recommendations discussed (if applicable).       Prep for procedure:     Reviewed procedure prep instructions.     Patient verbalized understanding and had no questions or concerns at this time.        Corinne Kliber, RN  Endoscopy Procedure Pre Assessment RN  [phone] option 4

## 2024-03-08 NOTE — LETTER
March 8, 2024      Brit Romano  [address]   [facility] [address]              Dear Brit,      Colonoscopy      Procedure date: 03/20/24    Anticipated arrival time: 0715 AM   (Procedure Times are Subject to Change)    Facility location: [facility]; [address] [place] [address] Check in: at the  of the main entrance.  Come through the roundabout underneath the awning (not the ER entrance).      Important Procedure Reminders:     Prep Instructions:   Instructions on how to prepare for your upcoming procedure are found below. Please read instructions carefully. Deviation from instructions may result in less than desired outcomes and procedure may need to be rescheduled.   If you have additional questions regarding how to prepare for your upcoming procedure, contact our endoscopy pre assessment nurses at [phone] option 4 Monday through Friday 7:00am-5:00pm     Policy:   On the day of your procedure, please designatean adult(s) who can drive you home and stay with you for 6 hours post procedure. The medicines used in the exam will make you sleepy. You will not be able to drive. You cannot take public transportation, ride share services, or non-medical taxi service without a responsible caregiver.    Medical transport services are allowed with the requirement that a responsible caregiver will receive you at your destination.  We require that drivers and caregivers are confirmed prior to your procedure.    Day of procedure:  Please do not wear jewelry (i.e. earrings, rings, necklaces, watches, etc) .   Leave your purse, billfold, credit cards, and other valuables at home.   Bring insurance card and ID.     To cancel or reschedule your procedure:   Within 14 days of your procedure if you develop any flu-like symptoms (such as fever, cough, shortness of breath), COVID-19 like symptoms or exposure to COVID-19, contact our endoscopy team at [phone] option 4 to  determine if procedure can be completed or needs to be delayed.   If you need to cancel or reschedule, our endoscopy scheduling team can be reached at [phone], option 2. Monday through Friday, 7:00am-5:00pm.      Medication Reminders:    Please note the following medication holding recommendations:   N/A    ----------------------------------------------------------------------------------------------------------------------------------------------------------------------------------------------------------------------------------------------------------------------    Golytely Extended Colonoscopy Prep  Prep instructions for colonoscopy   Pre-Assessment Phone Number: [facility]; [phone] option 4      Please read these instructions carefully at least 7 days prior to your colonoscopy procedure. Be sure to follow all directions completely. The inside of your colon must be clean to allow for a complete examination for the presence of any growths, polyps, and/or abnormalities, as well as their biopsy or removal. A number of tips are included in order to make this part of the procedure as comfortable as possible.    Immediately:  You will receive a call from a Nurse to review instructions and health history.  This assessment must be completed prior to your procedure.  Failure to complete the Nurse assessment may result in the procedure being cancelled.  You must arrange for an adult to drive you home after your exam. Your colonoscopy cannot be done unless you have a ride. If you need to use public transportation, someone must ride with you and stay with you for a minimum of 6-24 hours.  Check with your insurance company to be sure they will cover this exam.Arrange for a responsible adult to drive you home on the day of the exam. This cannot be a taxi or a bus as you will need someone with you after the procedure.    7 days prior:  Talk to your prescribing provider: If you take blood thinners (such as  Coumadin, Plavix, Xarelto, Eliquis, Lovenox, or others), these medications may need to be stopped temporarily before your procedure. Your prescribing provider will tell you what to do.   Talk to your prescribing provider: If you take prescription NSAIDS (such as Sulindac, Celebrex, Mobic, Relafen). Your prescribing provider will tell you what to do.   If you have diabetes, you should request an early morning appointment.  Stop taking fiber supplements and multivitamins containing iron, or any other medications containing iron.  Fill your prescription for 2 containers of Golytely and 4 Dulcolax tablets at the pharmacy.  It is very important that you stay well hydrated during the colonoscopy prep. The Golytely bowel prep is designed to clean out your colon, but it will not provide hydration. While you are taking the prescribed prep, you should also drink 64 oz. of Gatorade or similar sports drink product to drink for staying hydrated. (Avoid red and purple colors)  Stop eating corn, popcorn, nuts and foods with seeds.   Begin a restricted or low fiber diet (see list below).    2 days prior:  Drink fluids to be well hydrated, this is important. Drink at least 4 large glasses of water, Gatorade, or other similar sports drinks.  It is a good idea to use Vaseline on the skin around your anus after each bowel movement to prevent irritation. Wet wipes also help to reduce irritation.   You can have a light, low-fiber breakfast. You may also have a light, low-fiber lunch.  No solid foods after 1pm. Begin a clear liquid diet. (see list below).  At 4pm, take 2 Dulcolax (bisacodyl) tablets.  At 5pm, mix and drink half of a jug of Golytely bowel prep. Drink an 8 oz. Glass of Golytely every 10-15 minutes until half of the jug is gone. Place the remainder of the Golytely in the refrigerator. Stay close to the bathroom.     1 day prior:  Continue clear liquid diet only (see examples below). Do not eat solid food this day.  Do not  drink any red or purple colored drinks.  Stop taking NSAID pain relievers, such as Advil, Ibuprofen, Motrin, etc.  You may take Tylenol.  At 4 pm, take 2 Dulcolax (bisacodyl) tablets.  At 5 pm, drink the 2nd half of a jug of Golytely bowel prep. Drink an 8 oz. glass of Golytely every 10-15 minutes until the 1st jug of Golytely is gone.   The Golytely bowel prep will not keep you hydrated. You should drink 8-10 glasses of clear liquids throughout the day.  Before you go to bed, mix the 2nd container of Golytely and place in the refrigerator for the morning.      Procedure day:  6 hours before your check-in time, drink an 8 oz. Glass of Golytely every 10-15 minutes until half of the 2nd jug of Golytely is gone. You WILL NOT drink the entire 2nd jug of Golytely.   You may take your necessary morning medications with sips of water  Do not take diabetes medicine by mouth until after your exam.  You may drink clear liquids only up until 2 hours before your arrival time.  Do not smoke, chew tobacco, or swallow anything, including water or gum for at least 2 hours before your arrival time. This is a safety issue. Your procedure could be cancelled if you do not follow directions.  Please do not wear jewelry (i.e. earrings, rings, necklaces, watches, etc) . Leave your purse, billfold, credit cards, and other valuables at home.   Please arrive with a responsible adult who can take you home after the test and stay with you for a minimum of 24 hours: The medicine used will make you sleepy and forgetful. If you do not have someone to take you home, we will cancel your procedure. If using public transportation you must have someone to ride with you.  Please perform your nebulizer treatments and airway clearance therapy in the morning prior to the procedure (if applicable).  If you have asthma, bring your inhalers.    CLEAR LIQUID DIET   You may have:  Water, tea, coffee (no milk or cream)  Soda pop, Gatorade (not red or  purple)  Jell-O, Popsicles (no milk or fruit pieces - not red or purple)  Fat-free soup broth or bouillon  Plain hard candy, such as clear life savers (not red or purple)  Clear juices and fruit-flavored drinks, such as apple juice, white grape juice, Hi-C, and Sreedhar-Aid (not red or purple)   Do not have:  Milk or milk products such as ice cream, malts or shakes, or coffee creamer  Red or purple drinks of any kind such as cranberry juice or grape juice. Avoid red or purple Jell-O, Popsicles, Sreedhar-Aid, sorbet, sherbet and candy  Juices with pulp such as orange, grapefruit, pineapple or tomato juice  Cream soups of any kind  Alcohol and beer  Protein drinks or protein powder     LOW FIBER DIET   You may have:    Starches: White bread, rolls, biscuits, croissants, Emily toast, white flour tortillas, waffles, pancakes, Estonian toast; white rice, noodles, pasta, macaroni; cooked and peeled potatoes; plain crackers, saltines; cooked farina or cream of rice; puffed rice, corn flakes, Rice Krispies, Special K   Vegetables: tender cooked and canned, vegetable broths  Fruits and fruit juices: Strained fruit juice, canned fruit without seeds or skin (not pineapple), applesauce, pear sauce, ripe bananas, melons (not watermelon)   Milk products: Milk (plain or flavored), cheese, cottage cheese, yogurt (no berries), custard, ice cream    Proteins: Tender, well-cooked ground beef, lamb, veal, ham, pork, chicken, turkey, fish or organ meats; eggs; creamy peanut butter   Fats and condiments:  Margarine, butter, oils, mayonnaise, sour cream, salad dressing, plain gravy; spices, cooked herbs; sugar, clear jelly, honey, syrup   Snacks, sweets and drinks: Pretzels, hard candy; plain cakes and cookies (no nuts or seeds); gelatin, plain pudding, sherbet, Popsicles; coffee, tea, carbonated ( fizzy ) drinks Do not have:    Starches: Breads or rolls that contain nuts, seeds or fruit; whole wheat or whole grain breads that contain more than 1  gram of fiber per slice; cornbread; corn or whole wheat tortillas; potatoes with skin; brown rice, wild rice, kasha (buckwheat), and oatmeal   Vegetables: Any raw or steamed vegetables; vegetables with seeds; corn in any form   Fruits and fruit juices: Prunes, prune juice, raisins and other dried fruits, berries and other fruits with seeds, canned pineapple juices with pulp such as orange, grapefruit, pineapple or tomato juice  Milk products: Any yogurt with nuts, seeds or berries   Proteins: Tough, fibrous meats with gristle; cooked dried beans, peas or lentils; crunchy peanut butter  Fats and condiments: Pickles, olives, relish, horseradish; jam, marmalade, preserves   Snacks, sweets and drinks: Popcorn, nuts, seeds, granola, coconut, candies made with nuts or seeds; all desserts that contain nuts, seeds, raisins and other dried fruits, coconut, whole grains or bran.      FAQ:    How do you know if your colon is cleaned out?   After completing the bowel prep, your bowel movements should be all liquid and yellow. Your bowel movements will look similar to urine in the toilet. If there are pieces of stool (poop) in the toilet, or if you can't see to the bottom of the toilet, please call our office for advice. Call [phone] and ask to speak with a nurse.   Why do you need a responsible  to take you home and stay with you?  We require a responsible adult to take you home for your safety. The sedation medicines used to relax you during the procedure can impair your judgement and reaction time, make you forgetful and possible a little unsteady. Do not drive, make any important decisions, or sign any legal documents for 24 hours after your procedure.   It is normal to feel bloated and gassy after your procedure. Walking will help move the air through your colon. You can take non-aspirin pain relievers that contain acetaminophen (Tylenol).   When can you eat after your procedure?  You may resume your normal diet  when you feel ready, unless advised otherwise by the doctor performing your procedure. Do not drink alcohol for 24 hours after your procedure.   You many resume normal activities (work, exercise, etc.) after 24 hours.   When will you get test results?  You should have your procedure results and any lab results (if applicable) by letter, MyChart message, or phone call within 2 weeks. If you have any questions, please call the doctor that referred you for the procedure.       Thank you for choosing [facility] for your procedure. If you are sent a survey regarding your care, please take the time to complete the questionnaire. We value your feedback!

## 2024-03-08 NOTE — LETTER
March 8, 2024      Brit Romano  [address]   [facility] [address]              Brit,     We apologize that we have been unable to contact you by phone. We are calling in regards to your upcoming Colonoscopy/Upper endoscopy (EGD) procedure that is scheduled on 3/20/24 to complete pre assessment.    Please contact our pre assessment nursing team at [phone] option 4. We are open Monday through Friday, 7:00am to 5:00pm. Note that failure to complete Nurse assessment phone call will result in your procedure being cancelled.     Our schedules fill up quickly and are in high demand. If you know that you need to cancel, please contact us so that we can accommodate scheduling for other patients. Our endoscopy scheduling team can be reached at [phone] option 2.     Thank you,  [facility] Endoscopy Team

## 2024-03-18 NOTE — TELEPHONE ENCOUNTER
Patient called stating she does not think she can drink the golytely because of the taste.  Writer informed her that she can add a crystal light powder flavor pack to the mixture as long as it is not red or purple.      Patient understood and will try that.     Tianna Edwards RN

## 2024-03-20 ENCOUNTER — HOSPITAL ENCOUNTER (OUTPATIENT)
Facility: CLINIC | Age: 44
Discharge: HOME | End: 2024-03-20
Attending: INTERNAL MEDICINE | Admitting: INTERNAL MEDICINE
Payer: COMMERCIAL

## 2024-03-20 VITALS
SYSTOLIC BLOOD PRESSURE: 139 MMHG | DIASTOLIC BLOOD PRESSURE: 102 MMHG | HEART RATE: 91 BPM | BODY MASS INDEX: 32.99 KG/M2 | RESPIRATION RATE: 16 BRPM | HEIGHT: 65 IN | OXYGEN SATURATION: 100 % | WEIGHT: 198 LBS

## 2024-03-20 LAB — COLONOSCOPY: NORMAL

## 2024-03-20 PROCEDURE — 999N000127 HC STATISTIC PERIPHERAL IV START W US GUIDANCE

## 2024-03-20 PROCEDURE — 45380 COLONOSCOPY AND BIOPSY: CPT | Performed by: INTERNAL MEDICINE

## 2024-03-20 PROCEDURE — 250N000013 HC RX MED GY IP 250 OP 250 PS 637: Performed by: INTERNAL MEDICINE

## 2024-03-20 PROCEDURE — 88305 TISSUE EXAM BY PATHOLOGIST: CPT | Mod: 26 | Performed by: PATHOLOGY

## 2024-03-20 PROCEDURE — G0500 MOD SEDAT ENDO SERVICE >5YRS: HCPCS | Performed by: INTERNAL MEDICINE

## 2024-03-20 PROCEDURE — 88305 TISSUE EXAM BY PATHOLOGIST: CPT | Mod: TC | Performed by: INTERNAL MEDICINE

## 2024-03-20 PROCEDURE — 250N000011 HC RX IP 250 OP 636: Performed by: INTERNAL MEDICINE

## 2024-03-20 RX ORDER — NALOXONE HYDROCHLORIDE 0.4 MG/ML
0.4 INJECTION, SOLUTION INTRAMUSCULAR; INTRAVENOUS; SUBCUTANEOUS
Status: DISCONTINUED | OUTPATIENT
Start: 2024-03-20 | End: 2024-03-20 | Stop reason: HOSPADM

## 2024-03-20 RX ORDER — ONDANSETRON 2 MG/ML
4 INJECTION INTRAMUSCULAR; INTRAVENOUS
Status: DISCONTINUED | OUTPATIENT
Start: 2024-03-20 | End: 2024-03-20 | Stop reason: HOSPADM

## 2024-03-20 RX ORDER — FLUMAZENIL 0.1 MG/ML
0.2 INJECTION, SOLUTION INTRAVENOUS
Status: CANCELLED | OUTPATIENT
Start: 2024-03-20 | End: 2024-03-20

## 2024-03-20 RX ORDER — NALOXONE HYDROCHLORIDE 0.4 MG/ML
0.2 INJECTION, SOLUTION INTRAMUSCULAR; INTRAVENOUS; SUBCUTANEOUS
Status: DISCONTINUED | OUTPATIENT
Start: 2024-03-20 | End: 2024-03-20 | Stop reason: HOSPADM

## 2024-03-20 RX ORDER — SIMETHICONE 40MG/0.6ML
133 SUSPENSION, DROPS(FINAL DOSAGE FORM)(ML) ORAL
Status: COMPLETED | OUTPATIENT
Start: 2024-03-20 | End: 2024-03-20

## 2024-03-20 RX ORDER — FENTANYL CITRATE 50 UG/ML
50-100 INJECTION, SOLUTION INTRAMUSCULAR; INTRAVENOUS EVERY 5 MIN PRN
Status: DISCONTINUED | OUTPATIENT
Start: 2024-03-20 | End: 2024-03-20 | Stop reason: HOSPADM

## 2024-03-20 RX ORDER — FLUMAZENIL 0.1 MG/ML
0.2 INJECTION, SOLUTION INTRAVENOUS
Status: DISCONTINUED | OUTPATIENT
Start: 2024-03-20 | End: 2024-03-20 | Stop reason: HOSPADM

## 2024-03-20 RX ORDER — LIDOCAINE 40 MG/G
CREAM TOPICAL
Status: DISCONTINUED | OUTPATIENT
Start: 2024-03-20 | End: 2024-03-20 | Stop reason: HOSPADM

## 2024-03-20 RX ORDER — DIPHENHYDRAMINE HYDROCHLORIDE 50 MG/ML
25-50 INJECTION INTRAMUSCULAR; INTRAVENOUS
Status: DISCONTINUED | OUTPATIENT
Start: 2024-03-20 | End: 2024-03-20 | Stop reason: HOSPADM

## 2024-03-20 RX ORDER — EPINEPHRINE 1 MG/ML
0.1 INJECTION, SOLUTION INTRAMUSCULAR; SUBCUTANEOUS
Status: DISCONTINUED | OUTPATIENT
Start: 2024-03-20 | End: 2024-03-20 | Stop reason: HOSPADM

## 2024-03-20 RX ORDER — ATROPINE SULFATE 0.1 MG/ML
1 INJECTION INTRAVENOUS
Status: DISCONTINUED | OUTPATIENT
Start: 2024-03-20 | End: 2024-03-20 | Stop reason: HOSPADM

## 2024-03-20 RX ADMIN — FENTANYL CITRATE 25 MCG: 0.05 INJECTION, SOLUTION INTRAMUSCULAR; INTRAVENOUS at 08:06

## 2024-03-20 RX ADMIN — Medication 133 MG: at 08:10

## 2024-03-20 RX ADMIN — MIDAZOLAM 1 MG: 1 INJECTION INTRAMUSCULAR; INTRAVENOUS at 08:03

## 2024-03-20 RX ADMIN — FENTANYL CITRATE 50 MCG: 0.05 INJECTION, SOLUTION INTRAMUSCULAR; INTRAVENOUS at 08:02

## 2024-03-20 RX ADMIN — MIDAZOLAM 2 MG: 1 INJECTION INTRAMUSCULAR; INTRAVENOUS at 08:02

## 2024-03-20 ASSESSMENT — ACTIVITIES OF DAILY LIVING (ADL): ADLS_ACUITY_SCORE: 35

## 2024-03-20 NOTE — H&P
Gastroenterology Pre-Procedure Note    Brit Romano MRN#  [id number]   Age:  44 year old YOB: 1980    Date of Admission:  3/20/2024     PROCEDURE   Date of Procedure: 3/20/2024  Primary care provider: Jeannette Conway  Type of Endoscopy: colonoscopy  Reason for Procedure: anemia, loose stools  Type of Anesthesia Anticipated: Moderate (conscious) sedation     SUBJECTIVE   Brit is a 44 year old female who will be undergoing the above procedure.      A history and physical has been performed. The patient's medications and allergies have been reviewed. The risks and benefits of the procedure and the sedation options and risks were discussed with the patient.  All questions were answered and informed consent was obtained.       PAST HISTORY        Past Medical History:   Diagnosis Date    Fatty liver     Fibromyalgia     History of gestational diabetes     Seasonal allergies       Past Surgical History:   Procedure Laterality Date    ESOPHAGOSCOPY, GASTROSCOPY, DUODENOSCOPY (EGD), COMBINED N/A 11/26/2023    Procedure: Esophagoscopy, gastroscopy, duodenoscopy (EGD), combined;  Surgeon: Fernie Billy MD;  Location: [facility]    NO HISTORY OF SURGERY        Family History Social History   Family History   Problem Relation Age of Onset    Hypertension Mother     Diabetes Mother     Kidney Disease Mother         dialysis    Glaucoma Mother     Diabetes Son     Macular Degeneration No family hx of     Colon Cancer No family hx of     Social History     Socioeconomic History    Marital status:      Spouse name: Not on file    Number of children: Not on file    Years of education: Not on file    Highest education level: Not on file   Occupational History    Occupation: Housewife   Tobacco Use    Smoking status: Never    Smokeless tobacco: Never   Vaping Use    Vaping Use: Never used   Substance and Sexual Activity    Alcohol use: No     Alcohol/week: 0.0 standard drinks of alcohol    Drug use: No    Sexual  activity: Yes     Partners: Male     Birth control/protection: None   Other Topics Concern    Parent/sibling w/ CABG, MI or angioplasty before 65F 55M? Not Asked   Social History Narrative    How much exercise per week? none    How much calcium per day? diet       How much caffeine per day? none    How much vitamin D per day? Diet/supplement    Do you/your family wear seatbelts?  Yes    Do you/your family use safety helmets? No    Do you/your family use sunscreen? No    Do you/your family keep firearms in the home? No    Do you/your family have a smoke detector(s)? Yes        Do you feel safe in your home? Yes    Has anyone ever touched you in an unwanted manner? No     Explain     Cande Persaud, [facility]    08/21/2015        Review Maya Zamorano [facility]    10/07/2016     Social Determinants of Health     Financial Resource Strain: Low Risk  (11/24/2023)    Financial Resource Strain     Within the past 12 months, have you or your family members you live with been unable to get utilities (heat, electricity) when it was really needed?: No   Food Insecurity: Low Risk  (11/24/2023)    Food Insecurity     Within the past 12 months, did you worry that your food would run out before you got money to buy more?: No     Within the past 12 months, did the food you bought just not last and you didn t have money to get more?: No   Transportation Needs: Low Risk  (11/24/2023)    Transportation Needs     Within the past 12 months, has lack of transportation kept you from medical appointments, getting your medicines, non-medical meetings or appointments, work, or from getting things that you need?: No   Physical Activity: Not on file   Stress: Not on file   Social Connections: Not on file   Interpersonal Safety: Low Risk  (12/13/2023)    Interpersonal Safety     Do you feel physically and emotionally safe where you currently live?: Yes     Within the past 12 months, have you been hit, slapped, kicked or otherwise physically hurt by  someone?: No     Within the past 12 months, have you been humiliated or emotionally abused in other ways by your partner or ex-partner?: No   Housing Stability: Low Risk  (11/24/2023)    Housing Stability     Do you have housing? : Yes     Are you worried about losing your housing?: No        MEDICATIONS & ALLERGIES     Medications Prior to Admission   Medication Sig Dispense Refill Last Dose    acetaminophen (TYLENOL) 325 MG tablet Take 2 tablets (650 mg) by mouth every 6 hours as needed for mild pain Start after Delivery. 100 tablet 0     cetirizine (ZYRTEC) 10 MG tablet Take 1 tablet (10 mg) by mouth daily 30 tablet 11     fluticasone (FLONASE) 50 MCG/ACT nasal spray Spray 2 sprays into both nostrils daily (Patient taking differently: Spray 2 sprays into both nostrils daily as needed) 16 g 11     hydrocortisone, Perianal, (HYDROCORTISONE) 2.5 % cream Place rectally 2 times daily as needed for hemorrhoids 30 g 1     omeprazole (PRILOSEC) 40 MG DR capsule TAKE 1 CAPSULE(40 MG) BY MOUTH DAILY 90 capsule 3     bisacodyl (DULCOLAX) 5 MG EC tablet Two days prior to exam take two (2) tablets at 4pm. One day prior to exam take two (2) tablets at 4pm 4 tablet 0     calcium carbonate (TUMS) 500 MG chewable tablet Take 1 tablet (500 mg) by mouth 3 times daily as needed for heartburn (Patient not taking: Reported on 12/13/2023) 90 tablet 3     ondansetron (ZOFRAN) 4 MG tablet Take one tablet every six hours for nausea during colonoscopy bowel prepping 3 tablet 0     polyethylene glycol (GOLYTELY) 236 g suspension Take as directed. Two days before your exam fill the first container with water. Cover and shake until mixed well. At 5:00pm drink one 8oz glass every 10-15 minutes until half (1/2) of the first container is empty. Store the remainder in the refrigerator. One day before your exam at 5:00pm drink the second half of the first container until it is gone. Before you go to bed mix the second container with water and put  "in refrigerator. Six hours before your check in time drink one 8oz glass every 10-15 minutes until half of container is empty. Discard the remainder of solution. 8000 mL 0     polyethylene glycol (MIRALAX) 17 GM/Dose powder Take 17 g by mouth daily as needed for constipation 510 g 3       Allergies   Allergen Reactions    Sodium Citrate Diarrhea        OBJECTIVE   Vitals BP (!) 139/91   Pulse 84   Resp (!) 8   Ht 1.651 m (5' 5\")   Wt 89.8 kg (198 lb)   SpO2 100%   BMI 32.95 kg/m          General:   Well-developed  in NAD.   HEENT:   NC/AT. MMM.   Lungs:  No respiratory distress.   Heart:  RRR. +S1, S2.    Abdomen:   Soft. NT/ND. BS active.    Ext/MS:   No cyanosis or erythema.    Neuro:   No focal deficits noted.    Psych:  Appears to have normal affect and mood.   Skin:  No obvious rashes or lesions noted.           IMPRESSION   ASA Class 1 - Healthy patient, no medical problems    Proceed with above stated procedure with sedation plan as described.       Imtiaz Shankar MD  [facility]  [phone]      "

## 2024-03-20 NOTE — LETTER
March 28, 2024      Brit Romano  [address]   [facility] [address]        Dear ,    We are writing to inform you of your test results. Good news - your colonoscopy and biopsies were normal. No concerns.    Resulted Orders   COLONOSCOPY   Result Value Ref Range    COLONOSCOPY       [facility]  _______________________________________________________________________________  Patient Name: Brit Romano        Procedure Date: 3/20/2024 7:29 AM  MRN: [id number]                       Account Number: [id number]  YOB: 1980               Admit Type: Outpatient  Age: 44                               Gender: Female  Attending MD: YAMIL DIANE , ,       Total Sedation Time: approximately 15 min  Instrument Name: 223 - Adult Colonoscope   _______________________________________________________________________________     Procedure:                Colonoscopy  Indications:              Iron deficiency anemia, loose stools  Providers:                YAMIL DIANE (Doctor)  Referring MD:             TERESA REYES (Referring MD)  Medicines:                Fentanyl 75 micrograms IV, Midazolam 3 mg IV  Complications:            No immediate complications.  _______________________________________________________________________________  P rocedure:                Pre-Anesthesia Assessment:                            - After reviewing the risks and benefits, the                             patient was deemed in satisfactory condition to                             undergo the procedure.                            After obtaining informed consent, the colonoscope                             was passed under direct vision. Throughout the                             procedure, the patient's blood pressure, pulse, and                             oxygen saturations were monitored continuously. The                             Olympus Adult Colonoscope, Model # CF-OD119W,                              Censitrac # [id number] was introduced through the                             anus and advanced to the cecum, identified by                             appendiceal orifice and ileocecal valve. The                             colonoscopy was performed without difficulty. The                             patient tolerated the procedur e well. The quality                             of the bowel preparation was excellent. The                             terminal ileum, ileocecal valve, appendiceal                             orifice, and rectum were photographed.                                                                                   Findings:       The digital rectal exam was normal.       The colon (entire examined portion) appeared normal. Biopsies for        histology were taken with a cold forceps for evaluation of microscopic        colitis.       The terminal ileum appeared normal.                                                                                   Impression:               - The entire examined colon is normal. Biopsied.                            - The examined portion of the ileum was normal.  Recommendation:           - Repeat colonoscopy in 10 years for screening                             purposes.                                                                                    Procedure Code(s):       --- Professional ---       99466, Colonoscopy, flexible; with biopsy, single or multiple  Diagnosis Code(s):       --- Professional ---       D50.9, Iron deficiency anemia, unspecified    CPT copyright 2022 [employer]. All rights reserved.    The codes documented in this report are preliminary and upon  review may   be revised to meet current compliance requirements.    Electronically signed by Imtiaz Diane MD  _______________  IMTIAZ DIANE,   3/20/2024 8:28:39 AM  I was physically present for the entire viewing portion of the  exam.  __________________________YAMIL DIANE  Number of Addenda: 0    Note Initiated On: 3/20/2024 7:29 AM  MRN:                      [id number]  Procedure Date:           3/20/2024 7:29:56 AM  Scope Withdrawal Time: 0 hours 9 minutes 19 seconds   Total Procedure Duration: 0 hours 14 minutes 39 seconds   Estimated Blood Loss:       Scope In: 8:03:31 AM  Scope Out: 8:18:10 AM         If you have any questions or concerns, please call the clinic at the number listed above.       Sincerely,      Cheyenne Loving MD

## 2024-03-21 LAB
PATH REPORT.COMMENTS IMP SPEC: NORMAL
PATH REPORT.COMMENTS IMP SPEC: NORMAL
PATH REPORT.FINAL DX SPEC: NORMAL
PATH REPORT.GROSS SPEC: NORMAL
PATH REPORT.MICROSCOPIC SPEC OTHER STN: NORMAL
PATH REPORT.RELEVANT HX SPEC: NORMAL
PHOTO IMAGE: NORMAL

## 2025-01-25 DIAGNOSIS — H10.13 ALLERGIC CONJUNCTIVITIS OF BOTH EYES: ICD-10-CM

## 2025-01-25 DIAGNOSIS — K21.9 GASTROESOPHAGEAL REFLUX DISEASE WITHOUT ESOPHAGITIS: ICD-10-CM

## 2025-01-25 DIAGNOSIS — J30.1 SEASONAL ALLERGIC RHINITIS DUE TO POLLEN: ICD-10-CM

## 2025-01-25 DIAGNOSIS — J30.89 ALLERGIC RHINITIS DUE TO DUST MITE: ICD-10-CM

## 2025-01-27 RX ORDER — OMEPRAZOLE 40 MG/1
CAPSULE, DELAYED RELEASE ORAL
Qty: 90 CAPSULE | Refills: 0 | Status: SHIPPED | OUTPATIENT
Start: 2025-01-27

## 2025-01-27 RX ORDER — CETIRIZINE HYDROCHLORIDE 10 MG/1
10 TABLET ORAL DAILY
Qty: 30 TABLET | Refills: 0 | Status: SHIPPED | OUTPATIENT
Start: 2025-01-27

## (undated) DEVICE — KIT ENDO TURNOVER/PROCEDURE W/CLEAN A SCOPE LINERS 103888

## (undated) DEVICE — ENDO FORCEP SPIKED SERRATED SHAFT JUMBO 239CM G56998

## (undated) RX ORDER — FENTANYL CITRATE 50 UG/ML
INJECTION, SOLUTION INTRAMUSCULAR; INTRAVENOUS
Status: DISPENSED
Start: 2024-03-20

## (undated) RX ORDER — SIMETHICONE 40MG/0.6ML
SUSPENSION, DROPS(FINAL DOSAGE FORM)(ML) ORAL
Status: DISPENSED
Start: 2024-03-20

## (undated) RX ORDER — FENTANYL CITRATE 50 UG/ML
INJECTION, SOLUTION INTRAMUSCULAR; INTRAVENOUS
Status: DISPENSED
Start: 2023-11-26